# Patient Record
Sex: FEMALE | Race: WHITE | NOT HISPANIC OR LATINO | Employment: PART TIME | URBAN - METROPOLITAN AREA
[De-identification: names, ages, dates, MRNs, and addresses within clinical notes are randomized per-mention and may not be internally consistent; named-entity substitution may affect disease eponyms.]

---

## 2017-01-23 ENCOUNTER — ALLSCRIPTS OFFICE VISIT (OUTPATIENT)
Dept: OTHER | Facility: OTHER | Age: 47
End: 2017-01-23

## 2017-01-23 DIAGNOSIS — K91.2 POSTSURGICAL MALABSORPTION, NOT ELSEWHERE CLASSIFIED: ICD-10-CM

## 2017-01-23 DIAGNOSIS — K21.9 GASTRO-ESOPHAGEAL REFLUX DISEASE WITHOUT ESOPHAGITIS: ICD-10-CM

## 2017-01-23 DIAGNOSIS — E55.9 VITAMIN D DEFICIENCY: ICD-10-CM

## 2017-01-23 DIAGNOSIS — Z13.220 ENCOUNTER FOR SCREENING FOR LIPOID DISORDERS: ICD-10-CM

## 2017-01-23 DIAGNOSIS — G43.909 MIGRAINE WITHOUT STATUS MIGRAINOSUS, NOT INTRACTABLE: ICD-10-CM

## 2017-03-07 ENCOUNTER — GENERIC CONVERSION - ENCOUNTER (OUTPATIENT)
Dept: OTHER | Facility: OTHER | Age: 47
End: 2017-03-07

## 2017-03-30 ENCOUNTER — ALLSCRIPTS OFFICE VISIT (OUTPATIENT)
Dept: OTHER | Facility: OTHER | Age: 47
End: 2017-03-30

## 2017-06-29 ENCOUNTER — ALLSCRIPTS OFFICE VISIT (OUTPATIENT)
Dept: OTHER | Facility: OTHER | Age: 47
End: 2017-06-29

## 2017-06-29 ENCOUNTER — APPOINTMENT (OUTPATIENT)
Dept: LAB | Facility: CLINIC | Age: 47
End: 2017-06-29
Payer: COMMERCIAL

## 2017-06-29 ENCOUNTER — TRANSCRIBE ORDERS (OUTPATIENT)
Dept: LAB | Facility: CLINIC | Age: 47
End: 2017-06-29

## 2017-06-29 DIAGNOSIS — G43.909 MIGRAINE WITHOUT STATUS MIGRAINOSUS, NOT INTRACTABLE: ICD-10-CM

## 2017-06-29 DIAGNOSIS — E55.9 VITAMIN D DEFICIENCY: ICD-10-CM

## 2017-06-29 DIAGNOSIS — K91.2 POSTSURGICAL MALABSORPTION, NOT ELSEWHERE CLASSIFIED: ICD-10-CM

## 2017-06-29 DIAGNOSIS — K21.9 GASTRO-ESOPHAGEAL REFLUX DISEASE WITHOUT ESOPHAGITIS: ICD-10-CM

## 2017-06-29 DIAGNOSIS — Z13.220 ENCOUNTER FOR SCREENING FOR LIPOID DISORDERS: ICD-10-CM

## 2017-06-29 LAB
25(OH)D3 SERPL-MCNC: 29.8 NG/ML (ref 30–100)
ALBUMIN SERPL BCP-MCNC: 3.2 G/DL (ref 3.5–5)
ALP SERPL-CCNC: 88 U/L (ref 46–116)
ALT SERPL W P-5'-P-CCNC: 22 U/L (ref 12–78)
ANION GAP SERPL CALCULATED.3IONS-SCNC: 4 MMOL/L (ref 4–13)
AST SERPL W P-5'-P-CCNC: 21 U/L (ref 5–45)
BASOPHILS # BLD AUTO: 0.02 THOUSANDS/ΜL (ref 0–0.1)
BASOPHILS NFR BLD AUTO: 0 % (ref 0–1)
BILIRUB SERPL-MCNC: 0.24 MG/DL (ref 0.2–1)
BUN SERPL-MCNC: 8 MG/DL (ref 5–25)
CALCIUM SERPL-MCNC: 8.3 MG/DL (ref 8.3–10.1)
CHLORIDE SERPL-SCNC: 108 MMOL/L (ref 100–108)
CHOLEST SERPL-MCNC: 172 MG/DL (ref 50–200)
CO2 SERPL-SCNC: 26 MMOL/L (ref 21–32)
CREAT SERPL-MCNC: 0.6 MG/DL (ref 0.6–1.3)
EOSINOPHIL # BLD AUTO: 0.24 THOUSAND/ΜL (ref 0–0.61)
EOSINOPHIL NFR BLD AUTO: 3 % (ref 0–6)
ERYTHROCYTE [DISTWIDTH] IN BLOOD BY AUTOMATED COUNT: 14.4 % (ref 11.6–15.1)
FOLATE SERPL-MCNC: 18.8 NG/ML (ref 3.1–17.5)
GFR SERPL CREATININE-BSD FRML MDRD: >60 ML/MIN/1.73SQ M
GLUCOSE P FAST SERPL-MCNC: 79 MG/DL (ref 65–99)
HCT VFR BLD AUTO: 37.2 % (ref 34.8–46.1)
HDLC SERPL-MCNC: 61 MG/DL (ref 40–60)
HGB BLD-MCNC: 11.9 G/DL (ref 11.5–15.4)
LDLC SERPL CALC-MCNC: 97 MG/DL (ref 0–100)
LYMPHOCYTES # BLD AUTO: 2.32 THOUSANDS/ΜL (ref 0.6–4.47)
LYMPHOCYTES NFR BLD AUTO: 33 % (ref 14–44)
MCH RBC QN AUTO: 28.7 PG (ref 26.8–34.3)
MCHC RBC AUTO-ENTMCNC: 32 G/DL (ref 31.4–37.4)
MCV RBC AUTO: 90 FL (ref 82–98)
MONOCYTES # BLD AUTO: 0.5 THOUSAND/ΜL (ref 0.17–1.22)
MONOCYTES NFR BLD AUTO: 7 % (ref 4–12)
NEUTROPHILS # BLD AUTO: 3.87 THOUSANDS/ΜL (ref 1.85–7.62)
NEUTS SEG NFR BLD AUTO: 57 % (ref 43–75)
NRBC BLD AUTO-RTO: 0 /100 WBCS
PLATELET # BLD AUTO: 285 THOUSANDS/UL (ref 149–390)
PMV BLD AUTO: 10.1 FL (ref 8.9–12.7)
POTASSIUM SERPL-SCNC: 4 MMOL/L (ref 3.5–5.3)
PROT SERPL-MCNC: 7.2 G/DL (ref 6.4–8.2)
RBC # BLD AUTO: 4.15 MILLION/UL (ref 3.81–5.12)
SODIUM SERPL-SCNC: 138 MMOL/L (ref 136–145)
TRIGL SERPL-MCNC: 70 MG/DL
VIT B12 SERPL-MCNC: 256 PG/ML (ref 100–900)
WBC # BLD AUTO: 6.96 THOUSAND/UL (ref 4.31–10.16)

## 2017-06-29 PROCEDURE — 82607 VITAMIN B-12: CPT

## 2017-06-29 PROCEDURE — 84207 ASSAY OF VITAMIN B-6: CPT

## 2017-06-29 PROCEDURE — 80061 LIPID PANEL: CPT

## 2017-06-29 PROCEDURE — 82746 ASSAY OF FOLIC ACID SERUM: CPT

## 2017-06-29 PROCEDURE — 85025 COMPLETE CBC W/AUTO DIFF WBC: CPT

## 2017-06-29 PROCEDURE — 36415 COLL VENOUS BLD VENIPUNCTURE: CPT

## 2017-06-29 PROCEDURE — 80053 COMPREHEN METABOLIC PANEL: CPT

## 2017-06-29 PROCEDURE — 82306 VITAMIN D 25 HYDROXY: CPT

## 2017-06-29 PROCEDURE — 84425 ASSAY OF VITAMIN B-1: CPT

## 2017-07-02 LAB — VIT B1 BLD-SCNC: 206.6 NMOL/L (ref 66.5–200)

## 2017-07-03 LAB — VIT B6 SERPL-MCNC: 7.1 UG/L (ref 2–32.8)

## 2017-09-12 ENCOUNTER — GENERIC CONVERSION - ENCOUNTER (OUTPATIENT)
Dept: OTHER | Facility: OTHER | Age: 47
End: 2017-09-12

## 2017-10-12 ENCOUNTER — GENERIC CONVERSION - ENCOUNTER (OUTPATIENT)
Dept: OTHER | Facility: OTHER | Age: 47
End: 2017-10-12

## 2018-01-12 VITALS
TEMPERATURE: 97.9 F | HEIGHT: 65 IN | DIASTOLIC BLOOD PRESSURE: 94 MMHG | BODY MASS INDEX: 31.74 KG/M2 | RESPIRATION RATE: 15 BRPM | WEIGHT: 190.5 LBS | HEART RATE: 72 BPM | SYSTOLIC BLOOD PRESSURE: 146 MMHG

## 2018-01-12 NOTE — MISCELLANEOUS
Message   Date: 07 Mar 2017 9:37 AM EST, Recorded By: Fabio Pepe   Calling For: Rickie Walters   Caller: Chase Zapata, Self   Phone: [phone] (Home), [phone] (Work)   Reason: Medical Complaint   cancelled f/u appointment for today, has a migraine, requesting Sumatriptan RX, sent to pharmacy        Active Problems   1  Allergic rhinitis (477 9) (J30 9)  2  Cervical cancer screening (V76 2) (Z12 4)  3  Depression (311) (F32 9)  4  Encounter to establish care with new doctor (V65 8) (Z71 89)  5  GERD without esophagitis (530 81) (K21 9)  6  Insomnia (780 52) (G47 00)  7  Lipid screening (V77 91) (Z13 220)  8  Migraine, unspecified, not intractable, without status migrainosus (346 90) (G43 909)  9  Need for prophylactic vaccination and inoculation against influenza (V04 81) (Z23)  10  Pain syndrome, chronic (338 4) (G89 4)  11  Postgastrectomy malabsorption (579 3) (K91 2,Z90 3)  12  Visit for screening mammogram (V76 12) (Z12 31)  13  Vitamin D deficiency (268 9) (E55 9)  14  Weight gain (783 1) (R63 5)    Current Meds  1  B-12 2500 MCG Oral Tablet; 1 TAB DAILY; Therapy: (Brian Flores) to Recorded  2  Carafate 1 GM/10ML Oral Suspension; 1 gram twice a day as needed; Therapy: 44JQR9778 to (Last Rx:12Jan2017)  Requested for: 12Jan2017; Status: ACTIVE   - Transmit to Pharmacy - Awaiting Verification Ordered  3  Fruity Chewables Multivitamin CHEW;   Therapy: (Recorded:16Nov2015) to Recorded  4  Methadone HCl - 10 MG Oral Tablet; TAKE 2 TABLET 3 TIMES DAILY AS NEEDED FOR   PAIN;   Therapy: 21IAQ1355 to (Evaluate:22Mar2017); Last Rx:23Eia2566 Ordered  5  Necon 1/35 (28) 1-35 MG-MCG Oral Tablet; Therapy: 19BAB9198 to (Last QL:23YII0190)  Requested for: 69OIU7002 Ordered  6  PreviDent 5000 Sensitive 1 1-5 % Dental Paste; Therapy: 80HEH4905 to (Last Rx:01Feb2012)  Requested for: 01Feb2012 Ordered  7  Probiotic CAPS; Therapy: (Recorded:16Nov2015) to Recorded  8   Sertraline HCl - 100 MG Oral Tablet; take 3 tablets daily; Therapy: 91QIF3386 to (Evaluate:25Kfy0386)  Requested for: 82AJC4176; Last   Rx:25Nov2016 Ordered  9  Topiramate 50 MG Oral Tablet; TAKE 1 TABLET TWICE DAILY; Therapy: 21ZSV4750 to )  Requested for: 86MKT1362; Last   Rx:23Jan2017; Status: ACTIVE - Transmit to [facility] Verification Ordered  10  TraZODone HCl - 150 MG Oral Tablet; TAKE 1 TABLET AT BEDTIME; Therapy: 61ZYK0994 to (Evaluate:71Vhe4343)  Requested for: 50YJL3642; Last    Rx:01Mar2017 Ordered  11  Zolpidem Tartrate 5 MG Oral Tablet; TAKE ONE TABLET BY MOUTH AT BEDTIME AS    NEEDED FOR SLEEP; Therapy: 27XPR1948 to (Evaluate:23May2017); Last Rx:23Jan2017 Ordered    Allergies   1  Aspirin TABS  2  Morphine Derivatives  3  Stadol SOLN  4  Sulfa Drugs  5  Amoxicillin TABS  6  Azithromycin TABS  7  Macrodantin CAPS  8  Neurontin CAPS  9  Cymbalta CPEP    Plan  Migraine, unspecified, not intractable, without status migrainosus    · Renew: SUMAtriptan Succinate 100 MG Oral Tablet; TAKE 1 TABLET AT ONSET OF  MIGRAINE  MAY REPEAT ONCE AFTER 2 HOURS   MAX 2 DOSES/24 HOURS    Signatures   Electronically signed by : PAULA Flores ; Mar  7 2017 12:15PM EST                       (Author)

## 2018-01-12 NOTE — MISCELLANEOUS
Provider Comments  Provider Comments:   Patient NO SHOW on 4/7/2016 at 10:30am       Signatures   Electronically signed by : PAULA Harper ; Apr 18 2016  3:11PM EST                       (Author)

## 2018-01-13 VITALS
BODY MASS INDEX: 29.89 KG/M2 | DIASTOLIC BLOOD PRESSURE: 82 MMHG | TEMPERATURE: 98.7 F | SYSTOLIC BLOOD PRESSURE: 124 MMHG | HEIGHT: 65 IN | RESPIRATION RATE: 15 BRPM | HEART RATE: 68 BPM | WEIGHT: 179.38 LBS

## 2018-01-13 VITALS
HEIGHT: 65 IN | RESPIRATION RATE: 15 BRPM | DIASTOLIC BLOOD PRESSURE: 64 MMHG | WEIGHT: 168.38 LBS | HEART RATE: 72 BPM | BODY MASS INDEX: 28.06 KG/M2 | TEMPERATURE: 97.7 F | SYSTOLIC BLOOD PRESSURE: 112 MMHG

## 2018-01-13 NOTE — RESULT NOTES
Message      Recorded as Task   Date: 04/21/2016 10:18 AM, Created By: Mariel Auguste   Task Name: Follow Up   Assigned To: May Gordon   Regarding Patient: Zayda Vidal, Status: Active   Comment:    Brittani French - 21 Apr 2016 10:18 AM     TASK CREATED  Vit D level was borderline low  Sending in a prescription for vit D   Corina Cedillo - 21 Apr 2016 1:24 PM     TASK EDITED  Call patient and told her that her vitamin D was low and that the doctor send over a Rx

## 2018-01-14 NOTE — MISCELLANEOUS
Message  She called stating that this morning her right eye was crusty  It feels a little red  No vision problems  No discharge  I sent her olopatadine drops  She should call us if redness worsens or starts to have discharge  Symptoms at this time do not seem consistent with bacterial conjunctivitis  Plan  Allergic conjunctivitis of right eye    · Start: Olopatadine HCl - 0 1 % Ophthalmic Solution; INSTILL 1 DROP INTO AFFECTED  EYE(S) TWICE DAILY AS DIRECTED  Allergic rhinitis, Depression, GERD without esophagitis, Insomnia, Lipid screening,  Migraine, Pain syndrome, chronic, Status post gastric bypass for obesity    · Follow-up visit in 4 Months Evaluation and Treatment  Follow-up  Status: Complete  Done:  00LPM4974  Depression, Insomnia    · *1 - [facility] Physician Referral  Consult  Status: Active   Requested for: 13Apr2016  Behavioral Health Referral Questions : Patient requires outpatient Psychotherapy   assessment/intake appointment (with licensed therapist)  (MU) Care Summary provided  : Yes  Depression, Insomnia, Migraine, Status post gastric bypass for obesity, Vitamin D  deficiency    · (1) VITAMIN D 25-HYDROXY; Status:Active;  Requested for:13Apr2016;     Signatures   Electronically signed by : PAULA Duron ; Apr 22 2016 11:03AM EST                       (Author)

## 2018-01-22 VITALS
HEART RATE: 60 BPM | TEMPERATURE: 98.3 F | WEIGHT: 162 LBS | BODY MASS INDEX: 26.99 KG/M2 | HEIGHT: 65 IN | DIASTOLIC BLOOD PRESSURE: 82 MMHG | RESPIRATION RATE: 15 BRPM | SYSTOLIC BLOOD PRESSURE: 124 MMHG

## 2018-02-09 DIAGNOSIS — F41.9 ANXIETY: Primary | ICD-10-CM

## 2018-02-09 RX ORDER — ALPRAZOLAM 1 MG/1
TABLET ORAL
Qty: 5 TABLET | Refills: 0 | OUTPATIENT
Start: 2018-02-09 | End: 2018-03-27 | Stop reason: SDUPTHER

## 2018-02-09 RX ORDER — ALPRAZOLAM 1 MG/1
1 TABLET ORAL
COMMUNITY
Start: 2011-08-26 | End: 2018-02-09 | Stop reason: SDUPTHER

## 2018-02-14 ENCOUNTER — OFFICE VISIT (OUTPATIENT)
Dept: INTERNAL MEDICINE CLINIC | Facility: CLINIC | Age: 48
End: 2018-02-14
Payer: COMMERCIAL

## 2018-02-14 VITALS
WEIGHT: 154.6 LBS | RESPIRATION RATE: 16 BRPM | DIASTOLIC BLOOD PRESSURE: 90 MMHG | TEMPERATURE: 96.9 F | HEIGHT: 65 IN | BODY MASS INDEX: 25.76 KG/M2 | HEART RATE: 60 BPM | SYSTOLIC BLOOD PRESSURE: 130 MMHG

## 2018-02-14 DIAGNOSIS — F32.A DEPRESSION, UNSPECIFIED DEPRESSION TYPE: ICD-10-CM

## 2018-02-14 DIAGNOSIS — K91.2 POSTGASTRECTOMY MALABSORPTION: ICD-10-CM

## 2018-02-14 DIAGNOSIS — G89.4 PAIN SYNDROME, CHRONIC: Primary | ICD-10-CM

## 2018-02-14 DIAGNOSIS — F41.9 ANXIETY: ICD-10-CM

## 2018-02-14 DIAGNOSIS — Z90.3 POSTGASTRECTOMY MALABSORPTION: ICD-10-CM

## 2018-02-14 DIAGNOSIS — G43.909 MIGRAINE WITHOUT STATUS MIGRAINOSUS, NOT INTRACTABLE, UNSPECIFIED MIGRAINE TYPE: ICD-10-CM

## 2018-02-14 DIAGNOSIS — K21.9 GERD WITHOUT ESOPHAGITIS: ICD-10-CM

## 2018-02-14 PROCEDURE — 99214 OFFICE O/P EST MOD 30 MIN: CPT | Performed by: INTERNAL MEDICINE

## 2018-02-14 RX ORDER — TOPIRAMATE 50 MG/1
1 TABLET, FILM COATED ORAL 2 TIMES DAILY
COMMUNITY
Start: 2017-01-23 | End: 2018-09-19 | Stop reason: SDUPTHER

## 2018-02-14 RX ORDER — METHADONE HYDROCHLORIDE 10 MG/1
1 TABLET ORAL 3 TIMES DAILY
COMMUNITY
Start: 2012-05-23 | End: 2018-02-14 | Stop reason: SDUPTHER

## 2018-02-14 RX ORDER — TRAZODONE HYDROCHLORIDE 150 MG/1
1 TABLET ORAL
COMMUNITY
Start: 2012-03-08 | End: 2018-05-30 | Stop reason: SDUPTHER

## 2018-02-14 RX ORDER — ZOLPIDEM TARTRATE 5 MG/1
1 TABLET ORAL
COMMUNITY
Start: 2012-07-31 | End: 2018-04-26 | Stop reason: SDUPTHER

## 2018-02-14 RX ORDER — NICOTINE POLACRILEX 4 MG/1
1 GUM, CHEWING ORAL DAILY
COMMUNITY
Start: 2017-10-12 | End: 2019-04-18 | Stop reason: SDUPTHER

## 2018-02-14 RX ORDER — SUCRALFATE ORAL 1 G/10ML
SUSPENSION ORAL AS NEEDED
COMMUNITY
Start: 2011-03-08 | End: 2018-04-18 | Stop reason: SDUPTHER

## 2018-02-14 RX ORDER — SERTRALINE HYDROCHLORIDE 100 MG/1
1 TABLET, FILM COATED ORAL 3 TIMES DAILY
COMMUNITY
Start: 2012-07-31 | End: 2018-03-30 | Stop reason: SDUPTHER

## 2018-02-14 RX ORDER — MULTIVITAMIN
1 TABLET,CHEWABLE ORAL DAILY
COMMUNITY

## 2018-02-14 RX ORDER — METHADONE HYDROCHLORIDE 10 MG/1
10 TABLET ORAL 3 TIMES DAILY
Qty: 90 TABLET | Refills: 0 | Status: SHIPPED | OUTPATIENT
Start: 2018-02-14 | End: 2018-02-21 | Stop reason: SDUPTHER

## 2018-02-14 NOTE — PROGRESS NOTES
[facility] [place]      NAME: Ely Cantrell  AGE: 50 y o  SEX: female  : 1970   MRN: [id number]    DATE: 2018  TIME: 12:50 PM    Assessment and Plan   1  Anxiety    This mostly revolves around  Airplane travel  The patient uses an occasional alprazolam when she flies  2  Pain syndrome, chronic    Adequately controlled on current analgesics  - methadone (DOLOPHINE) 10 mg tablet; Take 1 tablet by mouth 3 (three) times a day As needed for pain,  Dispense: 90 tablet; Refill: 0    3  Migraine without status migrainosus, not intractable, unspecified migraine type   well controlled with topiramate    4  GERD without esophagitis    Generally controlled on omeprazole  The patient also uses sucralfate if need be  5  Postgastrectomy malabsorption    Bariatric vitamins surveillance was completed in 2017  She will be due again in the summer  6  Depression, unspecified depression type    Adequately controlled at the moment     the patient is doing generally well  She will be having several teeth extracted in the near future  There is no medical contraindication to this  She does not intend to use any additional pain medication after the procedure  She will continue her current medications and return for follow-up in 3 months  Return to office in:  3 months    Chief Complaint     Interval follow-up    History of Present Illness       The patient returns to the office for follow-up of migraine, esophageal reflux, anxiety and depression, chronic pain syndrome, and vitamin-D deficiency  Since her last visit she has been feeling generally well  She is going to have several teeth extracted in the near future  Thereafter, she is going to get a partial plate  She has had no chest pain, shortness of breath, palpitations, or dizziness  Her chronic pain is adequately controlled on methadone 10 mg 3 times daily  Her migraines are adequately controlled    She is tolerating her medication well      The following portions of the patient's history were reviewed and updated as appropriate: allergies, current medications, past family history, past medical history, past social history, past surgical history and problem list     Review of Systems   Review of Systems    Active Problem List     Patient Active Problem List   Diagnosis    Allergic rhinitis    Anxiety    Depression    GERD without esophagitis    Insomnia    Migraine, unspecified, not intractable, without status migrainosus    Pain syndrome, chronic    Postgastrectomy malabsorption    Vitamin D deficiency       Objective   /90 (BP Location: Left arm, Patient Position: Sitting, Cuff Size: Standard)   Pulse 60   Temp (!) 96 9 °F (36 1 °C) (Tympanic)   Resp 16   Ht 5' 5" (1 651 m)   Wt 70 1 kg (154 lb 9 6 oz)   BMI 25 73 kg/m²     Physical Exam    Pertinent Laboratory/Diagnostic Studies:    No recent lab work    Current Medications     Current Outpatient Prescriptions:     methadone (DOLOPHINE) 10 mg tablet, Take 1 tablet by mouth 3 (three) times a day As needed for pain,, Disp: 90 tablet, Rfl: 0    norethindrone-ethinyl estradiol (DASETTA 1/35) 1-35 MG-MCG per tablet, TAKE ONE TABLET BY MOUTH ONCE DAILY , Disp: , Rfl:     Omeprazole 20 MG TBEC, Take 1 capsule by mouth daily, Disp: , Rfl:     Pediatric Multiple Vit-C-FA (FRUITY CHEWABLES MULTIVITAMIN) CHEW, Chew, Disp: , Rfl:     sertraline (ZOLOFT) 100 mg tablet, Take 1 tablet by mouth 3 (three) times a day, Disp: , Rfl:     sucralfate (CARAFATE) 1 g/10 mL suspension, Take by mouth as needed, Disp: , Rfl:     topiramate (TOPAMAX) 50 MG tablet, Take 1 tablet by mouth 2 (two) times a day, Disp: , Rfl:     traZODone (DESYREL) 150 mg tablet, Take 1 tablet by mouth daily at bedtime, Disp: , Rfl:     zolpidem (AMBIEN) 5 mg tablet, Take 1 tablet by mouth daily at bedtime, Disp: , Rfl:     ALPRAZolam (XANAX) 1 mg tablet, 1 tablet every 6 hours as needed for anxiety (airplane), Disp: 5 tablet, Rfl: 0    Amber Bonilla MD

## 2018-02-21 DIAGNOSIS — G89.4 PAIN SYNDROME, CHRONIC: ICD-10-CM

## 2018-02-21 RX ORDER — METHADONE HYDROCHLORIDE 10 MG/1
10 TABLET ORAL 3 TIMES DAILY
Qty: 10 TABLET | Refills: 0 | Status: SHIPPED | OUTPATIENT
Start: 2018-02-21 | End: 2018-02-22 | Stop reason: SDUPTHER

## 2018-02-22 DIAGNOSIS — G89.4 PAIN SYNDROME, CHRONIC: ICD-10-CM

## 2018-02-22 RX ORDER — METHADONE HYDROCHLORIDE 10 MG/1
10 TABLET ORAL 3 TIMES DAILY
Qty: 180 TABLET | Refills: 0 | Status: SHIPPED | OUTPATIENT
Start: 2018-02-22 | End: 2018-03-23 | Stop reason: SDUPTHER

## 2018-03-23 DIAGNOSIS — G89.4 PAIN SYNDROME, CHRONIC: ICD-10-CM

## 2018-03-23 RX ORDER — METHADONE HYDROCHLORIDE 10 MG/1
TABLET ORAL
Qty: 180 TABLET | Refills: 0 | Status: SHIPPED | OUTPATIENT
Start: 2018-03-23 | End: 2018-04-18 | Stop reason: SDUPTHER

## 2018-03-27 DIAGNOSIS — F41.9 ANXIETY: ICD-10-CM

## 2018-03-28 RX ORDER — ALPRAZOLAM 1 MG/1
TABLET ORAL
Qty: 5 TABLET | Refills: 0 | Status: SHIPPED | OUTPATIENT
Start: 2018-03-28 | End: 2019-01-03 | Stop reason: SDUPTHER

## 2018-03-30 DIAGNOSIS — F32.A DEPRESSION, UNSPECIFIED DEPRESSION TYPE: Primary | ICD-10-CM

## 2018-04-02 RX ORDER — SERTRALINE HYDROCHLORIDE 100 MG/1
TABLET, FILM COATED ORAL
Qty: 90 TABLET | Refills: 2 | OUTPATIENT
Start: 2018-04-02 | End: 2018-07-12 | Stop reason: SDUPTHER

## 2018-04-18 ENCOUNTER — OFFICE VISIT (OUTPATIENT)
Dept: INTERNAL MEDICINE CLINIC | Facility: CLINIC | Age: 48
End: 2018-04-18
Payer: COMMERCIAL

## 2018-04-18 VITALS
TEMPERATURE: 98.3 F | HEART RATE: 76 BPM | SYSTOLIC BLOOD PRESSURE: 114 MMHG | DIASTOLIC BLOOD PRESSURE: 72 MMHG | HEIGHT: 65 IN | BODY MASS INDEX: 25.66 KG/M2 | RESPIRATION RATE: 15 BRPM | WEIGHT: 154 LBS

## 2018-04-18 DIAGNOSIS — F32.A DEPRESSION, UNSPECIFIED DEPRESSION TYPE: ICD-10-CM

## 2018-04-18 DIAGNOSIS — K91.2 POSTGASTRECTOMY MALABSORPTION: ICD-10-CM

## 2018-04-18 DIAGNOSIS — G89.4 PAIN SYNDROME, CHRONIC: ICD-10-CM

## 2018-04-18 DIAGNOSIS — Z90.3 POSTGASTRECTOMY MALABSORPTION: ICD-10-CM

## 2018-04-18 DIAGNOSIS — K21.9 GERD WITHOUT ESOPHAGITIS: Primary | ICD-10-CM

## 2018-04-18 DIAGNOSIS — E55.9 VITAMIN D DEFICIENCY: ICD-10-CM

## 2018-04-18 DIAGNOSIS — G43.909 MIGRAINE WITHOUT STATUS MIGRAINOSUS, NOT INTRACTABLE, UNSPECIFIED MIGRAINE TYPE: ICD-10-CM

## 2018-04-18 PROCEDURE — 99214 OFFICE O/P EST MOD 30 MIN: CPT | Performed by: INTERNAL MEDICINE

## 2018-04-18 RX ORDER — METRONIDAZOLE 500 MG/1
500 TABLET ORAL
COMMUNITY
Start: 2018-04-12 | End: 2018-04-19

## 2018-04-18 RX ORDER — METHADONE HYDROCHLORIDE 10 MG/1
TABLET ORAL
Qty: 180 TABLET | Refills: 0 | Status: SHIPPED | OUTPATIENT
Start: 2018-04-18 | End: 2018-04-26 | Stop reason: SDUPTHER

## 2018-04-18 RX ORDER — SUCRALFATE ORAL 1 G/10ML
1 SUSPENSION ORAL
Qty: 420 ML | Refills: 2 | Status: SHIPPED | OUTPATIENT
Start: 2018-04-18 | End: 2019-03-06 | Stop reason: SDUPTHER

## 2018-04-19 NOTE — PROGRESS NOTES
[facility] [place]      NAME: Ely Cantrell  AGE: 46 y o  SEX: female  : 1970   MRN: [id number]    DATE: 2018  TIME: 5:22 PM    Assessment and Plan   1  Pain syndrome, chronic    The patient's pain is significantly better on methadone than it had been before that  This allows her to function essentially without restrictions  The patient has been on a variety of non opioid therapies  She has had extensive evaluation in the past including pain management physicians here in [facility] and also a physician in [place] who did a nerve decompression procedure  This was only marginally helpful  The patient has tolerated methadone without ill effect  She was actually on a higher dose in the past and was able to tolerate that as well  She has not had any compliance issues  I discussed the possible use of no locks on with the patient but she did not think this was necessary since she has been on methadone for many years and has not had any problems  She  Is being monitored for ill affects  A urine drug screen was ordered  The patient uses no benzodiazepines except for a rare dose of alprazolam when she is flying on an airplane   - methadone (DOLOPHINE) 10 mg tablet; 1 tablet 3 times daily as needed for pain,  Dispense: 180 tablet; Refill: 0  - Toxicology screen, urine    2  GERD without esophagitis    Adequately controlled on omeprazole and sucralfate   - sucralfate (CARAFATE) 1 g/10 mL suspension; Take 10 mL (1 g total) by mouth 4 (four) times a day (with meals and at bedtime)  Dispense: 420 mL; Refill: 2    3  Depression, unspecified depression type   Stable on sertraline  4  Postgastrectomy malabsorption    The patient is doing well following bariatric surgery  She is due for her routine vitamin surveillance labs which have been ordered    - CBC  - Comprehensive metabolic panel  - Iron Saturation %  - Lipid panel  - Vitamin D 25 hydroxy  - Vitamin B12  - Folate  - Vitamin B1, whole blood; Future  - Vitamin B6; Future    5  Migraine without status migrainosus, not intractable, unspecified migraine type    Improved on Topamax  6  Vitamin D deficiency    On replacement  Overall, the patient is doing pretty well  She will continue with her current regimen  The [facility] prescription drug monitoring program has been reviewed and is reassuring  Return to office in: Three months  Chief Complaint     Interval follow-up  History of Present Illness       The patient returned to the office for re-evaluation of migraine, esophageal reflux, previous gastric bypass with post gastrectomy malabsorption, vitamin-D deficiency, and chronic pain syndrome  Since her last visit she has been doing reasonably well  She does have some pain but has been able to manage this with methadone 30 mg daily  In the past, she was taking as much as 60 mg daily  She has been on Topamax and this has been helpful both for her pain and for migraine  Her her migraines are currently in remission  She does have occasional reflux symptoms but is doing pretty well on  omeprazole and sucralfate  She is having no issues with her medications at present  The following portions of the patient's history were reviewed and updated as appropriate: allergies, current medications, past family history, past medical history, past social history, past surgical history and problem list     Review of Systems   Review of Systems   Constitutional: Negative  HENT: Negative for congestion, ear pain, postnasal drip, rhinorrhea, sore throat and trouble swallowing  Eyes: Negative for pain, discharge, redness and visual disturbance  Respiratory: Negative for cough, shortness of breath and wheezing  Cardiovascular: Negative  Gastrointestinal: Negative  Endocrine: Negative  Genitourinary: Negative for difficulty urinating, dysuria, frequency, hematuria and urgency     Musculoskeletal: Negative for arthralgias, gait problem, joint swelling and myalgias  Skin: Negative for rash  Neurological: Negative for dizziness, speech difficulty, weakness, light-headedness, numbness and headaches  Hematological: Negative  Psychiatric/Behavioral: Negative for confusion, decreased concentration, dysphoric mood and sleep disturbance  The patient is not nervous/anxious  Active Problem List     Patient Active Problem List   Diagnosis    Allergic rhinitis    Anxiety    Depression    GERD without esophagitis    Insomnia    Migraine, unspecified, not intractable, without status migrainosus    Pain syndrome, chronic    Postgastrectomy malabsorption    Vitamin D deficiency       Objective   /72 (BP Location: Left arm, Patient Position: Sitting, Cuff Size: Standard)   Pulse 76   Temp 98 3 °F (36 8 °C) (Tympanic)   Resp 15   Ht 5' 5" (1 651 m)   Wt 69 9 kg (154 lb)   BMI 25 63 kg/m²     Physical Exam   Constitutional: She is oriented to person, place, and time  She appears well-developed and well-nourished  No distress  HENT:   Head: Normocephalic and atraumatic  Neck: Neck supple  No JVD present  No tracheal deviation present  No thyromegaly present  Cardiovascular: Normal rate, regular rhythm, normal heart sounds and intact distal pulses  Exam reveals no gallop and no friction rub  No murmur heard  Pulmonary/Chest: Effort normal and breath sounds normal  She has no wheezes  She has no rales  She exhibits no tenderness  Abdominal: Soft  Bowel sounds are normal  She exhibits no distension and no mass  There is no tenderness  There is no rebound  Musculoskeletal: Normal range of motion  She exhibits no edema or tenderness  Lymphadenopathy:     She has no cervical adenopathy  Neurological: She is alert and oriented to person, place, and time  Skin: Skin is warm and dry  Psychiatric: She has a normal mood and affect   Her behavior is normal  Judgment and thought content normal  Pertinent Laboratory/Diagnostic Studies:No recent lab work        Current Medications     Current Outpatient Prescriptions:     methadone (DOLOPHINE) 10 mg tablet, 1 tablet 3 times daily as needed for pain,, Disp: 180 tablet, Rfl: 0    metroNIDAZOLE (FLAGYL) 500 mg tablet, Take 500 mg by mouth, Disp: , Rfl:     norethindrone-ethinyl estradiol (DASETTA 1/35) 1-35 MG-MCG per tablet, TAKE ONE TABLET BY MOUTH ONCE DAILY , Disp: , Rfl:     Omeprazole 20 MG TBEC, Take 1 capsule by mouth daily, Disp: , Rfl:     Pediatric Multiple Vit-C-FA (FRUITY CHEWABLES MULTIVITAMIN) CHEW, Chew, Disp: , Rfl:     sertraline (ZOLOFT) 100 mg tablet, Take 3 tablets once a day, Disp: 90 tablet, Rfl: 2    sucralfate (CARAFATE) 1 g/10 mL suspension, Take 10 mL (1 g total) by mouth 4 (four) times a day (with meals and at bedtime), Disp: 420 mL, Rfl: 2    topiramate (TOPAMAX) 50 MG tablet, Take 1 tablet by mouth 2 (two) times a day, Disp: , Rfl:     traZODone (DESYREL) 150 mg tablet, Take 1 tablet by mouth daily at bedtime, Disp: , Rfl:     zolpidem (AMBIEN) 5 mg tablet, Take 1 tablet by mouth daily at bedtime, Disp: , Rfl:     ALPRAZolam (XANAX) 1 mg tablet, TAKE ONE TABLET BY MOUTH EVERY 6 HOURS AS NEEDED FOR ANXIETY, Disp: 5 tablet, Rfl: 0      Alin Hilton MD

## 2018-04-25 ENCOUNTER — TRANSCRIBE ORDERS (OUTPATIENT)
Dept: ADMINISTRATIVE | Facility: HOSPITAL | Age: 48
End: 2018-04-25

## 2018-04-25 ENCOUNTER — APPOINTMENT (OUTPATIENT)
Dept: LAB | Facility: MEDICAL CENTER | Age: 48
End: 2018-04-25
Payer: COMMERCIAL

## 2018-04-25 DIAGNOSIS — Z90.3 POSTGASTRECTOMY MALABSORPTION: ICD-10-CM

## 2018-04-25 DIAGNOSIS — K91.2 POSTGASTRECTOMY MALABSORPTION: ICD-10-CM

## 2018-04-25 LAB
25(OH)D3 SERPL-MCNC: 17 NG/ML (ref 30–100)
ALBUMIN SERPL BCP-MCNC: 3.3 G/DL (ref 3.5–5)
ALP SERPL-CCNC: 86 U/L (ref 46–116)
ALT SERPL W P-5'-P-CCNC: 28 U/L (ref 12–78)
ANION GAP SERPL CALCULATED.3IONS-SCNC: 5 MMOL/L (ref 4–13)
AST SERPL W P-5'-P-CCNC: 27 U/L (ref 5–45)
BILIRUB SERPL-MCNC: 0.44 MG/DL (ref 0.2–1)
BUN SERPL-MCNC: 13 MG/DL (ref 5–25)
CALCIUM SERPL-MCNC: 8.1 MG/DL (ref 8.3–10.1)
CHLORIDE SERPL-SCNC: 109 MMOL/L (ref 100–108)
CHOLEST SERPL-MCNC: 137 MG/DL (ref 50–200)
CO2 SERPL-SCNC: 25 MMOL/L (ref 21–32)
CREAT SERPL-MCNC: 0.61 MG/DL (ref 0.6–1.3)
ERYTHROCYTE [DISTWIDTH] IN BLOOD BY AUTOMATED COUNT: 14.6 % (ref 11.6–15.1)
FOLATE SERPL-MCNC: >20 NG/ML (ref 3.1–17.5)
GFR SERPL CREATININE-BSD FRML MDRD: 106 ML/MIN/1.73SQ M
GLUCOSE P FAST SERPL-MCNC: 86 MG/DL (ref 65–99)
HCT VFR BLD AUTO: 39 % (ref 34.8–46.1)
HDLC SERPL-MCNC: 60 MG/DL (ref 40–60)
HGB BLD-MCNC: 11.7 G/DL (ref 11.5–15.4)
IRON SATN MFR SERPL: 17 %
IRON SERPL-MCNC: 79 UG/DL (ref 50–170)
LDLC SERPL CALC-MCNC: 59 MG/DL (ref 0–100)
MCH RBC QN AUTO: 26 PG (ref 26.8–34.3)
MCHC RBC AUTO-ENTMCNC: 30 G/DL (ref 31.4–37.4)
MCV RBC AUTO: 87 FL (ref 82–98)
NONHDLC SERPL-MCNC: 77 MG/DL
PLATELET # BLD AUTO: 287 THOUSANDS/UL (ref 149–390)
PMV BLD AUTO: 9.7 FL (ref 8.9–12.7)
POTASSIUM SERPL-SCNC: 4.5 MMOL/L (ref 3.5–5.3)
PROT SERPL-MCNC: 7.2 G/DL (ref 6.4–8.2)
RBC # BLD AUTO: 4.5 MILLION/UL (ref 3.81–5.12)
SODIUM SERPL-SCNC: 139 MMOL/L (ref 136–145)
TIBC SERPL-MCNC: 459 UG/DL (ref 250–450)
TRIGL SERPL-MCNC: 91 MG/DL
VIT B12 SERPL-MCNC: 218 PG/ML (ref 100–900)
WBC # BLD AUTO: 6.87 THOUSAND/UL (ref 4.31–10.16)

## 2018-04-25 PROCEDURE — 85027 COMPLETE CBC AUTOMATED: CPT | Performed by: INTERNAL MEDICINE

## 2018-04-25 PROCEDURE — 82607 VITAMIN B-12: CPT | Performed by: INTERNAL MEDICINE

## 2018-04-25 PROCEDURE — 36415 COLL VENOUS BLD VENIPUNCTURE: CPT | Performed by: INTERNAL MEDICINE

## 2018-04-25 PROCEDURE — 84207 ASSAY OF VITAMIN B-6: CPT

## 2018-04-25 PROCEDURE — 82746 ASSAY OF FOLIC ACID SERUM: CPT | Performed by: INTERNAL MEDICINE

## 2018-04-25 PROCEDURE — 84425 ASSAY OF VITAMIN B-1: CPT

## 2018-04-25 PROCEDURE — 83550 IRON BINDING TEST: CPT | Performed by: INTERNAL MEDICINE

## 2018-04-25 PROCEDURE — 82306 VITAMIN D 25 HYDROXY: CPT | Performed by: INTERNAL MEDICINE

## 2018-04-25 PROCEDURE — 83540 ASSAY OF IRON: CPT | Performed by: INTERNAL MEDICINE

## 2018-04-25 PROCEDURE — 80061 LIPID PANEL: CPT | Performed by: INTERNAL MEDICINE

## 2018-04-25 PROCEDURE — 80053 COMPREHEN METABOLIC PANEL: CPT | Performed by: INTERNAL MEDICINE

## 2018-04-25 PROCEDURE — 80307 DRUG TEST PRSMV CHEM ANLYZR: CPT | Performed by: INTERNAL MEDICINE

## 2018-04-26 DIAGNOSIS — F51.01 PRIMARY INSOMNIA: Primary | ICD-10-CM

## 2018-04-26 DIAGNOSIS — G89.4 PAIN SYNDROME, CHRONIC: ICD-10-CM

## 2018-04-27 RX ORDER — ZOLPIDEM TARTRATE 5 MG/1
5 TABLET ORAL
Qty: 30 TABLET | Refills: 0 | Status: SHIPPED | OUTPATIENT
Start: 2018-04-27 | End: 2018-05-30 | Stop reason: SDUPTHER

## 2018-04-27 RX ORDER — METHADONE HYDROCHLORIDE 10 MG/1
TABLET ORAL
Qty: 90 TABLET | Refills: 0 | Status: SHIPPED | OUTPATIENT
Start: 2018-04-27 | End: 2018-05-30 | Stop reason: SDUPTHER

## 2018-04-29 LAB — VIT B6 SERPL-MCNC: 6.6 UG/L (ref 2–32.8)

## 2018-04-30 LAB
AMPHETAMINES UR QL SCN: NEGATIVE NG/ML
BARBITURATES UR QL SCN: NEGATIVE NG/ML
BENZODIAZ UR QL SCN: NEGATIVE NG/ML
BZE UR QL: NEGATIVE NG/ML
CANNABINOIDS UR QL SCN: NEGATIVE NG/ML
METHADONE UR QL SCN: POSITIVE
OPIATES UR QL: NEGATIVE NG/ML
PCP UR QL: NEGATIVE NG/ML
PROPOXYPH UR QL: NEGATIVE NG/ML
VIT B1 BLD-SCNC: 172.3 NMOL/L (ref 66.5–200)

## 2018-05-10 ENCOUNTER — TELEPHONE (OUTPATIENT)
Dept: INTERNAL MEDICINE CLINIC | Facility: CLINIC | Age: 48
End: 2018-05-10

## 2018-05-10 DIAGNOSIS — E55.9 VITAMIN D DEFICIENCY: Primary | ICD-10-CM

## 2018-05-10 NOTE — TELEPHONE ENCOUNTER
The patient called asking for her lab results  As per Dr Chapo Haro everything looked pretty good except that her Vitamin D was low  She should start taking Vitamin D 1,000 u daily      The patient understood and will start taking this

## 2018-05-14 ENCOUNTER — OFFICE VISIT (OUTPATIENT)
Dept: INTERNAL MEDICINE CLINIC | Facility: CLINIC | Age: 48
End: 2018-05-14
Payer: COMMERCIAL

## 2018-05-14 VITALS
RESPIRATION RATE: 15 BRPM | WEIGHT: 149.6 LBS | HEIGHT: 65 IN | HEART RATE: 68 BPM | BODY MASS INDEX: 24.93 KG/M2 | TEMPERATURE: 98.3 F | DIASTOLIC BLOOD PRESSURE: 78 MMHG | SYSTOLIC BLOOD PRESSURE: 120 MMHG

## 2018-05-14 DIAGNOSIS — R20.0 NUMBNESS AND TINGLING: ICD-10-CM

## 2018-05-14 DIAGNOSIS — R07.81 RIB PAIN: Primary | ICD-10-CM

## 2018-05-14 DIAGNOSIS — R20.2 NUMBNESS AND TINGLING: ICD-10-CM

## 2018-05-14 PROCEDURE — 99213 OFFICE O/P EST LOW 20 MIN: CPT | Performed by: INTERNAL MEDICINE

## 2018-05-14 NOTE — PROGRESS NOTES
Assessment/Plan:    No problem-specific Assessment & Plan notes found for this encounter  Likely inflammation, versus contusion  Will get an x-ray to rule out any significant abnormality  Topical lidocaine, ice application  Unclear as the cause of her tingling on the thumb  Negative Tinel sign  Continue monitoring at this time  If symptoms do not improve, EMG would be considered  Diagnoses and all orders for this visit:    Rib pain  -     XR ribs bilateral 4+ vw w pa chest; Future    Numbness and tingling          Subjective:   Chief Complaint   Patient presents with    Rib Pain     Right side        Patient ID: Guevara Alvarez is a 46 y o  female  She comes in for an acute appointment  She notes that yesterday she was at her daughter's graduation party and was choking on a meatball that she was unable to chew properly  Two people had to give her the Heimlich maneuver before the meatball piece dislodged and she felt better  Last night she started having pain in the right mid back  Pain is worse when she takes a deep breath or coughs or sneezes  No falls  She tried her regular methadone that she uses for chronic pain without any benefit  She is also concerned about tingling and numbness on her right thumb which has been going on for a week or so  Initially thought that it was because of a bug bite but it has not completely resolved  No pain or difficulty in mobility  The following portions of the patient's history were reviewed and updated as appropriate: current medications, past medical history, past social history and past surgical history      PHQ-9 Depression Screening    PHQ-9:    Frequency of the following problems over the past two weeks:                Current Outpatient Prescriptions:     ALPRAZolam (XANAX) 1 mg tablet, TAKE ONE TABLET BY MOUTH EVERY 6 HOURS AS NEEDED FOR ANXIETY, Disp: 5 tablet, Rfl: 0    methadone (DOLOPHINE) 10 mg tablet, 1 tablet 3 times daily as needed for pain, Earliest Fill Date: 4/27/18, Disp: 90 tablet, Rfl: 0    norethindrone-ethinyl estradiol (DASETTA 1/35) 1-35 MG-MCG per tablet, TAKE ONE TABLET BY MOUTH ONCE DAILY , Disp: , Rfl:     Omeprazole 20 MG TBEC, Take 1 capsule by mouth daily, Disp: , Rfl:     Pediatric Multiple Vit-C-FA (FRUITY CHEWABLES MULTIVITAMIN) CHEW, Chew, Disp: , Rfl:     sertraline (ZOLOFT) 100 mg tablet, Take 3 tablets once a day, Disp: 90 tablet, Rfl: 2    sucralfate (CARAFATE) 1 g/10 mL suspension, Take 10 mL (1 g total) by mouth 4 (four) times a day (with meals and at bedtime), Disp: 420 mL, Rfl: 2    topiramate (TOPAMAX) 50 MG tablet, Take 1 tablet by mouth 2 (two) times a day, Disp: , Rfl:     traZODone (DESYREL) 150 mg tablet, Take 1 tablet by mouth daily at bedtime, Disp: , Rfl:     zolpidem (AMBIEN) 5 mg tablet, Take 1 tablet (5 mg total) by mouth daily at bedtime, Disp: 30 tablet, Rfl: 0    Review of Systems   Constitutional: Negative for fatigue, fever and unexpected weight change  HENT: Negative for ear pain, hearing loss and sore throat  Eyes: Negative for pain and discharge  Respiratory: Negative for cough, chest tightness and shortness of breath  Cardiovascular: Negative for chest pain and palpitations  Gastrointestinal: Negative for abdominal pain, blood in stool, constipation, diarrhea and nausea  Genitourinary: Negative for dysuria, frequency and hematuria  Musculoskeletal: Positive for arthralgias  Negative for joint swelling  Skin: Negative for rash  Allergic/Immunologic: Negative for immunocompromised state  Neurological: Negative for dizziness and headaches  Hematological: Negative for adenopathy  Psychiatric/Behavioral: Negative for confusion and sleep disturbance           Objective:  /78 (BP Location: Left arm, Patient Position: Sitting, Cuff Size: Standard)   Pulse 68   Temp 98 3 °F (36 8 °C)   Resp 15   Ht 5' 5" (1 651 m)   Wt 67 9 kg (149 lb 9 6 oz)   BMI 24 89 kg/m²      Physical Exam   Constitutional: She appears well-developed and well-nourished  HENT:   Head: Normocephalic and atraumatic  Right Ear: Tympanic membrane normal    Left Ear: Tympanic membrane normal    Nose: Nose normal    Mouth/Throat: Oropharynx is clear and moist  No posterior oropharyngeal edema or posterior oropharyngeal erythema  Eyes: Conjunctivae are normal  Pupils are equal, round, and reactive to light  Right eye exhibits no discharge  Neck: Normal range of motion  Neck supple  No thyromegaly present  Cardiovascular: Normal rate, regular rhythm, S1 normal, S2 normal and normal heart sounds  PMI is not displaced  No murmur heard  Pulmonary/Chest: Effort normal and breath sounds normal  No accessory muscle usage  No apnea  No respiratory distress  She has no rhonchi  She has no rales  Abdominal: Soft  Normal appearance and bowel sounds are normal  She exhibits no shifting dullness  There is no hepatosplenomegaly  There is no tenderness  There is no rebound and no CVA tenderness  Musculoskeletal: Normal range of motion  She exhibits no edema or tenderness  Left wrist: She exhibits no tenderness, no swelling and no effusion  Back:    Lymphadenopathy:     She has no cervical adenopathy  Neurological: She is alert  Skin: Skin is warm and intact  No rash noted  Psychiatric: She has a normal mood and affect  Her speech is normal    Nursing note and vitals reviewed          Recent Results (from the past 1008 hour(s))   CBC    Collection Time: 04/25/18  5:51 PM   Result Value Ref Range    WBC 6 87 4 31 - 10 16 Thousand/uL    RBC 4 50 3 81 - 5 12 Million/uL    Hemoglobin 11 7 11 5 - 15 4 g/dL    Hematocrit 39 0 34 8 - 46 1 %    MCV 87 82 - 98 fL    MCH 26 0 (L) 26 8 - 34 3 pg    MCHC 30 0 (L) 31 4 - 37 4 g/dL    RDW 14 6 11 6 - 15 1 %    Platelets 830 730 - 913 Thousands/uL    MPV 9 7 8 9 - 12 7 fL   Comprehensive metabolic panel    Collection Time: 04/25/18  5:51 PM   Result Value Ref Range    Sodium 139 136 - 145 mmol/L    Potassium 4 5 3 5 - 5 3 mmol/L    Chloride 109 (H) 100 - 108 mmol/L    CO2 25 21 - 32 mmol/L    Anion Gap 5 4 - 13 mmol/L    BUN 13 5 - 25 mg/dL    Creatinine 0 61 0 60 - 1 30 mg/dL    Glucose, Fasting 86 65 - 99 mg/dL    Calcium 8 1 (L) 8 3 - 10 1 mg/dL    AST 27 5 - 45 U/L    ALT 28 12 - 78 U/L    Alkaline Phosphatase 86 46 - 116 U/L    Total Protein 7 2 6 4 - 8 2 g/dL    Albumin 3 3 (L) 3 5 - 5 0 g/dL    Total Bilirubin 0 44 0 20 - 1 00 mg/dL    eGFR 106 ml/min/1 73sq m   Iron Saturation %    Collection Time: 04/25/18  5:51 PM   Result Value Ref Range    Iron Saturation 17 %    TIBC 459 (H) 250 - 450 ug/dL    Iron 79 50 - 170 ug/dL   Lipid panel    Collection Time: 04/25/18  5:51 PM   Result Value Ref Range    Cholesterol 137 50 - 200 mg/dL    Triglycerides 91 <=150 mg/dL    HDL, Direct 60 40 - 60 mg/dL    LDL Calculated 59 0 - 100 mg/dL    Non-HDL-Chol (CHOL-HDL) 77 mg/dl   Vitamin D 25 hydroxy    Collection Time: 04/25/18  5:51 PM   Result Value Ref Range    Vit D, 25-Hydroxy 17 0 (L) 30 0 - 100 0 ng/mL   Vitamin B12    Collection Time: 04/25/18  5:51 PM   Result Value Ref Range    Vitamin B-12 218 100 - 900 pg/mL   Folate    Collection Time: 04/25/18  5:51 PM   Result Value Ref Range    Folate >20 0 (H) 3 1 - 17 5 ng/mL   Toxicology screen, urine    Collection Time: 04/25/18  5:51 PM   Result Value Ref Range    Amphetamine Screen, Ur Negative Bdevde=9447 ng/mL    Barbiturate Screen, Ur Negative Eyuuwr=901 ng/mL    Benzodiazepine Screen, Urine Negative Spxjoc=378 ng/mL    Cannabinoid Scrn, Ur Negative Cutoff=50 ng/mL    Methadone Screen, Urine Positive (A) Nvtazn=162    Opiate Scrn, Ur Negative Couqqj=476 ng/mL    Phencyclidine (PCP), Qual, Ur Negative Cutoff=25 ng/mL    Propoxyphene, Screen Negative Ueuqie=506 ng/mL    Cocaine (Metab ) Urine Negative Qyydam=853 ng/mL   Vitamin B1, whole blood    Collection Time: 04/25/18  5:51 PM   Result Value Ref Range    Vitamin B1, Whole Blood 172 3 66 5 - 200 0 nmol/L   Vitamin B6    Collection Time: 04/25/18  5:51 PM   Result Value Ref Range    Vitamin B6 6 6 2 0 - 32 8 ug/L   ]    No results found

## 2018-05-29 ENCOUNTER — TELEPHONE (OUTPATIENT)
Dept: INTERNAL MEDICINE CLINIC | Facility: CLINIC | Age: 48
End: 2018-05-29

## 2018-05-30 DIAGNOSIS — F51.01 PRIMARY INSOMNIA: ICD-10-CM

## 2018-05-30 DIAGNOSIS — G89.4 PAIN SYNDROME, CHRONIC: ICD-10-CM

## 2018-05-31 RX ORDER — METHADONE HYDROCHLORIDE 10 MG/1
TABLET ORAL
Qty: 90 TABLET | Refills: 0 | Status: SHIPPED | OUTPATIENT
Start: 2018-05-31 | End: 2018-07-02 | Stop reason: SDUPTHER

## 2018-05-31 RX ORDER — ZOLPIDEM TARTRATE 5 MG/1
5 TABLET ORAL
Qty: 90 TABLET | Refills: 1 | Status: SHIPPED | OUTPATIENT
Start: 2018-05-31 | End: 2018-11-28 | Stop reason: SDUPTHER

## 2018-05-31 RX ORDER — TRAZODONE HYDROCHLORIDE 150 MG/1
150 TABLET ORAL
Qty: 90 TABLET | Refills: 1 | Status: SHIPPED | OUTPATIENT
Start: 2018-05-31 | End: 2018-09-04 | Stop reason: ALTCHOICE

## 2018-06-06 ENCOUNTER — CLINICAL SUPPORT (OUTPATIENT)
Dept: INTERNAL MEDICINE CLINIC | Facility: CLINIC | Age: 48
End: 2018-06-06
Payer: COMMERCIAL

## 2018-06-06 DIAGNOSIS — Z11.1 SCREENING FOR TUBERCULOSIS: Primary | ICD-10-CM

## 2018-06-06 PROCEDURE — 86580 TB INTRADERMAL TEST: CPT | Performed by: INTERNAL MEDICINE

## 2018-06-08 LAB
INDURATION: 0 MM
TB SKIN TEST: NEGATIVE

## 2018-06-14 RX ORDER — MELATONIN
1000 DAILY
Qty: 30 TABLET | Refills: 0
Start: 2018-06-14 | End: 2021-06-16

## 2018-06-18 ENCOUNTER — APPOINTMENT (OUTPATIENT)
Dept: RADIOLOGY | Facility: CLINIC | Age: 48
End: 2018-06-18
Payer: COMMERCIAL

## 2018-06-18 ENCOUNTER — OFFICE VISIT (OUTPATIENT)
Dept: OBGYN CLINIC | Facility: CLINIC | Age: 48
End: 2018-06-18
Payer: COMMERCIAL

## 2018-06-18 VITALS
HEIGHT: 65 IN | SYSTOLIC BLOOD PRESSURE: 123 MMHG | WEIGHT: 149.2 LBS | DIASTOLIC BLOOD PRESSURE: 76 MMHG | BODY MASS INDEX: 24.86 KG/M2 | HEART RATE: 69 BPM

## 2018-06-18 DIAGNOSIS — R52 PAIN: ICD-10-CM

## 2018-06-18 DIAGNOSIS — G56.30 WARTENBERG SYNDROME: Primary | ICD-10-CM

## 2018-06-18 PROCEDURE — 73140 X-RAY EXAM OF FINGER(S): CPT

## 2018-06-18 PROCEDURE — 99203 OFFICE O/P NEW LOW 30 MIN: CPT | Performed by: ORTHOPAEDIC SURGERY

## 2018-06-18 NOTE — PROGRESS NOTES
ASSESSMENT/PLAN:    Diagnoses and all orders for this visit:    Wartenberg syndrome  -     XR thumb right first digit-min 2v; Future  -     Ambulatory referral to PT/OT hand therapy; Future  -     Ambulatory referral to PT/OT hand therapy; Future        Assessment:   Wartenberg syndrome     Plan:   Therapy with modalities as needed  Custom Nighttime thumb spica splinting made by the OT was discussed with the patient today  Follow Up:  2  month(s)    To Do Next Visit:       General Discussions:       Operative Discussions:         _____________________________________________________  CHIEF COMPLAINT:  Chief Complaint   Patient presents with    Right Thumb - Pain         SUBJECTIVE:  Guevara Alvarez is a 46y o  year old female who presents with Numbness, stiffness/loss of ROM and swelling to the right thumb  Pt states that sometimes her thumb will turn blue-vaibhav in color  This started  2 month(s) ago as Insidious  Pt states that she notices that after she was gardening she noticed this pain and constant dorsal sided thumb numbness     Radiation: Yes to the  thumb  Previous Treatments: Resume activities as tolerated without relief  Associated symptoms: No Complaints    PAST MEDICAL HISTORY:  Past Medical History:   Diagnosis Date    Facial cellulitis     Fracture, cuboid     LAST ASSESSED: 3/26/15    Gastrojejunal ulcer     ANASTOMOTIC    Hypertension     Insomnia     Iron deficiency anemia     LAST ASSESSED: AND RESOLVED: 4/13/16       PAST SURGICAL HISTORY:  Past Surgical History:   Procedure Laterality Date    ABDOMINOPLASTY      GASTRIC BYPASS      FOR MORBID OBESITY       FAMILY HISTORY:  Family History   Problem Relation Age of Onset    Diabetes Family         MELLITUS    Cancer Family     Hypertension Family     Osteoporosis Family     Asthma Family     Stroke Family         SYNDROME    Breast cancer Mother     COPD Father     Asthma Child        SOCIAL HISTORY:  Social History   Substance Use Topics    Smoking status: Never Smoker    Smokeless tobacco: Never Used    Alcohol use No      Comment: CONSUMES ALCOHOL OCCASIONALLY       MEDICATIONS:    Current Outpatient Prescriptions:     ALPRAZolam (XANAX) 1 mg tablet, TAKE ONE TABLET BY MOUTH EVERY 6 HOURS AS NEEDED FOR ANXIETY, Disp: 5 tablet, Rfl: 0    cholecalciferol (VITAMIN D3) 1,000 units tablet, Take 1 tablet (1,000 Units total) by mouth daily, Disp: 30 tablet, Rfl: 0    methadone (DOLOPHINE) 10 mg tablet, 1 tablet 3 times daily as needed for pain,, Disp: 90 tablet, Rfl: 0    norethindrone-ethinyl estradiol (DASETTA 1/35) 1-35 MG-MCG per tablet, TAKE ONE TABLET BY MOUTH ONCE DAILY , Disp: , Rfl:     Omeprazole 20 MG TBEC, Take 1 capsule by mouth daily, Disp: , Rfl:     Pediatric Multiple Vit-C-FA (FRUITY CHEWABLES MULTIVITAMIN) CHEW, Chew, Disp: , Rfl:     sertraline (ZOLOFT) 100 mg tablet, Take 3 tablets once a day, Disp: 90 tablet, Rfl: 2    sucralfate (CARAFATE) 1 g/10 mL suspension, Take 10 mL (1 g total) by mouth 4 (four) times a day (with meals and at bedtime), Disp: 420 mL, Rfl: 2    topiramate (TOPAMAX) 50 MG tablet, Take 1 tablet by mouth 2 (two) times a day, Disp: , Rfl:     traZODone (DESYREL) 150 mg tablet, Take 1 tablet (150 mg total) by mouth daily at bedtime, Disp: 90 tablet, Rfl: 1    zolpidem (AMBIEN) 5 mg tablet, Take 1 tablet (5 mg total) by mouth daily at bedtime, Disp: 90 tablet, Rfl: 1    ALLERGIES:  Allergies   Allergen Reactions    Amoxicillin Hives and Shortness Of Breath    Butorphanol Anaphylaxis and Other (See Comments)     Other reaction(s): BUTORPHANOL TARTRATE (STADOL) (loss of breath)    Aspirin Hives and Other (See Comments)    Duloxetine     Morphine Hallucinations and Other (See Comments)     takes vicodin at home    Gabapentin Palpitations    Nitrofurantoin Hives and Rash    Sulfa Antibiotics Hives, Rash and Other (See Comments)       REVIEW OF SYSTEMS:  Pertinent items are noted in HPI     LABS:  HgA1c: No results found for: HGBA1C  BMP:   Lab Results   Component Value Date    CALCIUM 8 1 (L) 04/25/2018     04/25/2018    K 4 5 04/25/2018    CO2 25 04/25/2018     (H) 04/25/2018    BUN 13 04/25/2018    CREATININE 0 61 04/25/2018         _____________________________________________________  PHYSICAL EXAMINATION:  General: well developed and well nourished, alert, oriented times 3 and appears comfortable  Psychiatric: Normal  HEENT: Trachea Midline, No torticollis  Cardiovascular: No discernable arrhythmia  Pulmonary: No wheezing or stridor  Skin: No masses, erthema, lacerations, fluctation, ulcerations  Neurovascular: Decreased Sensation to  the Median Nerve, Decreased Sensation to  the Radial Nerve, Motor Intact to the Median, Ulnar, Radial Nerve and Pulses Intact    MUSCULOSKELETAL EXAMINATION:  RIGHT SIDE:  Finger:  thumb ROM is full, negative CMC tenderness, negative tinels at the wrist, no triggering, IP, MP and CMC jt to valgus and varus dorsal and volar translation, tinels to superficial radial sensory branch   negative Finklesteins    _____________________________________________________  STUDIES REVIEWED:  Images were reviewd in PACS: minimal arthritic change noted in R thumb CMC jt       PROCEDURES PERFORMED:  Procedures  No Procedures performed today   Scribe Attestation    I,:   Ji Wu am acting as a scribe while in the presence of the attending physician :        I,:   Nupur Goldstein MD personally performed the services described in this documentation    as scribed in my presence :

## 2018-06-25 ENCOUNTER — EVALUATION (OUTPATIENT)
Dept: OCCUPATIONAL THERAPY | Facility: MEDICAL CENTER | Age: 48
End: 2018-06-25
Payer: COMMERCIAL

## 2018-06-25 DIAGNOSIS — G56.30 WARTENBERG SYNDROME: ICD-10-CM

## 2018-06-25 DIAGNOSIS — G58.9 ENTRAPMENT SYNDROME: Primary | ICD-10-CM

## 2018-06-25 PROCEDURE — G8985 CARRY GOAL STATUS: HCPCS | Performed by: OCCUPATIONAL THERAPIST

## 2018-06-25 PROCEDURE — 97166 OT EVAL MOD COMPLEX 45 MIN: CPT | Performed by: OCCUPATIONAL THERAPIST

## 2018-06-25 PROCEDURE — G8984 CARRY CURRENT STATUS: HCPCS | Performed by: OCCUPATIONAL THERAPIST

## 2018-06-25 NOTE — PROGRESS NOTES
OT Evaluation     Today's date: 2018  Patient name: Terri Moore  : 1970  MRN: [id number]  Referring provider: Julius Jones MD  Dx:   Encounter Diagnosis     ICD-10-CM    1  Entrapment syndrome G58 9    2  Wartenberg syndrome G58 9 Ambulatory referral to PT/OT hand therapy                  Assessment  Impairments: abnormal or restricted ROM, activity intolerance, impaired physical strength, lacks appropriate home exercise program and pain with function  Functional limitations: Pt  has pain with lifting and pulling with R hand, she is unable to start the lawnmower  pain with hand writing  Assessment details: Pt  Presenting with pain and numbness of R thumb dorsally due to +Wartenberg's syndrome dx  Pt  To benefit from occupational therapy for ROM, strengthening, neuro re-education and custom splinting for protection/rest     Understanding of Dx/Px/POC: excellent  Goals  STG(6 visits)  1  Decrease pain to 2/10 with hand function  2  Compliant with splint/HEP for ROM/protection  3  Increase /pinch strength by 8lbs    LTG(12 visits)  1  Decrease pain to 0/10 with hand function  2  Improve FOTO score by 20%    Plan  Patient would benefit from: skilled occupational therapy  Planned modality interventions: low level laser therapy, thermotherapy: hydrocollator packs and fluidotherapy  Planned therapy interventions: manual therapy, massage, neuromuscular re-education, therapeutic exercise, home exercise program, graded exercise, functional ROM exercises and fine motor coordination training  Frequency: 2x week  Treatment plan discussed with: patient        Subjective Evaluation    History of Present Illness  Date of onset: 2018  Mechanism of injury: Pt  Started experiencing N/T dorsum R thumb which has progressed within the last few weeks  Pt  Was referred to Dr Justice Farah where she was diagnosed with Wartenberg's syndrome of R thumb  Pt   Has N/T radiating dorsally over thumb with pain and difficulty moving the thumb into opposition  She is referred to OT for evaluation and treatment and custom splinting  Quality of life: excellent    Pain  Current pain rating: 3  At worst pain ratin  Quality: sharp  Relieving factors: medications (OTC tylenol)  Progression: worsening    Social Support    Employment status: working (childcare)  Hand dominance: right      Diagnostic Tests  X-ray: normal  Treatments  Current treatment: occupational therapy  Patient Goals  Patient goals for therapy: decreased pain, increased motion and increased strength          Objective     Neurological Testing     Sensation     Wrist/Hand     Right   Diminished: light touch, pin prick, static two point discrimination and dynamic two point discrimination    Active Range of Motion     Right Wrist   Normal active range of motion    Right Thumb   Flexion     MP: 58    DIP: 50  Extension     CMC: 10    MP: 0    DIP: 0  Radial Abduction    CMC: 42  Adduction    CMC: 10  Opposition: Opposes to base of fifth      Strength/Myotome Testing     Left Wrist/Hand      (2nd hand position)     Trial 1: 55    Thumb Strength  Key/Lateral Pinch     Trail 1: 11  Tip/Two-Point Pinch     Trial 1: 6  Palmar/Three-Point Pinch     Trial 1: 8    Right Wrist/Hand   Normal wrist strength     (2nd hand position)     Trial 1: 45    Thumb Strength   Key/Lateral Pinch     Trial 1: 12  Tip/Two-Point Pinch     Trial 1: 9  Palmar/Three-Point Pinch     Trial 1: 10      Flowsheet Rows      Most Recent Value   PT/OT G-Codes   Current Score  69   Projected Score  70   FOTO information reviewed  Yes   Assessment Type  Evaluation   G code set  Carrying, Moving & Handling Objects   Carrying, Moving and Handling Objects Current Status ()  CJ   Carrying, Moving and Handling Objects Goal Status ()  CJ          Precautions: Universal    Daily Treatment Diary     Manual              IASTM dorsal thumb             Thumb ROM Exercise Diary  6/25            opposition                                                                                                                                                                                                                                                                        Modalities  6/25            jb padilla

## 2018-06-27 ENCOUNTER — OFFICE VISIT (OUTPATIENT)
Dept: OCCUPATIONAL THERAPY | Facility: MEDICAL CENTER | Age: 48
End: 2018-06-27
Payer: COMMERCIAL

## 2018-06-27 DIAGNOSIS — G58.9 ENTRAPMENT SYNDROME: ICD-10-CM

## 2018-06-27 DIAGNOSIS — G56.30 WARTENBERG SYNDROME: Primary | ICD-10-CM

## 2018-06-27 PROCEDURE — 97022 WHIRLPOOL THERAPY: CPT | Performed by: OCCUPATIONAL THERAPIST

## 2018-06-27 PROCEDURE — 97010 HOT OR COLD PACKS THERAPY: CPT | Performed by: OCCUPATIONAL THERAPIST

## 2018-06-27 PROCEDURE — 97110 THERAPEUTIC EXERCISES: CPT | Performed by: OCCUPATIONAL THERAPIST

## 2018-06-27 PROCEDURE — 97140 MANUAL THERAPY 1/> REGIONS: CPT | Performed by: OCCUPATIONAL THERAPIST

## 2018-06-27 NOTE — PROGRESS NOTES
Daily Note     Today's date: 2018  Patient name: Lakeshia Yeung  : 1970  MRN: [id number]  Referring provider: Nasrin Miramontes MD  Dx:   Encounter Diagnosis     ICD-10-CM    1  Wartenberg syndrome G58 9    2  Entrapment syndrome G58 9                   Subjective: It felt so good after you worked on it  Objective: See treatment diary below     Daily Treatment Diary     Manual             IASTM dorsal thumb  5min           Thumb ROM  3min                                                      Exercise Diary             opposition  W/ red clip           EDC  Red bands x2                                                                                                                                                                                                                                                         Modalities             fluido  5 min           MHP L thumb  10 min                            Assessment: Tolerated treatment well  Patient demonstrated fatigue post treatment      Plan: Continue per plan of care  Awaiting authorization for custom TSS

## 2018-06-28 ENCOUNTER — OFFICE VISIT (OUTPATIENT)
Dept: INTERNAL MEDICINE CLINIC | Facility: CLINIC | Age: 48
End: 2018-06-28
Payer: COMMERCIAL

## 2018-06-28 VITALS
SYSTOLIC BLOOD PRESSURE: 122 MMHG | HEIGHT: 65 IN | WEIGHT: 151.8 LBS | TEMPERATURE: 97.3 F | BODY MASS INDEX: 25.29 KG/M2 | DIASTOLIC BLOOD PRESSURE: 78 MMHG | HEART RATE: 62 BPM | RESPIRATION RATE: 14 BRPM

## 2018-06-28 DIAGNOSIS — L23.9 ALLERGIC DERMATITIS: Primary | ICD-10-CM

## 2018-06-28 PROCEDURE — 99213 OFFICE O/P EST LOW 20 MIN: CPT | Performed by: INTERNAL MEDICINE

## 2018-06-28 RX ORDER — CLOBETASOL PROPIONATE 0.5 MG/G
OINTMENT TOPICAL 2 TIMES DAILY
Qty: 30 G | Refills: 0 | Status: SHIPPED | OUTPATIENT
Start: 2018-06-28 | End: 2018-09-04 | Stop reason: ALTCHOICE

## 2018-06-28 NOTE — LETTER
June 28, 2018     Patient: Lakeshia Yeung   YOB: 1970   Date of Visit: 6/28/2018       To Whom it May Concern:    Lakeshia Yeung is under my professional care  She was seen in my office on 6/28/2018  She may return to work on 6/29/18  If you have any questions or concerns, please don't hesitate to call           Sincerely,          Quan Jett MD        CC: No Recipients

## 2018-06-28 NOTE — PROGRESS NOTES
Assessment/Plan:    No problem-specific Assessment & Plan notes found for this encounter  Likely an allergic reaction, topical steroids, completely stop the offending agent  No recent travel or exposure to bedbugs  I asked the patient to call the office if symptoms do not improve     Diagnoses and all orders for this visit:    Allergic dermatitis  -     clobetasol (TEMOVATE) 0 05 % ointment; Apply topically 2 (two) times a day          Subjective:   Chief Complaint   Patient presents with    Rash     breasts , face, stomach, back        Patient ID: Katie Linda is a 46 y o  female  She comes in for an acute appointment  For the last 1 week she has had an EEG rash on her body  She notes that she has started using a new talcum powder  No new medications  Has recently been in touch with the new by friend and possibly things may be allergic to his cosmetic products  Rash   Pertinent negatives include no cough, diarrhea, eye pain, fatigue, fever, shortness of breath or sore throat  The following portions of the patient's history were reviewed and updated as appropriate: current medications, past medical history, past social history and past surgical history      PHQ-9 Depression Screening    PHQ-9:    Frequency of the following problems over the past two weeks:                Current Outpatient Prescriptions:     ALPRAZolam (XANAX) 1 mg tablet, TAKE ONE TABLET BY MOUTH EVERY 6 HOURS AS NEEDED FOR ANXIETY, Disp: 5 tablet, Rfl: 0    cholecalciferol (VITAMIN D3) 1,000 units tablet, Take 1 tablet (1,000 Units total) by mouth daily, Disp: 30 tablet, Rfl: 0    methadone (DOLOPHINE) 10 mg tablet, 1 tablet 3 times daily as needed for pain,, Disp: 90 tablet, Rfl: 0    norethindrone-ethinyl estradiol (DASETTA 1/35) 1-35 MG-MCG per tablet, TAKE ONE TABLET BY MOUTH ONCE DAILY , Disp: , Rfl:     Omeprazole 20 MG TBEC, Take 1 capsule by mouth daily, Disp: , Rfl:     Pediatric Multiple Vit-C-FA (FRUITY CHEWABLES MULTIVITAMIN) CHEW, Chew, Disp: , Rfl:     sertraline (ZOLOFT) 100 mg tablet, Take 3 tablets once a day, Disp: 90 tablet, Rfl: 2    sucralfate (CARAFATE) 1 g/10 mL suspension, Take 10 mL (1 g total) by mouth 4 (four) times a day (with meals and at bedtime), Disp: 420 mL, Rfl: 2    topiramate (TOPAMAX) 50 MG tablet, Take 1 tablet by mouth 2 (two) times a day, Disp: , Rfl:     traZODone (DESYREL) 150 mg tablet, Take 1 tablet (150 mg total) by mouth daily at bedtime, Disp: 90 tablet, Rfl: 1    zolpidem (AMBIEN) 5 mg tablet, Take 1 tablet (5 mg total) by mouth daily at bedtime, Disp: 90 tablet, Rfl: 1    clobetasol (TEMOVATE) 0 05 % ointment, Apply topically 2 (two) times a day, Disp: 30 g, Rfl: 0    Review of Systems   Constitutional: Negative for fatigue, fever and unexpected weight change  HENT: Negative for ear pain, hearing loss and sore throat  Eyes: Negative for pain and discharge  Respiratory: Negative for cough, chest tightness and shortness of breath  Cardiovascular: Negative for chest pain and palpitations  Gastrointestinal: Negative for abdominal pain, blood in stool, constipation, diarrhea and nausea  Genitourinary: Negative for dysuria, frequency and hematuria  Musculoskeletal: Negative for arthralgias and joint swelling  Skin: Positive for rash  Allergic/Immunologic: Negative for immunocompromised state  Neurological: Negative for dizziness and headaches  Hematological: Negative for adenopathy  Psychiatric/Behavioral: Negative for confusion and sleep disturbance  Objective:  /78 (BP Location: Left arm, Patient Position: Sitting, Cuff Size: Standard)   Pulse 62   Temp (!) 97 3 °F (36 3 °C)   Resp 14   Ht 5' 5" (1 651 m)   Wt 68 9 kg (151 lb 12 8 oz)   BMI 25 26 kg/m²      Physical Exam   Constitutional: She appears well-developed and well-nourished  HENT:   Head: Normocephalic and atraumatic     Right Ear: Tympanic membrane normal    Left Ear: Tympanic membrane normal    Nose: Nose normal    Mouth/Throat: Oropharynx is clear and moist  No posterior oropharyngeal edema or posterior oropharyngeal erythema  Eyes: Conjunctivae are normal  Pupils are equal, round, and reactive to light  Right eye exhibits no discharge  Neck: Normal range of motion  Neck supple  No thyromegaly present  Cardiovascular: Normal rate, regular rhythm, S1 normal, S2 normal and normal heart sounds  PMI is not displaced  No murmur heard  Pulmonary/Chest: Effort normal and breath sounds normal  No accessory muscle usage  No apnea  No respiratory distress  She has no rhonchi  She has no rales  Abdominal: Soft  Normal appearance and bowel sounds are normal  She exhibits no shifting dullness  There is no hepatosplenomegaly  There is no tenderness  There is no rebound and no CVA tenderness  Musculoskeletal: Normal range of motion  She exhibits no edema or tenderness  Lymphadenopathy:     She has no cervical adenopathy  Neurological: She is alert  Skin: Skin is warm and intact  Rash noted  Rash is urticarial         Psychiatric: She has a normal mood and affect  Her speech is normal    Nursing note and vitals reviewed  Recent Results (from the past 1008 hour(s))   TB Skin Test    Collection Time: 06/08/18  1:02 PM   Result Value Ref Range    TB Skin Test Negative     Induration 0 mm   ]    No results found

## 2018-07-02 ENCOUNTER — OFFICE VISIT (OUTPATIENT)
Dept: OCCUPATIONAL THERAPY | Facility: MEDICAL CENTER | Age: 48
End: 2018-07-02
Payer: COMMERCIAL

## 2018-07-02 DIAGNOSIS — G56.30 WARTENBERG SYNDROME: Primary | ICD-10-CM

## 2018-07-02 DIAGNOSIS — G89.4 PAIN SYNDROME, CHRONIC: ICD-10-CM

## 2018-07-02 PROCEDURE — L3808 WHFO, RIGID W/O JOINTS: HCPCS | Performed by: OCCUPATIONAL THERAPIST

## 2018-07-02 PROCEDURE — 97010 HOT OR COLD PACKS THERAPY: CPT

## 2018-07-02 PROCEDURE — 97110 THERAPEUTIC EXERCISES: CPT

## 2018-07-02 PROCEDURE — 97140 MANUAL THERAPY 1/> REGIONS: CPT

## 2018-07-02 RX ORDER — METHADONE HYDROCHLORIDE 10 MG/1
TABLET ORAL
Qty: 90 TABLET | Refills: 0 | Status: SHIPPED | OUTPATIENT
Start: 2018-07-02 | End: 2018-08-01 | Stop reason: SDUPTHER

## 2018-07-02 NOTE — PROGRESS NOTES
Splint    Diagnosis:   1  Wartenberg syndrome        Indication: wartenberg syndrome    Location: Right  wrist  Supplies: Orthotics  Thermoplastic material    Splint type: WHO  thumb spica  Wearing Schedule: Night wear only  Describe Position: wrist in neutral, Thumb slight abduction    Precautions: Universal    Patient or Caregiver expresses understanding of wearing Schedule and Precautions? Yes  Patient or Caregiver able to don/doff orthotic independently? Yes    Written orders provided to patient?  Yes  Orders Obtained: Written  Orders Obtained from: Dr Shalonda Guzmán

## 2018-07-02 NOTE — PROGRESS NOTES
Daily Note     Today's date: 2018  Patient name: Lakeshia Yeung  : 1970  MRN: [id number]  Referring provider: Nasrin Miramontes MD  Dx:   Encounter Diagnosis     ICD-10-CM    1  Wartenberg syndrome G58 9                   Subjective: It feels so much better just since the few times I've been here  Objective: See treatment diary below     Daily Treatment Diary     Manual            IASTM dorsal thumb  5min 5 min          Thumb ROM  3min 3 min                                                     Exercise Diary            opposition  W/ red clip Red clip          EDC  Red bands x2 Red bands 2x                                                                                                                                                                                                                                                        Modalities            fluido prn post  5 min           MHP L thumb pre  10 min 10 min                           Assessment: Tolerated treatment well  Patient demonstrated fatigue post treatment      Plan: Continue per plan of care  Monitor effectiveness of custom splint

## 2018-07-09 ENCOUNTER — APPOINTMENT (OUTPATIENT)
Dept: OCCUPATIONAL THERAPY | Facility: MEDICAL CENTER | Age: 48
End: 2018-07-09
Payer: COMMERCIAL

## 2018-07-11 ENCOUNTER — OFFICE VISIT (OUTPATIENT)
Dept: OCCUPATIONAL THERAPY | Facility: MEDICAL CENTER | Age: 48
End: 2018-07-11
Payer: COMMERCIAL

## 2018-07-11 DIAGNOSIS — G56.30 WARTENBERG SYNDROME: Primary | ICD-10-CM

## 2018-07-11 PROCEDURE — 97010 HOT OR COLD PACKS THERAPY: CPT

## 2018-07-11 PROCEDURE — 97110 THERAPEUTIC EXERCISES: CPT

## 2018-07-11 PROCEDURE — 97140 MANUAL THERAPY 1/> REGIONS: CPT

## 2018-07-11 NOTE — PROGRESS NOTES
Daily Note     Today's date: 2018  Patient name: Angella Cavazos  : 1970  MRN: [id number]  Referring provider: Marissa Garcia MD  Dx:   Encounter Diagnosis     ICD-10-CM    1  Wartenberg syndrome G58 9                   Subjective: The splint is making it worse  The whole hand goes numb if I wear it  It was feeling better before I started using it  Objective: See treatment diary below     Daily Treatment Diary     Manual           IASTM dorsal thumb  5min 5 min manual 5 min         Thumb ROM  3min 3 min 3 min                                                    Exercise Diary           pinching  W/ red clip Red clip Red clip         EDC  Red bands x2 Red bands 2x Red bands 2x                                                                                                                                                                                                                                                       Modalities           fluido prn post  5 min           MHP L thumb pre  10 min 10 min 10 min                          Assessment: Tolerated treatment well  Patient exhibited good technique with therapeutic exercises and would benefit from continued OT  Plan: Continue per plan of care  Discontinue splint, due to making symptoms worse

## 2018-07-12 DIAGNOSIS — F32.A DEPRESSION, UNSPECIFIED DEPRESSION TYPE: ICD-10-CM

## 2018-07-12 RX ORDER — SERTRALINE HYDROCHLORIDE 100 MG/1
TABLET, FILM COATED ORAL
Qty: 90 TABLET | Refills: 0 | Status: SHIPPED | OUTPATIENT
Start: 2018-07-12 | End: 2018-08-20 | Stop reason: SDUPTHER

## 2018-07-16 ENCOUNTER — OFFICE VISIT (OUTPATIENT)
Dept: OCCUPATIONAL THERAPY | Facility: MEDICAL CENTER | Age: 48
End: 2018-07-16
Payer: COMMERCIAL

## 2018-07-16 DIAGNOSIS — G56.30 WARTENBERG SYNDROME: Primary | ICD-10-CM

## 2018-07-16 PROCEDURE — 97010 HOT OR COLD PACKS THERAPY: CPT | Performed by: OCCUPATIONAL THERAPIST

## 2018-07-16 PROCEDURE — 97140 MANUAL THERAPY 1/> REGIONS: CPT | Performed by: OCCUPATIONAL THERAPIST

## 2018-07-16 PROCEDURE — 97110 THERAPEUTIC EXERCISES: CPT | Performed by: OCCUPATIONAL THERAPIST

## 2018-07-16 NOTE — PROGRESS NOTES
Daily Note     Today's date: 2018  Patient name: Theo Lubin  : 1970  MRN: [id number]  Referring provider: Jim Nissen, MD  Dx:   Encounter Diagnosis     ICD-10-CM    1  Wartenberg syndrome G58 9                   Subjective: It is improving  Objective: See treatment diary below     Daily Treatment Diary     Manual          IASTM dorsal thumb  5min 5 min manual 5 min 5 min        Thumb ROM  3min 3 min 3 min 3 min                                                   Exercise Diary          pinching  W/ red clip Red clip Red clip Red clip        EDC  Red bands x2 Red bands 2x Red bands 2x Red  bands x2                                                                                                                                                                                                                                                      Modalities          fluido prn post  5 min           MHP L thumb pre  10 min 10 min 10 min 10 min                         Assessment: Tolerated treatment well  Patient exhibited good technique with therapeutic exercises and would benefit from continued OT  Pain has is minimal since discontinuing splint use  Plan: Continue per plan of care

## 2018-07-18 ENCOUNTER — OFFICE VISIT (OUTPATIENT)
Dept: OCCUPATIONAL THERAPY | Facility: MEDICAL CENTER | Age: 48
End: 2018-07-18
Payer: COMMERCIAL

## 2018-07-18 DIAGNOSIS — G56.30 WARTENBERG SYNDROME: Primary | ICD-10-CM

## 2018-07-18 PROCEDURE — 97010 HOT OR COLD PACKS THERAPY: CPT | Performed by: OCCUPATIONAL THERAPIST

## 2018-07-18 PROCEDURE — 97110 THERAPEUTIC EXERCISES: CPT | Performed by: OCCUPATIONAL THERAPIST

## 2018-07-18 PROCEDURE — 97140 MANUAL THERAPY 1/> REGIONS: CPT | Performed by: OCCUPATIONAL THERAPIST

## 2018-07-18 NOTE — PROGRESS NOTES
Daily Note     Today's date: 2018  Patient name: Denise Tavera  : 1970  MRN: [id number]  Referring provider: Wilner Schmitz MD  Dx:   Encounter Diagnosis     ICD-10-CM    1  Wartenberg syndrome G58 9                   Subjective: It is improving  Objective: See treatment diary below     Daily Treatment Diary     Manual         IASTM dorsal thumb  5min 5 min manual 5 min 5 min 5min       Thumb ROM  3min 3 min 3 min 3 min 3min                                                  Exercise Diary         pinching  W/ red clip Red clip Red clip Red clip Red clip       EDC  Red bands x2 Red bands 2x Red bands 2x Red  bands x2 Green bands x2                                                                                                                                                                                                                                                     Modalities         fluido prn post  5 min           MHP L thumb pre  10 min 10 min 10 min 10 min 10 min                        Assessment: Tolerated treatment well  Patient exhibited good technique with therapeutic exercises and would benefit from continued OT  Pain is minimal since discontinuing splint use  Plan: Continue per plan of care

## 2018-07-23 ENCOUNTER — APPOINTMENT (OUTPATIENT)
Dept: OCCUPATIONAL THERAPY | Facility: MEDICAL CENTER | Age: 48
End: 2018-07-23
Payer: COMMERCIAL

## 2018-07-25 ENCOUNTER — OFFICE VISIT (OUTPATIENT)
Dept: OCCUPATIONAL THERAPY | Facility: MEDICAL CENTER | Age: 48
End: 2018-07-25
Payer: COMMERCIAL

## 2018-07-25 DIAGNOSIS — G56.30 WARTENBERG SYNDROME: Primary | ICD-10-CM

## 2018-07-25 PROCEDURE — G8992 OTHER PT/OT  D/C STATUS: HCPCS

## 2018-07-25 PROCEDURE — 97140 MANUAL THERAPY 1/> REGIONS: CPT

## 2018-07-25 PROCEDURE — 97110 THERAPEUTIC EXERCISES: CPT

## 2018-07-25 PROCEDURE — G8991 OTHER PT/OT GOAL STATUS: HCPCS

## 2018-07-25 PROCEDURE — 97010 HOT OR COLD PACKS THERAPY: CPT

## 2018-07-25 PROCEDURE — G8990 OTHER PT/OT CURRENT STATUS: HCPCS

## 2018-07-25 NOTE — PROGRESS NOTES
Daily Note     Today's date: 2018  Patient name: Oanh Luis  : 1970  MRN: [id number]  Referring provider: Coleen Rosario MD  Dx:   Encounter Diagnosis     ICD-10-CM    1  Wartenberg syndrome G58 9                   Subjective: It is improving  Objective: See treatment diary below     Daily Treatment Diary     Manual        IASTM dorsal thumb  5min 5 min manual 5 min 5 min 5min 5 min      Thumb ROM  3min 3 min 3 min 3 min 3min                                                  Exercise Diary        pinching  W/ red clip Red clip Red clip Red clip Red clip Red clip      EDC  Red bands x2 Red bands 2x Red bands 2x Red  bands x2 Green bands x2 Green bands 2x                                                                                                                                                                                                                                                    Modalities        fluido prn post  5 min           MHP L thumb pre  10 min 10 min 10 min 10 min 10 min 10 min                       Assessment: Tolerated treatment well  Patient exhibited good technique with therapeutic exercises  Plan: Pt doing well  Transition to HEP

## 2018-08-01 DIAGNOSIS — G89.4 PAIN SYNDROME, CHRONIC: ICD-10-CM

## 2018-08-01 RX ORDER — METHADONE HYDROCHLORIDE 10 MG/1
TABLET ORAL
Qty: 90 TABLET | Refills: 0 | Status: SHIPPED | OUTPATIENT
Start: 2018-08-01 | End: 2018-08-31 | Stop reason: SDUPTHER

## 2018-08-20 DIAGNOSIS — F32.A DEPRESSION, UNSPECIFIED DEPRESSION TYPE: ICD-10-CM

## 2018-08-21 RX ORDER — SERTRALINE HYDROCHLORIDE 100 MG/1
TABLET, FILM COATED ORAL
Qty: 90 TABLET | Refills: 0 | Status: SHIPPED | OUTPATIENT
Start: 2018-08-21 | End: 2018-09-21 | Stop reason: SDUPTHER

## 2018-08-31 DIAGNOSIS — G89.4 PAIN SYNDROME, CHRONIC: ICD-10-CM

## 2018-08-31 RX ORDER — METHADONE HYDROCHLORIDE 10 MG/1
TABLET ORAL
Qty: 12 TABLET | Refills: 0 | Status: SHIPPED | OUTPATIENT
Start: 2018-08-31 | End: 2018-09-04 | Stop reason: SDUPTHER

## 2018-09-04 ENCOUNTER — OFFICE VISIT (OUTPATIENT)
Dept: INTERNAL MEDICINE CLINIC | Facility: CLINIC | Age: 48
End: 2018-09-04
Payer: COMMERCIAL

## 2018-09-04 VITALS
HEART RATE: 80 BPM | DIASTOLIC BLOOD PRESSURE: 84 MMHG | TEMPERATURE: 97 F | HEIGHT: 65 IN | RESPIRATION RATE: 16 BRPM | SYSTOLIC BLOOD PRESSURE: 130 MMHG | BODY MASS INDEX: 24.16 KG/M2 | WEIGHT: 145 LBS

## 2018-09-04 DIAGNOSIS — Z90.3 POSTGASTRECTOMY MALABSORPTION: ICD-10-CM

## 2018-09-04 DIAGNOSIS — K91.2 POSTGASTRECTOMY MALABSORPTION: ICD-10-CM

## 2018-09-04 DIAGNOSIS — F32.A DEPRESSION, UNSPECIFIED DEPRESSION TYPE: ICD-10-CM

## 2018-09-04 DIAGNOSIS — E55.9 VITAMIN D DEFICIENCY: ICD-10-CM

## 2018-09-04 DIAGNOSIS — G43.909 MIGRAINE WITHOUT STATUS MIGRAINOSUS, NOT INTRACTABLE, UNSPECIFIED MIGRAINE TYPE: ICD-10-CM

## 2018-09-04 DIAGNOSIS — K21.9 GERD WITHOUT ESOPHAGITIS: Primary | ICD-10-CM

## 2018-09-04 DIAGNOSIS — G89.4 PAIN SYNDROME, CHRONIC: ICD-10-CM

## 2018-09-04 DIAGNOSIS — F51.01 PRIMARY INSOMNIA: ICD-10-CM

## 2018-09-04 PROCEDURE — 1036F TOBACCO NON-USER: CPT | Performed by: INTERNAL MEDICINE

## 2018-09-04 PROCEDURE — 99214 OFFICE O/P EST MOD 30 MIN: CPT | Performed by: INTERNAL MEDICINE

## 2018-09-04 PROCEDURE — 3008F BODY MASS INDEX DOCD: CPT | Performed by: INTERNAL MEDICINE

## 2018-09-04 RX ORDER — METHADONE HYDROCHLORIDE 10 MG/1
TABLET ORAL
Qty: 90 TABLET | Refills: 0 | Status: SHIPPED | OUTPATIENT
Start: 2018-09-04 | End: 2018-10-04 | Stop reason: SDUPTHER

## 2018-09-05 NOTE — PROGRESS NOTES
[facility] [place]      NAME: Ely Cantrell  AGE: 46 y o  SEX: female  : 1970   MRN: [id number]    DATE: 2018  TIME: 1:10 PM    Assessment and Plan   1  Pain syndrome, chronic  The patient's pain is under adequate control on her current dose of methadone  Her current dose represents a reduction from her maximal usage of 50 mg daily  She remains on Topamax as a pain modulator  She is not a candidate for nonsteroidal anti-inflammatories because of her previous gastric bypass  - methadone (DOLOPHINE) 10 mg tablet; 1 tablet 3 times daily as needed for pain,  Dispense: 90 tablet; Refill: 0    2  GERD without esophagitis  Stable on omeprazole  The patient uses sucralfate only occasionally  3  Migraine without status migrainosus, not intractable, unspecified migraine type  Well controlled on Topamax    4  Depression, unspecified depression type  Stable on sertraline  5  Primary insomnia  The patient has weaned off trazodone  She is using zolpidem 5 mg nightly    6  Vitamin D deficiency  On replacement    7  Postgastrectomy malabsorption  On appropriate nutritional supplements  Most recent vitamin levels were satisfactory  Overall, the patient is doing reasonably well  Her pain control is satisfactory  Her mood has been stable  Her migraines have been minimally symptomatic  She has also lost weight since she resume Topamax  I recommended that she continue with her current therapy for now  She is going to the dentist later today because of a broken tooth  Return to office in:   3 months    Chief Complaint     Chief Complaint   Patient presents with    Follow-up     4 months       History of Present Illness     The patient returned to the office for re-evaluation of gastroesophageal reflux, migraine, depression, chronic pain syndrome, and previous bariatric surgery with post gastrectomy malabsorption  Since her last visit she has had a broken tooth    She will be seeing the dentist later today  Otherwise, she has been doing pretty well  Her pain is stable  She is tolerating methadone, 10 mg 3 times daily  She remains on Topamax  Since she resume Topamax, her weight has been better controlled  She is having no reflux symptoms  She has no chest pain or shortness of breath  It should be noted that although the patient has alprazolam 1 her medication list, she uses this only when she is flying  The following portions of the patient's history were reviewed and updated as appropriate: allergies, current medications, past family history, past medical history, past social history, past surgical history and problem list     Review of Systems   Review of Systems   Constitutional: Negative  HENT: Positive for dental problem  Negative for congestion, ear pain, postnasal drip, rhinorrhea, sore throat and trouble swallowing  Eyes: Negative for pain, discharge, redness and visual disturbance  Respiratory: Negative for cough, shortness of breath and wheezing  Cardiovascular: Negative  Gastrointestinal: Negative  Endocrine: Negative  Genitourinary: Negative for difficulty urinating, dysuria, frequency, hematuria and urgency  Musculoskeletal: Negative for arthralgias, gait problem, joint swelling and myalgias  Skin: Negative for rash  Neurological: Negative for dizziness, speech difficulty, weakness, light-headedness, numbness and headaches  Hematological: Negative  Psychiatric/Behavioral: Negative for confusion, decreased concentration, dysphoric mood and sleep disturbance  The patient is not nervous/anxious          Active Problem List     Patient Active Problem List   Diagnosis    Allergic rhinitis    Anxiety    Depression    GERD without esophagitis    Insomnia    Migraine, unspecified, not intractable, without status migrainosus    Pain syndrome, chronic    Postgastrectomy malabsorption    Vitamin D deficiency    Wartenberg syndrome  History of bariatric surgery       Objective   /84 (BP Location: Left arm, Patient Position: Sitting, Cuff Size: Standard)   Pulse 80   Temp (!) 97 °F (36 1 °C) (Tympanic)   Resp 16   Ht 5' 5" (1 651 m)   Wt 65 8 kg (145 lb)   BMI 24 13 kg/m²     Physical Exam   Constitutional: She is oriented to person, place, and time  She appears well-developed and well-nourished  No distress  HENT:   Head: Normocephalic and atraumatic  Neck: Neck supple  No JVD present  No tracheal deviation present  No thyromegaly present  Cardiovascular: Normal rate, regular rhythm, normal heart sounds and intact distal pulses  Exam reveals no gallop and no friction rub  No murmur heard  Pulmonary/Chest: Effort normal and breath sounds normal  She has no wheezes  She has no rales  She exhibits no tenderness  Abdominal: Soft  Bowel sounds are normal  She exhibits no distension and no mass  There is no tenderness  There is no rebound  Musculoskeletal: Normal range of motion  She exhibits no edema or tenderness  Lymphadenopathy:     She has no cervical adenopathy  Neurological: She is alert and oriented to person, place, and time  Skin: Skin is warm and dry  Psychiatric: She has a normal mood and affect   Her behavior is normal  Judgment and thought content normal        Pertinent Laboratory/Diagnostic Studies:  Clinical Support on 06/06/2018   Component Date Value Ref Range Status    TB Skin Test 06/08/2018 Negative   Final    Induration 06/08/2018 0  mm Final           Current Medications     Current Outpatient Prescriptions:     cholecalciferol (VITAMIN D3) 1,000 units tablet, Take 1 tablet (1,000 Units total) by mouth daily, Disp: 30 tablet, Rfl: 0    methadone (DOLOPHINE) 10 mg tablet, 1 tablet 3 times daily as needed for pain,, Disp: 90 tablet, Rfl: 0    norethindrone-ethinyl estradiol (DASETTA 1/35) 1-35 MG-MCG per tablet, TAKE ONE TABLET BY MOUTH ONCE DAILY , Disp: , Rfl:     Omeprazole 20 MG TBEC, Take 1 capsule by mouth daily, Disp: , Rfl:     Pediatric Multiple Vit-C-FA (FRUITY CHEWABLES MULTIVITAMIN) CHEW, Chew, Disp: , Rfl:     sertraline (ZOLOFT) 100 mg tablet, TAKE THREE TABLETS BY MOUTH DAILY , Disp: 90 tablet, Rfl: 0    sucralfate (CARAFATE) 1 g/10 mL suspension, Take 10 mL (1 g total) by mouth 4 (four) times a day (with meals and at bedtime), Disp: 420 mL, Rfl: 2    topiramate (TOPAMAX) 50 MG tablet, Take 1 tablet by mouth 2 (two) times a day, Disp: , Rfl:     zolpidem (AMBIEN) 5 mg tablet, Take 1 tablet (5 mg total) by mouth daily at bedtime, Disp: 90 tablet, Rfl: 1    ALPRAZolam (XANAX) 1 mg tablet, TAKE ONE TABLET BY MOUTH EVERY 6 HOURS AS NEEDED FOR ANXIETY (Patient not taking: Reported on 9/4/2018), Disp: 5 tablet, Rfl: 0      Marlyn Hernandez MD

## 2018-09-19 DIAGNOSIS — G43.909 MIGRAINE WITHOUT STATUS MIGRAINOSUS, NOT INTRACTABLE, UNSPECIFIED MIGRAINE TYPE: Primary | ICD-10-CM

## 2018-09-20 RX ORDER — TOPIRAMATE 50 MG/1
TABLET, FILM COATED ORAL
Qty: 60 TABLET | Refills: 1 | Status: SHIPPED | OUTPATIENT
Start: 2018-09-20 | End: 2018-12-11 | Stop reason: SDUPTHER

## 2018-09-21 DIAGNOSIS — F32.A DEPRESSION, UNSPECIFIED DEPRESSION TYPE: ICD-10-CM

## 2018-09-21 RX ORDER — SERTRALINE HYDROCHLORIDE 100 MG/1
300 TABLET, FILM COATED ORAL DAILY
Qty: 90 TABLET | Refills: 0 | Status: SHIPPED | OUTPATIENT
Start: 2018-09-21 | End: 2018-11-07 | Stop reason: SDUPTHER

## 2018-10-04 DIAGNOSIS — G89.4 PAIN SYNDROME, CHRONIC: ICD-10-CM

## 2018-10-04 RX ORDER — METHADONE HYDROCHLORIDE 10 MG/1
TABLET ORAL
Qty: 90 TABLET | Refills: 0 | Status: SHIPPED | OUTPATIENT
Start: 2018-10-04 | End: 2018-10-31 | Stop reason: SDUPTHER

## 2018-10-31 DIAGNOSIS — G89.4 PAIN SYNDROME, CHRONIC: ICD-10-CM

## 2018-10-31 RX ORDER — METHADONE HYDROCHLORIDE 10 MG/1
TABLET ORAL
Qty: 90 TABLET | Refills: 0 | Status: SHIPPED | OUTPATIENT
Start: 2018-10-31 | End: 2018-11-28 | Stop reason: SDUPTHER

## 2018-11-07 DIAGNOSIS — F32.A DEPRESSION, UNSPECIFIED DEPRESSION TYPE: ICD-10-CM

## 2018-11-07 RX ORDER — SERTRALINE HYDROCHLORIDE 100 MG/1
300 TABLET, FILM COATED ORAL DAILY
Qty: 90 TABLET | Refills: 3 | Status: SHIPPED | OUTPATIENT
Start: 2018-11-07 | End: 2019-04-22 | Stop reason: SDUPTHER

## 2018-11-28 DIAGNOSIS — F51.01 PRIMARY INSOMNIA: ICD-10-CM

## 2018-11-28 DIAGNOSIS — G89.4 PAIN SYNDROME, CHRONIC: ICD-10-CM

## 2018-11-29 RX ORDER — METHADONE HYDROCHLORIDE 10 MG/1
TABLET ORAL
Qty: 90 TABLET | Refills: 0 | Status: SHIPPED | OUTPATIENT
Start: 2018-11-29 | End: 2018-12-28 | Stop reason: SDUPTHER

## 2018-11-29 RX ORDER — ZOLPIDEM TARTRATE 5 MG/1
TABLET ORAL
Qty: 30 TABLET | Refills: 0 | Status: SHIPPED | OUTPATIENT
Start: 2018-11-29 | End: 2018-12-23 | Stop reason: SDUPTHER

## 2018-12-11 DIAGNOSIS — G43.909 MIGRAINE WITHOUT STATUS MIGRAINOSUS, NOT INTRACTABLE, UNSPECIFIED MIGRAINE TYPE: ICD-10-CM

## 2018-12-11 RX ORDER — TOPIRAMATE 50 MG/1
50 TABLET, FILM COATED ORAL 2 TIMES DAILY
Qty: 60 TABLET | Refills: 0 | Status: SHIPPED | OUTPATIENT
Start: 2018-12-11 | End: 2019-01-16 | Stop reason: SDUPTHER

## 2018-12-23 DIAGNOSIS — F51.01 PRIMARY INSOMNIA: ICD-10-CM

## 2018-12-23 RX ORDER — ZOLPIDEM TARTRATE 5 MG/1
TABLET ORAL
Qty: 30 TABLET | Refills: 0 | Status: SHIPPED | OUTPATIENT
Start: 2018-12-23 | End: 2019-01-03 | Stop reason: SDUPTHER

## 2018-12-28 DIAGNOSIS — G89.4 PAIN SYNDROME, CHRONIC: ICD-10-CM

## 2018-12-28 RX ORDER — METHADONE HYDROCHLORIDE 10 MG/1
TABLET ORAL
Qty: 9 TABLET | Refills: 0 | Status: SHIPPED | OUTPATIENT
Start: 2018-12-28 | End: 2019-01-03 | Stop reason: SDUPTHER

## 2019-01-03 ENCOUNTER — OFFICE VISIT (OUTPATIENT)
Dept: INTERNAL MEDICINE CLINIC | Facility: CLINIC | Age: 49
End: 2019-01-03
Payer: COMMERCIAL

## 2019-01-03 VITALS
HEIGHT: 65 IN | WEIGHT: 144 LBS | RESPIRATION RATE: 16 BRPM | BODY MASS INDEX: 23.99 KG/M2 | TEMPERATURE: 97.7 F | DIASTOLIC BLOOD PRESSURE: 60 MMHG | HEART RATE: 72 BPM | SYSTOLIC BLOOD PRESSURE: 102 MMHG

## 2019-01-03 DIAGNOSIS — K21.9 GERD WITHOUT ESOPHAGITIS: ICD-10-CM

## 2019-01-03 DIAGNOSIS — F51.01 PRIMARY INSOMNIA: ICD-10-CM

## 2019-01-03 DIAGNOSIS — G43.909 MIGRAINE WITHOUT STATUS MIGRAINOSUS, NOT INTRACTABLE, UNSPECIFIED MIGRAINE TYPE: ICD-10-CM

## 2019-01-03 DIAGNOSIS — F41.9 ANXIETY: ICD-10-CM

## 2019-01-03 DIAGNOSIS — Z90.3 POSTGASTRECTOMY MALABSORPTION: ICD-10-CM

## 2019-01-03 DIAGNOSIS — K91.2 POSTGASTRECTOMY MALABSORPTION: ICD-10-CM

## 2019-01-03 DIAGNOSIS — Z23 ENCOUNTER FOR IMMUNIZATION: Primary | ICD-10-CM

## 2019-01-03 DIAGNOSIS — E55.9 VITAMIN D DEFICIENCY: ICD-10-CM

## 2019-01-03 DIAGNOSIS — G89.4 PAIN SYNDROME, CHRONIC: ICD-10-CM

## 2019-01-03 PROCEDURE — 3008F BODY MASS INDEX DOCD: CPT | Performed by: INTERNAL MEDICINE

## 2019-01-03 PROCEDURE — 90682 RIV4 VACC RECOMBINANT DNA IM: CPT | Performed by: INTERNAL MEDICINE

## 2019-01-03 PROCEDURE — 1036F TOBACCO NON-USER: CPT | Performed by: INTERNAL MEDICINE

## 2019-01-03 PROCEDURE — 90471 IMMUNIZATION ADMIN: CPT | Performed by: INTERNAL MEDICINE

## 2019-01-03 PROCEDURE — 99214 OFFICE O/P EST MOD 30 MIN: CPT | Performed by: INTERNAL MEDICINE

## 2019-01-03 RX ORDER — ZOLPIDEM TARTRATE 5 MG/1
5 TABLET ORAL
Qty: 30 TABLET | Refills: 3 | Status: SHIPPED | OUTPATIENT
Start: 2019-01-03 | End: 2019-04-18 | Stop reason: SDUPTHER

## 2019-01-03 RX ORDER — METHADONE HYDROCHLORIDE 10 MG/1
TABLET ORAL
Qty: 90 TABLET | Refills: 0 | Status: SHIPPED | OUTPATIENT
Start: 2019-01-03 | End: 2019-01-03 | Stop reason: SDUPTHER

## 2019-01-03 RX ORDER — ALPRAZOLAM 1 MG/1
1 TABLET ORAL 3 TIMES DAILY PRN
Qty: 5 TABLET | Refills: 0 | Status: SHIPPED | OUTPATIENT
Start: 2019-01-03 | End: 2019-04-04 | Stop reason: SDUPTHER

## 2019-01-03 RX ORDER — METHADONE HYDROCHLORIDE 10 MG/1
TABLET ORAL
Qty: 81 TABLET | Refills: 0 | Status: SHIPPED | OUTPATIENT
Start: 2019-01-03 | End: 2019-01-29 | Stop reason: SDUPTHER

## 2019-01-03 NOTE — PROGRESS NOTES
[facility] [place]      NAME: Ely Cantrell  AGE: 46 y o  SEX: female  : 1970   MRN: [id number]    DATE: 1/3/2019  TIME: 5:11 PM    Assessment and Plan   1  Encounter for immunization  - influenza vaccine, [id number], quadrivalent, recombinant, PF, 0 5 mL, for patients 18 yr+ ([id number])    2  Migraine without status migrainosus, not intractable, unspecified migraine type  Stable on Topamax  3  GERD without esophagitis  Stable on sucralfate  4  Postgastrectomy malabsorption  Doing well overall  It will be time for re-evaluation of her vitamin status before her next visit  - CBC; Future  - Comprehensive metabolic panel; Future  - Lipid panel; Future  - Iron Saturation %; Future  - Vitamin B12; Future  - Vitamin B1, whole blood; Future  - Vitamin B6; Future    5  Anxiety  The patient uses alprazolam only before flying  She will be going to [place] in the near future  - ALPRAZolam (XANAX) 1 mg tablet; Take 1 tablet (1 mg total) by mouth 3 (three) times a day as needed for anxiety  Dispense: 5 tablet; Refill: 0    6  Vitamin D deficiency  Continue vitamin-D replacement  - Vitamin D 25 hydroxy; Future    7  Pain syndrome, chronic  The patient's symptoms have been generally stable  She will continue her current regimen  Toxicology screen will be obtained in the near future  - Toxicology screen, urine; Future  - methadone (DOLOPHINE) 10 mg tablet; 1 tablet 3 times daily as needed for pain,  Dispense: 90 tablet; Refill: 0  - methadone (DOLOPHINE) 10 mg tablet; 1 tablet 3 times daily as needed for pain,  Dispense: 81 tablet; Refill: 0    8  Primary insomnia  Continue Ambien  - zolpidem (AMBIEN) 5 mg tablet; Take 1 tablet (5 mg total) by mouth daily at bedtime  Dispense: 30 tablet;  Refill: 3          Return to office in:   3 months    Chief Complaint     Chief Complaint   Patient presents with    Follow-up     3 months       History of Present Illness     The patient returned to the office for re-evaluation of esophageal reflux, migraine, depression, chronic pain syndrome, and postsurgical malabsorption after gastric bypass  Since her last visit she has been doing reasonably well  Her pain control is reasonably good  She is tolerating her medications  She is having no chest pain, shortness of breath, palpitations, or dizziness  She is having no nausea, vomiting, etc     The following portions of the patient's history were reviewed and updated as appropriate: allergies, current medications, past family history, past medical history, past social history, past surgical history and problem list     Review of Systems   Review of Systems   Constitutional: Negative  HENT: Negative for congestion, ear pain, postnasal drip, rhinorrhea, sore throat and trouble swallowing  Eyes: Negative for pain, discharge, redness and visual disturbance  Respiratory: Negative for cough, shortness of breath and wheezing  Cardiovascular: Negative  Gastrointestinal: Negative  Endocrine: Negative  Genitourinary: Negative for difficulty urinating, dysuria, frequency, hematuria and urgency  Musculoskeletal: Negative for arthralgias, gait problem, joint swelling and myalgias  Skin: Negative for rash  Neurological: Negative for dizziness, speech difficulty, weakness, light-headedness, numbness and headaches  Hematological: Negative  Psychiatric/Behavioral: Negative for confusion, decreased concentration, dysphoric mood and sleep disturbance  The patient is not nervous/anxious          Active Problem List     Patient Active Problem List   Diagnosis    Allergic rhinitis    Anxiety    Depression    GERD without esophagitis    Insomnia    Migraine, unspecified, not intractable, without status migrainosus    Pain syndrome, chronic    Postgastrectomy malabsorption    Vitamin D deficiency    Wartenberg syndrome    History of bariatric surgery       Objective   /60 (BP Location: Left arm, Patient Position: Sitting, Cuff Size: Standard)   Pulse 72   Temp 97 7 °F (36 5 °C) (Tympanic)   Resp 16   Ht 5' 5" (1 651 m)   Wt 65 3 kg (144 lb)   BMI 23 96 kg/m²     Physical Exam   Constitutional: She is oriented to person, place, and time  She appears well-developed and well-nourished  No distress  HENT:   Head: Normocephalic and atraumatic  Neck: Neck supple  No JVD present  No tracheal deviation present  No thyromegaly present  Cardiovascular: Normal rate, regular rhythm, normal heart sounds and intact distal pulses  Exam reveals no gallop and no friction rub  No murmur heard  Pulmonary/Chest: Effort normal and breath sounds normal  She has no wheezes  She has no rales  She exhibits no tenderness  Abdominal: Soft  Bowel sounds are normal  She exhibits no distension and no mass  There is no tenderness  There is no rebound  Musculoskeletal: Normal range of motion  She exhibits no edema or tenderness  Lymphadenopathy:     She has no cervical adenopathy  Neurological: She is alert and oriented to person, place, and time  Skin: Skin is warm and dry  Psychiatric: She has a normal mood and affect  Her behavior is normal  Judgment and thought content normal        Pertinent Laboratory/Diagnostic Studies:  No visits with results within 3 Month(s) from this visit     Latest known visit with results is:   Clinical Support on 06/06/2018   Component Date Value Ref Range Status    TB Skin Test 06/08/2018 Negative   Final    Induration 06/08/2018 0  mm Final           Current Medications     Current Outpatient Prescriptions:     ALPRAZolam (XANAX) 1 mg tablet, Take 1 tablet (1 mg total) by mouth 3 (three) times a day as needed for anxiety, Disp: 5 tablet, Rfl: 0    cholecalciferol (VITAMIN D3) 1,000 units tablet, Take 1 tablet (1,000 Units total) by mouth daily, Disp: 30 tablet, Rfl: 0    methadone (DOLOPHINE) 10 mg tablet, 1 tablet 3 times daily as needed for pain,, Disp: 90 tablet, Rfl: 0    Omeprazole 20 MG TBEC, Take 1 capsule by mouth daily, Disp: , Rfl:     Pediatric Multiple Vit-C-FA (FRUITY CHEWABLES MULTIVITAMIN) CHEW, Chew, Disp: , Rfl:     sertraline (ZOLOFT) 100 mg tablet, Take 3 tablets (300 mg total) by mouth daily, Disp: 90 tablet, Rfl: 3    sucralfate (CARAFATE) 1 g/10 mL suspension, Take 10 mL (1 g total) by mouth 4 (four) times a day (with meals and at bedtime), Disp: 420 mL, Rfl: 2    topiramate (TOPAMAX) 50 MG tablet, Take 1 tablet (50 mg total) by mouth 2 (two) times a day, Disp: 60 tablet, Rfl: 0    zolpidem (AMBIEN) 5 mg tablet, Take 1 tablet (5 mg total) by mouth daily at bedtime, Disp: 30 tablet, Rfl: 3      Amber Bonilla MD

## 2019-01-16 DIAGNOSIS — G43.909 MIGRAINE WITHOUT STATUS MIGRAINOSUS, NOT INTRACTABLE, UNSPECIFIED MIGRAINE TYPE: ICD-10-CM

## 2019-01-17 RX ORDER — TOPIRAMATE 50 MG/1
50 TABLET, FILM COATED ORAL 2 TIMES DAILY
Qty: 60 TABLET | Refills: 0 | Status: SHIPPED | OUTPATIENT
Start: 2019-01-17 | End: 2019-02-21 | Stop reason: SDUPTHER

## 2019-01-29 DIAGNOSIS — G89.4 PAIN SYNDROME, CHRONIC: ICD-10-CM

## 2019-01-29 RX ORDER — METHADONE HYDROCHLORIDE 10 MG/1
TABLET ORAL
Qty: 81 TABLET | Refills: 0 | Status: SHIPPED | OUTPATIENT
Start: 2019-01-29 | End: 2019-02-25 | Stop reason: SDUPTHER

## 2019-02-20 DIAGNOSIS — G43.909 MIGRAINE WITHOUT STATUS MIGRAINOSUS, NOT INTRACTABLE, UNSPECIFIED MIGRAINE TYPE: ICD-10-CM

## 2019-02-21 DIAGNOSIS — G43.909 MIGRAINE WITHOUT STATUS MIGRAINOSUS, NOT INTRACTABLE, UNSPECIFIED MIGRAINE TYPE: ICD-10-CM

## 2019-02-21 RX ORDER — TOPIRAMATE 50 MG/1
TABLET, FILM COATED ORAL
Qty: 60 TABLET | Refills: 0 | OUTPATIENT
Start: 2019-02-21

## 2019-02-21 RX ORDER — TOPIRAMATE 50 MG/1
50 TABLET, FILM COATED ORAL 2 TIMES DAILY
Qty: 60 TABLET | Refills: 0 | Status: CANCELLED | OUTPATIENT
Start: 2019-02-21

## 2019-02-21 RX ORDER — TOPIRAMATE 50 MG/1
50 TABLET, FILM COATED ORAL 2 TIMES DAILY
Qty: 60 TABLET | Refills: 0 | Status: SHIPPED | OUTPATIENT
Start: 2019-02-21 | End: 2019-04-04 | Stop reason: SDUPTHER

## 2019-02-25 DIAGNOSIS — G89.4 PAIN SYNDROME, CHRONIC: ICD-10-CM

## 2019-02-27 RX ORDER — METHADONE HYDROCHLORIDE 10 MG/1
TABLET ORAL
Qty: 81 TABLET | Refills: 0 | Status: SHIPPED | OUTPATIENT
Start: 2019-02-27 | End: 2019-03-25 | Stop reason: SDUPTHER

## 2019-03-06 DIAGNOSIS — K21.9 GERD WITHOUT ESOPHAGITIS: ICD-10-CM

## 2019-03-06 RX ORDER — SUCRALFATE ORAL 1 G/10ML
1 SUSPENSION ORAL
Qty: 420 ML | Refills: 2 | Status: SHIPPED | OUTPATIENT
Start: 2019-03-06 | End: 2019-04-18

## 2019-03-25 DIAGNOSIS — G89.4 PAIN SYNDROME, CHRONIC: ICD-10-CM

## 2019-03-26 RX ORDER — METHADONE HYDROCHLORIDE 10 MG/1
TABLET ORAL
Qty: 81 TABLET | Refills: 0 | Status: SHIPPED | OUTPATIENT
Start: 2019-03-26 | End: 2019-03-29 | Stop reason: SDUPTHER

## 2019-03-29 DIAGNOSIS — G89.4 PAIN SYNDROME, CHRONIC: ICD-10-CM

## 2019-03-29 RX ORDER — METHADONE HYDROCHLORIDE 10 MG/1
TABLET ORAL
Qty: 81 TABLET | Refills: 0 | Status: SHIPPED | OUTPATIENT
Start: 2019-03-29 | End: 2019-04-22 | Stop reason: SDUPTHER

## 2019-03-29 NOTE — TELEPHONE ENCOUNTER
Pharmacy gave pt 5 day supply until authorization was approved  Pharmacist call and requested a new script

## 2019-04-04 DIAGNOSIS — F41.9 ANXIETY: ICD-10-CM

## 2019-04-04 DIAGNOSIS — G43.909 MIGRAINE WITHOUT STATUS MIGRAINOSUS, NOT INTRACTABLE, UNSPECIFIED MIGRAINE TYPE: ICD-10-CM

## 2019-04-05 DIAGNOSIS — G43.909 MIGRAINE WITHOUT STATUS MIGRAINOSUS, NOT INTRACTABLE, UNSPECIFIED MIGRAINE TYPE: ICD-10-CM

## 2019-04-05 DIAGNOSIS — F41.9 ANXIETY: ICD-10-CM

## 2019-04-05 RX ORDER — ALPRAZOLAM 1 MG/1
1 TABLET ORAL 3 TIMES DAILY PRN
Qty: 5 TABLET | Refills: 0 | Status: SHIPPED | OUTPATIENT
Start: 2019-04-05 | End: 2019-11-01 | Stop reason: SDUPTHER

## 2019-04-05 RX ORDER — TOPIRAMATE 50 MG/1
TABLET, FILM COATED ORAL
Qty: 60 TABLET | Refills: 0 | OUTPATIENT
Start: 2019-04-05

## 2019-04-05 RX ORDER — ALPRAZOLAM 1 MG/1
TABLET ORAL
Qty: 5 TABLET | Refills: 0 | OUTPATIENT
Start: 2019-04-05

## 2019-04-05 RX ORDER — TOPIRAMATE 50 MG/1
50 TABLET, FILM COATED ORAL 2 TIMES DAILY
Qty: 60 TABLET | Refills: 0 | Status: SHIPPED | OUTPATIENT
Start: 2019-04-05 | End: 2019-04-18

## 2019-04-15 ENCOUNTER — APPOINTMENT (OUTPATIENT)
Dept: LAB | Facility: CLINIC | Age: 49
End: 2019-04-15
Payer: COMMERCIAL

## 2019-04-15 DIAGNOSIS — Z90.3 POSTGASTRECTOMY MALABSORPTION: ICD-10-CM

## 2019-04-15 DIAGNOSIS — G89.4 PAIN SYNDROME, CHRONIC: ICD-10-CM

## 2019-04-15 DIAGNOSIS — E55.9 VITAMIN D DEFICIENCY: ICD-10-CM

## 2019-04-15 DIAGNOSIS — K91.2 POSTGASTRECTOMY MALABSORPTION: ICD-10-CM

## 2019-04-15 LAB
25(OH)D3 SERPL-MCNC: 26.9 NG/ML (ref 30–100)
ALBUMIN SERPL BCP-MCNC: 3 G/DL (ref 3.5–5)
ALP SERPL-CCNC: 106 U/L (ref 46–116)
ALT SERPL W P-5'-P-CCNC: 26 U/L (ref 12–78)
ANION GAP SERPL CALCULATED.3IONS-SCNC: 4 MMOL/L (ref 4–13)
AST SERPL W P-5'-P-CCNC: 35 U/L (ref 5–45)
BILIRUB SERPL-MCNC: 0.24 MG/DL (ref 0.2–1)
BUN SERPL-MCNC: 13 MG/DL (ref 5–25)
CALCIUM SERPL-MCNC: 7.8 MG/DL (ref 8.3–10.1)
CHLORIDE SERPL-SCNC: 114 MMOL/L (ref 100–108)
CHOLEST SERPL-MCNC: 144 MG/DL (ref 50–200)
CO2 SERPL-SCNC: 24 MMOL/L (ref 21–32)
CREAT SERPL-MCNC: 0.58 MG/DL (ref 0.6–1.3)
ERYTHROCYTE [DISTWIDTH] IN BLOOD BY AUTOMATED COUNT: 16.8 % (ref 11.6–15.1)
GFR SERPL CREATININE-BSD FRML MDRD: 107 ML/MIN/1.73SQ M
GLUCOSE P FAST SERPL-MCNC: 72 MG/DL (ref 65–99)
HCT VFR BLD AUTO: 35.5 % (ref 34.8–46.1)
HDLC SERPL-MCNC: 51 MG/DL (ref 40–60)
HGB BLD-MCNC: 10.1 G/DL (ref 11.5–15.4)
IRON SATN MFR SERPL: 5 %
IRON SERPL-MCNC: 23 UG/DL (ref 50–170)
LDLC SERPL CALC-MCNC: 81 MG/DL (ref 0–100)
MCH RBC QN AUTO: 23.9 PG (ref 26.8–34.3)
MCHC RBC AUTO-ENTMCNC: 28.5 G/DL (ref 31.4–37.4)
MCV RBC AUTO: 84 FL (ref 82–98)
NONHDLC SERPL-MCNC: 93 MG/DL
PLATELET # BLD AUTO: 287 THOUSANDS/UL (ref 149–390)
PMV BLD AUTO: 10.3 FL (ref 8.9–12.7)
POTASSIUM SERPL-SCNC: 4.1 MMOL/L (ref 3.5–5.3)
PROT SERPL-MCNC: 6.7 G/DL (ref 6.4–8.2)
RBC # BLD AUTO: 4.23 MILLION/UL (ref 3.81–5.12)
SODIUM SERPL-SCNC: 142 MMOL/L (ref 136–145)
TIBC SERPL-MCNC: 487 UG/DL (ref 250–450)
TRIGL SERPL-MCNC: 62 MG/DL
VIT B12 SERPL-MCNC: 223 PG/ML (ref 100–900)
WBC # BLD AUTO: 7.54 THOUSAND/UL (ref 4.31–10.16)

## 2019-04-15 PROCEDURE — 80061 LIPID PANEL: CPT

## 2019-04-15 PROCEDURE — 84425 ASSAY OF VITAMIN B-1: CPT

## 2019-04-15 PROCEDURE — 83550 IRON BINDING TEST: CPT

## 2019-04-15 PROCEDURE — 80053 COMPREHEN METABOLIC PANEL: CPT

## 2019-04-15 PROCEDURE — 80307 DRUG TEST PRSMV CHEM ANLYZR: CPT

## 2019-04-15 PROCEDURE — 36415 COLL VENOUS BLD VENIPUNCTURE: CPT

## 2019-04-15 PROCEDURE — 84207 ASSAY OF VITAMIN B-6: CPT

## 2019-04-15 PROCEDURE — 82607 VITAMIN B-12: CPT

## 2019-04-15 PROCEDURE — 85027 COMPLETE CBC AUTOMATED: CPT

## 2019-04-15 PROCEDURE — 83540 ASSAY OF IRON: CPT

## 2019-04-15 PROCEDURE — 82306 VITAMIN D 25 HYDROXY: CPT

## 2019-04-18 ENCOUNTER — OFFICE VISIT (OUTPATIENT)
Dept: INTERNAL MEDICINE CLINIC | Facility: CLINIC | Age: 49
End: 2019-04-18
Payer: COMMERCIAL

## 2019-04-18 VITALS
SYSTOLIC BLOOD PRESSURE: 110 MMHG | HEIGHT: 65 IN | RESPIRATION RATE: 16 BRPM | HEART RATE: 76 BPM | DIASTOLIC BLOOD PRESSURE: 60 MMHG | BODY MASS INDEX: 22.33 KG/M2 | WEIGHT: 134 LBS | TEMPERATURE: 97 F

## 2019-04-18 DIAGNOSIS — G89.4 PAIN SYNDROME, CHRONIC: Primary | ICD-10-CM

## 2019-04-18 DIAGNOSIS — G43.909 MIGRAINE WITHOUT STATUS MIGRAINOSUS, NOT INTRACTABLE, UNSPECIFIED MIGRAINE TYPE: ICD-10-CM

## 2019-04-18 DIAGNOSIS — F51.01 PRIMARY INSOMNIA: ICD-10-CM

## 2019-04-18 DIAGNOSIS — E55.9 VITAMIN D DEFICIENCY: ICD-10-CM

## 2019-04-18 DIAGNOSIS — D50.8 OTHER IRON DEFICIENCY ANEMIA: ICD-10-CM

## 2019-04-18 DIAGNOSIS — K21.9 GERD WITHOUT ESOPHAGITIS: ICD-10-CM

## 2019-04-18 DIAGNOSIS — F32.A DEPRESSION, UNSPECIFIED DEPRESSION TYPE: ICD-10-CM

## 2019-04-18 PROBLEM — D50.9 IRON DEFICIENCY ANEMIA: Status: ACTIVE | Noted: 2019-04-18

## 2019-04-18 LAB
AMPHETAMINES UR QL SCN: NEGATIVE NG/ML
BARBITURATES UR QL SCN: NEGATIVE NG/ML
BENZODIAZ UR QL: NEGATIVE NG/ML
BZE UR QL: NEGATIVE NG/ML
CANNABINOIDS UR QL SCN: NEGATIVE NG/ML
METHADONE UR QL SCN: POSITIVE
OPIATES UR QL: NEGATIVE NG/ML
PCP UR QL: NEGATIVE NG/ML
PROPOXYPH UR QL SCN: NEGATIVE NG/ML

## 2019-04-18 PROCEDURE — 3008F BODY MASS INDEX DOCD: CPT | Performed by: INTERNAL MEDICINE

## 2019-04-18 PROCEDURE — 99214 OFFICE O/P EST MOD 30 MIN: CPT | Performed by: INTERNAL MEDICINE

## 2019-04-18 RX ORDER — ZOLPIDEM TARTRATE 5 MG/1
5 TABLET ORAL
Qty: 30 TABLET | Refills: 3 | Status: SHIPPED | OUTPATIENT
Start: 2019-04-18 | End: 2019-11-02 | Stop reason: SDUPTHER

## 2019-04-18 RX ORDER — NICOTINE POLACRILEX 4 MG/1
20 GUM, CHEWING ORAL DAILY
Qty: 90 TABLET | Refills: 3 | Status: SHIPPED | OUTPATIENT
Start: 2019-04-18 | End: 2020-05-14 | Stop reason: SDUPTHER

## 2019-04-18 RX ORDER — SUCRALFATE ORAL 1 G/10ML
1 SUSPENSION ORAL 4 TIMES DAILY PRN
Qty: 420 ML | Refills: 2 | Status: SHIPPED | OUTPATIENT
Start: 2019-04-18 | End: 2020-09-14 | Stop reason: SDUPTHER

## 2019-04-18 RX ORDER — TOPIRAMATE 50 MG/1
50 TABLET, FILM COATED ORAL DAILY
Qty: 60 TABLET | Refills: 0
Start: 2019-04-18 | End: 2019-05-17 | Stop reason: SDUPTHER

## 2019-04-19 PROBLEM — R87.810 ASCUS WITH POSITIVE HIGH RISK HPV CERVICAL: Status: ACTIVE | Noted: 2019-03-28

## 2019-04-19 PROBLEM — R87.610 ASCUS WITH POSITIVE HIGH RISK HPV CERVICAL: Status: ACTIVE | Noted: 2019-03-28

## 2019-04-21 LAB — VIT B1 BLD-SCNC: 164.3 NMOL/L (ref 66.5–200)

## 2019-04-22 DIAGNOSIS — F32.A DEPRESSION, UNSPECIFIED DEPRESSION TYPE: ICD-10-CM

## 2019-04-22 DIAGNOSIS — G89.4 PAIN SYNDROME, CHRONIC: ICD-10-CM

## 2019-04-22 LAB — VIT B6 SERPL-MCNC: 5.6 UG/L (ref 2–32.8)

## 2019-04-23 RX ORDER — METHADONE HYDROCHLORIDE 10 MG/1
TABLET ORAL
Qty: 81 TABLET | Refills: 0 | Status: SHIPPED | OUTPATIENT
Start: 2019-04-23 | End: 2019-05-20 | Stop reason: SDUPTHER

## 2019-04-23 RX ORDER — SERTRALINE HYDROCHLORIDE 100 MG/1
300 TABLET, FILM COATED ORAL DAILY
Qty: 90 TABLET | Refills: 3 | Status: SHIPPED | OUTPATIENT
Start: 2019-04-23 | End: 2019-09-03 | Stop reason: SDUPTHER

## 2019-04-26 RX ORDER — SODIUM CHLORIDE 9 MG/ML
20 INJECTION, SOLUTION INTRAVENOUS CONTINUOUS
Status: DISCONTINUED | OUTPATIENT
Start: 2019-04-29 | End: 2019-05-02 | Stop reason: HOSPADM

## 2019-04-29 ENCOUNTER — HOSPITAL ENCOUNTER (OUTPATIENT)
Dept: INFUSION CENTER | Facility: CLINIC | Age: 49
Discharge: HOME | End: 2019-04-29
Payer: COMMERCIAL

## 2019-04-29 VITALS
RESPIRATION RATE: 18 BRPM | SYSTOLIC BLOOD PRESSURE: 110 MMHG | DIASTOLIC BLOOD PRESSURE: 65 MMHG | TEMPERATURE: 98.2 F | HEART RATE: 70 BPM

## 2019-04-29 PROCEDURE — 96365 THER/PROPH/DIAG IV INF INIT: CPT

## 2019-04-29 RX ADMIN — SODIUM CHLORIDE 20 ML/HR: 0.9 INJECTION, SOLUTION INTRAVENOUS at 13:51

## 2019-04-29 RX ADMIN — IRON SUCROSE 200 MG: 20 INJECTION, SOLUTION INTRAVENOUS at 14:01

## 2019-04-29 NOTE — PLAN OF CARE
Problem: Potential for Falls  Goal: Patient will remain free of falls  Description  INTERVENTIONS:  - Assess patient frequently for physical needs  -  Identify cognitive and physical deficits and behaviors that affect risk of falls    -  [place] fall precautions as indicated by assessment   - Educate patient/family on patient safety including physical limitations  - Instruct patient to call for assistance with activity based on assessment  - Modify environment to reduce risk of injury  - Consider OT/PT consult to assist with strengthening/mobility  Outcome: Progressing

## 2019-05-06 RX ORDER — SODIUM CHLORIDE 9 MG/ML
20 INJECTION, SOLUTION INTRAVENOUS CONTINUOUS
Status: DISCONTINUED | OUTPATIENT
Start: 2019-05-07 | End: 2019-05-10 | Stop reason: HOSPADM

## 2019-05-07 ENCOUNTER — HOSPITAL ENCOUNTER (OUTPATIENT)
Dept: INFUSION CENTER | Facility: CLINIC | Age: 49
Discharge: HOME | End: 2019-05-07
Payer: COMMERCIAL

## 2019-05-07 VITALS
HEART RATE: 71 BPM | DIASTOLIC BLOOD PRESSURE: 71 MMHG | TEMPERATURE: 97 F | RESPIRATION RATE: 18 BRPM | SYSTOLIC BLOOD PRESSURE: 115 MMHG

## 2019-05-07 PROCEDURE — 96366 THER/PROPH/DIAG IV INF ADDON: CPT

## 2019-05-07 PROCEDURE — 96365 THER/PROPH/DIAG IV INF INIT: CPT

## 2019-05-07 RX ADMIN — IRON SUCROSE 200 MG: 20 INJECTION, SOLUTION INTRAVENOUS at 14:10

## 2019-05-07 RX ADMIN — SODIUM CHLORIDE 20 ML/HR: 0.9 INJECTION, SOLUTION INTRAVENOUS at 14:10

## 2019-05-10 RX ORDER — SODIUM CHLORIDE 9 MG/ML
20 INJECTION, SOLUTION INTRAVENOUS CONTINUOUS
Status: DISCONTINUED | OUTPATIENT
Start: 2019-05-13 | End: 2019-05-16 | Stop reason: HOSPADM

## 2019-05-13 ENCOUNTER — HOSPITAL ENCOUNTER (OUTPATIENT)
Dept: INFUSION CENTER | Facility: CLINIC | Age: 49
Discharge: HOME | End: 2019-05-13

## 2019-05-15 ENCOUNTER — HOSPITAL ENCOUNTER (OUTPATIENT)
Dept: INFUSION CENTER | Facility: CLINIC | Age: 49
Discharge: HOME | End: 2019-05-15
Payer: COMMERCIAL

## 2019-05-15 VITALS
HEART RATE: 64 BPM | DIASTOLIC BLOOD PRESSURE: 62 MMHG | TEMPERATURE: 98.5 F | SYSTOLIC BLOOD PRESSURE: 97 MMHG | RESPIRATION RATE: 16 BRPM

## 2019-05-15 PROCEDURE — 96365 THER/PROPH/DIAG IV INF INIT: CPT

## 2019-05-15 PROCEDURE — 96366 THER/PROPH/DIAG IV INF ADDON: CPT

## 2019-05-15 RX ORDER — SODIUM CHLORIDE 9 MG/ML
20 INJECTION, SOLUTION INTRAVENOUS CONTINUOUS
Status: DISCONTINUED | OUTPATIENT
Start: 2019-05-15 | End: 2019-05-18 | Stop reason: HOSPADM

## 2019-05-15 RX ADMIN — IRON SUCROSE 200 MG: 20 INJECTION, SOLUTION INTRAVENOUS at 14:16

## 2019-05-15 RX ADMIN — SODIUM CHLORIDE 20 ML/HR: 0.9 INJECTION, SOLUTION INTRAVENOUS at 14:10

## 2019-05-15 NOTE — PROGRESS NOTES
Patient tolerated Venofer  Denies any discomforts at the end  Patient had some burning while venofer infusing  NSS infused concurrently at [facility] which helped the burning  Aware of next appointment   Declined AVS

## 2019-05-17 DIAGNOSIS — G43.909 MIGRAINE WITHOUT STATUS MIGRAINOSUS, NOT INTRACTABLE, UNSPECIFIED MIGRAINE TYPE: ICD-10-CM

## 2019-05-17 RX ORDER — TOPIRAMATE 50 MG/1
50 TABLET, FILM COATED ORAL DAILY
Qty: 60 TABLET | Refills: 0 | Status: SHIPPED | OUTPATIENT
Start: 2019-05-17 | End: 2019-07-02 | Stop reason: SDUPTHER

## 2019-05-20 ENCOUNTER — HOSPITAL ENCOUNTER (OUTPATIENT)
Dept: INFUSION CENTER | Facility: CLINIC | Age: 49
Discharge: HOME | End: 2019-05-20
Payer: COMMERCIAL

## 2019-05-20 DIAGNOSIS — G89.4 PAIN SYNDROME, CHRONIC: ICD-10-CM

## 2019-05-20 PROCEDURE — 96365 THER/PROPH/DIAG IV INF INIT: CPT

## 2019-05-20 PROCEDURE — 96366 THER/PROPH/DIAG IV INF ADDON: CPT

## 2019-05-20 RX ORDER — METHADONE HYDROCHLORIDE 10 MG/1
TABLET ORAL
Qty: 90 TABLET | Refills: 0 | Status: SHIPPED | OUTPATIENT
Start: 2019-05-20 | End: 2019-06-17 | Stop reason: SDUPTHER

## 2019-05-20 RX ORDER — SODIUM CHLORIDE 9 MG/ML
20 INJECTION, SOLUTION INTRAVENOUS CONTINUOUS
Status: DISCONTINUED | OUTPATIENT
Start: 2019-05-20 | End: 2019-05-23 | Stop reason: HOSPADM

## 2019-05-20 RX ADMIN — IRON SUCROSE 200 MG: 20 INJECTION, SOLUTION INTRAVENOUS at 14:30

## 2019-05-20 RX ADMIN — SODIUM CHLORIDE 20 ML/HR: 0.9 INJECTION, SOLUTION INTRAVENOUS at 14:25

## 2019-05-20 NOTE — PROGRESS NOTES
Pt  Tolerated Venofer without adverse event  There are no future appointments scheduled at this time  Labs are scheduled with physician follow up    Declined AVS

## 2019-05-23 RX ORDER — SODIUM CHLORIDE 9 MG/ML
20 INJECTION, SOLUTION INTRAVENOUS CONTINUOUS
Status: DISCONTINUED | OUTPATIENT
Start: 2019-05-28 | End: 2019-05-31 | Stop reason: HOSPADM

## 2019-05-28 ENCOUNTER — HOSPITAL ENCOUNTER (OUTPATIENT)
Dept: INFUSION CENTER | Facility: CLINIC | Age: 49
Discharge: HOME | End: 2019-05-28

## 2019-05-29 RX ORDER — SODIUM CHLORIDE 9 MG/ML
20 INJECTION, SOLUTION INTRAVENOUS CONTINUOUS
Status: DISCONTINUED | OUTPATIENT
Start: 2019-05-30 | End: 2019-06-02 | Stop reason: HOSPADM

## 2019-05-30 ENCOUNTER — HOSPITAL ENCOUNTER (OUTPATIENT)
Dept: INFUSION CENTER | Facility: CLINIC | Age: 49
Discharge: HOME | End: 2019-05-30
Payer: COMMERCIAL

## 2019-05-30 VITALS
HEART RATE: 71 BPM | DIASTOLIC BLOOD PRESSURE: 69 MMHG | RESPIRATION RATE: 18 BRPM | SYSTOLIC BLOOD PRESSURE: 105 MMHG | TEMPERATURE: 98.7 F

## 2019-05-30 PROCEDURE — 96365 THER/PROPH/DIAG IV INF INIT: CPT

## 2019-05-30 RX ADMIN — SODIUM CHLORIDE 20 ML/HR: 0.9 INJECTION, SOLUTION INTRAVENOUS at 13:49

## 2019-05-30 RX ADMIN — IRON SUCROSE 200 MG: 20 INJECTION, SOLUTION INTRAVENOUS at 13:56

## 2019-05-30 NOTE — PROGRESS NOTES
Pt tolerated venofer today without incident  Pt declined AVS and states this is her last venofer treatment

## 2019-05-30 NOTE — PLAN OF CARE
Problem: Potential for Falls  Goal: Patient will remain free of falls  Description  INTERVENTIONS:  - Assess patient frequently for physical needs  -  Identify cognitive and physical deficits and behaviors that affect risk of falls    -  [place] fall precautions as indicated by assessment   - Educate patient/family on patient safety including physical limitations  - Instruct patient to call for assistance with activity based on assessment  - Modify environment to reduce risk of injury  - Consider OT/PT consult to assist with strengthening/mobility  Outcome: Progressing

## 2019-06-17 DIAGNOSIS — G89.4 PAIN SYNDROME, CHRONIC: ICD-10-CM

## 2019-06-17 RX ORDER — METHADONE HYDROCHLORIDE 10 MG/1
TABLET ORAL
Qty: 90 TABLET | Refills: 0 | Status: SHIPPED | OUTPATIENT
Start: 2019-06-17 | End: 2019-07-15 | Stop reason: SDUPTHER

## 2019-06-21 ENCOUNTER — APPOINTMENT (OUTPATIENT)
Dept: LAB | Facility: MEDICAL CENTER | Age: 49
End: 2019-06-21
Payer: COMMERCIAL

## 2019-06-21 LAB
ERYTHROCYTE [DISTWIDTH] IN BLOOD BY AUTOMATED COUNT: 19 % (ref 11.6–15.1)
HCT VFR BLD AUTO: 42.7 % (ref 34.8–46.1)
HGB BLD-MCNC: 12.9 G/DL (ref 11.5–15.4)
MCH RBC QN AUTO: 27.8 PG (ref 26.8–34.3)
MCHC RBC AUTO-ENTMCNC: 30.2 G/DL (ref 31.4–37.4)
MCV RBC AUTO: 92 FL (ref 82–98)
PLATELET # BLD AUTO: 254 THOUSANDS/UL (ref 149–390)
PMV BLD AUTO: 10.2 FL (ref 8.9–12.7)
RBC # BLD AUTO: 4.64 MILLION/UL (ref 3.81–5.12)
WBC # BLD AUTO: 9.77 THOUSAND/UL (ref 4.31–10.16)

## 2019-06-21 PROCEDURE — 85027 COMPLETE CBC AUTOMATED: CPT | Performed by: INTERNAL MEDICINE

## 2019-06-21 PROCEDURE — 36415 COLL VENOUS BLD VENIPUNCTURE: CPT | Performed by: INTERNAL MEDICINE

## 2019-07-02 DIAGNOSIS — G43.909 MIGRAINE WITHOUT STATUS MIGRAINOSUS, NOT INTRACTABLE, UNSPECIFIED MIGRAINE TYPE: ICD-10-CM

## 2019-07-02 RX ORDER — TOPIRAMATE 50 MG/1
50 TABLET, FILM COATED ORAL 2 TIMES DAILY
Qty: 60 TABLET | Refills: 0 | Status: SHIPPED | OUTPATIENT
Start: 2019-07-02 | End: 2019-08-05 | Stop reason: SDUPTHER

## 2019-07-02 NOTE — TELEPHONE ENCOUNTER
Dr Leodan Nicolas has gone back to taking two pills daily of her Topamax  She was taking one, but lately has been having a lot more headaches and 2 a day have been working  I did change the prescription for 2 a day, for 30 days  She has an appt with you on 7/18 and did see you in April

## 2019-07-15 DIAGNOSIS — G89.4 PAIN SYNDROME, CHRONIC: ICD-10-CM

## 2019-07-16 RX ORDER — METHADONE HYDROCHLORIDE 10 MG/1
TABLET ORAL
Qty: 90 TABLET | Refills: 0 | Status: SHIPPED | OUTPATIENT
Start: 2019-07-16 | End: 2019-07-23 | Stop reason: SDUPTHER

## 2019-07-18 ENCOUNTER — OFFICE VISIT (OUTPATIENT)
Dept: INTERNAL MEDICINE CLINIC | Facility: CLINIC | Age: 49
End: 2019-07-18
Payer: COMMERCIAL

## 2019-07-18 VITALS
HEIGHT: 65 IN | WEIGHT: 143.8 LBS | BODY MASS INDEX: 23.96 KG/M2 | DIASTOLIC BLOOD PRESSURE: 62 MMHG | SYSTOLIC BLOOD PRESSURE: 110 MMHG | OXYGEN SATURATION: 99 % | HEART RATE: 66 BPM

## 2019-07-18 DIAGNOSIS — G89.4 PAIN SYNDROME, CHRONIC: ICD-10-CM

## 2019-07-18 DIAGNOSIS — Z98.84 HISTORY OF BARIATRIC SURGERY: ICD-10-CM

## 2019-07-18 DIAGNOSIS — D50.8 OTHER IRON DEFICIENCY ANEMIA: Primary | ICD-10-CM

## 2019-07-18 DIAGNOSIS — E55.9 VITAMIN D DEFICIENCY: ICD-10-CM

## 2019-07-18 DIAGNOSIS — F32.A DEPRESSION, UNSPECIFIED DEPRESSION TYPE: ICD-10-CM

## 2019-07-18 DIAGNOSIS — G43.909 MIGRAINE WITHOUT STATUS MIGRAINOSUS, NOT INTRACTABLE, UNSPECIFIED MIGRAINE TYPE: ICD-10-CM

## 2019-07-18 PROCEDURE — 99214 OFFICE O/P EST MOD 30 MIN: CPT | Performed by: INTERNAL MEDICINE

## 2019-07-18 PROCEDURE — 3008F BODY MASS INDEX DOCD: CPT | Performed by: INTERNAL MEDICINE

## 2019-07-22 NOTE — PROGRESS NOTES
[facility] [place]      NAME: Ely Cantrell  AGE: 44 y o  SEX: female  : 1970   MRN: [id number]    DATE: 2019  TIME: 8:18 AM    Assessment and Plan   1  Other iron deficiency anemia  The patient's hemoglobin has improved  We will continue to monitor this  She has required periodic iron infusions   - CBC; Future    2  Pain syndrome, chronic  Stable on current dose of methadone  3  History of bariatric surgery  On appropriate nutritional supplements  Her weight has been stable  4  Migraine without status migrainosus, not intractable, unspecified migraine type  Well controlled on Topamax  5  Depression, unspecified depression type  Stable on sertraline  6  Vitamin D deficiency  On replacement  Overall, the patient is doing pretty well  I asked her to continue her current medications for now  Return to office in:  3 months    Chief Complaint     Chief Complaint   Patient presents with    Follow-up       History of Present Illness     The patient returned to the office for re-evaluation of migraine, iron deficiency anemia, vitamin-D deficiency, and previous bariatric surgery  Since her last visit she has been feeling reasonably well  She has a lot going on her life including looking for a new job and a new place to live  Despite this, her mood has been reasonably stable  She has had no chest pain, shortness of breath, palpitations, or dizziness  Her chronic pain has been stable  She has been tolerating her medications  The following portions of the patient's history were reviewed and updated as appropriate: allergies, current medications, past family history, past medical history, past social history, past surgical history and problem list     Review of Systems   Review of Systems   Constitutional: Negative  HENT: Negative for congestion, ear pain, postnasal drip, rhinorrhea, sore throat and trouble swallowing      Eyes: Negative for pain, discharge, redness and visual disturbance  Respiratory: Negative for cough, shortness of breath and wheezing  Cardiovascular: Negative  Gastrointestinal: Negative  Endocrine: Negative  Genitourinary: Negative for difficulty urinating, dysuria, frequency, hematuria and urgency  Musculoskeletal: Negative for arthralgias, gait problem, joint swelling and myalgias  Skin: Negative for rash  Neurological: Negative for dizziness, speech difficulty, weakness, light-headedness, numbness and headaches  Hematological: Negative  Psychiatric/Behavioral: Negative for confusion, decreased concentration, dysphoric mood and sleep disturbance  The patient is not nervous/anxious  Active Problem List     Patient Active Problem List   Diagnosis    Allergic rhinitis    Anxiety    Depression    GERD without esophagitis    Insomnia    Migraine, unspecified, not intractable, without status migrainosus    Pain syndrome, chronic    Postgastrectomy malabsorption    Vitamin D deficiency    Wartenberg syndrome    History of bariatric surgery    Iron deficiency anemia    ASCUS with positive high risk HPV cervical       Objective   /62 (BP Location: Left arm, Patient Position: Sitting, Cuff Size: Standard)   Pulse 66   Ht 5' 5" (1 651 m)   Wt 65 2 kg (143 lb 12 8 oz)   SpO2 99%   BMI 23 93 kg/m²     Physical Exam   Constitutional: She is oriented to person, place, and time  She appears well-developed and well-nourished  No distress  HENT:   Head: Normocephalic and atraumatic  Neck: Neck supple  No JVD present  No tracheal deviation present  No thyromegaly present  Cardiovascular: Normal rate, regular rhythm, normal heart sounds and intact distal pulses  Exam reveals no gallop and no friction rub  No murmur heard  Pulmonary/Chest: Effort normal and breath sounds normal  She has no wheezes  She has no rales  She exhibits no tenderness  Abdominal: Soft   Bowel sounds are normal  She exhibits no distension and no mass  There is no tenderness  There is no rebound  Musculoskeletal: Normal range of motion  She exhibits no edema or tenderness  Lymphadenopathy:     She has no cervical adenopathy  Neurological: She is alert and oriented to person, place, and time  Skin: Skin is warm and dry  Psychiatric: She has a normal mood and affect  Her behavior is normal  Judgment and thought content normal        Pertinent Laboratory/Diagnostic Studies:  No visits with results within 3 Month(s) from this visit     Latest known visit with results is:   Office Visit on 04/18/2019   Component Date Value Ref Range Status    WBC 06/21/2019 9 77  4 31 - 10 16 Thousand/uL Final    RBC 06/21/2019 4 64  3 81 - 5 12 Million/uL Final    Hemoglobin 06/21/2019 12 9  11 5 - 15 4 g/dL Final    Hematocrit 06/21/2019 42 7  34 8 - 46 1 % Final    MCV 06/21/2019 92  82 - 98 fL Final    MCH 06/21/2019 27 8  26 8 - 34 3 pg Final    MCHC 06/21/2019 30 2* 31 4 - 37 4 g/dL Final    RDW 06/21/2019 19 0* 11 6 - 15 1 % Final    Platelets 57/93/2799 254  149 - 390 Thousands/uL Final    MPV 06/21/2019 10 2  8 9 - 12 7 fL Final           Current Medications     Current Outpatient Medications:     ALPRAZolam (XANAX) 1 mg tablet, Take 1 tablet (1 mg total) by mouth 3 (three) times a day as needed for anxiety (Patient not taking: Reported on 4/29/2019), Disp: 5 tablet, Rfl: 0    cholecalciferol (VITAMIN D3) 1,000 units tablet, Take 1 tablet (1,000 Units total) by mouth daily, Disp: 30 tablet, Rfl: 0    methadone (DOLOPHINE) 10 mg tablet, 1 tablet 3 times daily as needed for pain,, Disp: 90 tablet, Rfl: 0    norethindrone-ethinyl estradiol (NORTREL 1-35 TAB) 1-35 MG-MCG per tablet, Take 1 tablet by mouth, Disp: , Rfl:     Omeprazole 20 MG TBEC, Take 1 tablet (20 mg total) by mouth daily, Disp: 90 tablet, Rfl: 3    Pediatric Multiple Vit-C-FA (FRUITY CHEWABLES MULTIVITAMIN) CHEW, Chew, Disp: , Rfl:     sertraline (ZOLOFT) 100 mg tablet, Take 3 tablets (300 mg total) by mouth daily, Disp: 90 tablet, Rfl: 3    sucralfate (CARAFATE) 1 g/10 mL suspension, Take 10 mL (1 g total) by mouth 4 (four) times a day as needed (dyspepsia) (Patient not taking: Reported on 4/29/2019), Disp: 420 mL, Rfl: 2    topiramate (TOPAMAX) 50 MG tablet, Take 1 tablet (50 mg total) by mouth 2 (two) times a day Take 1 tablet by mouth 2 times daily, Disp: 60 tablet, Rfl: 0    zolpidem (AMBIEN) 5 mg tablet, Take 1 tablet (5 mg total) by mouth daily at bedtime, Disp: 30 tablet, Rfl: 3      Aleta Zamora MD

## 2019-07-23 DIAGNOSIS — G89.4 PAIN SYNDROME, CHRONIC: ICD-10-CM

## 2019-07-23 RX ORDER — METHADONE HYDROCHLORIDE 10 MG/1
TABLET ORAL
Qty: 68 TABLET | Refills: 0 | Status: SHIPPED | OUTPATIENT
Start: 2019-07-23 | End: 2019-07-23 | Stop reason: SDUPTHER

## 2019-07-23 NOTE — PROGRESS NOTES
Dr Rubén Ybarra  [address] [place] [address] called and asked if we could send this medication to another [address] [place] [address]  The one pharmacy is out of stock  I changed the pharmacy and sent the methadone to you again  Thanks

## 2019-07-23 NOTE — TELEPHONE ENCOUNTER
PT picked up 22 tablets and paid cash, needs new prescription for the remainder of 90 tables   Prior authorization was approved on 7/22/19

## 2019-07-24 RX ORDER — METHADONE HYDROCHLORIDE 10 MG/1
TABLET ORAL
Qty: 68 TABLET | Refills: 0 | Status: SHIPPED | OUTPATIENT
Start: 2019-07-24 | End: 2019-08-14 | Stop reason: SDUPTHER

## 2019-08-02 DIAGNOSIS — G43.909 MIGRAINE WITHOUT STATUS MIGRAINOSUS, NOT INTRACTABLE, UNSPECIFIED MIGRAINE TYPE: ICD-10-CM

## 2019-08-05 ENCOUNTER — TELEPHONE (OUTPATIENT)
Dept: INTERNAL MEDICINE CLINIC | Facility: CLINIC | Age: 49
End: 2019-08-05

## 2019-08-05 DIAGNOSIS — G43.909 MIGRAINE WITHOUT STATUS MIGRAINOSUS, NOT INTRACTABLE, UNSPECIFIED MIGRAINE TYPE: ICD-10-CM

## 2019-08-05 NOTE — TELEPHONE ENCOUNTER
Ely called in stating that she had been bitten by her friend's dog and has a puncture wound on her calf  She said she cleaned it and dressed it  We told her to keep an eye on it, keep it clean, and let us know how she's doing in a couple of days  We offered to come in and see Dr Myra Pablo, but she thought she would see how it is in a couple of days and then call if needed

## 2019-08-06 RX ORDER — TOPIRAMATE 50 MG/1
50 TABLET, FILM COATED ORAL 2 TIMES DAILY
Qty: 60 TABLET | Refills: 0 | Status: SHIPPED | OUTPATIENT
Start: 2019-08-06 | End: 2019-08-30 | Stop reason: SDUPTHER

## 2019-08-06 RX ORDER — TOPIRAMATE 50 MG/1
TABLET, FILM COATED ORAL
Qty: 60 TABLET | Refills: 0 | OUTPATIENT
Start: 2019-08-06 | End: 2020-01-10

## 2019-08-14 DIAGNOSIS — G89.4 PAIN SYNDROME, CHRONIC: ICD-10-CM

## 2019-08-14 RX ORDER — METHADONE HYDROCHLORIDE 10 MG/1
TABLET ORAL
Qty: 90 TABLET | Refills: 0 | Status: SHIPPED | OUTPATIENT
Start: 2019-08-14 | End: 2019-09-09 | Stop reason: SDUPTHER

## 2019-08-30 DIAGNOSIS — G43.909 MIGRAINE WITHOUT STATUS MIGRAINOSUS, NOT INTRACTABLE, UNSPECIFIED MIGRAINE TYPE: ICD-10-CM

## 2019-09-03 DIAGNOSIS — F32.A DEPRESSION, UNSPECIFIED DEPRESSION TYPE: ICD-10-CM

## 2019-09-03 RX ORDER — SERTRALINE HYDROCHLORIDE 100 MG/1
TABLET, FILM COATED ORAL
Qty: 90 TABLET | Refills: 2 | Status: SHIPPED | OUTPATIENT
Start: 2019-09-03 | End: 2020-01-22

## 2019-09-03 RX ORDER — TOPIRAMATE 50 MG/1
TABLET, FILM COATED ORAL
Qty: 60 TABLET | Refills: 0 | Status: SHIPPED | OUTPATIENT
Start: 2019-09-03 | End: 2019-10-26 | Stop reason: SDUPTHER

## 2019-09-09 DIAGNOSIS — G89.4 PAIN SYNDROME, CHRONIC: ICD-10-CM

## 2019-09-10 RX ORDER — METHADONE HYDROCHLORIDE 10 MG/1
TABLET ORAL
Qty: 90 TABLET | Refills: 0 | Status: SHIPPED | OUTPATIENT
Start: 2019-09-10 | End: 2019-10-08 | Stop reason: SDUPTHER

## 2019-09-26 DIAGNOSIS — G43.909 MIGRAINE WITHOUT STATUS MIGRAINOSUS, NOT INTRACTABLE, UNSPECIFIED MIGRAINE TYPE: ICD-10-CM

## 2019-09-26 RX ORDER — TOPIRAMATE 50 MG/1
TABLET, FILM COATED ORAL
Qty: 60 TABLET | Refills: 0 | Status: SHIPPED | OUTPATIENT
Start: 2019-09-26 | End: 2019-11-22 | Stop reason: SDUPTHER

## 2019-10-04 DIAGNOSIS — G89.4 PAIN SYNDROME, CHRONIC: ICD-10-CM

## 2019-10-08 DIAGNOSIS — G89.4 PAIN SYNDROME, CHRONIC: ICD-10-CM

## 2019-10-08 RX ORDER — METHADONE HYDROCHLORIDE 10 MG/1
TABLET ORAL
Qty: 90 TABLET | Refills: 0 | Status: SHIPPED | OUTPATIENT
Start: 2019-10-08 | End: 2019-11-04 | Stop reason: SDUPTHER

## 2019-10-08 RX ORDER — METHADONE HYDROCHLORIDE 10 MG/1
TABLET ORAL
Qty: 90 TABLET | Refills: 0 | Status: CANCELLED | OUTPATIENT
Start: 2019-10-08

## 2019-10-22 ENCOUNTER — OFFICE VISIT (OUTPATIENT)
Dept: INTERNAL MEDICINE CLINIC | Facility: CLINIC | Age: 49
End: 2019-10-22
Payer: COMMERCIAL

## 2019-10-22 VITALS
HEART RATE: 72 BPM | OXYGEN SATURATION: 98 % | TEMPERATURE: 97.8 F | WEIGHT: 143 LBS | SYSTOLIC BLOOD PRESSURE: 140 MMHG | DIASTOLIC BLOOD PRESSURE: 80 MMHG | HEIGHT: 65 IN | BODY MASS INDEX: 23.82 KG/M2

## 2019-10-22 DIAGNOSIS — Z23 NEED FOR IMMUNIZATION AGAINST INFLUENZA: ICD-10-CM

## 2019-10-22 DIAGNOSIS — G89.4 PAIN SYNDROME, CHRONIC: ICD-10-CM

## 2019-10-22 DIAGNOSIS — Z98.84 HISTORY OF BARIATRIC SURGERY: ICD-10-CM

## 2019-10-22 DIAGNOSIS — Z23 NEED FOR TETANUS, DIPHTHERIA, AND ACELLULAR PERTUSSIS (TDAP) VACCINE IN PATIENT OF ADOLESCENT AGE OR OLDER: ICD-10-CM

## 2019-10-22 DIAGNOSIS — E55.9 VITAMIN D DEFICIENCY: ICD-10-CM

## 2019-10-22 DIAGNOSIS — G43.909 MIGRAINE WITHOUT STATUS MIGRAINOSUS, NOT INTRACTABLE, UNSPECIFIED MIGRAINE TYPE: Primary | ICD-10-CM

## 2019-10-22 DIAGNOSIS — D50.8 OTHER IRON DEFICIENCY ANEMIA: ICD-10-CM

## 2019-10-22 PROCEDURE — 90715 TDAP VACCINE 7 YRS/> IM: CPT | Performed by: INTERNAL MEDICINE

## 2019-10-22 PROCEDURE — 3008F BODY MASS INDEX DOCD: CPT | Performed by: INTERNAL MEDICINE

## 2019-10-22 PROCEDURE — 90472 IMMUNIZATION ADMIN EACH ADD: CPT | Performed by: INTERNAL MEDICINE

## 2019-10-22 PROCEDURE — 99214 OFFICE O/P EST MOD 30 MIN: CPT | Performed by: INTERNAL MEDICINE

## 2019-10-22 PROCEDURE — 90682 RIV4 VACC RECOMBINANT DNA IM: CPT | Performed by: INTERNAL MEDICINE

## 2019-10-22 PROCEDURE — 90471 IMMUNIZATION ADMIN: CPT | Performed by: INTERNAL MEDICINE

## 2019-10-22 NOTE — PROGRESS NOTES
[facility] [place]      NAME: Ely Cantrell  AGE: 44 y o  SEX: female  : 1970   MRN: [id number]    DATE: 10/22/2019  TIME: 4:09 PM    Assessment and Plan   1  Migraine without status migrainosus, not intractable, unspecified migraine type  Currently stable on Topamax  2  History of bariatric surgery  Doing well  Up to date with vitamin-C reveal aunts, etc    3  Other iron deficiency anemia  Oral iron replacement  The patient receives IV iron periodically  4  Pain syndrome, chronic  Well controlled on current medications  5  Vitamin D deficiency  On replacement  Overall, the patient is doing well  She will maintain her current medications  She had a mammography recently  She saw her gynecologist last week  She was given an influenza vaccine  She was also given a Tdap  She is baby-sitting her infant grandchild 1 day a week  Return to office in:  3 months    Chief Complaint     Chief Complaint   Patient presents with    Follow-up       History of Present Illness     The patient returned to the office for re-evaluation of migraine, esophageal reflux, vitamin-D deficiency, previous bariatric surgery with subsequent iron deficiency anemia, and vitamin-D deficiency  Since her last visit she has been doing well in general   She did have a dizzy spell while at work  She improved after consuming some water  She was later found to have urinary tract infection which may have been playing a role  Otherwise, she has done well  She has had no chest pain, palpitations, shortness of breath, etc   Her pain control is adequate  She has no issues with her medications  The following portions of the patient's history were reviewed and updated as appropriate: allergies, current medications, past family history, past medical history, past social history, past surgical history and problem list     Review of Systems   Review of Systems   Constitutional: Negative      HENT: Negative for congestion, ear pain, postnasal drip, rhinorrhea, sore throat and trouble swallowing  Eyes: Negative for pain, discharge, redness and visual disturbance  Respiratory: Negative for cough, shortness of breath and wheezing  Cardiovascular: Negative  Gastrointestinal: Negative  Endocrine: Negative  Genitourinary: Negative for difficulty urinating, dysuria, frequency, hematuria and urgency  Musculoskeletal: Negative for arthralgias, gait problem, joint swelling and myalgias  Skin: Negative for rash  Neurological: Negative for dizziness, speech difficulty, weakness, light-headedness, numbness and headaches  Hematological: Negative  Psychiatric/Behavioral: Negative for confusion, decreased concentration, dysphoric mood and sleep disturbance  The patient is not nervous/anxious  Active Problem List     Patient Active Problem List   Diagnosis    Allergic rhinitis    Anxiety    Depression    GERD without esophagitis    Insomnia    Migraine, unspecified, not intractable, without status migrainosus    Pain syndrome, chronic    Postgastrectomy malabsorption    Vitamin D deficiency    Wartenberg syndrome    History of bariatric surgery    Iron deficiency anemia    ASCUS with positive high risk HPV cervical       Objective   /80 (BP Location: Left arm, Patient Position: Sitting, Cuff Size: Standard)   Pulse 72   Temp 97 8 °F (36 6 °C) (Tympanic)   Ht 5' 5" (1 651 m)   Wt 64 9 kg (143 lb)   SpO2 98%   BMI 23 80 kg/m²     Physical Exam   Constitutional: She is oriented to person, place, and time  She appears well-developed and well-nourished  No distress  HENT:   Head: Normocephalic and atraumatic  Neck: Neck supple  No JVD present  No tracheal deviation present  No thyromegaly present  Cardiovascular: Normal rate, regular rhythm, normal heart sounds and intact distal pulses  Exam reveals no gallop and no friction rub  No murmur heard    Pulmonary/Chest: Effort normal and breath sounds normal  She has no wheezes  She has no rales  She exhibits no tenderness  Abdominal: Soft  Bowel sounds are normal  She exhibits no distension and no mass  There is no tenderness  There is no rebound  Musculoskeletal: Normal range of motion  She exhibits no edema or tenderness  Lymphadenopathy:     She has no cervical adenopathy  Neurological: She is alert and oriented to person, place, and time  Skin: Skin is warm and dry  Psychiatric: She has a normal mood and affect  Her behavior is normal  Judgment and thought content normal        Pertinent Laboratory/Diagnostic Studies:  No visits with results within 3 Month(s) from this visit     Latest known visit with results is:   Office Visit on 04/18/2019   Component Date Value Ref Range Status    WBC 06/21/2019 9 77  4 31 - 10 16 Thousand/uL Final    RBC 06/21/2019 4 64  3 81 - 5 12 Million/uL Final    Hemoglobin 06/21/2019 12 9  11 5 - 15 4 g/dL Final    Hematocrit 06/21/2019 42 7  34 8 - 46 1 % Final    MCV 06/21/2019 92  82 - 98 fL Final    MCH 06/21/2019 27 8  26 8 - 34 3 pg Final    MCHC 06/21/2019 30 2* 31 4 - 37 4 g/dL Final    RDW 06/21/2019 19 0* 11 6 - 15 1 % Final    Platelets 40/41/3224 254  149 - 390 Thousands/uL Final    MPV 06/21/2019 10 2  8 9 - 12 7 fL Final           Current Medications     Current Outpatient Medications:     ALPRAZolam (XANAX) 1 mg tablet, Take 1 tablet (1 mg total) by mouth 3 (three) times a day as needed for anxiety (Patient not taking: Reported on 4/29/2019), Disp: 5 tablet, Rfl: 0    cholecalciferol (VITAMIN D3) 1,000 units tablet, Take 1 tablet (1,000 Units total) by mouth daily, Disp: 30 tablet, Rfl: 0    methadone (DOLOPHINE) 10 mg tablet, 1 tablet 3 times daily as needed for pain,, Disp: 90 tablet, Rfl: 0    norethindrone-ethinyl estradiol (NORTREL 1-35 TAB) 1-35 MG-MCG per tablet, Take 1 tablet by mouth, Disp: , Rfl:     Omeprazole 20 MG TBEC, Take 1 tablet (20 mg total) by mouth daily, Disp: 90 tablet, Rfl: 3    Pediatric Multiple Vit-C-FA (FRUITY CHEWABLES MULTIVITAMIN) CHEW, Chew, Disp: , Rfl:     sertraline (ZOLOFT) 100 mg tablet, TAKE THREE TABLETS BY MOUTH DAILY , Disp: 90 tablet, Rfl: 2    sucralfate (CARAFATE) 1 g/10 mL suspension, Take 10 mL (1 g total) by mouth 4 (four) times a day as needed (dyspepsia) (Patient not taking: Reported on 4/29/2019), Disp: 420 mL, Rfl: 2    topiramate (TOPAMAX) 50 MG tablet, TAKE ONE TABLET BY MOUTH TWICE DAILY , Disp: 60 tablet, Rfl: 0    topiramate (TOPAMAX) 50 MG tablet, TAKE ONE TABLET BY MOUTH TWICE DAILY , Disp: 60 tablet, Rfl: 0    topiramate (TOPAMAX) 50 MG tablet, TAKE ONE TABLET BY MOUTH TWICE DAILY , Disp: 60 tablet, Rfl: 0    zolpidem (AMBIEN) 5 mg tablet, Take 1 tablet (5 mg total) by mouth daily at bedtime, Disp: 30 tablet, Rfl: 3      Brianna Ayon MD

## 2019-10-22 NOTE — LETTER
To whom it May concern,   A because of previous surgery, Ely Cantrell is prone to dehydration  It would be most helpful if she could keep a 16 oz bottle of water or other beverage with her during work  Thank you for your attention to this matter      Sincerely,    Diego Briones MD

## 2019-10-23 ENCOUNTER — TELEPHONE (OUTPATIENT)
Dept: INTERNAL MEDICINE CLINIC | Facility: CLINIC | Age: 49
End: 2019-10-23

## 2019-10-23 NOTE — TELEPHONE ENCOUNTER
Call was handed to me at 16:55 today  It came in [place] 10 AM   Ely had flu vaccine and TDap yesterday  She had a low grade fever last night  She also had a sore throat and swelling in her face  She took Benadryl  I tried to call her to see how she was feeling but there was no answer  I left a message and asked her to return my call  Ely called back at 16:55  She said she still had a sore throat and headache  She was instructed to use Tylenol and call tomorrow with an update

## 2019-10-26 DIAGNOSIS — F41.9 ANXIETY: ICD-10-CM

## 2019-10-26 DIAGNOSIS — G43.909 MIGRAINE WITHOUT STATUS MIGRAINOSUS, NOT INTRACTABLE, UNSPECIFIED MIGRAINE TYPE: ICD-10-CM

## 2019-10-29 RX ORDER — TOPIRAMATE 50 MG/1
TABLET, FILM COATED ORAL
Qty: 60 TABLET | Refills: 0 | Status: SHIPPED | OUTPATIENT
Start: 2019-10-29 | End: 2020-01-10

## 2019-11-01 RX ORDER — ALPRAZOLAM 1 MG/1
1 TABLET ORAL
Qty: 5 TABLET | Refills: 0 | Status: SHIPPED | OUTPATIENT
Start: 2019-11-01 | End: 2019-11-04 | Stop reason: SDUPTHER

## 2019-11-02 DIAGNOSIS — F41.9 ANXIETY: ICD-10-CM

## 2019-11-02 DIAGNOSIS — F51.01 PRIMARY INSOMNIA: ICD-10-CM

## 2019-11-02 DIAGNOSIS — G89.4 PAIN SYNDROME, CHRONIC: ICD-10-CM

## 2019-11-05 RX ORDER — METHADONE HYDROCHLORIDE 10 MG/1
TABLET ORAL
Qty: 90 TABLET | Refills: 0 | Status: SHIPPED | OUTPATIENT
Start: 2019-11-05 | End: 2019-12-03 | Stop reason: SDUPTHER

## 2019-11-05 RX ORDER — ALPRAZOLAM 1 MG/1
1 TABLET ORAL
Qty: 30 TABLET | Refills: 0 | Status: SHIPPED | OUTPATIENT
Start: 2019-11-05 | End: 2020-03-31 | Stop reason: SDUPTHER

## 2019-11-05 RX ORDER — ZOLPIDEM TARTRATE 5 MG/1
TABLET ORAL
Qty: 30 TABLET | Refills: 1 | Status: SHIPPED | OUTPATIENT
Start: 2019-11-05 | End: 2019-12-26 | Stop reason: SDUPTHER

## 2019-11-10 ENCOUNTER — OFFICE VISIT (OUTPATIENT)
Dept: URGENT CARE | Age: 49
End: 2019-11-10
Payer: COMMERCIAL

## 2019-11-10 VITALS
HEIGHT: 65 IN | RESPIRATION RATE: 18 BRPM | BODY MASS INDEX: 23.66 KG/M2 | TEMPERATURE: 98.1 F | SYSTOLIC BLOOD PRESSURE: 144 MMHG | DIASTOLIC BLOOD PRESSURE: 66 MMHG | HEART RATE: 58 BPM | WEIGHT: 142 LBS

## 2019-11-10 DIAGNOSIS — R35.0 FREQUENCY OF MICTURITION: ICD-10-CM

## 2019-11-10 DIAGNOSIS — R31.9 URINARY TRACT INFECTION WITH HEMATURIA, SITE UNSPECIFIED: Primary | ICD-10-CM

## 2019-11-10 DIAGNOSIS — N39.0 URINARY TRACT INFECTION WITH HEMATURIA, SITE UNSPECIFIED: Primary | ICD-10-CM

## 2019-11-10 LAB
SL AMB  POCT GLUCOSE, UA: NORMAL
SL AMB LEUKOCYTE ESTERASE,UA: NORMAL
SL AMB POCT BILIRUBIN,UA: NORMAL
SL AMB POCT BLOOD,UA: NORMAL
SL AMB POCT CLARITY,UA: CLEAR
SL AMB POCT COLOR,UA: YELLOW
SL AMB POCT KETONES,UA: NORMAL
SL AMB POCT NITRITE,UA: NORMAL
SL AMB POCT PH,UA: 6
SL AMB POCT SPECIFIC GRAVITY,UA: 1.01
SL AMB POCT URINE PROTEIN: NORMAL
SL AMB POCT UROBILINOGEN: 0.2

## 2019-11-10 PROCEDURE — 87086 URINE CULTURE/COLONY COUNT: CPT | Performed by: PHYSICIAN ASSISTANT

## 2019-11-10 PROCEDURE — 99213 OFFICE O/P EST LOW 20 MIN: CPT | Performed by: PHYSICIAN ASSISTANT

## 2019-11-10 PROCEDURE — 81002 URINALYSIS NONAUTO W/O SCOPE: CPT | Performed by: PHYSICIAN ASSISTANT

## 2019-11-10 RX ORDER — CLINDAMYCIN HYDROCHLORIDE 300 MG/1
300 CAPSULE ORAL 3 TIMES DAILY
Qty: 21 CAPSULE | Refills: 0 | Status: SHIPPED | OUTPATIENT
Start: 2019-11-10 | End: 2019-11-10 | Stop reason: CLARIF

## 2019-11-10 RX ORDER — CLINDAMYCIN HYDROCHLORIDE 300 MG/1
300 CAPSULE ORAL 3 TIMES DAILY
Qty: 21 CAPSULE | Refills: 0 | Status: SHIPPED | OUTPATIENT
Start: 2019-11-10 | End: 2019-11-17

## 2019-11-10 NOTE — PROGRESS NOTES
=    [facility]        NAME: Earl Edwards is a 44 y o  female  : 1970    MRN: [id number]  DATE: November 10, 2019  TIME: 1:48 PM    Assessment and Plan   Urinary tract infection with hematuria, site unspecified [N39 0, R31 9]  1  Urinary tract infection with hematuria, site unspecified  clindamycin (CLEOCIN) 300 MG capsule    DISCONTINUED: clindamycin (CLEOCIN) 300 MG capsule   2  Frequency of micturition  POCT urine dip    Urine culture         Patient Instructions       Follow up with PCP in 3-5 days  Proceed to  ER if symptoms worsen  Chief Complaint     Chief Complaint   Patient presents with    Possible UTI     s/s started on Friday- frequency and burning, this am she saw blood in her uring- last UTI was about 1 month ago         History of Present Illness       Patient here for evaluation of urinary frequency and pressure and hematuria  Patient's symptoms started on Friday  She did have some low back pain as well  She denies any no fevers, nausea, vomiting  She does have a history of frequent UTIs which occur after intercourse  She has worked up by her OBGYN and has been treated multiple times for the UTIs  She does have a follow-up coming up this week  Review of Systems   Review of Systems   Constitutional: Negative  Gastrointestinal: Negative  Genitourinary: Positive for frequency, hematuria and urgency  Negative for dysuria and flank pain  Musculoskeletal: Positive for back pain           Current Medications       Current Outpatient Medications:     ALPRAZolam (XANAX) 1 mg tablet, Take 1 tablet (1 mg total) by mouth daily at bedtime as needed for anxiety, Disp: 30 tablet, Rfl: 0    cholecalciferol (VITAMIN D3) 1,000 units tablet, Take 1 tablet (1,000 Units total) by mouth daily, Disp: 30 tablet, Rfl: 0    clindamycin (CLEOCIN) 300 MG capsule, Take 1 capsule (300 mg total) by mouth 3 (three) times a day for 7 days, Disp: 21 capsule, Rfl: 0    methadone (DOLOPHINE) 10 mg tablet, 1 tablet 3 times daily as needed for pain,, Disp: 90 tablet, Rfl: 0    norethindrone-ethinyl estradiol (NORTREL 1-35 TAB) 1-35 MG-MCG per tablet, Take 1 tablet by mouth, Disp: , Rfl:     Omeprazole 20 MG TBEC, Take 1 tablet (20 mg total) by mouth daily, Disp: 90 tablet, Rfl: 3    Pediatric Multiple Vit-C-FA (FRUITY CHEWABLES MULTIVITAMIN) CHEW, Chew, Disp: , Rfl:     sertraline (ZOLOFT) 100 mg tablet, TAKE THREE TABLETS BY MOUTH DAILY , Disp: 90 tablet, Rfl: 2    sucralfate (CARAFATE) 1 g/10 mL suspension, Take 10 mL (1 g total) by mouth 4 (four) times a day as needed (dyspepsia) (Patient not taking: Reported on 4/29/2019), Disp: 420 mL, Rfl: 2    topiramate (TOPAMAX) 50 MG tablet, TAKE ONE TABLET BY MOUTH TWICE DAILY , Disp: 60 tablet, Rfl: 0    topiramate (TOPAMAX) 50 MG tablet, TAKE ONE TABLET BY MOUTH TWICE DAILY , Disp: 60 tablet, Rfl: 0    topiramate (TOPAMAX) 50 MG tablet, TAKE ONE TABLET BY MOUTH TWICE DAILY , Disp: 60 tablet, Rfl: 0    zolpidem (AMBIEN) 5 mg tablet, TAKE ONE TABLET BY MOUTH ONCE DAILY AT BEDTIME , Disp: 30 tablet, Rfl: 1    Current Allergies     Allergies as of 11/10/2019 - Reviewed 11/10/2019   Allergen Reaction Noted    Amoxicillin Hives and Shortness Of Breath 11/16/2015    Aspirin Hives and Other (See Comments) 08/23/2009    Butorphanol Anaphylaxis and Other (See Comments) 08/23/2009    Morphine Other (See Comments) and Hallucinations 08/23/2009    Duloxetine  07/30/2013    Gabapentin Palpitations 02/15/2016    Nitrofurantoin Hives and Rash 06/14/2012    Sulfa antibiotics Hives, Rash, and Other (See Comments) 08/23/2009            The following portions of the patient's history were reviewed and updated as appropriate: allergies, current medications, past family history, past medical history, past social history, past surgical history and problem list      Past Medical History:   Diagnosis Date    Facial cellulitis     Fracture, cuboid LAST ASSESSED: 3/26/15    Gastrojejunal ulcer     ANASTOMOTIC    History of small bowel obstruction     Hypertension     pt denies    Insomnia     Iron deficiency anemia     LAST ASSESSED: AND RESOLVED: 4/13/16       Past Surgical History:   Procedure Laterality Date    ABDOMINOPLASTY      x2    CHOLECYSTECTOMY      GASTRIC BYPASS      FOR MORBID OBESITY    INCISIONAL HERNIA REPAIR      SMALL INTESTINE SURGERY         Family History   Problem Relation Age of Onset    Diabetes Family         MELLITUS    Cancer Family     Hypertension Family     Osteoporosis Family     Asthma Family     Stroke Family         SYNDROME    Breast cancer Mother     COPD Father     Asthma Child          Medications have been verified  Objective   /66 (BP Location: Right arm, Patient Position: Sitting, Cuff Size: Standard)   Pulse 58   Temp 98 1 °F (36 7 °C) (Temporal)   Resp 18   Ht 5' 5" (1 651 m)   Wt 64 4 kg (142 lb)   LMP 11/01/2019   BMI 23 63 kg/m²        Physical Exam     Physical Exam   Constitutional: She is oriented to person, place, and time  She appears well-developed and well-nourished  No distress  HENT:   Head: Normocephalic and atraumatic  Abdominal: There is no CVA tenderness  Neurological: She is alert and oriented to person, place, and time  Skin: Skin is warm and dry  No rash noted  She is not diaphoretic  Psychiatric: She has a normal mood and affect  Her behavior is normal  Judgment and thought content normal    Nursing note and vitals reviewed

## 2019-11-11 ENCOUNTER — TELEPHONE (OUTPATIENT)
Dept: URGENT CARE | Age: 49
End: 2019-11-11

## 2019-11-11 LAB — BACTERIA UR CULT: NORMAL

## 2019-11-22 DIAGNOSIS — G43.909 MIGRAINE WITHOUT STATUS MIGRAINOSUS, NOT INTRACTABLE, UNSPECIFIED MIGRAINE TYPE: ICD-10-CM

## 2019-11-22 RX ORDER — TOPIRAMATE 50 MG/1
TABLET, FILM COATED ORAL
Qty: 60 TABLET | Refills: 0 | Status: SHIPPED | OUTPATIENT
Start: 2019-11-22 | End: 2020-01-10

## 2019-11-26 ENCOUNTER — APPOINTMENT (OUTPATIENT)
Dept: LAB | Facility: CLINIC | Age: 49
End: 2019-11-26
Payer: COMMERCIAL

## 2019-11-26 ENCOUNTER — TRANSCRIBE ORDERS (OUTPATIENT)
Dept: LAB | Facility: CLINIC | Age: 49
End: 2019-11-26

## 2019-11-26 DIAGNOSIS — Z11.3 SCREENING EXAMINATION FOR VENEREAL DISEASE: Primary | ICD-10-CM

## 2019-11-26 LAB
HBV CORE AB SER QL: NORMAL
HBV SURFACE AB SER-ACNC: <3.1 MIU/ML
HBV SURFACE AG SER QL: NORMAL
HCV AB SER QL: NORMAL

## 2019-11-26 PROCEDURE — 86704 HEP B CORE ANTIBODY TOTAL: CPT

## 2019-11-26 PROCEDURE — 87340 HEPATITIS B SURFACE AG IA: CPT

## 2019-11-26 PROCEDURE — 86592 SYPHILIS TEST NON-TREP QUAL: CPT

## 2019-11-26 PROCEDURE — 86706 HEP B SURFACE ANTIBODY: CPT

## 2019-11-26 PROCEDURE — 86803 HEPATITIS C AB TEST: CPT

## 2019-11-26 PROCEDURE — 87389 HIV-1 AG W/HIV-1&-2 AB AG IA: CPT

## 2019-11-26 PROCEDURE — 36415 COLL VENOUS BLD VENIPUNCTURE: CPT

## 2019-11-27 LAB — RPR SER QL: NORMAL

## 2019-11-28 LAB — HIV 1+2 AB+HIV1 P24 AG SERPL QL IA: NORMAL

## 2019-11-29 DIAGNOSIS — G89.4 PAIN SYNDROME, CHRONIC: ICD-10-CM

## 2019-11-29 RX ORDER — METHADONE HYDROCHLORIDE 10 MG/1
TABLET ORAL
Qty: 90 TABLET | Refills: 0 | Status: CANCELLED | OUTPATIENT
Start: 2019-11-29

## 2019-11-29 NOTE — TELEPHONE ENCOUNTER
Calling to get Methadone refill  She is changing pharmacies  I updated her preferred pharmacy at this time

## 2019-12-03 DIAGNOSIS — G89.4 PAIN SYNDROME, CHRONIC: ICD-10-CM

## 2019-12-04 RX ORDER — METHADONE HYDROCHLORIDE 10 MG/1
TABLET ORAL
Qty: 90 TABLET | Refills: 0 | Status: SHIPPED | OUTPATIENT
Start: 2019-12-04 | End: 2020-01-02 | Stop reason: SDUPTHER

## 2019-12-26 DIAGNOSIS — F51.01 PRIMARY INSOMNIA: ICD-10-CM

## 2019-12-26 RX ORDER — ZOLPIDEM TARTRATE 5 MG/1
5 TABLET ORAL
Qty: 30 TABLET | Refills: 1 | Status: SHIPPED | OUTPATIENT
Start: 2019-12-26 | End: 2020-01-22 | Stop reason: DRUGHIGH

## 2020-01-02 DIAGNOSIS — G89.4 PAIN SYNDROME, CHRONIC: ICD-10-CM

## 2020-01-02 RX ORDER — METHADONE HYDROCHLORIDE 10 MG/1
TABLET ORAL
Qty: 90 TABLET | Refills: 0 | Status: SHIPPED | OUTPATIENT
Start: 2020-01-02 | End: 2020-01-02 | Stop reason: SDUPTHER

## 2020-01-02 RX ORDER — METHADONE HYDROCHLORIDE 10 MG/1
TABLET ORAL
Qty: 90 TABLET | Refills: 0 | Status: SHIPPED | OUTPATIENT
Start: 2020-01-02 | End: 2020-01-31 | Stop reason: SDUPTHER

## 2020-01-10 DIAGNOSIS — G43.909 MIGRAINE WITHOUT STATUS MIGRAINOSUS, NOT INTRACTABLE, UNSPECIFIED MIGRAINE TYPE: ICD-10-CM

## 2020-01-11 RX ORDER — TOPIRAMATE 50 MG/1
50 TABLET, FILM COATED ORAL 2 TIMES DAILY
Qty: 60 TABLET | Refills: 0 | Status: SHIPPED | OUTPATIENT
Start: 2020-01-11 | End: 2020-02-11

## 2020-01-22 ENCOUNTER — OFFICE VISIT (OUTPATIENT)
Dept: INTERNAL MEDICINE CLINIC | Facility: CLINIC | Age: 50
End: 2020-01-22
Payer: COMMERCIAL

## 2020-01-22 VITALS
BODY MASS INDEX: 24.83 KG/M2 | HEART RATE: 85 BPM | SYSTOLIC BLOOD PRESSURE: 140 MMHG | HEIGHT: 65 IN | WEIGHT: 149 LBS | OXYGEN SATURATION: 98 % | DIASTOLIC BLOOD PRESSURE: 80 MMHG | TEMPERATURE: 98 F

## 2020-01-22 DIAGNOSIS — G89.4 PAIN SYNDROME, CHRONIC: ICD-10-CM

## 2020-01-22 DIAGNOSIS — E55.9 VITAMIN D DEFICIENCY: ICD-10-CM

## 2020-01-22 DIAGNOSIS — D50.8 OTHER IRON DEFICIENCY ANEMIA: ICD-10-CM

## 2020-01-22 DIAGNOSIS — Z90.3 POSTGASTRECTOMY MALABSORPTION: Primary | ICD-10-CM

## 2020-01-22 DIAGNOSIS — K91.2 POSTGASTRECTOMY MALABSORPTION: Primary | ICD-10-CM

## 2020-01-22 DIAGNOSIS — F51.01 PRIMARY INSOMNIA: ICD-10-CM

## 2020-01-22 DIAGNOSIS — F32.A DEPRESSION, UNSPECIFIED DEPRESSION TYPE: ICD-10-CM

## 2020-01-22 DIAGNOSIS — G43.909 MIGRAINE WITHOUT STATUS MIGRAINOSUS, NOT INTRACTABLE, UNSPECIFIED MIGRAINE TYPE: ICD-10-CM

## 2020-01-22 PROCEDURE — 3008F BODY MASS INDEX DOCD: CPT | Performed by: INTERNAL MEDICINE

## 2020-01-22 PROCEDURE — 99214 OFFICE O/P EST MOD 30 MIN: CPT | Performed by: INTERNAL MEDICINE

## 2020-01-22 RX ORDER — ZOLPIDEM TARTRATE 10 MG/1
10 TABLET ORAL
Qty: 30 TABLET | Refills: 0 | Status: SHIPPED | OUTPATIENT
Start: 2020-01-22 | End: 2020-02-17 | Stop reason: SDUPTHER

## 2020-01-22 RX ORDER — SERTRALINE HYDROCHLORIDE 100 MG/1
200 TABLET, FILM COATED ORAL DAILY
Qty: 60 TABLET | Refills: 5 | Status: SHIPPED | OUTPATIENT
Start: 2020-01-22 | End: 2020-08-17

## 2020-01-23 NOTE — PROGRESS NOTES
[facility] [place]      NAME: Ely Cantrell  AGE: 44 y o  SEX: female  : 1970   MRN: [id number]    DATE: 2020  TIME: 1:35 PM    Assessment and Plan   1  Depression  This has stabilized nicely since the patient resume sertraline  She is currently using 200 milligrams daily  - sertraline (ZOLOFT) 100 mg tablet; Take 2 tablets (200 mg total) by mouth daily  Dispense: 60 tablet; Refill: 5    2  Postgastrectomy malabsorption  The patient has sustained an excellent weight loss  She does occasionally have episodes of lightheadedness which is probably related to reactive hypoglycemia  She has adjusted her diet to minimize these   - CBC  - Comprehensive metabolic panel    3  Migraine without status migrainosus, not intractable, unspecified migraine type  Well controlled on Topamax    4  Primary insomnia  The patient has gotten good results from zolpidem 10 milligrams daily  She tried to taper this to 5 but has not done as well with it   - zolpidem (AMBIEN) 10 mg tablet; Take 1 tablet (10 mg total) by mouth daily at bedtime as needed for sleep  Dispense: 30 tablet; Refill: 0    5  Other iron deficiency anemia  Will check CBC and iron status  - Iron Saturation %    6  Pain syndrome, chronic  On a stable dose of methadone with good improvement  7  Vitamin D deficiency  On replacement  Check vitamin-D level  - Vitamin D 25 hydroxy          Return to office in:  3 months    Chief Complaint     Chief Complaint   Patient presents with    Follow-up       History of Present Illness     The patient returned to the office for re-evaluation of migraine, gastric bypass with postsurgical malabsorption, iron deficiency, vitamin-D deficiency, and depression  Since her last visit she had 2 episodes of lightheadedness  She was at her job a jaw it  Her blood pressure was taken and was normal   She improved with juice  She had not eaten in a while  I suspect she was hypoglycemic    She has had no chest pain, palpitations, shortness of breath, abdominal pain, etc   Her migraines have been well controlled on Topamax  She is not having any specific issues with her medications at the moment  She tried to stop sertraline but became more depressed  She is currently back on 200 milligrams daily which is working well  She was on zolpidem 10 milligrams at bedtime to try to reduce this to 5  Unfortunately this has not been very helpful  The following portions of the patient's history were reviewed and updated as appropriate: allergies, current medications, past family history, past medical history, past social history, past surgical history and problem list     Review of Systems   Review of Systems   Constitutional: Negative  HENT: Negative for congestion, ear pain, postnasal drip, rhinorrhea, sore throat and trouble swallowing  Eyes: Negative for pain, discharge, redness and visual disturbance  Respiratory: Negative for cough, shortness of breath and wheezing  Cardiovascular: Negative  Gastrointestinal: Negative  Endocrine: Negative  Genitourinary: Negative for difficulty urinating, dysuria, frequency, hematuria and urgency  Musculoskeletal: Negative for arthralgias, gait problem, joint swelling and myalgias  Skin: Negative for rash  Neurological: Positive for dizziness  Negative for speech difficulty, weakness, light-headedness, numbness and headaches  Hematological: Negative  Psychiatric/Behavioral: Negative for confusion, decreased concentration, dysphoric mood and sleep disturbance  The patient is not nervous/anxious          Active Problem List     Patient Active Problem List   Diagnosis    Allergic rhinitis    Anxiety    Depression    GERD without esophagitis    Insomnia    Migraine, unspecified, not intractable, without status migrainosus    Pain syndrome, chronic    Postgastrectomy malabsorption    Vitamin D deficiency    Wartenberg syndrome    History of bariatric surgery    Iron deficiency anemia    ASCUS with positive high risk HPV cervical    Urinary tract infection with hematuria       Objective   /80 (BP Location: Right arm, Patient Position: Sitting, Cuff Size: Standard)   Pulse 85   Temp 98 °F (36 7 °C) (Tympanic)   Ht 5' 5" (1 651 m)   Wt 67 6 kg (149 lb)   SpO2 98%   BMI 24 79 kg/m²     Physical Exam   Constitutional: She is oriented to person, place, and time  She appears well-developed and well-nourished  No distress  HENT:   Head: Normocephalic and atraumatic  Neck: Neck supple  No JVD present  No tracheal deviation present  No thyromegaly present  Cardiovascular: Normal rate, regular rhythm, normal heart sounds and intact distal pulses  Exam reveals no gallop and no friction rub  No murmur heard  Pulmonary/Chest: Effort normal and breath sounds normal  She has no wheezes  She has no rales  She exhibits no tenderness  Abdominal: Soft  Bowel sounds are normal  She exhibits no distension and no mass  There is no tenderness  There is no rebound  Musculoskeletal: Normal range of motion  She exhibits no edema or tenderness  Lymphadenopathy:     She has no cervical adenopathy  Neurological: She is alert and oriented to person, place, and time  Skin: Skin is warm and dry  Psychiatric: She has a normal mood and affect   Her behavior is normal  Judgment and thought content normal        Pertinent Laboratory/Diagnostic Studies:  Transcribe Orders on 11/26/2019   Component Date Value Ref Range Status    Hep B Core Total Ab 11/26/2019 Non-reactive  Non-reactive Final    Hepatitis B Surface Ag 11/26/2019 Non-reactive  Non-reactive, NonReactive - Confirmed Final    Hep B S Ab 11/26/2019 <3 10  mIU/mL Final    HIV-1/HIV-2 Ab 11/26/2019 Non-Reactive  Non-Reactive Final    Hepatitis C Ab 11/26/2019 Non-reactive  Non-reactive Final    RPR 11/26/2019 Non-Reactive  Non-Reactive Final   Office Visit on 11/10/2019   Component Date Value Ref Range Status    LEUKOCYTE ESTERASE,UA 11/10/2019 neg   Final    NITRITE,UA 11/10/2019 neg   Final    SL AMB POCT UROBILINOGEN 11/10/2019 0 2   Final    POCT URINE PROTEIN 11/10/2019 neg   Final     PH,UA 11/10/2019 6 0   Final    BLOOD,UA 11/10/2019 neg   Final    SPECIFIC GRAVITY,UA 11/10/2019 1 010   Final    KETONES,UA 11/10/2019 neg   Final    BILIRUBIN,UA 11/10/2019 neg   Final    GLUCOSE, UA 11/10/2019 neg   Final     COLOR,UA 11/10/2019 yellow   Final    CLARITY,UA 11/10/2019 clear   Final    Urine Culture 11/10/2019 No Growth <1000 cfu/mL   Final           Current Medications     Current Outpatient Medications:     ALPRAZolam (XANAX) 1 mg tablet, Take 1 tablet (1 mg total) by mouth daily at bedtime as needed for anxiety, Disp: 30 tablet, Rfl: 0    cholecalciferol (VITAMIN D3) 1,000 units tablet, Take 1 tablet (1,000 Units total) by mouth daily, Disp: 30 tablet, Rfl: 0    methadone (DOLOPHINE) 10 mg tablet, 1 tablet 3 times daily as needed for pain,, Disp: 90 tablet, Rfl: 0    norethindrone-ethinyl estradiol (NORTREL 1-35 TAB) 1-35 MG-MCG per tablet, Take 1 tablet by mouth, Disp: , Rfl:     Omeprazole 20 MG TBEC, Take 1 tablet (20 mg total) by mouth daily, Disp: 90 tablet, Rfl: 3    Pediatric Multiple Vit-C-FA (FRUITY CHEWABLES MULTIVITAMIN) CHEW, Chew, Disp: , Rfl:     sertraline (ZOLOFT) 100 mg tablet, Take 2 tablets (200 mg total) by mouth daily, Disp: 60 tablet, Rfl: 5    sucralfate (CARAFATE) 1 g/10 mL suspension, Take 10 mL (1 g total) by mouth 4 (four) times a day as needed (dyspepsia) (Patient not taking: Reported on 4/29/2019), Disp: 420 mL, Rfl: 2    topiramate (TOPAMAX) 50 MG tablet, Take 1 tablet (50 mg total) by mouth 2 (two) times a day, Disp: 60 tablet, Rfl: 0    zolpidem (AMBIEN) 10 mg tablet, Take 1 tablet (10 mg total) by mouth daily at bedtime as needed for sleep, Disp: 30 tablet, Rfl: 0      Brady Stover MD

## 2020-01-31 DIAGNOSIS — G89.4 PAIN SYNDROME, CHRONIC: ICD-10-CM

## 2020-02-04 RX ORDER — METHADONE HYDROCHLORIDE 10 MG/1
TABLET ORAL
Qty: 90 TABLET | Refills: 0 | Status: SHIPPED | OUTPATIENT
Start: 2020-02-04 | End: 2020-03-02 | Stop reason: SDUPTHER

## 2020-02-11 DIAGNOSIS — G43.909 MIGRAINE WITHOUT STATUS MIGRAINOSUS, NOT INTRACTABLE, UNSPECIFIED MIGRAINE TYPE: ICD-10-CM

## 2020-02-11 RX ORDER — TOPIRAMATE 50 MG/1
TABLET, FILM COATED ORAL
Qty: 60 TABLET | Refills: 0 | Status: SHIPPED | OUTPATIENT
Start: 2020-02-11 | End: 2020-03-11 | Stop reason: SDUPTHER

## 2020-02-17 DIAGNOSIS — F51.01 PRIMARY INSOMNIA: ICD-10-CM

## 2020-02-18 ENCOUNTER — TRANSCRIBE ORDERS (OUTPATIENT)
Dept: LAB | Facility: OTHER | Age: 50
End: 2020-02-18

## 2020-02-18 ENCOUNTER — APPOINTMENT (OUTPATIENT)
Dept: LAB | Facility: OTHER | Age: 50
End: 2020-02-18
Payer: COMMERCIAL

## 2020-02-18 LAB
25(OH)D3 SERPL-MCNC: 32.5 NG/ML (ref 30–100)
ALBUMIN SERPL BCP-MCNC: 3.5 G/DL (ref 3.5–5)
ALP SERPL-CCNC: 122 U/L (ref 46–116)
ALT SERPL W P-5'-P-CCNC: 31 U/L (ref 12–78)
ANION GAP SERPL CALCULATED.3IONS-SCNC: 4 MMOL/L (ref 4–13)
AST SERPL W P-5'-P-CCNC: 33 U/L (ref 5–45)
BILIRUB SERPL-MCNC: 0.28 MG/DL (ref 0.2–1)
BUN SERPL-MCNC: 14 MG/DL (ref 5–25)
CALCIUM SERPL-MCNC: 8.7 MG/DL (ref 8.3–10.1)
CHLORIDE SERPL-SCNC: 111 MMOL/L (ref 100–108)
CO2 SERPL-SCNC: 25 MMOL/L (ref 21–32)
CREAT SERPL-MCNC: 0.67 MG/DL (ref 0.6–1.3)
ERYTHROCYTE [DISTWIDTH] IN BLOOD BY AUTOMATED COUNT: 12.8 % (ref 11.6–15.1)
GFR SERPL CREATININE-BSD FRML MDRD: 101 ML/MIN/1.73SQ M
GLUCOSE P FAST SERPL-MCNC: 72 MG/DL (ref 65–99)
HCT VFR BLD AUTO: 43.4 % (ref 34.8–46.1)
HGB BLD-MCNC: 13.5 G/DL (ref 11.5–15.4)
IRON SATN MFR SERPL: 9 %
IRON SERPL-MCNC: 37 UG/DL (ref 50–170)
MCH RBC QN AUTO: 29.8 PG (ref 26.8–34.3)
MCHC RBC AUTO-ENTMCNC: 31.1 G/DL (ref 31.4–37.4)
MCV RBC AUTO: 96 FL (ref 82–98)
PLATELET # BLD AUTO: 281 THOUSANDS/UL (ref 149–390)
PMV BLD AUTO: 9.9 FL (ref 8.9–12.7)
POTASSIUM SERPL-SCNC: 4.1 MMOL/L (ref 3.5–5.3)
PROT SERPL-MCNC: 7.1 G/DL (ref 6.4–8.2)
RBC # BLD AUTO: 4.53 MILLION/UL (ref 3.81–5.12)
SODIUM SERPL-SCNC: 140 MMOL/L (ref 136–145)
TIBC SERPL-MCNC: 424 UG/DL (ref 250–450)
WBC # BLD AUTO: 7.9 THOUSAND/UL (ref 4.31–10.16)

## 2020-02-18 PROCEDURE — 80053 COMPREHEN METABOLIC PANEL: CPT | Performed by: INTERNAL MEDICINE

## 2020-02-18 PROCEDURE — 82306 VITAMIN D 25 HYDROXY: CPT | Performed by: INTERNAL MEDICINE

## 2020-02-18 PROCEDURE — 83550 IRON BINDING TEST: CPT | Performed by: INTERNAL MEDICINE

## 2020-02-18 PROCEDURE — 36415 COLL VENOUS BLD VENIPUNCTURE: CPT | Performed by: INTERNAL MEDICINE

## 2020-02-18 PROCEDURE — 83540 ASSAY OF IRON: CPT | Performed by: INTERNAL MEDICINE

## 2020-02-18 PROCEDURE — 85027 COMPLETE CBC AUTOMATED: CPT | Performed by: INTERNAL MEDICINE

## 2020-02-18 RX ORDER — ZOLPIDEM TARTRATE 10 MG/1
10 TABLET ORAL
Qty: 30 TABLET | Refills: 2 | Status: SHIPPED | OUTPATIENT
Start: 2020-02-18 | End: 2020-04-23 | Stop reason: ALTCHOICE

## 2020-02-22 ENCOUNTER — OFFICE VISIT (OUTPATIENT)
Dept: URGENT CARE | Facility: MEDICAL CENTER | Age: 50
End: 2020-02-22
Payer: COMMERCIAL

## 2020-02-22 VITALS
SYSTOLIC BLOOD PRESSURE: 124 MMHG | TEMPERATURE: 99.5 F | HEART RATE: 75 BPM | DIASTOLIC BLOOD PRESSURE: 67 MMHG | OXYGEN SATURATION: 98 % | RESPIRATION RATE: 18 BRPM

## 2020-02-22 DIAGNOSIS — R35.0 FREQUENT URINATION: ICD-10-CM

## 2020-02-22 DIAGNOSIS — R31.9 URINARY TRACT INFECTION WITH HEMATURIA, SITE UNSPECIFIED: Primary | ICD-10-CM

## 2020-02-22 DIAGNOSIS — N39.0 URINARY TRACT INFECTION WITH HEMATURIA, SITE UNSPECIFIED: Primary | ICD-10-CM

## 2020-02-22 LAB
SL AMB  POCT GLUCOSE, UA: ABNORMAL
SL AMB LEUKOCYTE ESTERASE,UA: ABNORMAL
SL AMB POCT BILIRUBIN,UA: ABNORMAL
SL AMB POCT BLOOD,UA: ABNORMAL
SL AMB POCT CLARITY,UA: ABNORMAL
SL AMB POCT COLOR,UA: YELLOW
SL AMB POCT KETONES,UA: ABNORMAL
SL AMB POCT NITRITE,UA: ABNORMAL
SL AMB POCT PH,UA: 8
SL AMB POCT SPECIFIC GRAVITY,UA: 1.01
SL AMB POCT URINE PROTEIN: ABNORMAL
SL AMB POCT UROBILINOGEN: 0.2

## 2020-02-22 PROCEDURE — 81002 URINALYSIS NONAUTO W/O SCOPE: CPT | Performed by: PHYSICIAN ASSISTANT

## 2020-02-22 PROCEDURE — 99213 OFFICE O/P EST LOW 20 MIN: CPT | Performed by: PHYSICIAN ASSISTANT

## 2020-02-22 PROCEDURE — 87086 URINE CULTURE/COLONY COUNT: CPT | Performed by: PHYSICIAN ASSISTANT

## 2020-02-22 RX ORDER — AZITHROMYCIN 250 MG/1
TABLET, FILM COATED ORAL
Qty: 6 TABLET | Refills: 0 | Status: SHIPPED | OUTPATIENT
Start: 2020-02-22 | End: 2020-02-26

## 2020-02-22 NOTE — PATIENT INSTRUCTIONS

## 2020-02-22 NOTE — PROGRESS NOTES
[facility] [address] [facility]        NAME: Lawerence Closs is a 44 y o  female  : 1970    MRN: [id number]  DATE: 2020  TIME: 11:49 AM    Assessment and Plan   Urinary tract infection with hematuria, site unspecified [N39 0, R31 9]  1  Urinary tract infection with hematuria, site unspecified  azithromycin (ZITHROMAX) 250 mg tablet   2  Frequent urination  POCT urine dip    Urine culture         Patient Instructions   We discussed with her multiple allergies and inability to take medications due to interactions that we will start her on Zithromax which has helped in the past until we get the final cultures back  Follow up with PCP in 3-5 days  Increase fluids  Chief Complaint     Chief Complaint   Patient presents with    Possible UTI         History of Present Illness       Patient tends to have urinary tract infections after sex  Her last 1 was several months ago  She is to follow-up with a urologist     Urinary Tract Infection    This is a new problem  The current episode started in the past 7 days  The problem occurs every urination  The problem has been gradually worsening  The quality of the pain is described as burning  The pain is moderate  There has been no fever  She is sexually active  There is no history of pyelonephritis  Associated symptoms include frequency, hematuria, hesitancy and urgency  Pertinent negatives include no chills, discharge, flank pain, nausea, possible pregnancy, sweats or vomiting  She has tried nothing for the symptoms  Her past medical history is significant for recurrent UTIs  There is no history of catheterization, kidney stones, a single kidney, urinary stasis or a urological procedure  Review of Systems   Review of Systems   Constitutional: Negative for chills  Gastrointestinal: Negative for nausea and vomiting  Genitourinary: Positive for frequency, hematuria, hesitancy and urgency  Negative for flank pain     All other systems reviewed and are negative          Current Medications       Current Outpatient Medications:     ALPRAZolam (XANAX) 1 mg tablet, Take 1 tablet (1 mg total) by mouth daily at bedtime as needed for anxiety, Disp: 30 tablet, Rfl: 0    cholecalciferol (VITAMIN D3) 1,000 units tablet, Take 1 tablet (1,000 Units total) by mouth daily, Disp: 30 tablet, Rfl: 0    methadone (DOLOPHINE) 10 mg tablet, 1 tablet 3 times daily as needed for pain,, Disp: 90 tablet, Rfl: 0    norethindrone-ethinyl estradiol (NORTREL 1-35 TAB) 1-35 MG-MCG per tablet, Take 1 tablet by mouth, Disp: , Rfl:     Omeprazole 20 MG TBEC, Take 1 tablet (20 mg total) by mouth daily, Disp: 90 tablet, Rfl: 3    Pediatric Multiple Vit-C-FA (FRUITY CHEWABLES MULTIVITAMIN) CHEW, Chew, Disp: , Rfl:     sertraline (ZOLOFT) 100 mg tablet, Take 2 tablets (200 mg total) by mouth daily, Disp: 60 tablet, Rfl: 5    sucralfate (CARAFATE) 1 g/10 mL suspension, Take 10 mL (1 g total) by mouth 4 (four) times a day as needed (dyspepsia), Disp: 420 mL, Rfl: 2    topiramate (TOPAMAX) 50 MG tablet, take 1 tablet by mouth twice a day, Disp: 60 tablet, Rfl: 0    zolpidem (AMBIEN) 10 mg tablet, Take 1 tablet (10 mg total) by mouth daily at bedtime as needed for sleep, Disp: 30 tablet, Rfl: 2    azithromycin (ZITHROMAX) 250 mg tablet, Take 2 tablets today then 1 tablet daily x 4 days, Disp: 6 tablet, Rfl: 0    Current Allergies     Allergies as of 02/22/2020 - Reviewed 02/22/2020   Allergen Reaction Noted    Amoxicillin Hives and Shortness Of Breath 11/16/2015    Aspirin Hives and Other (See Comments) 08/23/2009    Butorphanol Anaphylaxis and Other (See Comments) 08/23/2009    Morphine Other (See Comments) and Hallucinations 08/23/2009    Duloxetine  07/30/2013    Gabapentin Palpitations 02/15/2016    Nitrofurantoin Hives and Rash 06/14/2012    Sulfa antibiotics Hives, Rash, and Other (See Comments) 08/23/2009            The following portions of the patient's history were reviewed and updated as appropriate: allergies, current medications, past family history, past medical history, past social history, past surgical history and problem list      Past Medical History:   Diagnosis Date    Facial cellulitis     Fracture, cuboid     LAST ASSESSED: 3/26/15    Gastrojejunal ulcer     ANASTOMOTIC    History of small bowel obstruction     Hypertension     pt denies    Insomnia     Iron deficiency anemia     LAST ASSESSED: AND RESOLVED: 4/13/16       Past Surgical History:   Procedure Laterality Date    ABDOMINOPLASTY      x2    CHOLECYSTECTOMY      GASTRIC BYPASS      FOR MORBID OBESITY    INCISIONAL HERNIA REPAIR      SMALL INTESTINE SURGERY         Family History   Problem Relation Age of Onset    Diabetes Family         MELLITUS    Cancer Family     Hypertension Family     Osteoporosis Family     Asthma Family     Stroke Family         SYNDROME    Breast cancer Mother     COPD Father     Asthma Child          Medications have been verified  Objective   /67   Pulse 75   Temp 99 5 °F (37 5 °C)   Resp 18   SpO2 98%        Physical Exam     Physical Exam   Constitutional: She appears well-developed and well-nourished  Cardiovascular: Normal rate, regular rhythm and normal heart sounds  Pulmonary/Chest: Effort normal and breath sounds normal    Abdominal: Soft   Bowel sounds are normal      No CVA tenderness mild suprapubic tenderness

## 2020-02-22 NOTE — LETTER
February 22, 2020     Patient: Kamryn Mckeon   YOB: 1970   Date of Visit: 2/22/2020       To Whom it May Concern:    Kamryn Mckeon was seen in my clinic on 2/22/2020  She may return to work on 02/23/2020  If you have any questions or concerns, please don't hesitate to call           Sincerely,          Taylor Jerome PA-C        CC: No Recipients

## 2020-02-23 LAB — BACTERIA UR CULT: NORMAL

## 2020-03-02 DIAGNOSIS — G89.4 PAIN SYNDROME, CHRONIC: ICD-10-CM

## 2020-03-03 DIAGNOSIS — G89.4 PAIN SYNDROME, CHRONIC: ICD-10-CM

## 2020-03-03 RX ORDER — METHADONE HYDROCHLORIDE 10 MG/1
TABLET ORAL
Qty: 90 TABLET | Refills: 0 | Status: SHIPPED | OUTPATIENT
Start: 2020-03-03 | End: 2020-03-03 | Stop reason: SDUPTHER

## 2020-03-03 RX ORDER — METHADONE HYDROCHLORIDE 10 MG/1
TABLET ORAL
Qty: 90 TABLET | Refills: 0 | Status: SHIPPED | OUTPATIENT
Start: 2020-03-03 | End: 2020-04-02 | Stop reason: SDUPTHER

## 2020-03-11 DIAGNOSIS — G43.909 MIGRAINE WITHOUT STATUS MIGRAINOSUS, NOT INTRACTABLE, UNSPECIFIED MIGRAINE TYPE: ICD-10-CM

## 2020-03-11 RX ORDER — TOPIRAMATE 50 MG/1
50 TABLET, FILM COATED ORAL 2 TIMES DAILY
Qty: 60 TABLET | Refills: 1 | Status: SHIPPED | OUTPATIENT
Start: 2020-03-11 | End: 2020-05-07

## 2020-03-17 ENCOUNTER — TELEPHONE (OUTPATIENT)
Dept: INTERNAL MEDICINE CLINIC | Facility: CLINIC | Age: 50
End: 2020-03-17

## 2020-03-17 NOTE — TELEPHONE ENCOUNTER
Patient called with concerns on whether or not she should get tested for COVID-9  She stated that GYN provider cancelled appointment due to cough and to follow up with pcp  Patient denied any fever and shortness of breath  Having cough for 2 weeks  Patient also stated that her daughter is a teacher and there were 3 students in the class who had been exposed to [place]  I spoke with Dr Denisse Vanessa about Ely's concern and he states that she does not need to get tested for COVID  I relayed Dr Denisse Vanessa concerns to Divina Jackson and she understood and had no further questions or concerns

## 2020-03-31 DIAGNOSIS — F41.9 ANXIETY: ICD-10-CM

## 2020-03-31 RX ORDER — ALPRAZOLAM 1 MG/1
1 TABLET ORAL
Qty: 5 TABLET | Refills: 0 | Status: SHIPPED | OUTPATIENT
Start: 2020-03-31 | End: 2020-07-31 | Stop reason: SDUPTHER

## 2020-04-02 DIAGNOSIS — G89.4 PAIN SYNDROME, CHRONIC: ICD-10-CM

## 2020-04-02 RX ORDER — METHADONE HYDROCHLORIDE 10 MG/1
TABLET ORAL
Qty: 90 TABLET | Refills: 0 | Status: SHIPPED | OUTPATIENT
Start: 2020-04-02 | End: 2020-04-30 | Stop reason: SDUPTHER

## 2020-04-22 ENCOUNTER — TELEMEDICINE (OUTPATIENT)
Dept: INTERNAL MEDICINE CLINIC | Facility: CLINIC | Age: 50
End: 2020-04-22
Payer: COMMERCIAL

## 2020-04-22 DIAGNOSIS — K91.2 POSTGASTRECTOMY MALABSORPTION: ICD-10-CM

## 2020-04-22 DIAGNOSIS — G89.4 PAIN SYNDROME, CHRONIC: ICD-10-CM

## 2020-04-22 DIAGNOSIS — F32.A DEPRESSION, UNSPECIFIED DEPRESSION TYPE: ICD-10-CM

## 2020-04-22 DIAGNOSIS — G43.909 MIGRAINE WITHOUT STATUS MIGRAINOSUS, NOT INTRACTABLE, UNSPECIFIED MIGRAINE TYPE: Primary | ICD-10-CM

## 2020-04-22 DIAGNOSIS — Z90.3 POSTGASTRECTOMY MALABSORPTION: ICD-10-CM

## 2020-04-22 DIAGNOSIS — F51.01 PRIMARY INSOMNIA: ICD-10-CM

## 2020-04-22 PROCEDURE — 99214 OFFICE O/P EST MOD 30 MIN: CPT | Performed by: INTERNAL MEDICINE

## 2020-04-23 RX ORDER — NORTRIPTYLINE HYDROCHLORIDE 25 MG/1
25 CAPSULE ORAL
Qty: 30 CAPSULE | Refills: 2 | Status: SHIPPED | OUTPATIENT
Start: 2020-04-23 | End: 2020-08-26

## 2020-04-27 DIAGNOSIS — K21.9 GERD WITHOUT ESOPHAGITIS: Primary | ICD-10-CM

## 2020-04-27 RX ORDER — OMEPRAZOLE 20 MG/1
CAPSULE, DELAYED RELEASE ORAL
Qty: 90 CAPSULE | Refills: 3 | Status: SHIPPED | OUTPATIENT
Start: 2020-04-27 | End: 2021-04-15 | Stop reason: SDUPTHER

## 2020-04-30 DIAGNOSIS — G89.4 PAIN SYNDROME, CHRONIC: ICD-10-CM

## 2020-04-30 RX ORDER — METHADONE HYDROCHLORIDE 10 MG/1
TABLET ORAL
Qty: 90 TABLET | Refills: 0 | Status: SHIPPED | OUTPATIENT
Start: 2020-04-30 | End: 2020-06-02 | Stop reason: SDUPTHER

## 2020-05-01 ENCOUNTER — TRANSCRIBE ORDERS (OUTPATIENT)
Dept: INTERNAL MEDICINE CLINIC | Facility: CLINIC | Age: 50
End: 2020-05-01

## 2020-05-01 DIAGNOSIS — I83.813 VARICOSE VEINS OF BILATERAL LOWER EXTREMITIES WITH PAIN: Primary | ICD-10-CM

## 2020-05-01 PROBLEM — I83.893 SYMPTOMATIC VARICOSE VEINS OF BOTH LOWER EXTREMITIES: Status: ACTIVE | Noted: 2020-05-01

## 2020-05-04 ENCOUNTER — TELEPHONE (OUTPATIENT)
Dept: OTHER | Facility: OTHER | Age: 50
End: 2020-05-04

## 2020-05-07 DIAGNOSIS — G43.909 MIGRAINE WITHOUT STATUS MIGRAINOSUS, NOT INTRACTABLE, UNSPECIFIED MIGRAINE TYPE: ICD-10-CM

## 2020-05-07 RX ORDER — TOPIRAMATE 50 MG/1
TABLET, FILM COATED ORAL
Qty: 60 TABLET | Refills: 5 | Status: SHIPPED | OUTPATIENT
Start: 2020-05-07 | End: 2020-11-09

## 2020-05-14 ENCOUNTER — CONSULT (OUTPATIENT)
Dept: VASCULAR SURGERY | Facility: CLINIC | Age: 50
End: 2020-05-14
Payer: COMMERCIAL

## 2020-05-14 VITALS
HEART RATE: 83 BPM | TEMPERATURE: 99.7 F | WEIGHT: 160 LBS | BODY MASS INDEX: 26.66 KG/M2 | HEIGHT: 65 IN | RESPIRATION RATE: 16 BRPM

## 2020-05-14 DIAGNOSIS — I83.893 SYMPTOMATIC VARICOSE VEINS OF BOTH LOWER EXTREMITIES: Primary | ICD-10-CM

## 2020-05-14 DIAGNOSIS — G89.4 PAIN SYNDROME, CHRONIC: ICD-10-CM

## 2020-05-14 DIAGNOSIS — I83.813 VARICOSE VEINS OF BILATERAL LOWER EXTREMITIES WITH PAIN: ICD-10-CM

## 2020-05-14 DIAGNOSIS — F32.A DEPRESSION, UNSPECIFIED DEPRESSION TYPE: ICD-10-CM

## 2020-05-14 DIAGNOSIS — D50.8 OTHER IRON DEFICIENCY ANEMIA: ICD-10-CM

## 2020-05-14 PROCEDURE — 99244 OFF/OP CNSLTJ NEW/EST MOD 40: CPT | Performed by: PHYSICIAN ASSISTANT

## 2020-05-14 RX ORDER — NORETHINDRONE AND ETHINYL ESTRADIOL 1 MG-35MCG
1 KIT ORAL DAILY
COMMUNITY
Start: 2020-05-07 | End: 2020-12-02 | Stop reason: ALTCHOICE

## 2020-05-19 ENCOUNTER — TELEPHONE (OUTPATIENT)
Dept: INTERNAL MEDICINE CLINIC | Facility: CLINIC | Age: 50
End: 2020-05-19

## 2020-05-19 DIAGNOSIS — F51.01 PRIMARY INSOMNIA: Primary | ICD-10-CM

## 2020-05-19 RX ORDER — ESZOPICLONE 1 MG/1
1 TABLET, FILM COATED ORAL
Qty: 30 TABLET | Refills: 0 | Status: SHIPPED | OUTPATIENT
Start: 2020-05-19 | End: 2020-08-26

## 2020-05-28 DIAGNOSIS — F51.01 PRIMARY INSOMNIA: Primary | ICD-10-CM

## 2020-05-28 RX ORDER — ZOLPIDEM TARTRATE 10 MG/1
10 TABLET ORAL
Qty: 30 TABLET | Refills: 0 | Status: SHIPPED | OUTPATIENT
Start: 2020-05-28 | End: 2020-06-24 | Stop reason: SDUPTHER

## 2020-06-02 DIAGNOSIS — G89.4 PAIN SYNDROME, CHRONIC: ICD-10-CM

## 2020-06-03 RX ORDER — METHADONE HYDROCHLORIDE 10 MG/1
TABLET ORAL
Qty: 90 TABLET | Refills: 0 | Status: SHIPPED | OUTPATIENT
Start: 2020-06-03 | End: 2020-07-01 | Stop reason: SDUPTHER

## 2020-06-24 DIAGNOSIS — F51.01 PRIMARY INSOMNIA: ICD-10-CM

## 2020-06-26 RX ORDER — ZOLPIDEM TARTRATE 10 MG/1
10 TABLET ORAL
Qty: 90 TABLET | Refills: 0 | Status: SHIPPED | OUTPATIENT
Start: 2020-06-26 | End: 2020-09-21 | Stop reason: SDUPTHER

## 2020-06-30 ENCOUNTER — OFFICE VISIT (OUTPATIENT)
Dept: URGENT CARE | Age: 50
End: 2020-06-30
Payer: COMMERCIAL

## 2020-06-30 VITALS
DIASTOLIC BLOOD PRESSURE: 60 MMHG | RESPIRATION RATE: 20 BRPM | HEART RATE: 70 BPM | TEMPERATURE: 98.6 F | SYSTOLIC BLOOD PRESSURE: 124 MMHG | OXYGEN SATURATION: 100 % | WEIGHT: 160 LBS | BODY MASS INDEX: 26.66 KG/M2 | HEIGHT: 65 IN

## 2020-06-30 DIAGNOSIS — S92.912A UNSPECIFIED FRACTURE OF LEFT TOE(S), INITIAL ENCOUNTER FOR CLOSED FRACTURE: Primary | ICD-10-CM

## 2020-06-30 PROCEDURE — 99213 OFFICE O/P EST LOW 20 MIN: CPT | Performed by: PHYSICIAN ASSISTANT

## 2020-06-30 NOTE — PROGRESS NOTES
[facility]      NAME: Trace Flanagan is a 44 y o  female  : 1970    MRN: [id number]  DATE: 2020  TIME: 9:06 AM    Assessment and Plan   Unspecified fracture of left toe(s), initial encounter for closed fracture [S92 912A]  1  Unspecified fracture of left toe(s), initial encounter for closed fracture  XR foot 3+ vw left    Ambulatory referral to Orthopedic Surgery    Post Op Shoe     Left foot x-ray three views  Subtle irregularity of the cortices of the distal proximal phalanx  Unclear if new or old  Official report pending    Patient Instructions     Follow up with Ortho in 3-5 days  Proceed to  ER if symptoms worsen  Patent placed in post op shoe with toes buddy taped  Maintain as directed  Ice as needed for pain  Motrin / tylenol as needed for pain  Ortho referral placed  Chief Complaint     Chief Complaint   Patient presents with    Toe Pain     pt injured her toe about 4 years ago and never had it xrayed but feels she broke it then  pt is now having increased pain due to a recurrent injury  History of Present Illness       Patient is a 47-year-old female presenting the office for left 3rd toe pain  Patient states that her symptoms ongoing for the past 24 hours in duration  Patient states that she struck her toe on a stationary object and began to experience pain soon after  Patient states she had a toe fracture of the same toe approximately 4 years ago which was not treated properly was left with a angular deformity of the toe  Patient denies any numbness or tingling in the toe  Patient denies any discoloration of the toe  Patient does admit to having pain at the base her toe in association with swelling  Patient states she wrapped her toe with minimal relief of symptoms  Patient states she has not been taking any type pain medication  Patient offers no other complaints this time  Review of Systems   Review of Systems   Constitutional: Negative  HENT: Negative  Eyes: Negative  Respiratory: Negative  Cardiovascular: Negative  Gastrointestinal: Negative  Endocrine: Negative  Genitourinary: Negative  Musculoskeletal: Positive for arthralgias, gait problem and joint swelling  Negative for back pain, myalgias, neck pain and neck stiffness  Skin: Negative  Allergic/Immunologic: Negative  Hematological: Negative  Psychiatric/Behavioral: Negative            Current Medications       Current Outpatient Medications:     ALPRAZolam (XANAX) 1 mg tablet, Take 1 tablet (1 mg total) by mouth daily at bedtime as needed for anxiety, Disp: 5 tablet, Rfl: 0    ALYACEN 1/35 1-35 MG-MCG per tablet, Take 1 tablet by mouth daily, Disp: , Rfl:     cholecalciferol (VITAMIN D3) 1,000 units tablet, Take 1 tablet (1,000 Units total) by mouth daily, Disp: 30 tablet, Rfl: 0    eszopiclone (LUNESTA) 1 mg tablet, Take 1 tablet (1 mg total) by mouth daily at bedtime as needed for sleep Take immediately before bedtime, Disp: 30 tablet, Rfl: 0    methadone (DOLOPHINE) 10 mg tablet, 1 tablet 3 times daily as needed for chronic pain,, Disp: 90 tablet, Rfl: 0    nortriptyline (PAMELOR) 25 mg capsule, Take 1 capsule (25 mg total) by mouth daily at bedtime, Disp: 30 capsule, Rfl: 2    omeprazole (PriLOSEC) 20 mg delayed release capsule, take 1 capsule by mouth daily, Disp: 90 capsule, Rfl: 3    Pediatric Multiple Vit-C-FA (FRUITY CHEWABLES MULTIVITAMIN) CHEW, Chew, Disp: , Rfl:     sertraline (ZOLOFT) 100 mg tablet, Take 2 tablets (200 mg total) by mouth daily, Disp: 60 tablet, Rfl: 5    sucralfate (CARAFATE) 1 g/10 mL suspension, Take 10 mL (1 g total) by mouth 4 (four) times a day as needed (dyspepsia), Disp: 420 mL, Rfl: 2    topiramate (TOPAMAX) 50 MG tablet, take 1 tablet by mouth twice a day, Disp: 60 tablet, Rfl: 5    zolpidem (AMBIEN) 10 mg tablet, Take 1 tablet (10 mg total) by mouth daily at bedtime as needed for sleep, Disp: 90 tablet, Rfl: 0    Current Allergies     Allergies as of 06/30/2020 - Reviewed 06/30/2020   Allergen Reaction Noted    Amoxicillin Hives and Shortness Of Breath 11/16/2015    Aspirin Hives and Other (See Comments) 08/23/2009    Butorphanol Anaphylaxis and Other (See Comments) 08/23/2009    Morphine Other (See Comments) and Hallucinations 08/23/2009    Duloxetine  07/30/2013    Azithromycin Rash 05/14/2020    Gabapentin Palpitations 02/15/2016    Nitrofurantoin Hives and Rash 06/14/2012    Sulfa antibiotics Hives, Rash, and Other (See Comments) 08/23/2009            The following portions of the patient's history were reviewed and updated as appropriate: allergies, current medications, past family history, past medical history, past social history, past surgical history and problem list      Past Medical History:   Diagnosis Date    Facial cellulitis     Fracture, cuboid     LAST ASSESSED: 3/26/15    Gastrojejunal ulcer     ANASTOMOTIC    History of small bowel obstruction     Hypertension     pt denies    Insomnia     Iron deficiency anemia     LAST ASSESSED: AND RESOLVED: 4/13/16       Past Surgical History:   Procedure Laterality Date    ABDOMINOPLASTY      x2    CHOLECYSTECTOMY      GASTRIC BYPASS      FOR MORBID OBESITY    INCISIONAL HERNIA REPAIR      SMALL INTESTINE SURGERY         Family History   Problem Relation Age of Onset    Diabetes Family         MELLITUS    Cancer Family     Hypertension Family     Osteoporosis Family     Asthma Family     Stroke Family         SYNDROME    Breast cancer Mother     COPD Father     Asthma Child          Medications have been verified  Objective   /60   Pulse 70   Temp 98 6 °F (37 °C)   Resp 20   Ht 5' 5" (1 651 m)   Wt 72 6 kg (160 lb)   SpO2 100%   BMI 26 63 kg/m²        Physical Exam     Physical Exam   Constitutional: She is oriented to person, place, and time  She appears well-developed and well-nourished     HENT:   Head: Normocephalic  Eyes: Pupils are equal, round, and reactive to light  Conjunctivae and EOM are normal    Neck: Normal range of motion  Cardiovascular: Normal rate, regular rhythm, normal heart sounds and intact distal pulses  Pulmonary/Chest: Effort normal and breath sounds normal    Musculoskeletal:        Feet:    Neurological: She is alert and oriented to person, place, and time  Skin: Skin is warm  Capillary refill takes less than 2 seconds  Psychiatric: She has a normal mood and affect  Her behavior is normal  Judgment and thought content normal    Nursing note and vitals reviewed

## 2020-06-30 NOTE — LETTER
June 30, 2020     Patient: Óscar Parra   YOB: 1970   Date of Visit: 6/30/2020       To Whom it May Concern:    Óscar Parra was seen in my clinic on 6/30/2020  Please allow patient to be sedentary duty as needed  Until evaluation by Orthopedics  If you have any questions or concerns, please don't hesitate to call           Sincerely,          Kari Breen PA-C        CC: No Recipients

## 2020-06-30 NOTE — PATIENT INSTRUCTIONS
Follow up with Ortho in 3-5 days  Proceed to  ER if symptoms worsen  Patent placed in post op shoe with toes buddy taped  Maintain as directed  Ice as needed for pain  Motrin / tylenol as needed for pain  Ortho referral placed  Toe Fracture   WHAT YOU NEED TO KNOW:   A toe fracture is a break in 1 or more of the bones in your toe  It is most commonly caused by a direct blow to the toe  The bones in your toe may just be broken, or they may be out of place or   DISCHARGE INSTRUCTIONS:   Return to the emergency department if:   · Blood soaks through your bandage  · You have severe pain in your toe  · Your toe is cold or numb  Contact your healthcare provider if:   · You have a fever  · Your pain does not go away, even after treatment  · Your toe continues to hurt even after it has healed  · You have questions or concerns about your condition or care  Medicines: You may need any of the following:  · NSAIDs , such as ibuprofen, help decrease swelling, pain, and fever  This medicine is available with or without a doctor's order  NSAIDs can cause stomach bleeding or kidney problems in certain people  If you take blood thinner medicine, always ask your healthcare provider if NSAIDs are safe for you  Always read the medicine label and follow directions  · Prescription pain medicine  may be given  Ask your healthcare provider how to take this medicine safely  Some prescription pain medicines contain acetaminophen  Do not take other medicines that contain acetaminophen without talking to your healthcare provider  Too much acetaminophen may cause liver damage  Prescription pain medicine may cause constipation  Ask your healthcare provider how to prevent or treat constipation  · Antibiotics  treat a bacterial infection  You may need antibiotics if you have an open wound  · Take your medicine as directed    Contact your healthcare provider if you think your medicine is not helping or if you have side effects  Tell him of her if you are allergic to any medicine  Keep a list of the medicines, vitamins, and herbs you take  Include the amounts, and when and why you take them  Bring the list or the pill bottles to follow-up visits  Carry your medicine list with you in case of an emergency  Self-care:   · Use ralf tape, an elastic bandage, or a splint as directed  These help keep your toe in its correct position as it heals  Ralf tape is when your fractured toe and the toe next to it are taped together  · Use support devices  including a cane, crutches, walking boot, or hard soled shoe as directed  These help protect your broken toe and limit movement so it can heal     · Rest  your toe so that it can heal  Return to normal activities as directed  · Apply ice  on your toe for 15 to 20 minutes every hour or as directed  Use an ice pack, or put crushed ice in a plastic bag  Cover it with a towel  Ice helps prevent tissue damage and decreases swelling and pain  · Elevate  your toe above the level of your heart as often as you can  This will help decrease swelling and pain  Prop your toe on pillows or blankets to keep it elevated comfortably  Follow up with your healthcare provider as directed: You may need to see a podiatrist in 1 to 2 weeks  Write down your questions so you remember to ask them during your visits  © 2017 [employer] Information is for End User's use only and may not be sold, redistributed or otherwise used for commercial purposes  All illustrations and images included in CareNotes® are the copyrighted property of [employer] M [employer] Inc  or [employer]  The above information is an  only  It is not intended as medical advice for individual conditions or treatments  Talk to your doctor, nurse or pharmacist before following any medical regimen to see if it is safe and effective for you

## 2020-07-01 DIAGNOSIS — G89.4 PAIN SYNDROME, CHRONIC: ICD-10-CM

## 2020-07-01 RX ORDER — METHADONE HYDROCHLORIDE 10 MG/1
TABLET ORAL
Qty: 90 TABLET | Refills: 0 | Status: SHIPPED | OUTPATIENT
Start: 2020-07-01 | End: 2020-07-31 | Stop reason: SDUPTHER

## 2020-07-31 ENCOUNTER — OFFICE VISIT (OUTPATIENT)
Dept: URGENT CARE | Age: 50
End: 2020-07-31
Payer: COMMERCIAL

## 2020-07-31 VITALS
WEIGHT: 168 LBS | SYSTOLIC BLOOD PRESSURE: 136 MMHG | BODY MASS INDEX: 27.99 KG/M2 | HEIGHT: 65 IN | RESPIRATION RATE: 16 BRPM | DIASTOLIC BLOOD PRESSURE: 64 MMHG | OXYGEN SATURATION: 97 % | HEART RATE: 66 BPM | TEMPERATURE: 97.5 F

## 2020-07-31 DIAGNOSIS — M26.629 TMJ SYNDROME: Primary | ICD-10-CM

## 2020-07-31 DIAGNOSIS — F41.9 ANXIETY: ICD-10-CM

## 2020-07-31 DIAGNOSIS — G89.4 PAIN SYNDROME, CHRONIC: ICD-10-CM

## 2020-07-31 PROCEDURE — 99213 OFFICE O/P EST LOW 20 MIN: CPT | Performed by: PHYSICIAN ASSISTANT

## 2020-07-31 RX ORDER — ALPRAZOLAM 1 MG/1
1 TABLET ORAL
Qty: 5 TABLET | Refills: 0 | Status: SHIPPED | OUTPATIENT
Start: 2020-07-31 | End: 2020-08-26 | Stop reason: SDUPTHER

## 2020-07-31 RX ORDER — METHADONE HYDROCHLORIDE 10 MG/1
TABLET ORAL
Qty: 30 TABLET | Refills: 0 | Status: SHIPPED | OUTPATIENT
Start: 2020-07-31 | End: 2020-08-13 | Stop reason: SDUPTHER

## 2020-07-31 RX ORDER — METHOCARBAMOL 500 MG/1
500 TABLET, FILM COATED ORAL 2 TIMES DAILY
Qty: 20 TABLET | Refills: 0 | Status: SHIPPED | OUTPATIENT
Start: 2020-07-31 | End: 2020-08-26

## 2020-07-31 NOTE — PROGRESS NOTES
[facility]        NAME: Lucina Crump is a 44 y o  female  : 1970    MRN: [id number]  DATE: 2020  TIME: 7:18 PM    /64   Pulse 66   Temp 97 5 °F (36 4 °C)   Resp 16   Ht 5' 5" (1 651 m)   Wt 76 2 kg (168 lb)   LMP 2020 (Exact Date)   SpO2 97%   BMI 27 96 kg/m²     Assessment and Plan   TMJ syndrome [M26 629]  1  TMJ syndrome  methocarbamol (ROBAXIN) 500 mg tablet         Patient Instructions       Follow up with PCP in 3-5 days  Proceed to  ER if symptoms worsen  Chief Complaint     Chief Complaint   Patient presents with    Earache     pt reports pain and popping in left ear  Pt states she had teeth extracted on Monday and frequently has complications after dental work  History of Present Illness       Pt with left ear pain for several days  Pt with just having 7 dental extractions several days ago     Earache    There is pain in the left ear  This is a new problem  The current episode started in the past 7 days  The problem occurs constantly  There has been no fever  The pain is at a severity of 5/10  The pain is moderate  Pertinent negatives include no abdominal pain, coughing, diarrhea, ear discharge, headaches, hearing loss, neck pain, rash, rhinorrhea, sore throat or vomiting  She has tried nothing for the symptoms  The treatment provided no relief  Review of Systems   Review of Systems   Constitutional: Negative  HENT: Positive for ear pain  Negative for ear discharge, hearing loss, rhinorrhea and sore throat  Eyes: Negative  Respiratory: Negative  Negative for cough  Cardiovascular: Negative  Gastrointestinal: Negative  Negative for abdominal pain, diarrhea and vomiting  Endocrine: Negative  Genitourinary: Negative  Musculoskeletal: Negative  Negative for neck pain  Skin: Negative  Negative for rash  Allergic/Immunologic: Negative  Neurological: Negative  Negative for headaches  Hematological: Negative  Psychiatric/Behavioral: Negative  All other systems reviewed and are negative          Current Medications       Current Outpatient Medications:     ALPRAZolam (XANAX) 1 mg tablet, Take 1 tablet (1 mg total) by mouth daily at bedtime as needed for anxiety, Disp: 5 tablet, Rfl: 0    ALYACEN 1/35 1-35 MG-MCG per tablet, Take 1 tablet by mouth daily, Disp: , Rfl:     cholecalciferol (VITAMIN D3) 1,000 units tablet, Take 1 tablet (1,000 Units total) by mouth daily, Disp: 30 tablet, Rfl: 0    methadone (DOLOPHINE) 10 mg tablet, 1 tablet 3 times daily as needed for chronic pain,, Disp: 30 tablet, Rfl: 0    omeprazole (PriLOSEC) 20 mg delayed release capsule, take 1 capsule by mouth daily, Disp: 90 capsule, Rfl: 3    Pediatric Multiple Vit-C-FA (FRUITY CHEWABLES MULTIVITAMIN) CHEW, Chew, Disp: , Rfl:     sertraline (ZOLOFT) 100 mg tablet, Take 2 tablets (200 mg total) by mouth daily, Disp: 60 tablet, Rfl: 5    sucralfate (CARAFATE) 1 g/10 mL suspension, Take 10 mL (1 g total) by mouth 4 (four) times a day as needed (dyspepsia), Disp: 420 mL, Rfl: 2    topiramate (TOPAMAX) 50 MG tablet, take 1 tablet by mouth twice a day, Disp: 60 tablet, Rfl: 5    zolpidem (AMBIEN) 10 mg tablet, Take 1 tablet (10 mg total) by mouth daily at bedtime as needed for sleep, Disp: 90 tablet, Rfl: 0    eszopiclone (LUNESTA) 1 mg tablet, Take 1 tablet (1 mg total) by mouth daily at bedtime as needed for sleep Take immediately before bedtime (Patient not taking: Reported on 7/31/2020), Disp: 30 tablet, Rfl: 0    methocarbamol (ROBAXIN) 500 mg tablet, Take 1 tablet (500 mg total) by mouth 2 (two) times a day, Disp: 20 tablet, Rfl: 0    nortriptyline (PAMELOR) 25 mg capsule, Take 1 capsule (25 mg total) by mouth daily at bedtime (Patient not taking: Reported on 7/31/2020), Disp: 30 capsule, Rfl: 2    Current Allergies     Allergies as of 07/31/2020 - Reviewed 07/31/2020   Allergen Reaction Noted    Amoxicillin Hives and Shortness Of Breath 11/16/2015    Aspirin Hives and Other (See Comments) 08/23/2009    Butorphanol Anaphylaxis and Other (See Comments) 08/23/2009    Morphine Other (See Comments) and Hallucinations 08/23/2009    Duloxetine  07/30/2013    Azithromycin Rash 05/14/2020    Gabapentin Palpitations 02/15/2016    Nitrofurantoin Hives and Rash 06/14/2012    Sulfa antibiotics Hives, Rash, and Other (See Comments) 08/23/2009            The following portions of the patient's history were reviewed and updated as appropriate: allergies, current medications, past family history, past medical history, past social history, past surgical history and problem list      Past Medical History:   Diagnosis Date    Facial cellulitis     Fracture, cuboid     LAST ASSESSED: 3/26/15    Gastrojejunal ulcer     ANASTOMOTIC    History of small bowel obstruction     Hypertension     pt denies    Insomnia     Iron deficiency anemia     LAST ASSESSED: AND RESOLVED: 4/13/16       Past Surgical History:   Procedure Laterality Date    ABDOMINOPLASTY      x2    CHOLECYSTECTOMY      GASTRIC BYPASS      FOR MORBID OBESITY    INCISIONAL HERNIA REPAIR      SMALL INTESTINE SURGERY         Family History   Problem Relation Age of Onset    Diabetes Family         MELLITUS    Cancer Family     Hypertension Family     Osteoporosis Family     Asthma Family     Stroke Family         SYNDROME    Breast cancer Mother     COPD Father     Asthma Child          Medications have been verified  Objective   /64   Pulse 66   Temp 97 5 °F (36 4 °C)   Resp 16   Ht 5' 5" (1 651 m)   Wt 76 2 kg (168 lb)   LMP 07/24/2020 (Exact Date)   SpO2 97%   BMI 27 96 kg/m²        Physical Exam     Physical Exam   Constitutional: She is oriented to person, place, and time  She appears well-developed and well-nourished  Pt with gastric bypass  No nsaids  Pt on methadone     HENT:   Head: Normocephalic and atraumatic     Right Ear: External ear normal    Left Ear: External ear normal    Nose: Nose normal    Mouth/Throat: Oropharynx is clear and moist    Left tm wnl  Left ear canal wnl    All dental extaction site anterior    Left tmj pain to palp    Eyes: Pupils are equal, round, and reactive to light  Conjunctivae and EOM are normal    Neck: Normal range of motion  Neck supple  Cardiovascular: Normal rate, regular rhythm and normal heart sounds  Pulmonary/Chest: Effort normal and breath sounds normal    Abdominal: Soft  Bowel sounds are normal    Musculoskeletal: Normal range of motion  Neurological: She is alert and oriented to person, place, and time  Skin: Skin is warm  Capillary refill takes less than 2 seconds  Psychiatric: She has a normal mood and affect  Nursing note and vitals reviewed

## 2020-07-31 NOTE — TELEPHONE ENCOUNTER
Ely called on Friday, July 31,2020 for these prescriptions  As per Dr Cande Moreland she may have a 10 day supply  Dr Steven Chow will have to prescribe when he gets back from vacation  STAT order to Ortho for closed nondisplaced fracture of middle phalanx of lesser toe of left foot.  DOI 4/18/2020.  Images in PACS.  Please advise on in person or video visit.

## 2020-07-31 NOTE — PATIENT INSTRUCTIONS
Temporomandibular Disorder   WHAT YOU NEED TO KNOW:   Temporomandibular disorder is a condition that causes pain in your jaw  The disorder affects the joint between your temporal bone and your mandible (jawbone)  The muscles and nerves around the joint are also affected  DISCHARGE INSTRUCTIONS:   Medicines:   · Pain medicine: You may be given a prescription medicine to decrease pain  Do not wait until the pain is severe before you take this medicine  · NSAIDs:  These medicines decrease swelling and pain  You can buy NSAIDs without a doctor's order  Ask your healthcare provider which medicine is right for you, and how much to take  Take as directed  NSAIDs can cause stomach bleeding or kidney problems if not taken correctly  · Muscle relaxers  help decrease pain and muscle spasms  · Take your medicine as directed  Contact your healthcare provider if you think your medicine is not helping or if you have side effects  Tell him of her if you are allergic to any medicine  Keep a list of the medicines, vitamins, and herbs you take  Include the amounts, and when and why you take them  Bring the list or the pill bottles to follow-up visits  Carry your medicine list with you in case of an emergency  Follow up with your healthcare provider as directed:  Write down your questions so you remember to ask them during your visits  Manage your symptoms:   · Eat soft foods: Your healthcare provider may suggest that you eat only soft foods for several days  A dietitian may work with you to find foods that are easier to bite, chew, or swallow  Examples are soup, applesauce, cottage cheese, pudding, yogurt, and soft fruits  · Use jaw supporting devices:  Splints may be used to support your jaw or keep your jaw from moving  You may need to wear a mouth guard to keep you from clenching or grinding your teeth while you are sleeping  · Use ice and heat:  Ice helps decrease swelling and pain   Ice may also help prevent tissue damage  Use an ice pack, or put crushed ice in a plastic bag  Cover it with a towel and place it on your jaw for 15 to 20 minutes every hour or as directed  After the first 24 to 48 hours, use heat to decrease pain, swelling, and muscle spasms  Apply heat on the area for 20 to 30 minutes every 2 hours for as many days as directed  Use a heating pad, moist warm compress, or a hot water bottle  · Go to physical therapy:  A physical therapist teaches you exercises to help improve movement and strength, and to decrease pain in your jaw  A speech therapist may help you with swallowing and speech exercises  Contact your healthcare provider if:   · You have a fever  · Your splint or mouth guard is loose  · You have questions or concerns about your condition or care  Return to the emergency department if:   · You have nausea, are vomiting, or cannot keep liquids down  · You have pain that does not go away even after you take your pain medicine  · You have problems breathing, talking, drinking, eating, or swallowing  · Your splint or mouth guard gets damaged or broken  © 2017 [facility] Information is for End User's use only and may not be sold, redistributed or otherwise used for commercial purposes  All illustrations and images included in CareNotes® are the copyrighted property of [employer] M , Inc  or [employer]  The above information is an  only  It is not intended as medical advice for individual conditions or treatments  Talk to your doctor, nurse or pharmacist before following any medical regimen to see if it is safe and effective for you

## 2020-08-02 DIAGNOSIS — F41.9 ANXIETY: ICD-10-CM

## 2020-08-03 RX ORDER — ALPRAZOLAM 1 MG/1
TABLET ORAL
Qty: 5 TABLET | OUTPATIENT
Start: 2020-08-03

## 2020-08-10 DIAGNOSIS — F41.9 ANXIETY: ICD-10-CM

## 2020-08-11 RX ORDER — ALPRAZOLAM 1 MG/1
1 TABLET ORAL
Qty: 5 TABLET | Refills: 0 | OUTPATIENT
Start: 2020-08-11

## 2020-08-13 DIAGNOSIS — G89.4 PAIN SYNDROME, CHRONIC: ICD-10-CM

## 2020-08-13 RX ORDER — METHADONE HYDROCHLORIDE 10 MG/1
TABLET ORAL
Qty: 90 TABLET | Refills: 0 | Status: SHIPPED | OUTPATIENT
Start: 2020-08-13 | End: 2020-09-14 | Stop reason: SDUPTHER

## 2020-08-16 DIAGNOSIS — F32.A DEPRESSION, UNSPECIFIED DEPRESSION TYPE: ICD-10-CM

## 2020-08-17 RX ORDER — SERTRALINE HYDROCHLORIDE 100 MG/1
TABLET, FILM COATED ORAL
Qty: 60 TABLET | Refills: 5 | Status: SHIPPED | OUTPATIENT
Start: 2020-08-17 | End: 2021-02-04 | Stop reason: SDUPTHER

## 2020-08-19 ENCOUNTER — HOSPITAL ENCOUNTER (OUTPATIENT)
Dept: NON INVASIVE DIAGNOSTICS | Facility: CLINIC | Age: 50
Discharge: HOME | End: 2020-08-19
Payer: COMMERCIAL

## 2020-08-19 ENCOUNTER — TELEPHONE (OUTPATIENT)
Dept: VASCULAR SURGERY | Facility: CLINIC | Age: 50
End: 2020-08-19

## 2020-08-19 DIAGNOSIS — I83.893 SYMPTOMATIC VARICOSE VEINS OF BOTH LOWER EXTREMITIES: ICD-10-CM

## 2020-08-19 PROCEDURE — 93970 EXTREMITY STUDY: CPT

## 2020-08-19 PROCEDURE — 93970 EXTREMITY STUDY: CPT | Performed by: SURGERY

## 2020-08-19 NOTE — TELEPHONE ENCOUNTER
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

## 2020-08-24 ENCOUNTER — TELEPHONE (OUTPATIENT)
Dept: ADMINISTRATIVE | Facility: HOSPITAL | Age: 50
End: 2020-08-24

## 2020-08-24 NOTE — TELEPHONE ENCOUNTER

## 2020-08-25 ENCOUNTER — TRANSCRIBE ORDERS (OUTPATIENT)
Dept: LAB | Facility: CLINIC | Age: 50
End: 2020-08-25

## 2020-08-25 ENCOUNTER — OFFICE VISIT (OUTPATIENT)
Dept: VASCULAR SURGERY | Facility: CLINIC | Age: 50
End: 2020-08-25
Payer: COMMERCIAL

## 2020-08-25 ENCOUNTER — APPOINTMENT (OUTPATIENT)
Dept: LAB | Facility: CLINIC | Age: 50
End: 2020-08-25
Payer: COMMERCIAL

## 2020-08-25 VITALS
RESPIRATION RATE: 18 BRPM | HEIGHT: 65 IN | BODY MASS INDEX: 27.66 KG/M2 | SYSTOLIC BLOOD PRESSURE: 112 MMHG | DIASTOLIC BLOOD PRESSURE: 68 MMHG | WEIGHT: 166 LBS | HEART RATE: 74 BPM | TEMPERATURE: 97.3 F

## 2020-08-25 DIAGNOSIS — Z11.3 SCREENING EXAMINATION FOR VENEREAL DISEASE: ICD-10-CM

## 2020-08-25 DIAGNOSIS — I83.893 SYMPTOMATIC VARICOSE VEINS OF BOTH LOWER EXTREMITIES: Primary | ICD-10-CM

## 2020-08-25 DIAGNOSIS — Z11.3 SCREENING EXAMINATION FOR VENEREAL DISEASE: Primary | ICD-10-CM

## 2020-08-25 LAB
HCV AB SER QL: NORMAL
HIV 1+2 AB+HIV1 P24 AG SERPL QL IA: NORMAL
HIV1 P24 AG SER QL: NORMAL

## 2020-08-25 PROCEDURE — 86592 SYPHILIS TEST NON-TREP QUAL: CPT

## 2020-08-25 PROCEDURE — 36415 COLL VENOUS BLD VENIPUNCTURE: CPT

## 2020-08-25 PROCEDURE — 86803 HEPATITIS C AB TEST: CPT

## 2020-08-25 PROCEDURE — 1036F TOBACCO NON-USER: CPT | Performed by: SURGERY

## 2020-08-25 PROCEDURE — 87806 HIV AG W/HIV1&2 ANTB W/OPTIC: CPT

## 2020-08-25 PROCEDURE — 3008F BODY MASS INDEX DOCD: CPT | Performed by: SURGERY

## 2020-08-25 PROCEDURE — 99213 OFFICE O/P EST LOW 20 MIN: CPT | Performed by: SURGERY

## 2020-08-25 NOTE — PATIENT INSTRUCTIONS
1) Venous disease  -your test showed that you do not have any leaky valves in the veins (no venous insufficiency)  -if did show that you have an old blood clot in the right leg; there is no treatment necessary for this currently because it happened some time in the past  -wearing compression, elevating your legs, exercise, healthy weight can help with the swelling you have at work and help prevent symptoms related to the blood clots down the road  -I do not think that the pain you are describing in your thighs is related to the veins/vasculature    Leg Edema   WHAT YOU NEED TO KNOW:   Leg edema is swelling caused by fluid buildup  Your legs may swell if you sit or stand for long periods of time, are pregnant, or are injured  Swelling may also occur if you have heart failure or circulation problems  This means that your heart does not pump blood through your body as it should  DISCHARGE INSTRUCTIONS:   Self-care:   · Elevate your legs:  Raise your legs above the level of your heart as often as you can  This will help decrease swelling and pain  Prop your legs on pillows or blankets to keep them elevated comfortably  · Wear pressure stockings: These tight stockings put pressure on your legs to promote blood flow and prevent blood clots  Wear the stockings during the day  Do not wear them while you sleep  · Apply heat:  Heat helps decrease pain and swelling  Apply heat on the area for 20 to 30 minutes every 2 hours for as many days as directed  · Stay active:  Do not stand or sit for long periods of time  Ask your healthcare provider about the best exercise plan for you  · Eat healthy foods:  Healthy foods include fruits, vegetables, whole-grain breads, low-fat dairy products, beans, lean meats, and fish  Ask if you need to be on a special diet  Limit salt  Salt will make your body hold even more fluid    Follow up with your healthcare provider as directed:  Write down your questions so you remember to ask them during your visits  Contact your healthcare provider if:   · You have a fever or feel more tired than usual     · The veins in your legs look larger than usual  They may look full or bulging  · Your legs itch or feel heavy  · You have red or white areas or sores on your legs  The skin may also appear dimpled or have indentations  · You are gaining weight  · You have trouble moving your ankles  · The swelling does not go away, or other parts of your body swell  · You have questions or concerns about your condition or care  Return to the emergency department if:   · You cannot walk  · You feel faint or confused  · Your skin turns blue or gray  · Your leg feels warm, tender, and painful  It may be swollen and red  · You have chest pain or trouble breathing that is worse when you lie down  · You suddenly feel lightheaded and have trouble breathing  · You have new and sudden chest pain  You may have more pain when you take deep breaths or cough  You may also cough up blood  © 2017 [facility] Information is for End User's use only and may not be sold, redistributed or otherwise used for commercial purposes  All illustrations and images included in CareNotes® are the copyrighted property of [employer]  or [employer]  The above information is an  only  It is not intended as medical advice for individual conditions or treatments  Talk to your doctor, nurse or pharmacist before following any medical regimen to see if it is safe and effective for you

## 2020-08-25 NOTE — PROGRESS NOTES
Assessment/Plan:    Pt is a 45 yo F w/ GERD, malabsorption, migraine, anxiety, MDD, s/p gastric bypass, s/p abdominoplasty x 2 c/b chornic pain syndrome and methadone use, presents to discuss leg symptoms/veins    Symptomatic varicose veins of both lower extremities  -patient has large varicose vein of R anterior thigh and moderate L anterior thigh but these are asymptomatic; has mild BLE edema at the end of the day  -no prior venous procedures and no known hx of DVT  -reviewed reflux study which shows competent R deep and GSV/SSV systems; she has chronic R pop/per/PT DVT; L GSV/SSV atretic; L deep system competent  -given no reflux and atypical symptoms of R>L posterior thigh pain, I do not think that the etiology of her problem is vascular; there is no need for venous intervention; given her chronic DVT and mild edema, do recommend continuation of medical measures including compression, leg elevation, exercise, healthy weight to prevent worsening of symptoms, particularly on work days; did discuss that if she started having significant pain in her right leg varicose veins that we could consider doing stab phlebectomies for this, but would not recommend at this time as she has no discomfort there  -f/u PRN    Subjective:      Patient ID: Sunitha Macias is a 44 y o  female  Patient had a LEVDR on 8/19/20  Pt  c/o painful VV in BLE  Rt leg >Lt leg  Pt states that she has throbbing pain in the legs  Pt states that she has a rash on the RLE since her LEVDR  Pt wears compression stockings for 8 hours a day  Pt elevates her legs when possible  HPI:    Patient complains of legs symptoms  Last seen by Venus Barrow  Patient complains of large varicose vein over anterior thighs, R>L  She complains of leg pain, mainly down the posterior thighs, R>L  Has some mild swelling at the end of her shift  Works at [employer] part time (3x/wk, 8hr shift)  Since last visit, wears compression on work days    Does not think this makes any difference in her leg symptoms  Does elevate her legs, about 3hrs/day when she doesn't go to work (about 10hrs/wk total)  Does not do any exercise  Had gastric bypass surgery '07  Lost 200lbs and kept this off  Had abdominoplasty in '08 c/b MRSA infection and revision in '10  No prior venous procedures  Was unaware of any DVTs  The following portions of the patient's history were reviewed and updated as appropriate: allergies, current medications, past family history, past medical history, past social history, past surgical history and problem list     Review of Systems   Constitutional: Negative  HENT: Negative  Eyes: Negative  Respiratory: Negative  Cardiovascular: Positive for leg swelling (after work)  Painful veins     Gastrointestinal: Negative  Endocrine: Negative  Genitourinary: Negative  Musculoskeletal: Negative for arthralgias and gait problem  Myalgias: B posterior thighs  Leg pain   Skin: Positive for rash (Rt anterior shin)  Negative for color change and wound  Allergic/Immunologic: Negative  Neurological: Negative  Hematological: Negative  Psychiatric/Behavioral: Negative  Objective:      /68 (BP Location: Left arm, Patient Position: Sitting)   Pulse 74   Temp (!) 97 3 °F (36 3 °C) (Tympanic)   Resp 18   Ht 5' 5" (1 651 m)   Wt 75 3 kg (166 lb)   BMI 27 62 kg/m²          Physical Exam  Vitals signs and nursing note reviewed  Constitutional:       Appearance: She is well-developed  HENT:      Head: Normocephalic and atraumatic  Eyes:      Conjunctiva/sclera: Conjunctivae normal    Neck:      Musculoskeletal: Normal range of motion and neck supple  Cardiovascular:      Rate and Rhythm: Normal rate and regular rhythm  Pulses:           Radial pulses are 2+ on the right side and 2+ on the left side  Dorsalis pedis pulses are 1+ on the right side and 1+ on the left side          Posterior tibial pulses are 2+ on the right side and 2+ on the left side  Heart sounds: Normal heart sounds  No murmur  Comments: No carotid bruits B  Pulmonary:      Effort: Pulmonary effort is normal  No respiratory distress  Breath sounds: Normal breath sounds  No wheezing or rales  Abdominal:      General: There is no distension  Palpations: Abdomen is soft  Tenderness: There is no abdominal tenderness  There is no rebound  Musculoskeletal: Normal range of motion  Right lower leg: No edema  Left lower leg: No edema  Skin:     General: Skin is warm and dry  Comments: No chronic skin changes to the BLE; large varicosity running over the R anterior thigh; smaller varicosity running over the L anterior thigh; minimal spiders and retics over BLE  No leg/foot wounds   Neurological:      Mental Status: She is alert and oriented to person, place, and time     Psychiatric:         Behavior: Behavior normal

## 2020-08-26 ENCOUNTER — OFFICE VISIT (OUTPATIENT)
Dept: INTERNAL MEDICINE CLINIC | Facility: CLINIC | Age: 50
End: 2020-08-26
Payer: COMMERCIAL

## 2020-08-26 VITALS
TEMPERATURE: 97.6 F | WEIGHT: 164.6 LBS | SYSTOLIC BLOOD PRESSURE: 109 MMHG | HEIGHT: 65 IN | BODY MASS INDEX: 27.42 KG/M2 | DIASTOLIC BLOOD PRESSURE: 71 MMHG | RESPIRATION RATE: 14 BRPM | HEART RATE: 66 BPM

## 2020-08-26 DIAGNOSIS — F32.A DEPRESSION, UNSPECIFIED DEPRESSION TYPE: ICD-10-CM

## 2020-08-26 DIAGNOSIS — F41.9 ANXIETY: ICD-10-CM

## 2020-08-26 DIAGNOSIS — M75.52 SUBDELTOID BURSITIS OF LEFT SHOULDER JOINT: ICD-10-CM

## 2020-08-26 DIAGNOSIS — G89.4 PAIN SYNDROME, CHRONIC: ICD-10-CM

## 2020-08-26 DIAGNOSIS — Z90.3 POSTGASTRECTOMY MALABSORPTION: Primary | ICD-10-CM

## 2020-08-26 DIAGNOSIS — D50.8 OTHER IRON DEFICIENCY ANEMIA: ICD-10-CM

## 2020-08-26 DIAGNOSIS — K91.2 POSTGASTRECTOMY MALABSORPTION: Primary | ICD-10-CM

## 2020-08-26 LAB — RPR SER QL: NORMAL

## 2020-08-26 PROCEDURE — 20610 DRAIN/INJ JOINT/BURSA W/O US: CPT | Performed by: INTERNAL MEDICINE

## 2020-08-26 PROCEDURE — 3008F BODY MASS INDEX DOCD: CPT | Performed by: INTERNAL MEDICINE

## 2020-08-26 PROCEDURE — 1036F TOBACCO NON-USER: CPT | Performed by: INTERNAL MEDICINE

## 2020-08-26 PROCEDURE — 99214 OFFICE O/P EST MOD 30 MIN: CPT | Performed by: INTERNAL MEDICINE

## 2020-08-26 RX ORDER — ALPRAZOLAM 1 MG/1
1 TABLET ORAL
Qty: 10 TABLET | Refills: 1 | Status: SHIPPED | OUTPATIENT
Start: 2020-08-26 | End: 2020-09-14

## 2020-08-26 RX ADMIN — METHYLPREDNISOLONE ACETATE 1 ML: 40 INJECTION, SUSPENSION INTRA-ARTICULAR; INTRALESIONAL; INTRAMUSCULAR; SOFT TISSUE at 14:41

## 2020-08-27 PROBLEM — M75.52 SUBDELTOID BURSITIS OF LEFT SHOULDER JOINT: Status: ACTIVE | Noted: 2020-08-27

## 2020-08-27 RX ORDER — METHYLPREDNISOLONE ACETATE 40 MG/ML
1 INJECTION, SUSPENSION INTRA-ARTICULAR; INTRALESIONAL; INTRAMUSCULAR; SOFT TISSUE
Status: COMPLETED | OUTPATIENT
Start: 2020-08-26 | End: 2020-08-26

## 2020-08-27 NOTE — PROGRESS NOTES
[facility] Internal Medicine Only      NAME: Ely Cantrell  AGE: 44 y o  SEX: female  : 1970   MRN: [id number]    DATE: 2020  TIME: 5:05 PM    Assessment and Plan   1  Anxiety  The patient's anxiety has been somewhat increased related to the pandemic  She is using a quarter of a mg of alprazolam periodically  - ALPRAZolam (XANAX) 1 mg tablet; Take 1 tablet (1 mg total) by mouth daily at bedtime as needed for anxiety  Dispense: 10 tablet; Refill: 1    2  Postgastrectomy malabsorption  Continue current vitamin supplementation  [address] lab work  - Comprehensive metabolic panel    3  Depression, unspecified depression type  Content stable on sertraline  4  Pain syndrome, chronic  Adequately controlled on current dose of methadone  5  Other iron deficiency anemia  The patient has been prone to iron deficiency since her gastric bypass  Her iron studies will be recheck  - CBC  - Iron Saturation %    6  BMI  BMI Counseling: Body mass index is 27 39 kg/m²  The BMI is above normal  Nutrition recommendations include 3-5 servings of fruits/vegetables daily  Exercise recommendations include moderate aerobic physical activity for 150 minutes/week  7  Left subdeltoid bursitis  I recommended steroid injection and physical therapy  The patient is doing pretty well overall  She will continue her current medications  She was agreeable to steroid injection      Large joint arthrocentesis: L subacromial bursa  Date/Time: 2020 2:41 PM  Consent given by: patient  Site marked: site marked  Timeout: Immediately prior to procedure a time out was called to verify the correct patient, procedure, equipment, support staff and site/side marked as required   Supporting Documentation  Indications: pain   Procedure Details  Location: shoulder - L subacromial bursa  Preparation: Patient was prepped and draped in the usual sterile fashion  Needle size: 22 G  Ultrasound guidance: no  Approach: lateral  Medications administered: 1 mL methylPREDNISolone acetate 40 mg/mL    Patient tolerance: patient tolerated the procedure well with no immediate complications  Dressing:  Sterile dressing applied            Return to office in:  3 months    Chief Complaint     Chief Complaint   Patient presents with    Follow-up     3 month       History of Present Illness     The patient returned to the office for re-evaluation of esophageal reflux, migraine, depression, chronic pain syndrome, and post gastric bypass malabsorption  Since her last visit she has been feeling pretty well  She has been somewhat more anxious than usual   She is occasionally using 0 25 mg of Xanax which she finds very helpful  She has encountered no ill effects from this  Her pain is stable on her usual dose of methadone  She has had no chest pain, shortness of breath, palpitations, or dizziness  The following portions of the patient's history were reviewed and updated as appropriate: allergies, current medications, past family history, past medical history, past social history, past surgical history and problem list     Review of Systems   Review of Systems   Constitutional: Negative  HENT: Negative for congestion, ear pain, postnasal drip, rhinorrhea, sore throat and trouble swallowing  Eyes: Negative for pain, discharge, redness and visual disturbance  Respiratory: Negative for cough, shortness of breath and wheezing  Cardiovascular: Negative  Gastrointestinal: Negative  Endocrine: Negative  Genitourinary: Negative for difficulty urinating, dysuria, frequency, hematuria and urgency  Musculoskeletal: Negative for arthralgias, gait problem, joint swelling and myalgias  Skin: Negative for rash  Neurological: Negative for dizziness, speech difficulty, weakness, light-headedness, numbness and headaches  Hematological: Negative      Psychiatric/Behavioral: Negative for confusion, decreased concentration, dysphoric mood and sleep disturbance  The patient is not nervous/anxious  Active Problem List     Patient Active Problem List   Diagnosis    Allergic rhinitis    Anxiety    Depression    GERD without esophagitis    Insomnia    Migraine, unspecified, not intractable, without status migrainosus    Pain syndrome, chronic    Postgastrectomy malabsorption    Vitamin D deficiency    Wartenberg syndrome    History of bariatric surgery    Iron deficiency anemia    ASCUS with positive high risk HPV cervical    Urinary tract infection with hematuria    Symptomatic varicose veins of both lower extremities       Objective   /71 (BP Location: Left arm, Patient Position: Sitting, Cuff Size: Large)   Pulse 66   Temp 97 6 °F (36 4 °C)   Resp 14   Ht 5' 5" (1 651 m)   Wt 74 7 kg (164 lb 9 6 oz)   BMI 27 39 kg/m²     Physical Exam  Constitutional:       General: She is not in acute distress  Appearance: She is well-developed  HENT:      Head: Normocephalic and atraumatic  Neck:      Musculoskeletal: Neck supple  Thyroid: No thyromegaly  Vascular: No JVD  Trachea: No tracheal deviation  Cardiovascular:      Rate and Rhythm: Normal rate and regular rhythm  Heart sounds: Normal heart sounds  No murmur  No friction rub  No gallop  Pulmonary:      Effort: Pulmonary effort is normal       Breath sounds: Normal breath sounds  No wheezing or rales  Chest:      Chest wall: No tenderness  Abdominal:      General: Bowel sounds are normal  There is no distension  Palpations: Abdomen is soft  There is no mass  Tenderness: There is no abdominal tenderness  There is no rebound  Musculoskeletal: Normal range of motion  General: No tenderness  Lymphadenopathy:      Cervical: No cervical adenopathy  Skin:     General: Skin is warm and dry  Neurological:      Mental Status: She is alert and oriented to person, place, and time     Psychiatric:         Mood and Affect: Mood normal          Behavior: Behavior normal          Thought Content:  Thought content normal          Judgment: Judgment normal          Pertinent Laboratory/Diagnostic Studies:  Appointment on 08/25/2020   Component Date Value Ref Range Status    Hepatitis C Ab 08/25/2020 Non-reactive  Non-reactive Final    Rapid HIV 1 AND 2 08/25/2020 Non-Reactive  Non-Reactive Final    HIV-1 P24 Ag Screen 08/25/2020 Non-Reactive  Non-Reactive Final    RPR 08/25/2020 Non-Reactive  Non-Reactive Final           Current Medications     Current Outpatient Medications:     ALPRAZolam (XANAX) 1 mg tablet, Take 1 tablet (1 mg total) by mouth daily at bedtime as needed for anxiety, Disp: 10 tablet, Rfl: 1    ALYACEN 1/35 1-35 MG-MCG per tablet, Take 1 tablet by mouth daily, Disp: , Rfl:     cholecalciferol (VITAMIN D3) 1,000 units tablet, Take 1 tablet (1,000 Units total) by mouth daily, Disp: 30 tablet, Rfl: 0    methadone (DOLOPHINE) 10 mg tablet, 1 tablet 3 times daily as needed for chronic pain,, Disp: 90 tablet, Rfl: 0    omeprazole (PriLOSEC) 20 mg delayed release capsule, take 1 capsule by mouth daily, Disp: 90 capsule, Rfl: 3    Pediatric Multiple Vit-C-FA (FRUITY CHEWABLES MULTIVITAMIN) CHEW, Chew, Disp: , Rfl:     sertraline (ZOLOFT) 100 mg tablet, take 2 tablets by mouth daily, Disp: 60 tablet, Rfl: 5    sucralfate (CARAFATE) 1 g/10 mL suspension, Take 10 mL (1 g total) by mouth 4 (four) times a day as needed (dyspepsia), Disp: 420 mL, Rfl: 2    topiramate (TOPAMAX) 50 MG tablet, take 1 tablet by mouth twice a day, Disp: 60 tablet, Rfl: 5    zolpidem (AMBIEN) 10 mg tablet, Take 1 tablet (10 mg total) by mouth daily at bedtime as needed for sleep, Disp: 90 tablet, Rfl: 0      Re Espinal MD

## 2020-09-08 ENCOUNTER — HOSPITAL ENCOUNTER (INPATIENT)
Facility: HOSPITAL | Age: 50
LOS: 1 days | Discharge: HOME | DRG: 197 | End: 2020-09-09
Attending: EMERGENCY MEDICINE | Admitting: INTERNAL MEDICINE
Payer: COMMERCIAL

## 2020-09-08 ENCOUNTER — OFFICE VISIT (OUTPATIENT)
Dept: INTERNAL MEDICINE CLINIC | Facility: CLINIC | Age: 50
End: 2020-09-08
Payer: COMMERCIAL

## 2020-09-08 ENCOUNTER — APPOINTMENT (EMERGENCY)
Dept: NON INVASIVE DIAGNOSTICS | Facility: HOSPITAL | Age: 50
DRG: 197 | End: 2020-09-08
Payer: COMMERCIAL

## 2020-09-08 VITALS
OXYGEN SATURATION: 97 % | HEIGHT: 65 IN | TEMPERATURE: 97.9 F | BODY MASS INDEX: 28.32 KG/M2 | HEART RATE: 75 BPM | SYSTOLIC BLOOD PRESSURE: 120 MMHG | WEIGHT: 170 LBS | DIASTOLIC BLOOD PRESSURE: 76 MMHG

## 2020-09-08 DIAGNOSIS — Z98.84 HISTORY OF BARIATRIC SURGERY: ICD-10-CM

## 2020-09-08 DIAGNOSIS — F32.A DEPRESSION, UNSPECIFIED DEPRESSION TYPE: ICD-10-CM

## 2020-09-08 DIAGNOSIS — I82.401 ACUTE DEEP VEIN THROMBOSIS (DVT) OF RIGHT LOWER EXTREMITY, UNSPECIFIED VEIN (HCC): Primary | ICD-10-CM

## 2020-09-08 DIAGNOSIS — I82.5Y1 CHRONIC DEEP VEIN THROMBOSIS (DVT) OF PROXIMAL VEIN OF RIGHT LOWER EXTREMITY (HCC): Primary | ICD-10-CM

## 2020-09-08 DIAGNOSIS — I83.893 SYMPTOMATIC VARICOSE VEINS OF BOTH LOWER EXTREMITIES: ICD-10-CM

## 2020-09-08 DIAGNOSIS — I82.409 ACUTE DVT (DEEP VENOUS THROMBOSIS) (HCC): ICD-10-CM

## 2020-09-08 DIAGNOSIS — G89.4 PAIN SYNDROME, CHRONIC: ICD-10-CM

## 2020-09-08 DIAGNOSIS — L03.115 CELLULITIS OF RIGHT LOWER EXTREMITY: ICD-10-CM

## 2020-09-08 PROBLEM — H61.22 IMPACTED CERUMEN OF LEFT EAR: Status: ACTIVE | Noted: 2020-09-08

## 2020-09-08 PROBLEM — I82.511 CHRONIC DEEP VEIN THROMBOSIS (DVT) OF FEMORAL VEIN OF RIGHT LOWER EXTREMITY (HCC): Status: ACTIVE | Noted: 2020-09-08

## 2020-09-08 PROBLEM — D72.9 NEUTROPHILIC LEUKOCYTOSIS: Status: ACTIVE | Noted: 2020-09-08

## 2020-09-08 PROBLEM — D72.828 NEUTROPHILIC LEUKOCYTOSIS: Status: ACTIVE | Noted: 2020-09-08

## 2020-09-08 LAB
ANION GAP SERPL CALCULATED.3IONS-SCNC: 8 MMOL/L (ref 4–13)
APTT PPP: 40 SECONDS (ref 23–37)
BASOPHILS # BLD AUTO: 0.03 THOUSANDS/ΜL (ref 0–0.1)
BASOPHILS NFR BLD AUTO: 0 % (ref 0–1)
BUN SERPL-MCNC: 11 MG/DL (ref 5–25)
CALCIUM SERPL-MCNC: 7.7 MG/DL (ref 8.3–10.1)
CHLORIDE SERPL-SCNC: 103 MMOL/L (ref 100–108)
CO2 SERPL-SCNC: 25 MMOL/L (ref 21–32)
CREAT SERPL-MCNC: 0.77 MG/DL (ref 0.6–1.3)
EOSINOPHIL # BLD AUTO: 0.06 THOUSAND/ΜL (ref 0–0.61)
EOSINOPHIL NFR BLD AUTO: 0 % (ref 0–6)
ERYTHROCYTE [DISTWIDTH] IN BLOOD BY AUTOMATED COUNT: 13.4 % (ref 11.6–15.1)
GFR SERPL CREATININE-BSD FRML MDRD: 89 ML/MIN/1.73SQ M
GLUCOSE SERPL-MCNC: 80 MG/DL (ref 65–140)
HCT VFR BLD AUTO: 37.9 % (ref 34.8–46.1)
HGB BLD-MCNC: 12 G/DL (ref 11.5–15.4)
IMM GRANULOCYTES # BLD AUTO: 0.13 THOUSAND/UL (ref 0–0.2)
IMM GRANULOCYTES NFR BLD AUTO: 1 % (ref 0–2)
INR PPP: 1.2 (ref 0.84–1.19)
LYMPHOCYTES # BLD AUTO: 1.33 THOUSANDS/ΜL (ref 0.6–4.47)
LYMPHOCYTES NFR BLD AUTO: 8 % (ref 14–44)
MCH RBC QN AUTO: 29.5 PG (ref 26.8–34.3)
MCHC RBC AUTO-ENTMCNC: 31.7 G/DL (ref 31.4–37.4)
MCV RBC AUTO: 93 FL (ref 82–98)
MONOCYTES # BLD AUTO: 0.93 THOUSAND/ΜL (ref 0.17–1.22)
MONOCYTES NFR BLD AUTO: 6 % (ref 4–12)
NEUTROPHILS # BLD AUTO: 13.63 THOUSANDS/ΜL (ref 1.85–7.62)
NEUTS SEG NFR BLD AUTO: 85 % (ref 43–75)
NRBC BLD AUTO-RTO: 0 /100 WBCS
PLATELET # BLD AUTO: 228 THOUSANDS/UL (ref 149–390)
PMV BLD AUTO: 9.2 FL (ref 8.9–12.7)
POTASSIUM SERPL-SCNC: 3.5 MMOL/L (ref 3.5–5.3)
PROTHROMBIN TIME: 14.9 SECONDS (ref 11.6–14.5)
RBC # BLD AUTO: 4.07 MILLION/UL (ref 3.81–5.12)
SODIUM SERPL-SCNC: 136 MMOL/L (ref 136–145)
WBC # BLD AUTO: 16.11 THOUSAND/UL (ref 4.31–10.16)

## 2020-09-08 PROCEDURE — 85025 COMPLETE CBC W/AUTO DIFF WBC: CPT | Performed by: EMERGENCY MEDICINE

## 2020-09-08 PROCEDURE — 93971 EXTREMITY STUDY: CPT | Performed by: SURGERY

## 2020-09-08 PROCEDURE — 99284 EMERGENCY DEPT VISIT MOD MDM: CPT | Performed by: EMERGENCY MEDICINE

## 2020-09-08 PROCEDURE — 99223 1ST HOSP IP/OBS HIGH 75: CPT | Performed by: INTERNAL MEDICINE

## 2020-09-08 PROCEDURE — 80048 BASIC METABOLIC PNL TOTAL CA: CPT | Performed by: EMERGENCY MEDICINE

## 2020-09-08 PROCEDURE — 93971 EXTREMITY STUDY: CPT

## 2020-09-08 PROCEDURE — 85610 PROTHROMBIN TIME: CPT | Performed by: EMERGENCY MEDICINE

## 2020-09-08 PROCEDURE — 99284 EMERGENCY DEPT VISIT MOD MDM: CPT

## 2020-09-08 PROCEDURE — NC001 PR NO CHARGE: Performed by: SURGERY

## 2020-09-08 PROCEDURE — 36415 COLL VENOUS BLD VENIPUNCTURE: CPT | Performed by: EMERGENCY MEDICINE

## 2020-09-08 PROCEDURE — 85730 THROMBOPLASTIN TIME PARTIAL: CPT | Performed by: EMERGENCY MEDICINE

## 2020-09-08 PROCEDURE — 99214 OFFICE O/P EST MOD 30 MIN: CPT | Performed by: INTERNAL MEDICINE

## 2020-09-08 RX ORDER — PANTOPRAZOLE SODIUM 40 MG/1
40 TABLET, DELAYED RELEASE ORAL
Status: DISCONTINUED | OUTPATIENT
Start: 2020-09-09 | End: 2020-09-09 | Stop reason: HOSPADM

## 2020-09-08 RX ORDER — ZOLPIDEM TARTRATE 5 MG/1
10 TABLET ORAL
Status: DISCONTINUED | OUTPATIENT
Start: 2020-09-08 | End: 2020-09-09 | Stop reason: HOSPADM

## 2020-09-08 RX ORDER — SERTRALINE HYDROCHLORIDE 100 MG/1
200 TABLET, FILM COATED ORAL DAILY
Status: DISCONTINUED | OUTPATIENT
Start: 2020-09-09 | End: 2020-09-08

## 2020-09-08 RX ORDER — TOPIRAMATE 25 MG/1
50 TABLET ORAL 2 TIMES DAILY
Status: DISCONTINUED | OUTPATIENT
Start: 2020-09-08 | End: 2020-09-09 | Stop reason: HOSPADM

## 2020-09-08 RX ORDER — ALPRAZOLAM 0.5 MG/1
1 TABLET ORAL
Status: DISCONTINUED | OUTPATIENT
Start: 2020-09-08 | End: 2020-09-09 | Stop reason: HOSPADM

## 2020-09-08 RX ORDER — SERTRALINE HYDROCHLORIDE 100 MG/1
100 TABLET, FILM COATED ORAL 2 TIMES DAILY
Status: DISCONTINUED | OUTPATIENT
Start: 2020-09-08 | End: 2020-09-09 | Stop reason: HOSPADM

## 2020-09-08 RX ORDER — CLINDAMYCIN HYDROCHLORIDE 150 MG/1
300 CAPSULE ORAL EVERY 6 HOURS SCHEDULED
Status: DISCONTINUED | OUTPATIENT
Start: 2020-09-08 | End: 2020-09-09 | Stop reason: HOSPADM

## 2020-09-08 RX ORDER — METHADONE HYDROCHLORIDE 10 MG/1
30 TABLET ORAL DAILY
Status: DISCONTINUED | OUTPATIENT
Start: 2020-09-09 | End: 2020-09-09 | Stop reason: HOSPADM

## 2020-09-08 RX ADMIN — ENOXAPARIN SODIUM 80 MG: 80 INJECTION SUBCUTANEOUS at 16:24

## 2020-09-08 RX ADMIN — ALPRAZOLAM 1 MG: 0.5 TABLET ORAL at 23:29

## 2020-09-08 RX ADMIN — CLINDAMYCIN HYDROCHLORIDE 300 MG: 150 CAPSULE ORAL at 23:29

## 2020-09-08 RX ADMIN — ZOLPIDEM TARTRATE 10 MG: 5 TABLET, COATED ORAL at 23:29

## 2020-09-08 RX ADMIN — SERTRALINE HYDROCHLORIDE 100 MG: 100 TABLET ORAL at 21:03

## 2020-09-08 RX ADMIN — TOPIRAMATE 50 MG: 25 TABLET, FILM COATED ORAL at 18:44

## 2020-09-08 RX ADMIN — CLINDAMYCIN HYDROCHLORIDE 300 MG: 150 CAPSULE ORAL at 19:37

## 2020-09-08 NOTE — H&P
H&P- Ely Cantrell 1970, 44 y o  female MRN: [id number]    [facility] [address] 210-02 Encounter: [id number]    Primary Care Provider: Nixon Cali MD   Date and time admitted to hospital: 9/8/2020  2:37 PM        * Chronic deep vein thrombosis (DVT) of femoral vein of right lower extremity ([address] )  Assessment & Plan  · Patient has symptomatic large right lower extremity DVT, per vascular tech it looked chronic, but it is up to the common femoral vein with associated discomfort and erythema of the skin  · Unprovoked  · Will start Lovenox 1 milligram/kilogram q 12  · Consult vascular surgery, inpatient  · Consider outpatient referral to hematology for hypercoagulable workup    Neutrophilic leukocytosis  Assessment & Plan  · Due to large DVT versus right lower extremity cellulitis supported by scattered areas of warmth/petechia/and swelling  · She reports multiple allergies to antibiotics and other medications but tolerates clindamycin  · Start clindamycin 300 mg p o  Q 6 for possible right lower extremity cellular  · Repeat CBC in a m  Impacted cerumen of left ear  Assessment & Plan  · Will refer to ENT as an outpatient     Pain syndrome, chronic  Assessment & Plan  · Reports being on methadone 30 mg daily for many years for chronic pain  · PDMP website reviewed and confirmed her methadone usage  · Continue methadone 30 mg daily    Migraine, unspecified, not intractable, without status migrainosus  Assessment & Plan  · Stable without exacerbation  · Continue Topamax 50 mg b i d  GERD without esophagitis  Assessment & Plan  · Switch omeprazole to Protonix 40 mg daily      VTE Prophylaxis: Enoxaparin (Lovenox)    Code Status:  Full code  POLST: There is no POLST form on file for this patient (pre-hospital)    Anticipated Length of Stay:  Patient will be admitted on an Inpatient basis with an anticipated length of stay of  at least 2 midnights     Justification for Hospital Stay:  DVT    Total Time for Visit, including Counseling / Coordination of Care: 45 minutes  Greater than 50% of this total time spent on direct patient counseling and coordination of care  Chief Complaint:   Right leg swelling    History of Present Illness:    Mariela Devine is a 44 y o  female who presents with 2 days of right leg swelling, pain and discomfort  She has history of morbid obesity status post remote gastric bypass  She also recalls history of DVT many years ago  She admits being in pain all the time so she was used to bilateral leg discomfort and heaviness  She takes methadone 30 mg once daily for chronic pain  Two days ago she started noticing that her right leg was heavier and more painful  She noted that it was becoming more swollen compared to the left  She denied any recent hospitalization or immobility  In fact she still active at work and admits being on her feet all day  She denied shortness of breath/cough/hemoptysis/or chest pain  She also noted scattered areas of redness on her right leg  She denied any recent exposure/or bites  She was seen by vascular last month for symptomatic bilateral varicose veins  At that time a duplex was done which showed chronic right popliteal, peroneal, posterior tibial DVT  At that time it was decided that she did not require anticoagulation  Treatment with compression, leg elevation, exercise and weight loss were recommended  I reviewed her recent vascular study on 08/19/2020  There was no note of DVT in the proximal veins  Review of Systems:    Review of Systems   Constitutional: Negative for chills and fever  HENT:        Decreased hearing left ear   Eyes: Negative  Respiratory: Negative  Cardiovascular: Positive for leg swelling  Gastrointestinal: Negative  Endocrine: Negative  Genitourinary: Negative  Musculoskeletal:        Chronic back pain   Skin: Positive for color change  Neurological: Negative  Psychiatric/Behavioral: Negative  All other systems reviewed and are negative  Past Medical and Surgical History:     Past Medical History:   Diagnosis Date    Facial cellulitis     Fracture, cuboid     LAST ASSESSED: 3/26/15    Gastrojejunal ulcer     ANASTOMOTIC    History of small bowel obstruction     Hypertension     pt denies    Insomnia     Iron deficiency anemia     LAST ASSESSED: AND RESOLVED: 4/13/16       Past Surgical History:   Procedure Laterality Date    ABDOMINOPLASTY      x2    CHOLECYSTECTOMY      GASTRIC BYPASS      FOR MORBID OBESITY    INCISIONAL HERNIA REPAIR      SMALL INTESTINE SURGERY         Meds/Allergies:    Prior to Admission medications    Medication Sig Start Date End Date Taking?  Authorizing Provider   ALPRAZolam Garcia Glance) 1 mg tablet Take 1 tablet (1 mg total) by mouth daily at bedtime as needed for anxiety  Patient taking differently: Take 0 5 mg by mouth daily at bedtime as needed for anxiety  8/26/20  Yes Frank Mott MD   ALYACEN 1/35 1-35 MG-MCG per tablet Take 1 tablet by mouth daily 5/7/20  Yes Historical Provider, MD   cholecalciferol (VITAMIN D3) 1,000 units tablet Take 1 tablet (1,000 Units total) by mouth daily 6/14/18  Yes Frank Mott MD   methadone (DOLOPHINE) 10 mg tablet 1 tablet 3 times daily as needed for chronic pain, 8/13/20  Yes Frank Mott MD   omeprazole (PriLOSEC) 20 mg delayed release capsule take 1 capsule by mouth daily 4/27/20  Yes Frank Mott MD   Pediatric Multiple Vit-C-FA (FRUITY CHEWABLES MULTIVITAMIN) CHEW Chew 1 tablet daily    Yes Historical Provider, MD   sertraline (ZOLOFT) 100 mg tablet take 2 tablets by mouth daily  Patient taking differently: Take 100 mg by mouth 2 (two) times a day  8/17/20  Yes Frank Mott MD   sucralfate (CARAFATE) 1 g/10 mL suspension Take 10 mL (1 g total) by mouth 4 (four) times a day as needed (dyspepsia) 4/18/19  Yes Farnk Mott MD   topiramate (TOPAMAX) 50 MG tablet take 1 tablet by mouth twice a day 5/7/20  Yes Jake Carrizales MD Romeo   zolpidem (AMBIEN) 10 mg tablet Take 1 tablet (10 mg total) by mouth daily at bedtime as needed for sleep 6/26/20  Yes Luis Angel Medellin MD     I have reviewed home medications with patient personally  Allergies:    Allergies   Allergen Reactions    Amoxicillin Hives and Shortness Of Breath    Aspirin Hives and Other (See Comments)     Short of breath    Butorphanol Anaphylaxis and Other (See Comments)     Other reaction(s): BUTORPHANOL TARTRATE (STADOL) (loss of breath)    Morphine Other (See Comments) and Hallucinations     takes vicodin at home    Duloxetine     Azithromycin Rash    Gabapentin Palpitations    Nitrofurantoin Hives and Rash    Sulfa Antibiotics Hives, Rash and Other (See Comments)       Social History:     Marital Status: /Civil Union   Occupation:  Works at [employer]  Patient Pre-hospital Living Situation:  Independent  Patient Pre-hospital Level of Mobility:  Independent  Patient Pre-hospital Diet Restrictions:  None  Substance Use History:   Social History     Substance and Sexual Activity   Alcohol Use No    Comment: CONSUMES ALCOHOL OCCASIONALLY     Social History     Tobacco Use   Smoking Status Never Smoker   Smokeless Tobacco Never Used     Social History     Substance and Sexual Activity   Drug Use Never    Comment: usues methadone for pain relief       Family History:    Family History   Problem Relation Age of Onset    Diabetes Family         MELLITUS    Cancer Family     Hypertension Family     Osteoporosis Family     Asthma Family     Stroke Family         SYNDROME    Breast cancer Mother     COPD Father     Asthma Child        Physical Exam:     Vitals:   Blood Pressure: 132/75 (09/08/20 1702)  Pulse: 71 (09/08/20 1702)  Temperature: 97 8 °F (36 6 °C) (09/08/20 1702)  Temp Source: Oral (09/08/20 1702)  Respirations: 18 (09/08/20 1702)  Weight - Scale: 77 6 kg (171 lb 1 2 oz) (09/08/20 1348)  SpO2: 96 % (09/08/20 1702)    Physical Exam  Constitutional: Appearance: She is obese  She is not ill-appearing or diaphoretic  HENT:      Head: Normocephalic and atraumatic  Ears:      Comments: Impacted cerumen left external auditory canal  Could not visualize tympanic membrane  No tragal tenderness  No pinna tenderness     Nose: No congestion or rhinorrhea  Eyes:      General: No scleral icterus  Neck:      Musculoskeletal: Neck supple  Cardiovascular:      Rate and Rhythm: Normal rate and regular rhythm  Heart sounds: No murmur  No gallop  Pulmonary:      Effort: Pulmonary effort is normal  No respiratory distress  Breath sounds: No wheezing or rales  Abdominal:      General: Abdomen is flat  Palpations: Abdomen is soft  Musculoskeletal:      Right lower leg: Edema present  Comments: Right lower extremity is bigger than left   Skin:     General: Skin is warm  Findings: Erythema and rash present  Comments: Right leg is warm and erythematous  Scattered areas of petechia are noted on the right leg, lower thigh   Neurological:      Mental Status: She is alert  Mental status is at baseline  Psychiatric:         Mood and Affect: Mood normal          Behavior: Behavior normal          Additional Data:     Lab Results: I have personally reviewed pertinent reports  Results from last 7 days   Lab Units 09/08/20  1507   WBC Thousand/uL 16 11*   HEMOGLOBIN g/dL 12 0   HEMATOCRIT % 37 9   PLATELETS Thousands/uL 228   NEUTROS PCT % 85*   LYMPHS PCT % 8*   MONOS PCT % 6   EOS PCT % 0     Results from last 7 days   Lab Units 09/08/20  1507   SODIUM mmol/L 136   POTASSIUM mmol/L 3 5   CHLORIDE mmol/L 103   CO2 mmol/L 25   BUN mg/dL 11   CREATININE mg/dL 0 77   ANION GAP mmol/L 8   CALCIUM mg/dL 7 7*   GLUCOSE RANDOM mg/dL 80     Results from last 7 days   Lab Units 09/08/20  1507   INR  1 20*                   Imaging: I have personally reviewed pertinent reports        VAS lower limb venous duplex study, unilateral/limited (Results Pending)       EKG, Pathology, and Other Studies Reviewed on Admission:   · EKG:  None    Allscripts / Epic Records Reviewed: Yes     ** Please Note: This note has been constructed using a voice recognition system   **

## 2020-09-08 NOTE — ASSESSMENT & PLAN NOTE
· Reports being on methadone 30 mg daily for many years for chronic pain  · PDMP website reviewed and confirmed her methadone usage    · Continue methadone 30 mg daily

## 2020-09-08 NOTE — ED PROVIDER NOTES
History  Chief Complaint   Patient presents with    Leg Pain     Right leg pain, swelling, redness and rash x 2 days  Sent in by Dr Alcides Avila to r/o dvt  Denies cp/sob  Hx of dvt, not currently anticoagulated  58-year-old female with a history of hypertension, chronic pain, varicose veins presents to the emergency department with a 2 day history of swelling, pain and redness to the right lower extremity  She also noticed a rash to the right lower extremity  She saw her family doctor today and was referred here for rule out DVT  She denies fevers or chills, chest pain or shortness of breath  No recent trauma or immobilization  She states she been seen vascular surgery for varicose veins and had a venous duplex of the right lower extremity approximately 3 weeks ago which showed chronic DVT to the right calf  She was told at that time that she does not need anticoagulation and that probably had been there for years  History provided by:  Patient   used: No    Leg Pain   Location:  Leg  Time since incident:  2 days  Injury: no    Leg location:  R upper leg and R lower leg  Pain details:     Quality:  Aching    Radiates to:  Does not radiate    Severity:  Unable to specify    Onset quality:  Gradual    Duration:  2 days    Timing:  Constant    Progression:  Unchanged  Chronicity:  New  Prior injury to area:  No  Associated symptoms: swelling    Associated symptoms: no back pain, no decreased ROM, no fatigue, no fever, no itching, no muscle weakness, no neck pain, no numbness, no stiffness and no tingling    Swelling:     Location:  Leg    Onset quality:  Gradual    Duration:  2 days    Timing:  Constant    Progression:  Unchanged    Chronicity:  New  Risk factors: obesity    Risk factors: no recent illness        Prior to Admission Medications   Prescriptions Last Dose Informant Patient Reported? Taking?    ALPRAZolam (XANAX) 1 mg tablet   No No   Sig: Take 1 tablet (1 mg total) by mouth daily at bedtime as needed for anxiety   ALYACEN  1-35 MG-MCG per tablet  Self Yes No   Sig: Take 1 tablet by mouth daily   Pediatric Multiple Vit-C-FA (FRUITY CHEWABLES MULTIVITAMIN) CHEW  Self Yes No   Sig: Chew   cholecalciferol (VITAMIN D3) 1,000 units tablet  Self No No   Sig: Take 1 tablet (1,000 Units total) by mouth daily   methadone (DOLOPHINE) 10 mg tablet  Self No No   Si tablet 3 times daily as needed for chronic pain,   omeprazole (PriLOSEC) 20 mg delayed release capsule  Self No No   Sig: take 1 capsule by mouth daily   sertraline (ZOLOFT) 100 mg tablet  Self No No   Sig: take 2 tablets by mouth daily   sucralfate (CARAFATE) 1 g/10 mL suspension  Self No No   Sig: Take 10 mL (1 g total) by mouth 4 (four) times a day as needed (dyspepsia)   topiramate (TOPAMAX) 50 MG tablet  Self No No   Sig: take 1 tablet by mouth twice a day   zolpidem (AMBIEN) 10 mg tablet  Self No No   Sig: Take 1 tablet (10 mg total) by mouth daily at bedtime as needed for sleep      Facility-Administered Medications: None       Past Medical History:   Diagnosis Date    Facial cellulitis     Fracture, cuboid     LAST ASSESSED: 3/26/15    Gastrojejunal ulcer     ANASTOMOTIC    History of small bowel obstruction     Hypertension     pt denies    Insomnia     Iron deficiency anemia     LAST ASSESSED: AND RESOLVED: 16       Past Surgical History:   Procedure Laterality Date    ABDOMINOPLASTY      x2    CHOLECYSTECTOMY      GASTRIC BYPASS      FOR MORBID OBESITY    INCISIONAL HERNIA REPAIR      SMALL INTESTINE SURGERY         Family History   Problem Relation Age of Onset    Diabetes Family         MELLITUS    Cancer Family     Hypertension Family     Osteoporosis Family     Asthma Family     Stroke Family         SYNDROME    Breast cancer Mother     COPD Father     Asthma Child      I have reviewed and agree with the history as documented      E-Cigarette/Vaping    E-Cigarette Use Never User E-Cigarette/Vaping Substances     Social History     Tobacco Use    Smoking status: Never Smoker    Smokeless tobacco: Never Used   Substance Use Topics    Alcohol use: No     Comment: CONSUMES ALCOHOL OCCASIONALLY    Drug use: Never     Comment: usues methadone for pain relief       Review of Systems   Constitutional: Negative  Negative for chills, diaphoresis, fatigue and fever  HENT: Negative  Negative for congestion, rhinorrhea and sore throat  Eyes: Negative  Negative for discharge, redness and itching  Respiratory: Negative  Negative for apnea, cough, chest tightness, shortness of breath and wheezing  Cardiovascular: Positive for leg swelling  Negative for chest pain and palpitations  Gastrointestinal: Negative  Negative for abdominal pain  Endocrine: Negative  Genitourinary: Negative  Negative for flank pain, frequency and urgency  Musculoskeletal: Negative  Negative for back pain, neck pain and stiffness  Skin: Negative  Negative for itching  Allergic/Immunologic: Negative  Neurological: Negative  Negative for dizziness, syncope, weakness, light-headedness, numbness and headaches  Hematological: Negative  All other systems reviewed and are negative  Physical Exam  Physical Exam  Vitals signs and nursing note reviewed  Constitutional:       General: She is not in acute distress  Appearance: She is well-developed  She is obese  She is not ill-appearing, toxic-appearing or diaphoretic  HENT:      Head: Normocephalic and atraumatic  Right Ear: External ear normal       Left Ear: External ear normal       Nose: Nose normal       Mouth/Throat:      Pharynx: No oropharyngeal exudate  Eyes:      General: No scleral icterus  Right eye: No discharge  Left eye: No discharge  Conjunctiva/sclera: Conjunctivae normal       Pupils: Pupils are equal, round, and reactive to light  Neck:      Thyroid: No thyromegaly        Vascular: No JVD       Trachea: No tracheal deviation  Cardiovascular:      Rate and Rhythm: Normal rate and regular rhythm  Heart sounds: Normal heart sounds  No murmur  No friction rub  No gallop  Pulmonary:      Effort: Pulmonary effort is normal  No respiratory distress  Breath sounds: Normal breath sounds  No stridor  No wheezing or rales  Chest:      Chest wall: No tenderness  Abdominal:      General: Bowel sounds are normal  There is no distension  Palpations: Abdomen is soft  There is no mass  Tenderness: There is no abdominal tenderness  Hernia: No hernia is present  Musculoskeletal: Normal range of motion  General: No tenderness or deformity  Comments: Swelling of entire right lower extremity  There is some mild erythema to the right lower extremity below the knee  There is patches of petechial rash to the right thigh and right calf region  There is right calf tenderness  Skin:     General: Skin is warm and dry  Coloration: Skin is not jaundiced or pale  Findings: No bruising, erythema, lesion or rash  Neurological:      General: No focal deficit present  Mental Status: She is alert and oriented to person, place, and time  Motor: No abnormal muscle tone  Deep Tendon Reflexes: Reflexes are normal and symmetric  Psychiatric:         Mood and Affect: Mood normal          Behavior: Behavior is cooperative           Vital Signs  ED Triage Vitals [09/08/20 1348]   Temperature Pulse Respirations Blood Pressure SpO2   97 8 °F (36 6 °C) 73 16 116/56 96 %      Temp Source Heart Rate Source Patient Position - Orthostatic VS BP Location FiO2 (%)   Temporal -- -- -- --      Pain Score       5           Vitals:    09/08/20 1348   BP: 116/56   Pulse: 73         Visual Acuity      ED Medications  Medications - No data to display    Diagnostic Studies  Results Reviewed     Procedure Component Value Units Date/Time    CBC and differential [[id number]] Lab Status:  No result Specimen:  Blood     Protime-INR [[id number]]     Lab Status:  No result Specimen:  Blood     APTT [[id number]]     Lab Status:  No result Specimen:  Blood     Basic metabolic panel [[id number]]     Lab Status:  No result Specimen:  Blood                  VAS lower limb venous duplex study, unilateral/limited    (Results Pending)              Procedures  Procedures         ED Course                           SBIRT 22yo+      Most Recent Value   SBIRT (22 yo +)   In order to provide better care to our patients, we are screening all of our patients for alcohol and drug use  Would it be okay to ask you these screening questions? No Filed at: 09/08/2020 1454                  MDM  Number of Diagnoses or Management Options  Diagnosis management comments: 53-year-old female presents with a 2 day history of right lower extremity pain and swelling and also rash to the right lower extremity  No fevers or chills, chest pain or shortness of breath  She saw her family doctor today and was sent here for rule out DVT  She does have a history of chronic DVT of the right lower extremity for which she was never treated  She states it probably happened 12 years ago when she had her bariatric surgery  On exam she is alert in no distress  She does have swelling of the right lower extremity and some erythema below the knee along with a petechial rash in areas along the right leg  She does have right calf tenderness  Will check CBC, BMP and coags secondary to petechiae  Will also obtain venous duplex of the right lower extremity to rule out DVT         Amount and/or Complexity of Data Reviewed  Clinical lab tests: ordered and reviewed  Tests in the radiology section of CPT®: ordered and reviewed        Disposition  Final diagnoses:   None     ED Disposition     None      Follow-up Information    None         Patient's Medications   Discharge Prescriptions    No medications on file     No discharge procedures on file      PDMP Review       Value Time User    PDMP Reviewed  Yes 8/26/2020  3:08 PM Nimco Childress MD          ED Provider  Electronically Signed by           Parish Oglesby DO  09/08/20 4217

## 2020-09-08 NOTE — ASSESSMENT & PLAN NOTE
· Patient has symptomatic large right lower extremity DVT, per vascular tech it looked chronic, but it is up to the common femoral vein with associated discomfort and erythema of the skin    · Unprovoked  · Will start Lovenox 1 milligram/kilogram q 12  · Consult vascular surgery, inpatient  · Consider outpatient referral to hematology for hypercoagulable workup

## 2020-09-08 NOTE — CONSULTS
Consultation - Vascular Surgery   Ely Cantrell 44 y o  female MRN: [id number]  Unit/Bed#: [place] [address] -02 Encounter: [id number]    Assessment/Plan     Assessment:  43 y/o F w/ hx of DVT, HTN, gastric bypass, incisional hernia repair, cholecystectomy, abdominoplasty x2, who p/w RLE pain, edema, erythema, petechiae x 2 days, repeat venous duplex showing likely subacute R femoral DVT    Plan:  --Continue therapeutic Lovenox  --Continue Clindamycin for cellulitis  --Hypercoagulable workup  --Compression, elevation of RLE  --Rest of care per primary team    History of Present Illness     HPI:  Ger Hopkins is a 44 y o  female w/ hx of DVT, symptomatic varicose veins of b/l LE, HTN, gastric bypass, incisional hernia repair, cholecystectomy, abdominoplasty x2, who p/w RLE pain, erythema, edema x 2 days  Pt reports that 2 days ago, she began noticing her RLE becoming more edematous and painful  Denies any recent travel or immobility  Denies any hx of inherited hypercoagulability  Venous U/S of RLE performed today shows chronic vs  subacute DVT R femoral vein, popliteal vein, gastrocnemius vein, paired PT and peroneal veins  Pt had prior LEVDS on 8/19/20 showing chronic non-occlusive thrombus in R popliteal, PT, peroneal veins  Labwork today shows WBC of 16K  Pt was admitted to [place] [address], and started on PO Clindamycin for possible RLE cellulitis  Pt reports she does have a new cat in her home, and has had allergic skin reactions to her cats in the past            Inpatient consult to Vascular Surgery     Performed by  Jadyn Glasgow MD     Authorized by Ame Swann MD              Review of Systems   Constitutional: Negative for chills, diaphoresis, fatigue and fever  HENT: Negative for congestion, postnasal drip, rhinorrhea, sinus pressure, sinus pain, sore throat and trouble swallowing  Eyes: Negative for visual disturbance     Respiratory: Negative for cough, chest tightness, shortness of breath, wheezing and stridor  Cardiovascular: Positive for leg swelling  Negative for chest pain and palpitations  RLE swelling, erythema   Gastrointestinal: Negative for abdominal distention, abdominal pain, constipation, diarrhea, nausea and vomiting  Endocrine: Negative for polyphagia  Genitourinary: Negative for flank pain and urgency  Musculoskeletal: Negative for arthralgias, back pain, gait problem, joint swelling, myalgias, neck pain and neck stiffness  Skin: Negative for color change, pallor, rash and wound  Allergic/Immunologic: Negative for environmental allergies, food allergies and immunocompromised state  Neurological: Negative for dizziness, facial asymmetry, light-headedness and headaches  Psychiatric/Behavioral: Negative for agitation, behavioral problems and confusion         Historical Information   Past Medical History:   Diagnosis Date    Facial cellulitis     Fracture, cuboid     LAST ASSESSED: 3/26/15    Gastrojejunal ulcer     ANASTOMOTIC    History of small bowel obstruction     Hypertension     pt denies    Insomnia     Iron deficiency anemia     LAST ASSESSED: AND RESOLVED: 4/13/16     Past Surgical History:   Procedure Laterality Date    ABDOMINOPLASTY      x2    CHOLECYSTECTOMY      GASTRIC BYPASS      FOR MORBID OBESITY    INCISIONAL HERNIA REPAIR      SMALL INTESTINE SURGERY       Social History   Social History     Substance and Sexual Activity   Alcohol Use No    Comment: CONSUMES ALCOHOL OCCASIONALLY     Social History     Substance and Sexual Activity   Drug Use Never    Comment: usues methadone for pain relief     E-Cigarette/Vaping    E-Cigarette Use Never User      E-Cigarette/Vaping Substances     Social History     Tobacco Use   Smoking Status Never Smoker   Smokeless Tobacco Never Used     Family History:   Family History   Problem Relation Age of Onset    Diabetes Family         MELLITUS    Cancer Family     Hypertension Family     Osteoporosis Family  Asthma Family     Stroke Family         SYNDROME    Breast cancer Mother     COPD Father     Asthma Child        Meds/Allergies   current meds:   Current Facility-Administered Medications   Medication Dose Route Frequency    ALPRAZolam (XANAX) tablet 1 mg  1 mg Oral HS PRN    clindamycin (CLEOCIN) capsule 300 mg  300 mg Oral Q6H [facility]    [START ON 2020] enoxaparin (LOVENOX) subcutaneous injection 80 mg  1 mg/kg Subcutaneous Q12H [facility]    [START ON 2020] methadone (DOLOPHINE) tablet 30 mg  30 mg Oral Daily    [START ON 2020] pantoprazole (PROTONIX) EC tablet 40 mg  40 mg Oral Early Morning    [START ON 2020] sertraline (ZOLOFT) tablet 200 mg  200 mg Oral Daily    topiramate (TOPAMAX) tablet 50 mg  50 mg Oral BID    zolpidem (AMBIEN) tablet 10 mg  10 mg Oral HS PRN    and PTA meds:   Prior to Admission Medications   Prescriptions Last Dose Informant Patient Reported? Taking?    ALPRAZolam (XANAX) 1 mg tablet   No Yes   Sig: Take 1 tablet (1 mg total) by mouth daily at bedtime as needed for anxiety   Patient taking differently: Take 0 5 mg by mouth daily at bedtime as needed for anxiety    ALYACEN  1-35 MG-MCG per tablet  Self Yes Yes   Sig: Take 1 tablet by mouth daily   Pediatric Multiple Vit-C-FA (FRUITY CHEWABLES MULTIVITAMIN) CHEW  Self Yes Yes   Sig: Chew 1 tablet daily    cholecalciferol (VITAMIN D3) 1,000 units tablet  Self No Yes   Sig: Take 1 tablet (1,000 Units total) by mouth daily   methadone (DOLOPHINE) 10 mg tablet  Self No Yes   Si tablet 3 times daily as needed for chronic pain,   omeprazole (PriLOSEC) 20 mg delayed release capsule  Self No Yes   Sig: take 1 capsule by mouth daily   sertraline (ZOLOFT) 100 mg tablet  Self No Yes   Sig: take 2 tablets by mouth daily   Patient taking differently: Take 100 mg by mouth 2 (two) times a day    sucralfate (CARAFATE) 1 g/10 mL suspension  Self No Yes   Sig: Take 10 mL (1 g total) by mouth 4 (four) times a day as needed (dyspepsia)   topiramate (TOPAMAX) 50 MG tablet  Self No Yes   Sig: take 1 tablet by mouth twice a day   zolpidem (AMBIEN) 10 mg tablet  Self No Yes   Sig: Take 1 tablet (10 mg total) by mouth daily at bedtime as needed for sleep      Facility-Administered Medications: None     Allergies   Allergen Reactions    Amoxicillin Hives and Shortness Of Breath    Aspirin Hives and Other (See Comments)     Short of breath    Butorphanol Anaphylaxis and Other (See Comments)     Other reaction(s): BUTORPHANOL TARTRATE (STADOL) (loss of breath)    Morphine Other (See Comments) and Hallucinations     takes vicodin at home    Duloxetine     Azithromycin Rash    Gabapentin Palpitations    Nitrofurantoin Hives and Rash    Sulfa Antibiotics Hives, Rash and Other (See Comments)       Objective   First Vitals:   Blood Pressure: 116/56 (09/08/20 1348)  Pulse: 73 (09/08/20 1348)  Temperature: 97 8 °F (36 6 °C) (09/08/20 1348)  Temp Source: Temporal (09/08/20 1348)  Respirations: 16 (09/08/20 1348)  Weight - Scale: 77 6 kg (171 lb 1 2 oz) (09/08/20 1348)  SpO2: 96 % (09/08/20 1348)    Current Vitals:   Blood Pressure: 132/75 (09/08/20 1702)  Pulse: 71 (09/08/20 1702)  Temperature: 97 8 °F (36 6 °C) (09/08/20 1702)  Temp Source: Oral (09/08/20 1702)  Respirations: 18 (09/08/20 1702)  Weight - Scale: 77 6 kg (171 lb 1 2 oz) (09/08/20 1348)  SpO2: 96 % (09/08/20 1702)    No intake or output data in the 24 hours ending 09/08/20 1942    Invasive Devices     Peripheral Intravenous Line            Peripheral IV 09/08/20 Right Antecubital less than 1 day                Physical Exam  Vitals signs and nursing note reviewed  Constitutional:       General: She is not in acute distress  Appearance: She is well-developed  She is not diaphoretic  HENT:      Head: Normocephalic and atraumatic  Neck:      Musculoskeletal: Normal range of motion and neck supple  Cardiovascular:      Rate and Rhythm: Normal rate and regular rhythm  Comments: Pulse exam:  Bilateral palp DP/PT  Pulmonary:      Effort: Pulmonary effort is normal    Abdominal:      General: There is no distension  Palpations: Abdomen is soft  Tenderness: There is no abdominal tenderness  Musculoskeletal: Normal range of motion  Skin:     General: Skin is warm  Comments: Patchy areas of erythema and petechiae on R medial calf and R posterior calf   Neurological:      Mental Status: She is alert and oriented to person, place, and time     Psychiatric:         Behavior: Behavior normal                  Lab Results:   CBC:   Lab Results   Component Value Date    WBC 16 11 (H) 09/08/2020    HGB 12 0 09/08/2020    HCT 37 9 09/08/2020    MCV 93 09/08/2020     09/08/2020    MCH 29 5 09/08/2020    MCHC 31 7 09/08/2020    RDW 13 4 09/08/2020    MPV 9 2 09/08/2020    NRBC 0 09/08/2020   , CMP:   Lab Results   Component Value Date    SODIUM 136 09/08/2020    K 3 5 09/08/2020     09/08/2020    CO2 25 09/08/2020    BUN 11 09/08/2020    CREATININE 0 77 09/08/2020    CALCIUM 7 7 (L) 09/08/2020    EGFR 89 09/08/2020   , Coagulation:   Lab Results   Component Value Date    INR 1 20 (H) 09/08/2020     Imaging:     VAS lower limb venous duplex study, unilateral/limited   Final Result by Annette Arroyo MD (09/08 1828)

## 2020-09-08 NOTE — PROGRESS NOTES
[facility] [place]      NAME: Ely Cantrell  AGE: 44 y o  SEX: female  : 1970   MRN: [id number]    DATE: 2020  TIME: 4:25 PM    Assessment and Plan   1  Acute deep vein thrombosis (DVT) of right lower extremity, unspecified vein (HCC)  Based on physical examination I believe that there is a high likelihood of significant DVT  The patient will go to the emergency room for further evaluation   - Transfer to other facility    2  Symptomatic varicose veins of both lower extremities  Recent venous duplex examination revealed nonocclusive chronic DVT of the right popliteal vein  No intervention was thought needed  3  History of bariatric surgery  The patient's weight has been stable  She is on appropriate nutritional supplements  She has a tendency toward iron deficiency  4  Pain syndrome, chronic  Stable on methadone    5  Depression, unspecified depression type  Stable on sertraline    The case was discussed with the emergency room staff  The patient will go to the emergency room for further evaluation  Return to office in:  As appropriate based on ongoing evaluation    Chief Complaint     Chief Complaint   Patient presents with    Leg Swelling     edema, redness, and rash for three days (right leg and ankle area)       History of Present Illness     The patient came to the office on an urgent basis for evaluation of right leg pain and swelling  This started 3 days ago  She had no trauma  She has not been able to walk well because of pain  She noted the leg to be swollen and red  It is warm  There is a petechial rash on the lower calf  The patient has had no pulmonary symptoms including cough, shortness of breath, hemoptysis, or chest pain  She was recently seen by vascular surgery because of varicose veins  Venous duplex was done which showed chronic nonocclusive clots of the right popliteal vein  No intervention was thought needed      The following portions of the patient's history were reviewed and updated as appropriate: allergies, current medications, past family history, past medical history, past social history, past surgical history and problem list     Review of Systems   Review of Systems   Constitutional: Negative  HENT: Negative for congestion, ear pain, postnasal drip, rhinorrhea, sore throat and trouble swallowing  Eyes: Negative for pain, discharge, redness and visual disturbance  Respiratory: Negative for cough, shortness of breath and wheezing  Cardiovascular: Negative  Gastrointestinal: Negative  Endocrine: Negative  Genitourinary: Negative for difficulty urinating, dysuria, frequency, hematuria and urgency  Musculoskeletal: Negative for arthralgias, gait problem, joint swelling and myalgias  Skin: Negative for rash  Neurological: Negative for dizziness, speech difficulty, weakness, light-headedness, numbness and headaches  Hematological: Negative  Psychiatric/Behavioral: Negative for confusion, decreased concentration, dysphoric mood and sleep disturbance  The patient is not nervous/anxious          Active Problem List     Patient Active Problem List   Diagnosis    Allergic rhinitis    Anxiety    Depression    GERD without esophagitis    Insomnia    Migraine, unspecified, not intractable, without status migrainosus    Pain syndrome, chronic    Postgastrectomy malabsorption    Vitamin D deficiency    Wartenberg syndrome    History of bariatric surgery    Iron deficiency anemia    ASCUS with positive high risk HPV cervical    Urinary tract infection with hematuria    Symptomatic varicose veins of both lower extremities    Subdeltoid bursitis of left shoulder joint    Acute deep vein thrombosis (DVT) of right lower extremity (HCC)       Objective   /76 (BP Location: Left arm, Patient Position: Sitting, Cuff Size: Standard)   Pulse 75   Temp 97 9 °F (36 6 °C) (Skin)   Ht 5' 5" (1 651 m)   Wt 77 1 kg (170 lb)   LMP 08/12/2020   SpO2 97%   BMI 28 29 kg/m²     Physical Exam  Constitutional:       Appearance: Normal appearance  She is well-developed  HENT:      Head: Normocephalic and atraumatic  Neck:      Musculoskeletal: Neck supple  Thyroid: No thyromegaly  Vascular: No JVD  Trachea: No tracheal deviation  Cardiovascular:      Rate and Rhythm: Normal rate and regular rhythm  Heart sounds: Normal heart sounds  No murmur  No friction rub  No gallop  Pulmonary:      Effort: Pulmonary effort is normal       Breath sounds: Normal breath sounds  No wheezing or rales  Chest:      Chest wall: No tenderness  Abdominal:      General: Bowel sounds are normal  There is no distension  Palpations: Abdomen is soft  There is no mass  Tenderness: There is no abdominal tenderness  There is no rebound  Musculoskeletal: Normal range of motion  Comments: The right leg was markedly swollen up to mid thigh  There was mild redness of the calf  There was warmth  There was significant calf tenderness  There was a prominence of the superficial venous pattern  Lymphadenopathy:      Cervical: No cervical adenopathy  Skin:     General: Skin is warm and dry  Neurological:      Mental Status: She is alert and oriented to person, place, and time  Psychiatric:         Mood and Affect: Mood normal          Behavior: Behavior normal          Thought Content:  Thought content normal          Judgment: Judgment normal          Pertinent Laboratory/Diagnostic Studies:  Admission on 09/08/2020   Component Date Value Ref Range Status    WBC 09/08/2020 16 11* 4 31 - 10 16 Thousand/uL Final    RBC 09/08/2020 4 07  3 81 - 5 12 Million/uL Final    Hemoglobin 09/08/2020 12 0  11 5 - 15 4 g/dL Final    Hematocrit 09/08/2020 37 9  34 8 - 46 1 % Final    MCV 09/08/2020 93  82 - 98 fL Final    MCH 09/08/2020 29 5  26 8 - 34 3 pg Final    MCHC 09/08/2020 31 7  31 4 - 37 4 g/dL Final    RDW 09/08/2020 13 4  11 6 - 15 1 % Final    MPV 09/08/2020 9 2  8 9 - 12 7 fL Final    Platelets 47/22/8992 228  149 - 390 Thousands/uL Final    nRBC 09/08/2020 0  /100 WBCs Final    Neutrophils Relative 09/08/2020 85* 43 - 75 % Final    Immat GRANS % 09/08/2020 1  0 - 2 % Final    Lymphocytes Relative 09/08/2020 8* 14 - 44 % Final    Monocytes Relative 09/08/2020 6  4 - 12 % Final    Eosinophils Relative 09/08/2020 0  0 - 6 % Final    Basophils Relative 09/08/2020 0  0 - 1 % Final    Neutrophils Absolute 09/08/2020 13 63* 1 85 - 7 62 Thousands/µL Final    Immature Grans Absolute 09/08/2020 0 13  0 00 - 0 20 Thousand/uL Final    Lymphocytes Absolute 09/08/2020 1 33  0 60 - 4 47 Thousands/µL Final    Monocytes Absolute 09/08/2020 0 93  0 17 - 1 22 Thousand/µL Final    Eosinophils Absolute 09/08/2020 0 06  0 00 - 0 61 Thousand/µL Final    Basophils Absolute 09/08/2020 0 03  0 00 - 0 10 Thousands/µL Final    Protime 09/08/2020 14 9* 11 6 - 14 5 seconds Final    INR 09/08/2020 1 20* 0 84 - 1 19 Final    PTT 09/08/2020 40* 23 - 37 seconds Final    Sodium 09/08/2020 136  136 - 145 mmol/L Final    Potassium 09/08/2020 3 5  3 5 - 5 3 mmol/L Final    Chloride 09/08/2020 103  100 - 108 mmol/L Final    CO2 09/08/2020 25  21 - 32 mmol/L Final    ANION GAP 09/08/2020 8  4 - 13 mmol/L Final    BUN 09/08/2020 11  5 - 25 mg/dL Final    Creatinine 09/08/2020 0 77  0 60 - 1 30 mg/dL Final    Glucose 09/08/2020 80  65 - 140 mg/dL Final    Calcium 09/08/2020 7 7* 8 3 - 10 1 mg/dL Final    eGFR 09/08/2020 89  ml/min/1 73sq m Final   Appointment on 08/25/2020   Component Date Value Ref Range Status    Hepatitis C Ab 08/25/2020 Non-reactive  Non-reactive Final    Rapid HIV 1 AND 2 08/25/2020 Non-Reactive  Non-Reactive Final    HIV-1 P24 Ag Screen 08/25/2020 Non-Reactive  Non-Reactive Final    RPR 08/25/2020 Non-Reactive  Non-Reactive Final           Current Medications   No current facility-administered medications for this visit       Current Outpatient Medications:     ALPRAZolam (XANAX) 1 mg tablet, Take 1 tablet (1 mg total) by mouth daily at bedtime as needed for anxiety (Patient taking differently: Take 0 5 mg by mouth daily at bedtime as needed for anxiety ), Disp: 10 tablet, Rfl: 1    ALYACEN 1/35 1-35 MG-MCG per tablet, Take 1 tablet by mouth daily, Disp: , Rfl:     cholecalciferol (VITAMIN D3) 1,000 units tablet, Take 1 tablet (1,000 Units total) by mouth daily, Disp: 30 tablet, Rfl: 0    methadone (DOLOPHINE) 10 mg tablet, 1 tablet 3 times daily as needed for chronic pain,, Disp: 90 tablet, Rfl: 0    omeprazole (PriLOSEC) 20 mg delayed release capsule, take 1 capsule by mouth daily, Disp: 90 capsule, Rfl: 3    Pediatric Multiple Vit-C-FA (FRUITY CHEWABLES MULTIVITAMIN) CHEW, Chew 1 tablet daily , Disp: , Rfl:     sertraline (ZOLOFT) 100 mg tablet, take 2 tablets by mouth daily (Patient taking differently: Take 100 mg by mouth 2 (two) times a day ), Disp: 60 tablet, Rfl: 5    sucralfate (CARAFATE) 1 g/10 mL suspension, Take 10 mL (1 g total) by mouth 4 (four) times a day as needed (dyspepsia), Disp: 420 mL, Rfl: 2    topiramate (TOPAMAX) 50 MG tablet, take 1 tablet by mouth twice a day, Disp: 60 tablet, Rfl: 5    zolpidem (AMBIEN) 10 mg tablet, Take 1 tablet (10 mg total) by mouth daily at bedtime as needed for sleep, Disp: 90 tablet, Rfl: 0      Naveed Tafoya MD

## 2020-09-08 NOTE — PLAN OF CARE
Problem: Potential for Falls  Goal: Patient will remain free of falls  Description: INTERVENTIONS:  - Assess patient frequently for physical needs  -  Identify cognitive and physical deficits and behaviors that affect risk of falls    -  [place] fall precautions as indicated by assessment   - Educate patient/family on patient safety including physical limitations  - Instruct patient to call for assistance with activity based on assessment  - Modify environment to reduce risk of injury  - Consider OT/PT consult to assist with strengthening/mobility  Outcome: Progressing     Problem: PAIN - ADULT  Goal: Verbalizes/displays adequate comfort level or baseline comfort level  Description: Interventions:  - Encourage patient to monitor pain and request assistance  - Assess pain using appropriate pain scale  - Administer analgesics based on type and severity of pain and evaluate response  - Implement non-pharmacological measures as appropriate and evaluate response  - Consider cultural and social influences on pain and pain management  - Notify physician/advanced practitioner if interventions unsuccessful or patient reports new pain  Outcome: Progressing     Problem: INFECTION - ADULT  Goal: Absence or prevention of progression during hospitalization  Description: INTERVENTIONS:  - Assess and monitor for signs and symptoms of infection  - Monitor lab/diagnostic results  - Monitor all insertion sites, i e  indwelling lines, tubes, and drains  - Monitor endotracheal if appropriate and nasal secretions for changes in amount and color  - [place] appropriate cooling/warming therapies per order  - Administer medications as ordered  - Instruct and encourage patient and family to use good hand hygiene technique  - Identify and instruct in appropriate isolation precautions for identified infection/condition  Outcome: Progressing     Problem: SAFETY ADULT  Goal: Patient will remain free of falls  Description: INTERVENTIONS:  - Assess patient frequently for physical needs  -  Identify cognitive and physical deficits and behaviors that affect risk of falls    -  [place] fall precautions as indicated by assessment   - Educate patient/family on patient safety including physical limitations  - Instruct patient to call for assistance with activity based on assessment  - Modify environment to reduce risk of injury  - Consider OT/PT consult to assist with strengthening/mobility  Outcome: Progressing  Goal: Maintain or return to baseline ADL function  Description: INTERVENTIONS:  -  Assess patient's ability to carry out ADLs; assess patient's baseline for ADL function and identify physical deficits which impact ability to perform ADLs (bathing, care of mouth/teeth, toileting, grooming, dressing, etc )  - Assess/evaluate cause of self-care deficits   - Assess range of motion  - Assess patient's mobility; develop plan if impaired  - Assess patient's need for assistive devices and provide as appropriate  - Encourage maximum independence but intervene and supervise when necessary  - Involve family in performance of ADLs  - Assess for home care needs following discharge   - Consider OT consult to assist with ADL evaluation and planning for discharge  - Provide patient education as appropriate  Outcome: Progressing  Goal: Maintain or return mobility status to optimal level  Description: INTERVENTIONS:  - Assess patient's baseline mobility status (ambulation, transfers, stairs, etc )    - Identify cognitive and physical deficits and behaviors that affect mobility  - Identify mobility aids required to assist with transfers and/or ambulation (gait belt, sit-to-stand, lift, walker, cane, etc )  - [place] fall precautions as indicated by assessment  - Record patient progress and toleration of activity level on Mobility SBAR; progress patient to next Phase/Stage  - Instruct patient to call for assistance with activity based on assessment  - Consider rehabilitation consult to assist with strengthening/weightbearing, etc   Outcome: Progressing     Problem: DISCHARGE PLANNING  Goal: Discharge to home or other facility with appropriate resources  Description: INTERVENTIONS:  - Identify barriers to discharge w/patient and caregiver  - Arrange for needed discharge resources and transportation as appropriate  - Identify discharge learning needs (meds, wound care, etc )  - Arrange for interpretive services to assist at discharge as needed  - Refer to Case Management Department for coordinating discharge planning if the patient needs post-hospital services based on physician/advanced practitioner order or complex needs related to functional status, cognitive ability, or social support system  Outcome: Progressing     Problem: Knowledge Deficit  Goal: Patient/family/caregiver demonstrates understanding of disease process, treatment plan, medications, and discharge instructions  Description: Complete learning assessment and assess knowledge base    Interventions:  - Provide teaching at level of understanding  - Provide teaching via preferred learning methods  Outcome: Progressing     Problem: CARDIOVASCULAR - ADULT  Goal: Maintains optimal cardiac output and hemodynamic stability  Description: INTERVENTIONS:  - Monitor I/O, vital signs and rhythm  - Monitor for S/S and trends of decreased cardiac output  - Administer and titrate ordered vasoactive medications to optimize hemodynamic stability  - Assess quality of pulses, skin color and temperature  - Assess for signs of decreased coronary artery perfusion  - Instruct patient to report change in severity of symptoms  Outcome: Progressing  Goal: Absence of cardiac dysrhythmias or at baseline rhythm  Description: INTERVENTIONS:  - Continuous cardiac monitoring, vital signs, obtain 12 lead EKG if ordered  - Administer antiarrhythmic and heart rate control medications as ordered  - Monitor electrolytes and administer replacement therapy as ordered  Outcome: Progressing     Problem: HEMATOLOGIC - ADULT  Goal: Maintains hematologic stability  Description: INTERVENTIONS  - Assess for signs and symptoms of bleeding or hemorrhage  - Monitor labs  - Administer supportive blood products/factors as ordered and appropriate  Outcome: Progressing     Problem: MUSCULOSKELETAL - ADULT  Goal: Maintain or return mobility to safest level of function  Description: INTERVENTIONS:  - Assess patient's ability to carry out ADLs; assess patient's baseline for ADL function and identify physical deficits which impact ability to perform ADLs (bathing, care of mouth/teeth, toileting, grooming, dressing, etc )  - Assess/evaluate cause of self-care deficits   - Assess range of motion  - Assess patient's mobility  - Assess patient's need for assistive devices and provide as appropriate  - Encourage maximum independence but intervene and supervise when necessary  - Involve family in performance of ADLs  - Assess for home care needs following discharge   - Consider OT consult to assist with ADL evaluation and planning for discharge  - Provide patient education as appropriate  Outcome: Progressing  Goal: Maintain proper alignment of affected body part  Description: INTERVENTIONS:  - Support, maintain and protect limb and body alignment  - Provide patient/ family with appropriate education  Outcome: Progressing

## 2020-09-08 NOTE — LETTER
[id number]   Dept: [phone]    September 9, 2020     Patient: Ingrid Rogel   YOB: 1970   Date of Visit: 9/8/2020       To Whom it May Concern:    Ingrid Rogel is under my professional care  She was seen in the hospital from 9/8/2020   to 09/09/20  She should remain out of work through Friday, 09/11/2020, and may return afterwards without restrictions  If you have any questions or concerns, please don't hesitate to call           Sincerely,          Fausto Card PA-C

## 2020-09-08 NOTE — ASSESSMENT & PLAN NOTE
· Due to large DVT versus right lower extremity cellulitis supported by scattered areas of warmth/petechia/and swelling  · She reports multiple allergies to antibiotics and other medications but tolerates clindamycin  · Start clindamycin 300 mg p o  Q 6 for possible right lower extremity cellular  · Repeat CBC in a m

## 2020-09-09 ENCOUNTER — TELEPHONE (OUTPATIENT)
Dept: SURGICAL ONCOLOGY | Facility: CLINIC | Age: 50
End: 2020-09-09

## 2020-09-09 VITALS
HEART RATE: 62 BPM | DIASTOLIC BLOOD PRESSURE: 69 MMHG | TEMPERATURE: 97.9 F | BODY MASS INDEX: 28.47 KG/M2 | SYSTOLIC BLOOD PRESSURE: 115 MMHG | WEIGHT: 171.08 LBS | RESPIRATION RATE: 17 BRPM | OXYGEN SATURATION: 97 %

## 2020-09-09 LAB
ANION GAP SERPL CALCULATED.3IONS-SCNC: 5 MMOL/L (ref 4–13)
BASOPHILS # BLD AUTO: 0.03 THOUSANDS/ΜL (ref 0–0.1)
BASOPHILS NFR BLD AUTO: 0 % (ref 0–1)
BUN SERPL-MCNC: 9 MG/DL (ref 5–25)
CALCIUM SERPL-MCNC: 7.7 MG/DL (ref 8.3–10.1)
CHLORIDE SERPL-SCNC: 108 MMOL/L (ref 100–108)
CO2 SERPL-SCNC: 28 MMOL/L (ref 21–32)
CREAT SERPL-MCNC: 0.72 MG/DL (ref 0.6–1.3)
EOSINOPHIL # BLD AUTO: 0.41 THOUSAND/ΜL (ref 0–0.61)
EOSINOPHIL NFR BLD AUTO: 4 % (ref 0–6)
ERYTHROCYTE [DISTWIDTH] IN BLOOD BY AUTOMATED COUNT: 13.5 % (ref 11.6–15.1)
GFR SERPL CREATININE-BSD FRML MDRD: 97 ML/MIN/1.73SQ M
GLUCOSE SERPL-MCNC: 84 MG/DL (ref 65–140)
HCT VFR BLD AUTO: 37.6 % (ref 34.8–46.1)
HGB BLD-MCNC: 11.5 G/DL (ref 11.5–15.4)
IMM GRANULOCYTES # BLD AUTO: 0.06 THOUSAND/UL (ref 0–0.2)
IMM GRANULOCYTES NFR BLD AUTO: 1 % (ref 0–2)
LYMPHOCYTES # BLD AUTO: 2.58 THOUSANDS/ΜL (ref 0.6–4.47)
LYMPHOCYTES NFR BLD AUTO: 22 % (ref 14–44)
MCH RBC QN AUTO: 28.8 PG (ref 26.8–34.3)
MCHC RBC AUTO-ENTMCNC: 30.6 G/DL (ref 31.4–37.4)
MCV RBC AUTO: 94 FL (ref 82–98)
MONOCYTES # BLD AUTO: 0.91 THOUSAND/ΜL (ref 0.17–1.22)
MONOCYTES NFR BLD AUTO: 8 % (ref 4–12)
NEUTROPHILS # BLD AUTO: 7.71 THOUSANDS/ΜL (ref 1.85–7.62)
NEUTS SEG NFR BLD AUTO: 65 % (ref 43–75)
NRBC BLD AUTO-RTO: 0 /100 WBCS
PLATELET # BLD AUTO: 229 THOUSANDS/UL (ref 149–390)
PMV BLD AUTO: 9.8 FL (ref 8.9–12.7)
POTASSIUM SERPL-SCNC: 3.5 MMOL/L (ref 3.5–5.3)
RBC # BLD AUTO: 3.99 MILLION/UL (ref 3.81–5.12)
SODIUM SERPL-SCNC: 141 MMOL/L (ref 136–145)
WBC # BLD AUTO: 11.7 THOUSAND/UL (ref 4.31–10.16)

## 2020-09-09 PROCEDURE — 99239 HOSP IP/OBS DSCHRG MGMT >30: CPT | Performed by: PHYSICIAN ASSISTANT

## 2020-09-09 PROCEDURE — 85025 COMPLETE CBC W/AUTO DIFF WBC: CPT | Performed by: INTERNAL MEDICINE

## 2020-09-09 PROCEDURE — 99255 IP/OBS CONSLTJ NEW/EST HI 80: CPT | Performed by: SURGERY

## 2020-09-09 PROCEDURE — 80048 BASIC METABOLIC PNL TOTAL CA: CPT | Performed by: INTERNAL MEDICINE

## 2020-09-09 RX ORDER — SUCRALFATE ORAL 1 G/10ML
1000 SUSPENSION ORAL EVERY 6 HOURS SCHEDULED
Status: DISCONTINUED | OUTPATIENT
Start: 2020-09-09 | End: 2020-09-09 | Stop reason: HOSPADM

## 2020-09-09 RX ORDER — CLINDAMYCIN HYDROCHLORIDE 300 MG/1
300 CAPSULE ORAL EVERY 6 HOURS SCHEDULED
Qty: 32 CAPSULE | Refills: 0 | Status: SHIPPED | OUTPATIENT
Start: 2020-09-09 | End: 2020-09-17

## 2020-09-09 RX ADMIN — SERTRALINE HYDROCHLORIDE 100 MG: 100 TABLET ORAL at 09:58

## 2020-09-09 RX ADMIN — ENOXAPARIN SODIUM 80 MG: 80 INJECTION SUBCUTANEOUS at 05:07

## 2020-09-09 RX ADMIN — CLINDAMYCIN HYDROCHLORIDE 300 MG: 150 CAPSULE ORAL at 05:08

## 2020-09-09 RX ADMIN — PANTOPRAZOLE SODIUM 40 MG: 40 TABLET, DELAYED RELEASE ORAL at 05:08

## 2020-09-09 RX ADMIN — METHADONE HYDROCHLORIDE 30 MG: 10 TABLET ORAL at 09:58

## 2020-09-09 RX ADMIN — TOPIRAMATE 50 MG: 25 TABLET, FILM COATED ORAL at 09:57

## 2020-09-09 RX ADMIN — CLINDAMYCIN HYDROCHLORIDE 300 MG: 150 CAPSULE ORAL at 12:11

## 2020-09-09 RX ADMIN — SUCRALFATE 1000 MG: 1 SUSPENSION ORAL at 12:11

## 2020-09-09 NOTE — PROGRESS NOTES
Progress Note - Vascular Surgery   Ely Cantrell 44 y o  female MRN: [id number]  Unit/Bed#: [place] [address] -02 Encounter: [id number]    Assessment:  45 y/o F w/ hx of DVT, HTN, gastric bypass, incisional hernia repair, cholecystectomy, abdominoplasty x2, who p/w RLE pain, edema, erythema, petechiae x 2 days, repeat venous duplex showing likely subacute R femoral DVT    Plan:  --Continue therapeutic Lovenox  --Continue Clindamycin for cellulitis  --Hypercoagulable workup  --Compression, elevation of RLE  --Rest of care per primary team    Subjective/Objective      Subjective:     No acute events overnight  Pt denies any complaints this AM, feels well  Objective:     Blood pressure 118/72, pulse 65, temperature (!) 97 4 °F (36 3 °C), resp  rate 18, weight 77 6 kg (171 lb 1 2 oz), last menstrual period 08/12/2020, SpO2 99 %  ,Body mass index is 28 47 kg/m²  No intake or output data in the 24 hours ending 09/09/20 0415    Invasive Devices     Peripheral Intravenous Line            Peripheral IV 09/08/20 Right Antecubital less than 1 day                Physical Exam:    GEN: NAD  HEENT: MMM  CV: RRR  Lung: normal effort  Ab: Soft, NT/ND  Extrem: R medial calf and R posterior calf with patchy erythema and petechiae   Neuro:  A+Ox3, motor and sensation grossly intact  Pulse exam: b/l palp DP/PT    Lab, Imaging and other studies:  CBC:   Lab Results   Component Value Date    WBC 16 11 (H) 09/08/2020    HGB 12 0 09/08/2020    HCT 37 9 09/08/2020    MCV 93 09/08/2020     09/08/2020    MCH 29 5 09/08/2020    MCHC 31 7 09/08/2020    RDW 13 4 09/08/2020    MPV 9 2 09/08/2020    NRBC 0 09/08/2020   , CMP:   Lab Results   Component Value Date    SODIUM 136 09/08/2020    K 3 5 09/08/2020     09/08/2020    CO2 25 09/08/2020    BUN 11 09/08/2020    CREATININE 0 77 09/08/2020    CALCIUM 7 7 (L) 09/08/2020    EGFR 89 09/08/2020   , Coagulation:   Lab Results   Component Value Date    INR 1 20 (H) 09/08/2020     VTE Pharmacologic Prophylaxis: Enoxaparin (Lovenox)  VTE Mechanical Prophylaxis: sequential compression device

## 2020-09-09 NOTE — PLAN OF CARE
Problem: Potential for Falls  Goal: Patient will remain free of falls  Description: INTERVENTIONS:  - Assess patient frequently for physical needs  -  Identify cognitive and physical deficits and behaviors that affect risk of falls    -  [place] fall precautions as indicated by assessment   - Educate patient/family on patient safety including physical limitations  - Instruct patient to call for assistance with activity based on assessment  - Modify environment to reduce risk of injury  - Consider OT/PT consult to assist with strengthening/mobility  Outcome: Progressing     Problem: PAIN - ADULT  Goal: Verbalizes/displays adequate comfort level or baseline comfort level  Description: Interventions:  - Encourage patient to monitor pain and request assistance  - Assess pain using appropriate pain scale  - Administer analgesics based on type and severity of pain and evaluate response  - Implement non-pharmacological measures as appropriate and evaluate response  - Consider cultural and social influences on pain and pain management  - Notify physician/advanced practitioner if interventions unsuccessful or patient reports new pain  Outcome: Progressing     Problem: INFECTION - ADULT  Goal: Absence or prevention of progression during hospitalization  Description: INTERVENTIONS:  - Assess and monitor for signs and symptoms of infection  - Monitor lab/diagnostic results  - Monitor all insertion sites, i e  indwelling lines, tubes, and drains  - Monitor endotracheal if appropriate and nasal secretions for changes in amount and color  - [place] appropriate cooling/warming therapies per order  - Administer medications as ordered  - Instruct and encourage patient and family to use good hand hygiene technique  - Identify and instruct in appropriate isolation precautions for identified infection/condition  Outcome: Progressing     Problem: SAFETY ADULT  Goal: Patient will remain free of falls  Description: INTERVENTIONS:  - Assess patient frequently for physical needs  -  Identify cognitive and physical deficits and behaviors that affect risk of falls    -  [place] fall precautions as indicated by assessment   - Educate patient/family on patient safety including physical limitations  - Instruct patient to call for assistance with activity based on assessment  - Modify environment to reduce risk of injury  - Consider OT/PT consult to assist with strengthening/mobility  Outcome: Progressing  Goal: Maintain or return to baseline ADL function  Description: INTERVENTIONS:  -  Assess patient's ability to carry out ADLs; assess patient's baseline for ADL function and identify physical deficits which impact ability to perform ADLs (bathing, care of mouth/teeth, toileting, grooming, dressing, etc )  - Assess/evaluate cause of self-care deficits   - Assess range of motion  - Assess patient's mobility; develop plan if impaired  - Assess patient's need for assistive devices and provide as appropriate  - Encourage maximum independence but intervene and supervise when necessary  - Involve family in performance of ADLs  - Assess for home care needs following discharge   - Consider OT consult to assist with ADL evaluation and planning for discharge  - Provide patient education as appropriate  Outcome: Progressing  Goal: Maintain or return mobility status to optimal level  Description: INTERVENTIONS:  - Assess patient's baseline mobility status (ambulation, transfers, stairs, etc )    - Identify cognitive and physical deficits and behaviors that affect mobility  - Identify mobility aids required to assist with transfers and/or ambulation (gait belt, sit-to-stand, lift, walker, cane, etc )  - [place] fall precautions as indicated by assessment  - Record patient progress and toleration of activity level on Mobility SBAR; progress patient to next Phase/Stage  - Instruct patient to call for assistance with activity based on assessment  - Consider rehabilitation consult to assist with strengthening/weightbearing, etc   Outcome: Progressing     Problem: DISCHARGE PLANNING  Goal: Discharge to home or other facility with appropriate resources  Description: INTERVENTIONS:  - Identify barriers to discharge w/patient and caregiver  - Arrange for needed discharge resources and transportation as appropriate  - Identify discharge learning needs (meds, wound care, etc )  - Arrange for interpretive services to assist at discharge as needed  - Refer to Case Management Department for coordinating discharge planning if the patient needs post-hospital services based on physician/advanced practitioner order or complex needs related to functional status, cognitive ability, or social support system  Outcome: Progressing     Problem: Knowledge Deficit  Goal: Patient/family/caregiver demonstrates understanding of disease process, treatment plan, medications, and discharge instructions  Description: Complete learning assessment and assess knowledge base    Interventions:  - Provide teaching at level of understanding  - Provide teaching via preferred learning methods  Outcome: Progressing     Problem: CARDIOVASCULAR - ADULT  Goal: Maintains optimal cardiac output and hemodynamic stability  Description: INTERVENTIONS:  - Monitor I/O, vital signs and rhythm  - Monitor for S/S and trends of decreased cardiac output  - Administer and titrate ordered vasoactive medications to optimize hemodynamic stability  - Assess quality of pulses, skin color and temperature  - Assess for signs of decreased coronary artery perfusion  - Instruct patient to report change in severity of symptoms  Outcome: Progressing  Goal: Absence of cardiac dysrhythmias or at baseline rhythm  Description: INTERVENTIONS:  - Continuous cardiac monitoring, vital signs, obtain 12 lead EKG if ordered  - Administer antiarrhythmic and heart rate control medications as ordered  - Monitor electrolytes and administer replacement therapy as ordered  Outcome: Progressing     Problem: HEMATOLOGIC - ADULT  Goal: Maintains hematologic stability  Description: INTERVENTIONS  - Assess for signs and symptoms of bleeding or hemorrhage  - Monitor labs  - Administer supportive blood products/factors as ordered and appropriate  Outcome: Progressing     Problem: MUSCULOSKELETAL - ADULT  Goal: Maintain or return mobility to safest level of function  Description: INTERVENTIONS:  - Assess patient's ability to carry out ADLs; assess patient's baseline for ADL function and identify physical deficits which impact ability to perform ADLs (bathing, care of mouth/teeth, toileting, grooming, dressing, etc )  - Assess/evaluate cause of self-care deficits   - Assess range of motion  - Assess patient's mobility  - Assess patient's need for assistive devices and provide as appropriate  - Encourage maximum independence but intervene and supervise when necessary  - Involve family in performance of ADLs  - Assess for home care needs following discharge   - Consider OT consult to assist with ADL evaluation and planning for discharge  - Provide patient education as appropriate  Outcome: Progressing  Goal: Maintain proper alignment of affected body part  Description: INTERVENTIONS:  - Support, maintain and protect limb and body alignment  - Provide patient/ family with appropriate education  Outcome: Progressing

## 2020-09-09 NOTE — ASSESSMENT & PLAN NOTE
· Due to large DVT versus right lower extremity cellulitis supported by scattered areas of warmth/petechia/and swelling  · She reports multiple allergies to antibiotics and other medications but tolerates clindamycin  · Start clindamycin 300 mg p o  Q 6 for possible right lower extremity cellulitis  Will complete 10 day course  · Repeat CBC in a m

## 2020-09-09 NOTE — DISCHARGE SUMMARY
Discharge- Janett Neat 1970, 44 y o  female MRN: [id number]  Unit/Bed#: [facility] [address] 210-02 Encounter: [id number]  Primary Care Provider: Parish Plaza MD   Date and time admitted to hospital: 9/8/2020  2:37 PM    * Chronic deep vein thrombosis (DVT) of femoral vein of right lower extremity ([address] )  Assessment & Plan  · Patient has symptomatic large right lower extremity DVT, per vascular tech it looked chronic, but it is up to the common femoral vein with associated discomfort and erythema of the skin  · Unprovoked  · Was started on Lovenox 1 milligram/kilogram q 12  Transition to loading dose of Eliquis  Checked through [employer] and patient is agreeable  · Appreciate vascular surgery consult  No need for inpatient vascular intervention  · Will send for hematology referral for hypercoagulable workup  · Will give thigh-high compression stocking    Impacted cerumen of left ear  Assessment & Plan  · Gave information for outpatient ENT  Neutrophilic leukocytosis  Assessment & Plan  · Due to large DVT versus right lower extremity cellulitis supported by scattered areas of warmth/petechia/and swelling  · She reports multiple allergies to antibiotics and other medications but tolerates clindamycin  · Start clindamycin 300 mg p o  Q 6 for possible right lower extremity cellulitis  Will complete 10 day course  · Repeat CBC in a m  Pain syndrome, chronic  Assessment & Plan  · Reports being on methadone 30 mg daily for many years for chronic pain  · PDMP website reviewed and confirmed her methadone usage  · Continue methadone 30 mg daily    Migraine, unspecified, not intractable, without status migrainosus  Assessment & Plan  · Stable without exacerbation  · Continue Topamax 50 mg b i d      GERD without esophagitis  Assessment & Plan  · Switch omeprazole to Protonix 40 mg daily      Discharging Physician / Practitioner: Luisito Bills PA-C  PCP: Parish Plaza MD  Admission Date: Admission Orders (From admission, onward)     Ordered        09/08/20 1614  Inpatient Admission  Once                   Discharge Date: 09/09/20    Resolved Problems  Date Reviewed: 9/9/2020    None          Consultations During Hospital Stay:  · Vascular surgery    Procedures Performed:   · None    Significant Findings / Test Results:   · Venous duplex from 09/08/2020 showed evidence of a subacute DVT of the right femoral, popliteal and tibial veins    Incidental Findings:   · None     Test Results Pending at Discharge (will require follow up): · None     Outpatient Tests Requested:  · Hematology follow-up for hypercoagulability workup    Complications:  None    Reason for Admission:  Suspected subacute/acute DVT    Hospital Course:     Igor Alfaro is a 44 y o  female patient who originally presented to the hospital on 9/8/2020 due to suspicion for progression of/new DVT on outpatient Doppler study  On 09/08, the patient was seen by her PCP, Dr Danish craven, who had clinical concern for extensive DVT, and referred patient to the ED  The ED did fine large right lower extremity DVT which extended through the common femoral vein and patient was initiated on weight based Lovenox with a vascular consult  Vascular compared patient's imaging  The DVT appears to be progression of her prior, chronic DVT versus a new DVT  Vascular agrees with ongoing anticoagulation however there is no need for intravascular procedure  There is also petechial rash/redness on patient's calf and she was initiated on clindamycin  Will continue this for the duration of the course  The patient's insurance covers the price of Eliquis and we will transition patient to NOAC Eliquis  Will give her thigh-high compression stocking, at the recommendation of vascular surgery  Will send a referral for outpatient hematology so that patient may perform hypercoagulability workup when she is off of blood thinners      Today, patient denies shortness of breath  She denies difficulty with ambulation as the swelling and pain have improved  Agreeable to plan of care, as above  The patient, initially admitted to the hospital as inpatient, was discharged earlier than expected given the following: Faster than expected coordination of care  Please see above list of diagnoses and related plan for additional information  Condition at Discharge: good     Discharge Day Visit / Exam:     Subjective:  Believes the swelling and pain are improved  No shortness of breath  No chest pain  No fevers or chills  Vitals: Blood Pressure: 115/69 (09/09/20 0652)  Pulse: 62 (09/09/20 0652)  Temperature: 97 9 °F (36 6 °C) (09/09/20 0652)  Temp Source: Oral (09/09/20 3292)  Respirations: 17 (09/09/20 0247)  Weight - Scale: 77 6 kg (171 lb 1 2 oz) (09/08/20 1348)  SpO2: 97 % (09/09/20 0114)  Exam:   Physical Exam  Vitals signs and nursing note reviewed  Exam conducted with a chaperone present  Constitutional:       General: She is not in acute distress  Appearance: Normal appearance  She is not ill-appearing, toxic-appearing or diaphoretic  Comments: The patient is pleasant and fully conversational   No acute distress  Eyes:      General:         Right eye: No discharge  Left eye: No discharge  Cardiovascular:      Rate and Rhythm: Normal rate and regular rhythm  Heart sounds: No murmur  Pulmonary:      Effort: Pulmonary effort is normal  No respiratory distress  Breath sounds: Normal breath sounds  No stridor  Abdominal:      General: There is no distension  Palpations: There is no mass  Tenderness: There is no abdominal tenderness  Hernia: No hernia is present  Skin:     General: Skin is warm and dry  Coloration: Skin is not pale  Findings: Rash (Petechial appearing rash of right lower extremity; some swelling and erythema) present  No bruising     Neurological:      General: No focal deficit present  Mental Status: She is alert and oriented to person, place, and time  Mental status is at baseline  Psychiatric:         Mood and Affect: Mood normal          Behavior: Behavior normal        Discussion with Family:  Discussed with patient's daughter present at bedside  Discharge instructions/Information to patient and family:   See after visit summary for information provided to patient and family  Provisions for Follow-Up Care:  See after visit summary for information related to follow-up care and any pertinent home health orders  Disposition:     Home    For Discharges to [facility] SNF:   · Not Applicable to this Patient - Not Applicable to this Patient    Planned Readmission: none     Discharge Statement:  I spent 45 minutes discharging the patient  This time was spent on the day of discharge  I had direct contact with the patient on the day of discharge  Greater than 50% of the total time was spent examining patient, answering all patient questions, arranging and discussing plan of care with patient as well as directly providing post-discharge instructions  Additional time then spent on discharge activities  Discharge Medications:  See after visit summary for reconciled discharge medications provided to patient and family        ** Please Note: This note has been constructed using a voice recognition system **

## 2020-09-09 NOTE — TELEPHONE ENCOUNTER
New Patient Encounter    New Patient Intake Form   Patient Details:  Jacklyn Letters  1970  [id number]    Background Information:  [address] starts by opening a telephone encounter and gathering the following information   Who is calling to schedule? If not self, relationship to patient? self   Referring Provider Hospital ref   What is the diagnosis? DVT leukocytosis    Is this diagnosis confirmed? Yes   When was the diagnosis? 9/9   Is there a confirmed diagnosis from a biopsy/tissue reviewed by pathology? na   Were outside slides requested? NA   Is patient aware of diagnosis? Yes   Is there a personal history and what kind? na   Is there a family history and what kind? na   Reason for visit? New Diagnosis   Have you had any imaging or labs done? If so: when, [facility]? [facility]   Are records in EPIC? yes   If patient has a prior history of breast cancer were old records obtained? NA   Was the patient told to bring a disk? no   Does the patient smoke or Vape? no   If yes, how many packs or cartridges per day? na   Scheduling Information:   Preferred [place]     Are there any dates/time the patient cannot be seen? na   Miscellaneous: na   After completing the above information, please route to Financial Counselor and the appropriate Nurse Navigator for review

## 2020-09-09 NOTE — UTILIZATION REVIEW
Initial Clinical Review    Admission: Date/Time/Statement:   Admission Orders (From admission, onward)     Ordered        09/08/20 1614  Inpatient Admission  Once                   Orders Placed This Encounter   Procedures    Inpatient Admission     Standing Status:   Standing     Number of Occurrences:   1     Order Specific Question:   Admitting Physician     Answer:   Arlen Sherman [[id number]]     Order Specific Question:   Level of Care     Answer:   Med Surg [[id number]]     Order Specific Question:   Estimated length of stay     Answer:   More than 2 Midnights     Order Specific Question:   Certification     Answer:   I certify that inpatient services are medically necessary for this patient for a duration of greater than two midnights  See H&P and MD Progress Notes for additional information about the patient's course of treatment  ED Arrival Information     Expected Arrival Acuity Means of Arrival Escorted By Service Admission Type    9/8/2020 9/8/2020 13:39 Urgent Walk-In Self Hospitalist Urgent    Arrival Complaint    Acute deep vein thrombosis (DVT) of right lower extremity        Chief Complaint   Patient presents with    Leg Pain     Right leg pain, swelling, redness and rash x 2 days  Sent in by Dr Libby Winters to r/o dvt  Denies cp/sob  Hx of dvt, not currently anticoagulated  Assessment/Plan: 43 yo female to ED from home w/ 2 days of R leg swelling , pain and discomfort   Takes methadone daily for chronic pain   Noted scattered areas of redness on R leg    seen by vascular last month for symptomatic bilateral varicose veins  At that time a duplex was done which showed chronic right popliteal, peroneal, posterior tibial DVT  At that time it was decided that she did not require anticoagulation  Treatment with compression, leg elevation, exercise and weight loss were recommended  Admitted IP status for chronic DVT RLE plan to start lovenox , consult vascular  Start clindamycin and repeat CBC     Cont methadone for pain   9/8 Vascular surgery consult   p/w RLE pain, edema, erythema, petechiae x 2 days, repeat venous duplex showing likely subacute R femoral DVT  Plan to cont lovenox , cont clindamycin , compression , elevation of RLE , rest       9/9 Vascular note   No acute events overnight  Plan to cont lovenox , clindamycin , hypercoagulable workup , compression , elevate RLE and rest     ED Triage Vitals   Temperature Pulse Respirations Blood Pressure SpO2   09/08/20 1348 09/08/20 1348 09/08/20 1348 09/08/20 1348 09/08/20 1348   97 8 °F (36 6 °C) 73 16 116/56 96 %      Temp Source Heart Rate Source Patient Position - Orthostatic VS BP Location FiO2 (%)   09/08/20 1348 09/08/20 1652 09/08/20 1652 09/08/20 1652 --   Temporal Monitor Lying Left arm       Pain Score       09/08/20 1348       5          Wt Readings from Last 1 Encounters:   09/08/20 77 6 kg (171 lb 1 2 oz)     Additional Vital Signs:   09/09/20 06:52:46   97 9 °F (36 6 °C)   62   17   115/69   84   97 %   None (Room air)   Lying    09/08/20 22:15:47   97 4 °F (36 3 °C)Abnormal     65   18   118/72   87   99 %          09/08/20 1938                     None (Room air)       09/08/20 1715                     None (Room air)       09/08/20 17:02:13   97 8 °F (36 6 °C)   71   18   132/75   94   96 %   None (Room air)   Sitting    09/08/20 1652      64   16   119/60   84   97 %   None (Room air)   Lying    09/08/20 1450                     None (Room          Pertinent Labs/Diagnostic Test Results:   9/8 venous duplex RIGHT LOWER LIMB  There is evidence of chronic vs  subacute, deep vein thrombosis in the proximal  through distal femoral vein; the popliteal vein; the gastrocnemius veins; and in  the paired posterior tibial and peroneal veins  No evidence of superficial thrombophlebitis noted  Doppler evaluation shows a normal response to augmentation maneuvers    Popliteal, posterior tibial and anterior tibial arterial Doppler waveforms are  triphasic  LEFT LOWER LIMB LIMITED  Evaluation shows no evidence of thrombus in the common femoral vein  Doppler evaluation shows a normal response to augmentation maneuvers    Results from last 7 days   Lab Units 09/09/20  0510 09/08/20  1507   WBC Thousand/uL 11 70* 16 11*   HEMOGLOBIN g/dL 11 5 12 0   HEMATOCRIT % 37 6 37 9   PLATELETS Thousands/uL 229 228   NEUTROS ABS Thousands/µL 7 71* 13 63*     Results from last 7 days   Lab Units 09/09/20  0510 09/08/20  1507   SODIUM mmol/L 141 136   POTASSIUM mmol/L 3 5 3 5   CHLORIDE mmol/L 108 103   CO2 mmol/L 28 25   ANION GAP mmol/L 5 8   BUN mg/dL 9 11   CREATININE mg/dL 0 72 0 77   EGFR ml/min/1 73sq m 97 89   CALCIUM mg/dL 7 7* 7 7*       Results from last 7 days   Lab Units 09/09/20  0510 09/08/20  1507   GLUCOSE RANDOM mg/dL 84 80     Results from last 7 days   Lab Units 09/08/20  1507   PROTIME seconds 14 9*   INR  1 20*   PTT seconds 40*       ED Treatment:   Medication Administration from 09/08/2020 1328 to 09/08/2020 1701       Date/Time Order Dose Route Action Action by Comments     09/08/2020 1624 enoxaparin (LOVENOX) subcutaneous injection 80 mg 80 mg Subcutaneous Given Alice Blair RN         Past Medical History:   Diagnosis Date    Facial cellulitis     Fracture, cuboid     LAST ASSESSED: 3/26/15    Gastrojejunal ulcer     ANASTOMOTIC    History of small bowel obstruction     Hypertension     pt denies    Insomnia     Iron deficiency anemia     LAST ASSESSED: AND RESOLVED: 4/13/16     Present on Admission:   GERD without esophagitis   Migraine, unspecified, not intractable, without status migrainosus   Pain syndrome, chronic      Admitting Diagnosis: Leg pain [M79 606]  Chronic deep vein thrombosis (DVT) of proximal vein of right lower extremity (HCC) [I82 5Y1]  Age/Sex: 44 y o  female  Admission Orders:  Scheduled Medications:  clindamycin, 300 mg, Oral, Q6H ANUSHA  enoxaparin, 1 mg/kg, Subcutaneous, Q12H [facility]  methadone, 30 mg, Oral, Daily  pantoprazole, 40 mg, Oral, Early Morning  sertraline, 100 mg, Oral, BID  sucralfate, 1,000 mg, Oral, Q6H ANUSHA  topiramate, 50 mg, Oral, BID      Continuous IV Infusions:     PRN Meds:  ALPRAZolam, 1 mg, Oral, HS PRN  zolpidem, 10 mg, Oral, HS PRN    act as keren   Reg diet       IP CONSULT TO VASCULAR SURGERY    Network Utilization Review Department  [email or web address]  ATTENTION: Please call with any questions or concerns to [phone] and carefully listen to the prompts so that you are directed to the right person  All voicemails are confidential   Mihai Gee all requests for admission clinical reviews, approved or denied determinations and any other requests to dedicated fax number below belonging to the campus where the patient is receiving treatment   List of dedicated fax numbers for the Facilities:  [address] DENIALS (Administrative/Medical Necessity) [phone]   [address] (Maternity/NICU/Pediatrics) [phone]   Hyun Tang [phone]     [address] [phone]   Jose Munoz [phone]   Jailyn Peralta [phone]   [address] [place] [address] [address] [place] [address] [phone]   [facility]  [phone]   [address] [facility] [address]  [address] [facility] [address] [facility] [phone]

## 2020-09-09 NOTE — NURSING NOTE
Discharge paperwork and new meds reviewed with patient  Answered all questions and concerns  Patient aware of follow up appointments to be made  States she will  meds at [facility] and [facility]am [facility]ett  Will be providing own transportation home  Taken off unit via wheelchair with PCA

## 2020-09-09 NOTE — PLAN OF CARE
Problem: Potential for Falls  Goal: Patient will remain free of falls  Description: INTERVENTIONS:  - Assess patient frequently for physical needs  -  Identify cognitive and physical deficits and behaviors that affect risk of falls    -  [place] fall precautions as indicated by assessment   - Educate patient/family on patient safety including physical limitations  - Instruct patient to call for assistance with activity based on assessment  - Modify environment to reduce risk of injury  - Consider OT/PT consult to assist with strengthening/mobility  Outcome: Progressing     Problem: PAIN - ADULT  Goal: Verbalizes/displays adequate comfort level or baseline comfort level  Description: Interventions:  - Encourage patient to monitor pain and request assistance  - Assess pain using appropriate pain scale  - Administer analgesics based on type and severity of pain and evaluate response  - Implement non-pharmacological measures as appropriate and evaluate response  - Consider cultural and social influences on pain and pain management  - Notify physician/advanced practitioner if interventions unsuccessful or patient reports new pain  Outcome: Progressing     Problem: INFECTION - ADULT  Goal: Absence or prevention of progression during hospitalization  Description: INTERVENTIONS:  - Assess and monitor for signs and symptoms of infection  - Monitor lab/diagnostic results  - Monitor all insertion sites, i e  indwelling lines, tubes, and drains  - Monitor endotracheal if appropriate and nasal secretions for changes in amount and color  - [place] appropriate cooling/warming therapies per order  - Administer medications as ordered  - Instruct and encourage patient and family to use good hand hygiene technique  - Identify and instruct in appropriate isolation precautions for identified infection/condition  Outcome: Progressing     Problem: SAFETY ADULT  Goal: Patient will remain free of falls  Description: INTERVENTIONS:  - Assess patient frequently for physical needs  -  Identify cognitive and physical deficits and behaviors that affect risk of falls    -  [place] fall precautions as indicated by assessment   - Educate patient/family on patient safety including physical limitations  - Instruct patient to call for assistance with activity based on assessment  - Modify environment to reduce risk of injury  - Consider OT/PT consult to assist with strengthening/mobility  Outcome: Progressing  Goal: Maintain or return to baseline ADL function  Description: INTERVENTIONS:  -  Assess patient's ability to carry out ADLs; assess patient's baseline for ADL function and identify physical deficits which impact ability to perform ADLs (bathing, care of mouth/teeth, toileting, grooming, dressing, etc )  - Assess/evaluate cause of self-care deficits   - Assess range of motion  - Assess patient's mobility; develop plan if impaired  - Assess patient's need for assistive devices and provide as appropriate  - Encourage maximum independence but intervene and supervise when necessary  - Involve family in performance of ADLs  - Assess for home care needs following discharge   - Consider OT consult to assist with ADL evaluation and planning for discharge  - Provide patient education as appropriate  Outcome: Progressing  Goal: Maintain or return mobility status to optimal level  Description: INTERVENTIONS:  - Assess patient's baseline mobility status (ambulation, transfers, stairs, etc )    - Identify cognitive and physical deficits and behaviors that affect mobility  - Identify mobility aids required to assist with transfers and/or ambulation (gait belt, sit-to-stand, lift, walker, cane, etc )  - [place] fall precautions as indicated by assessment  - Record patient progress and toleration of activity level on Mobility SBAR; progress patient to next Phase/Stage  - Instruct patient to call for assistance with activity based on assessment  - Consider rehabilitation consult to assist with strengthening/weightbearing, etc   Outcome: Progressing     Problem: DISCHARGE PLANNING  Goal: Discharge to home or other facility with appropriate resources  Description: INTERVENTIONS:  - Identify barriers to discharge w/patient and caregiver  - Arrange for needed discharge resources and transportation as appropriate  - Identify discharge learning needs (meds, wound care, etc )  - Arrange for interpretive services to assist at discharge as needed  - Refer to Case Management Department for coordinating discharge planning if the patient needs post-hospital services based on physician/advanced practitioner order or complex needs related to functional status, cognitive ability, or social support system  Outcome: Progressing     Problem: Knowledge Deficit  Goal: Patient/family/caregiver demonstrates understanding of disease process, treatment plan, medications, and discharge instructions  Description: Complete learning assessment and assess knowledge base    Interventions:  - Provide teaching at level of understanding  - Provide teaching via preferred learning methods  Outcome: Progressing     Problem: CARDIOVASCULAR - ADULT  Goal: Maintains optimal cardiac output and hemodynamic stability  Description: INTERVENTIONS:  - Monitor I/O, vital signs and rhythm  - Monitor for S/S and trends of decreased cardiac output  - Administer and titrate ordered vasoactive medications to optimize hemodynamic stability  - Assess quality of pulses, skin color and temperature  - Assess for signs of decreased coronary artery perfusion  - Instruct patient to report change in severity of symptoms  Outcome: Progressing  Goal: Absence of cardiac dysrhythmias or at baseline rhythm  Description: INTERVENTIONS:  - Continuous cardiac monitoring, vital signs, obtain 12 lead EKG if ordered  - Administer antiarrhythmic and heart rate control medications as ordered  - Monitor electrolytes and administer replacement therapy as ordered  Outcome: Progressing     Problem: HEMATOLOGIC - ADULT  Goal: Maintains hematologic stability  Description: INTERVENTIONS  - Assess for signs and symptoms of bleeding or hemorrhage  - Monitor labs  - Administer supportive blood products/factors as ordered and appropriate  Outcome: Progressing     Problem: MUSCULOSKELETAL - ADULT  Goal: Maintain or return mobility to safest level of function  Description: INTERVENTIONS:  - Assess patient's ability to carry out ADLs; assess patient's baseline for ADL function and identify physical deficits which impact ability to perform ADLs (bathing, care of mouth/teeth, toileting, grooming, dressing, etc )  - Assess/evaluate cause of self-care deficits   - Assess range of motion  - Assess patient's mobility  - Assess patient's need for assistive devices and provide as appropriate  - Encourage maximum independence but intervene and supervise when necessary  - Involve family in performance of ADLs  - Assess for home care needs following discharge   - Consider OT consult to assist with ADL evaluation and planning for discharge  - Provide patient education as appropriate  Outcome: Progressing  Goal: Maintain proper alignment of affected body part  Description: INTERVENTIONS:  - Support, maintain and protect limb and body alignment  - Provide patient/ family with appropriate education  Outcome: Progressing

## 2020-09-09 NOTE — ASSESSMENT & PLAN NOTE
· Patient has symptomatic large right lower extremity DVT, per vascular tech it looked chronic, but it is up to the common femoral vein with associated discomfort and erythema of the skin  · Unprovoked  · Was started on Lovenox 1 milligram/kilogram q 12  Transition to loading dose of Eliquis  Checked through [employer] and patient is agreeable  · Appreciate vascular surgery consult  No need for inpatient vascular intervention  · Will send for hematology referral for hypercoagulable workup    · Will give thigh-high compression stocking

## 2020-09-09 NOTE — PLAN OF CARE
Problem: Potential for Falls  Goal: Patient will remain free of falls  Description: INTERVENTIONS:  - Assess patient frequently for physical needs  -  Identify cognitive and physical deficits and behaviors that affect risk of falls    -  [place] fall precautions as indicated by assessment   - Educate patient/family on patient safety including physical limitations  - Instruct patient to call for assistance with activity based on assessment  - Modify environment to reduce risk of injury  - Consider OT/PT consult to assist with strengthening/mobility  9/9/2020 1526 by Pretty Jacobson RN  Outcome: Adequate for Discharge  9/9/2020 1244 by Pretty Jacobson RN  Outcome: Progressing     Problem: PAIN - ADULT  Goal: Verbalizes/displays adequate comfort level or baseline comfort level  Description: Interventions:  - Encourage patient to monitor pain and request assistance  - Assess pain using appropriate pain scale  - Administer analgesics based on type and severity of pain and evaluate response  - Implement non-pharmacological measures as appropriate and evaluate response  - Consider cultural and social influences on pain and pain management  - Notify physician/advanced practitioner if interventions unsuccessful or patient reports new pain  9/9/2020 1526 by Pretty Jacobson RN  Outcome: Adequate for Discharge  9/9/2020 1244 by Pretty Jacobson RN  Outcome: Progressing     Problem: INFECTION - ADULT  Goal: Absence or prevention of progression during hospitalization  Description: INTERVENTIONS:  - Assess and monitor for signs and symptoms of infection  - Monitor lab/diagnostic results  - Monitor all insertion sites, i e  indwelling lines, tubes, and drains  - Monitor endotracheal if appropriate and nasal secretions for changes in amount and color  - [place] appropriate cooling/warming therapies per order  - Administer medications as ordered  - Instruct and encourage patient and family to use good hand hygiene technique  - Identify and instruct in appropriate isolation precautions for identified infection/condition  9/9/2020 1526 by Angi Clifford RN  Outcome: Adequate for Discharge  9/9/2020 1244 by Agni Clifford RN  Outcome: Progressing     Problem: SAFETY ADULT  Goal: Patient will remain free of falls  Description: INTERVENTIONS:  - Assess patient frequently for physical needs  -  Identify cognitive and physical deficits and behaviors that affect risk of falls    -  [place] fall precautions as indicated by assessment   - Educate patient/family on patient safety including physical limitations  - Instruct patient to call for assistance with activity based on assessment  - Modify environment to reduce risk of injury  - Consider OT/PT consult to assist with strengthening/mobility  9/9/2020 1526 by Angi Clifford RN  Outcome: Adequate for Discharge  9/9/2020 1244 by Angi Clifford RN  Outcome: Progressing  Goal: Maintain or return to baseline ADL function  Description: INTERVENTIONS:  -  Assess patient's ability to carry out ADLs; assess patient's baseline for ADL function and identify physical deficits which impact ability to perform ADLs (bathing, care of mouth/teeth, toileting, grooming, dressing, etc )  - Assess/evaluate cause of self-care deficits   - Assess range of motion  - Assess patient's mobility; develop plan if impaired  - Assess patient's need for assistive devices and provide as appropriate  - Encourage maximum independence but intervene and supervise when necessary  - Involve family in performance of ADLs  - Assess for home care needs following discharge   - Consider OT consult to assist with ADL evaluation and planning for discharge  - Provide patient education as appropriate  9/9/2020 1526 by Angi Clifford RN  Outcome: Adequate for Discharge  9/9/2020 1244 by Angi Clifford RN  Outcome: Progressing  Goal: Maintain or return mobility status to optimal level  Description: INTERVENTIONS:  - Assess patient's baseline mobility status (ambulation, transfers, stairs, etc )    - Identify cognitive and physical deficits and behaviors that affect mobility  - Identify mobility aids required to assist with transfers and/or ambulation (gait belt, sit-to-stand, lift, walker, cane, etc )  - [place] fall precautions as indicated by assessment  - Record patient progress and toleration of activity level on Mobility SBAR; progress patient to next Phase/Stage  - Instruct patient to call for assistance with activity based on assessment  - Consider rehabilitation consult to assist with strengthening/weightbearing, etc   9/9/2020 1526 by Blue Ramos RN  Outcome: Adequate for Discharge  9/9/2020 1244 by Blue Ramos RN  Outcome: Progressing     Problem: DISCHARGE PLANNING  Goal: Discharge to home or other facility with appropriate resources  Description: INTERVENTIONS:  - Identify barriers to discharge w/patient and caregiver  - Arrange for needed discharge resources and transportation as appropriate  - Identify discharge learning needs (meds, wound care, etc )  - Arrange for interpretive services to assist at discharge as needed  - Refer to Case Management Department for coordinating discharge planning if the patient needs post-hospital services based on physician/advanced practitioner order or complex needs related to functional status, cognitive ability, or social support system  9/9/2020 1526 by Blue Ramos RN  Outcome: Adequate for Discharge  9/9/2020 1244 by Blue Ramos RN  Outcome: Progressing     Problem: Knowledge Deficit  Goal: Patient/family/caregiver demonstrates understanding of disease process, treatment plan, medications, and discharge instructions  Description: Complete learning assessment and assess knowledge base    Interventions:  - Provide teaching at level of understanding  - Provide teaching via preferred learning methods  9/9/2020 1526 by Blue Ramos RN  Outcome: Adequate for Discharge  9/9/2020 1244 by Shikha Maxwell AMBER Huggins  Outcome: Progressing     Problem: CARDIOVASCULAR - ADULT  Goal: Maintains optimal cardiac output and hemodynamic stability  Description: INTERVENTIONS:  - Monitor I/O, vital signs and rhythm  - Monitor for S/S and trends of decreased cardiac output  - Administer and titrate ordered vasoactive medications to optimize hemodynamic stability  - Assess quality of pulses, skin color and temperature  - Assess for signs of decreased coronary artery perfusion  - Instruct patient to report change in severity of symptoms  9/9/2020 1526 by Akhil Black RN  Outcome: Adequate for Discharge  9/9/2020 1244 by Akhil Black RN  Outcome: Progressing  Goal: Absence of cardiac dysrhythmias or at baseline rhythm  Description: INTERVENTIONS:  - Continuous cardiac monitoring, vital signs, obtain 12 lead EKG if ordered  - Administer antiarrhythmic and heart rate control medications as ordered  - Monitor electrolytes and administer replacement therapy as ordered  9/9/2020 1526 by Akhil Black RN  Outcome: Adequate for Discharge  9/9/2020 1244 by Akhil Black RN  Outcome: Progressing     Problem: HEMATOLOGIC - ADULT  Goal: Maintains hematologic stability  Description: INTERVENTIONS  - Assess for signs and symptoms of bleeding or hemorrhage  - Monitor labs  - Administer supportive blood products/factors as ordered and appropriate  9/9/2020 1526 by Akhil Black RN  Outcome: Adequate for Discharge  9/9/2020 1244 by Akhil Black RN  Outcome: Progressing     Problem: MUSCULOSKELETAL - ADULT  Goal: Maintain or return mobility to safest level of function  Description: INTERVENTIONS:  - Assess patient's ability to carry out ADLs; assess patient's baseline for ADL function and identify physical deficits which impact ability to perform ADLs (bathing, care of mouth/teeth, toileting, grooming, dressing, etc )  - Assess/evaluate cause of self-care deficits   - Assess range of motion  - Assess patient's mobility  - Assess patient's need for assistive devices and provide as appropriate  - Encourage maximum independence but intervene and supervise when necessary  - Involve family in performance of ADLs  - Assess for home care needs following discharge   - Consider OT consult to assist with ADL evaluation and planning for discharge  - Provide patient education as appropriate  9/9/2020 1526 by Cory Vaz RN  Outcome: Adequate for Discharge  9/9/2020 1244 by Cory Vaz RN  Outcome: Progressing  Goal: Maintain proper alignment of affected body part  Description: INTERVENTIONS:  - Support, maintain and protect limb and body alignment  - Provide patient/ family with appropriate education  9/9/2020 1526 by Cory Vaz RN  Outcome: Adequate for Discharge  9/9/2020 1244 by Cory Vaz RN  Outcome: Progressing

## 2020-09-11 ENCOUNTER — NURSE TRIAGE (OUTPATIENT)
Dept: INTERNAL MEDICINE CLINIC | Facility: CLINIC | Age: 50
End: 2020-09-11

## 2020-09-11 DIAGNOSIS — G89.4 PAIN SYNDROME, CHRONIC: ICD-10-CM

## 2020-09-11 DIAGNOSIS — F41.9 ANXIETY: ICD-10-CM

## 2020-09-11 RX ORDER — METHADONE HYDROCHLORIDE 10 MG/1
TABLET ORAL
Qty: 90 TABLET | Refills: 0 | Status: CANCELLED | OUTPATIENT
Start: 2020-09-11

## 2020-09-12 ENCOUNTER — APPOINTMENT (EMERGENCY)
Dept: RADIOLOGY | Facility: HOSPITAL | Age: 50
End: 2020-09-12
Payer: COMMERCIAL

## 2020-09-12 ENCOUNTER — HOSPITAL ENCOUNTER (EMERGENCY)
Facility: HOSPITAL | Age: 50
Discharge: HOME | End: 2020-09-12
Attending: EMERGENCY MEDICINE | Admitting: EMERGENCY MEDICINE
Payer: COMMERCIAL

## 2020-09-12 VITALS
SYSTOLIC BLOOD PRESSURE: 109 MMHG | TEMPERATURE: 97.8 F | WEIGHT: 170.42 LBS | RESPIRATION RATE: 18 BRPM | DIASTOLIC BLOOD PRESSURE: 62 MMHG | BODY MASS INDEX: 28.36 KG/M2 | OXYGEN SATURATION: 98 % | HEART RATE: 65 BPM

## 2020-09-12 DIAGNOSIS — R60.0 LOWER EXTREMITY EDEMA: ICD-10-CM

## 2020-09-12 DIAGNOSIS — I26.99 PULMONARY EMBOLISM (HCC): Primary | ICD-10-CM

## 2020-09-12 DIAGNOSIS — I82.409 DVT (DEEP VENOUS THROMBOSIS) (HCC): ICD-10-CM

## 2020-09-12 LAB
ANION GAP SERPL CALCULATED.3IONS-SCNC: 4 MMOL/L (ref 4–13)
ATRIAL RATE: 59 BPM
ATRIAL RATE: 59 BPM
BASOPHILS # BLD AUTO: 0.05 THOUSANDS/ΜL (ref 0–0.1)
BASOPHILS NFR BLD AUTO: 1 % (ref 0–1)
BUN SERPL-MCNC: 15 MG/DL (ref 5–25)
CALCIUM SERPL-MCNC: 7.9 MG/DL (ref 8.3–10.1)
CHLORIDE SERPL-SCNC: 113 MMOL/L (ref 100–108)
CO2 SERPL-SCNC: 24 MMOL/L (ref 21–32)
CREAT SERPL-MCNC: 0.62 MG/DL (ref 0.6–1.3)
EOSINOPHIL # BLD AUTO: 0.58 THOUSAND/ΜL (ref 0–0.61)
EOSINOPHIL NFR BLD AUTO: 6 % (ref 0–6)
ERYTHROCYTE [DISTWIDTH] IN BLOOD BY AUTOMATED COUNT: 13.8 % (ref 11.6–15.1)
GFR SERPL CREATININE-BSD FRML MDRD: 104 ML/MIN/1.73SQ M
GLUCOSE SERPL-MCNC: 96 MG/DL (ref 65–140)
HCT VFR BLD AUTO: 35.6 % (ref 34.8–46.1)
HGB BLD-MCNC: 11.3 G/DL (ref 11.5–15.4)
IMM GRANULOCYTES # BLD AUTO: 0.08 THOUSAND/UL (ref 0–0.2)
IMM GRANULOCYTES NFR BLD AUTO: 1 % (ref 0–2)
LYMPHOCYTES # BLD AUTO: 1.93 THOUSANDS/ΜL (ref 0.6–4.47)
LYMPHOCYTES NFR BLD AUTO: 21 % (ref 14–44)
MCH RBC QN AUTO: 29.4 PG (ref 26.8–34.3)
MCHC RBC AUTO-ENTMCNC: 31.7 G/DL (ref 31.4–37.4)
MCV RBC AUTO: 93 FL (ref 82–98)
MONOCYTES # BLD AUTO: 0.74 THOUSAND/ΜL (ref 0.17–1.22)
MONOCYTES NFR BLD AUTO: 8 % (ref 4–12)
NEUTROPHILS # BLD AUTO: 6.03 THOUSANDS/ΜL (ref 1.85–7.62)
NEUTS SEG NFR BLD AUTO: 63 % (ref 43–75)
NRBC BLD AUTO-RTO: 0 /100 WBCS
P AXIS: 79 DEGREES
P AXIS: 82 DEGREES
PLATELET # BLD AUTO: 324 THOUSANDS/UL (ref 149–390)
PMV BLD AUTO: 9.6 FL (ref 8.9–12.7)
POTASSIUM SERPL-SCNC: 4.5 MMOL/L (ref 3.5–5.3)
PR INTERVAL: 138 MS
PR INTERVAL: 144 MS
QRS AXIS: 57 DEGREES
QRS AXIS: 57 DEGREES
QRSD INTERVAL: 74 MS
QRSD INTERVAL: 78 MS
QT INTERVAL: 416 MS
QT INTERVAL: 420 MS
QTC INTERVAL: 411 MS
QTC INTERVAL: 415 MS
RBC # BLD AUTO: 3.85 MILLION/UL (ref 3.81–5.12)
SARS-COV-2 RNA RESP QL NAA+PROBE: NEGATIVE
SODIUM SERPL-SCNC: 141 MMOL/L (ref 136–145)
T WAVE AXIS: 44 DEGREES
T WAVE AXIS: 48 DEGREES
TROPONIN I SERPL-MCNC: <0.02 NG/ML
VENTRICULAR RATE: 59 BPM
VENTRICULAR RATE: 59 BPM
WBC # BLD AUTO: 9.41 THOUSAND/UL (ref 4.31–10.16)

## 2020-09-12 PROCEDURE — U0003 INFECTIOUS AGENT DETECTION BY NUCLEIC ACID (DNA OR RNA); SEVERE ACUTE RESPIRATORY SYNDROME CORONAVIRUS 2 (SARS-COV-2) (CORONAVIRUS DISEASE [COVID-19]), AMPLIFIED PROBE TECHNIQUE, MAKING USE OF HIGH THROUGHPUT TECHNOLOGIES AS DESCRIBED BY CMS-2020-01-R: HCPCS | Performed by: EMERGENCY MEDICINE

## 2020-09-12 PROCEDURE — NC001 PR NO CHARGE: Performed by: SURGERY

## 2020-09-12 PROCEDURE — 74174 CTA ABD&PLVS W/CONTRAST: CPT

## 2020-09-12 PROCEDURE — 99285 EMERGENCY DEPT VISIT HI MDM: CPT | Performed by: EMERGENCY MEDICINE

## 2020-09-12 PROCEDURE — 99284 EMERGENCY DEPT VISIT MOD MDM: CPT

## 2020-09-12 PROCEDURE — 93005 ELECTROCARDIOGRAM TRACING: CPT

## 2020-09-12 PROCEDURE — 93010 ELECTROCARDIOGRAM REPORT: CPT | Performed by: INTERNAL MEDICINE

## 2020-09-12 PROCEDURE — 36415 COLL VENOUS BLD VENIPUNCTURE: CPT | Performed by: EMERGENCY MEDICINE

## 2020-09-12 PROCEDURE — 84484 ASSAY OF TROPONIN QUANT: CPT | Performed by: EMERGENCY MEDICINE

## 2020-09-12 PROCEDURE — 85025 COMPLETE CBC W/AUTO DIFF WBC: CPT | Performed by: EMERGENCY MEDICINE

## 2020-09-12 PROCEDURE — G1004 CDSM NDSC: HCPCS

## 2020-09-12 PROCEDURE — 71275 CT ANGIOGRAPHY CHEST: CPT

## 2020-09-12 PROCEDURE — 80048 BASIC METABOLIC PNL TOTAL CA: CPT | Performed by: EMERGENCY MEDICINE

## 2020-09-12 RX ADMIN — IOHEXOL 120 ML: 350 INJECTION, SOLUTION INTRAVENOUS at 12:36

## 2020-09-12 NOTE — ED ATTENDING ATTESTATION
9/12/2020  IJake MD, saw and evaluated the patient  I have discussed the patient with the resident/non-physician practitioner and agree with the resident's/non-physician practitioner's findings, Plan of Care, and MDM as documented in the resident's/non-physician practitioner's note, except where noted  All available labs and Radiology studies were reviewed  I was present for key portions of any procedure(s) performed by the resident/non-physician practitioner and I was immediately available to provide assistance  At this point I agree with the current assessment done in the Emergency Department  I have conducted an independent evaluation of this patient a history and physical is as follows:    43 y/o F diagnosed with extensive RLE DVT last week presenting with worsening swelling of the RLE, also with epigastric/ chest pain over the past couple of days  No fever, chills, cough, dyspnea  Has been taking Eliquis as prescribed  Awake and alert, NAD  Head AT/NC  Neck supple  Heart RRR, no M/R/G  Lungs CTA/B with no respiratory distress  Abd soft/NT/ND  There is swelling/ pitting of the right foot and calf with minimal erythema, does not appear cellulitic  2+ DP pulse, distal motor and sensory intact  Compartments soft  Imaging does not show extension of the DVT past the common femoral vein  There is small right segmental PE   CT read as possibly consistent with COVID, patient does not have these symptoms overall but we will send test   EKG and troponin with no evidence of strain  [facility] consulted and they evaluated the patient in the ED  They recommend compression stockings, leg elevation, continue Eliquis, and office follow-up within a week  Will discharge with follow-up instructions and return precautions  ED Course  ED Course as of Sep 12 1516   Sat Sep 12, 2020   1405 Imaging reviewed  Consulting [facility]              Critical Care Time  Procedures

## 2020-09-12 NOTE — TELEPHONE ENCOUNTER
Regarding: Swollen Leg   ----- Message from Agnieszka Rodriguez sent at 9/12/2020  8:50 AM EDT -----  "I have pain, swelling and redness on my right calf and foot "

## 2020-09-12 NOTE — CONSULTS
Consultation - Vascular Surgery   Ely Cantrell 44 y o  female MRN: [id number]  Unit/Bed#: ED 24 Encounter: [id number]      Assessment/Plan      Assessment:  Patient with right lower extremity pain and swelling and known recent history of right lower extremity DVT  CT scan shows DVT of the right femoral vein and the proximal popliteal vein which is consistent with her periods finding back on the 8th by venous Doppler ultrasound  There is no extension to the iliac veins  She does have a small segmental PE in the right lower lobe  However, patient has been complaining of chest pain since even the last admission but no investigation at the time was done  Plan:  Patient can be discharged from the ED  There is likely no benefit in admitting her and starting her on heparin drip  This is not a failed anticoagulation as patient was on the just recently started on Eliquis 3 days ago  There is no extension of the DVT  The PE most likely is old from her previous admission  Patient should follow up with the vascular Clinic  in a week  Cont  Clindamycin as previously prescribed  Patient should continue to elevate her legs whenever possible  Patient will continue on compression stockings  Case was discussed with vascular fellow who agrees with the plan above  History of Present Illness   Physician Requesting Consult: David العلي MD  Reason for Consult / Principal Problem: No improvement of RLE swelling due to DVT    HPI: Cal Chung is a 44y o  year old female who presents with worsening right lower extremity swelling and pain  She was recently admitted to  in [address] [place] [address] for which she was diagnosed with right lower extremity DVT  Duplex ultrasound at that time showed proximal to distal femoral vein, popliteal vein, gastrocnemius vein as were spread PT and peroneal vein DVT  She was started on therapeutic Lovenox and was transitioned to Eliquis  She was discharged on the 9th    Patient was also on clindamycin at the time because she has had petechiae and erythema on the same leg  However, patient today states that the redness and petechia has improved  Patient also states that she has chest pain down the sternum  The pain gets worse with swallowing or eating  Patient also states that it gets worse with deep breaths  Patient's noted that this started along the same time when she was was admitted priorly  She however denies any fever or chills  She does have low little bit of shortness of breath  Patient states that her only medical problem is she had a gastric bypass 12 years ago  She has abdominal plasty was was complicated by infection with MRSA  She subsequently has chronic pain and has been on pain medication for the past 10 years  Patient also has had gastric ulcers in the past   Last EGD was 2 years ago which at that time did not show anything  Patient is still taking her Carafate as needed and omeprazole daily  Patient denies any recent traveling  No sick contacts  No smoking, social drinker, no illicit drug use  Review of Systems   [place] review of system performed and is negative except for the above      Historical Information   Past Medical History:   Diagnosis Date    Facial cellulitis     Fracture, cuboid     LAST ASSESSED: 3/26/15    Gastrojejunal ulcer     ANASTOMOTIC    History of small bowel obstruction     Hypertension     pt denies    Insomnia     Iron deficiency anemia     LAST ASSESSED: AND RESOLVED: 4/13/16     Past Surgical History:   Procedure Laterality Date    ABDOMINOPLASTY      x2    CHOLECYSTECTOMY      GASTRIC BYPASS      FOR MORBID OBESITY    INCISIONAL HERNIA REPAIR      SMALL INTESTINE SURGERY       Social History   Social History     Substance and Sexual Activity   Alcohol Use No    Comment: CONSUMES ALCOHOL OCCASIONALLY     Social History     Substance and Sexual Activity   Drug Use Never    Comment: usues methadone for pain relief     E-Cigarette/Vaping    E-Cigarette Use Never User      E-Cigarette/Vaping Substances     Social History     Tobacco Use   Smoking Status Never Smoker   Smokeless Tobacco Never Used     Family History: non-contributory}    Meds/Allergies   all current active meds have been reviewed and current meds:   No current facility-administered medications for this encounter  Allergies   Allergen Reactions    Amoxicillin Hives and Shortness Of Breath    Aspirin Hives and Other (See Comments)     Short of breath    Butorphanol Anaphylaxis and Other (See Comments)     Other reaction(s): BUTORPHANOL TARTRATE (STADOL) (loss of breath)    Morphine Other (See Comments) and Hallucinations     takes vicodin at home    Duloxetine     Azithromycin Rash    Gabapentin Palpitations    Nitrofurantoin Hives and Rash    Sulfa Antibiotics Hives, Rash and Other (See Comments)       Objective   Vitals: Blood pressure 109/62, pulse 65, temperature 97 8 °F (36 6 °C), temperature source Oral, resp  rate 18, weight 77 3 kg (170 lb 6 7 oz), SpO2 98 %  ,Body mass index is 28 36 kg/m²  No intake or output data in the 24 hours ending 09/12/20 1517  Invasive Devices     Peripheral Intravenous Line            Peripheral IV 09/12/20 Left Antecubital less than 1 day                Physical Exam   Neuro: normal exam  General:  Not in acute distress, alert, awake, oriented  Cardiology:  Regular rate and rhythm, not tachycardic  Pulmonary:  Satting 99% on room air  Not tachypneic  Normal respiratory effort  Abdomen:  Scars seen  Abdomen is soft, nondistended, very very mildly tender to palpation in the epigastric region  No rebound tenderness or any other peritoneal signs  Lower extremity:  Right lower extremity significantly more swollen as compared to the left  There is pitting edema in the ankle on the right  There is petechiae and erythema on the front of the shin as well as the back of the calf    This is nontender to palpation  Calf is soft to palpation  DP and PT pulses on the right side are palpable  [place] sign was negative  Psyc: normal behavior  MSK: WNL    Lab Results:   I have personally reviewed pertinent reports  , CBC with diff:   Lab Results   Component Value Date    WBC 9 41 09/12/2020    HGB 11 3 (L) 09/12/2020    HCT 35 6 09/12/2020    MCV 93 09/12/2020     09/12/2020    MCH 29 4 09/12/2020    MCHC 31 7 09/12/2020    RDW 13 8 09/12/2020    MPV 9 6 09/12/2020    NRBC 0 09/12/2020   , BMP/CMP:   Lab Results   Component Value Date    SODIUM 141 09/12/2020    K 4 5 09/12/2020     (H) 09/12/2020    CO2 24 09/12/2020    BUN 15 09/12/2020    CREATININE 0 62 09/12/2020    CALCIUM 7 9 (L) 09/12/2020    EGFR 104 09/12/2020   , Coags: No results found for: PT, PTT, INR  Imaging Studies: I have personally reviewed pertinent reports  and I have personally reviewed pertinent films in PACS  EKG, Pathology, and Other Studies: I have personally reviewed pertinent reports  and I have personally reviewed pertinent films in PACS with a Radiologist   VTE Prophylaxis: Eliquis     Code Status: Prior  Advance Directive and Living Will:      Power of :    POLST:      Counseling / Coordination of Care  Counseling/Coordination of Care: Total floor / unit time spent today 30 minutes  Greater than 50% of total time was spent with the patient and / or family counseling and / or coordination of care

## 2020-09-12 NOTE — ED PROVIDER NOTES
History  Chief Complaint   Patient presents with    Leg Swelling     Patient reports recently hospitlization for DVT in right lower extremity  States that swelling has and redness down to her right foot  53-year-old female recently diagnosed with a DVT on Eliquis presents to the emergency department for evaluation of increased lower leg swelling  The patient reports that she was admitted to the hospital 4 days ago after being diagnosed with a DVT in her right leg  She was discharged the following day on Eliquis as well as clindamycin for a possible cellulitis  The patient states that the redness to her leg has improved but she now has swelling in her right foot  Patient reports that she has otherwise been doing well and denies fever, chills, nausea, vomiting, diarrhea, chest pain and shortness of breath  Prior to Admission Medications   Prescriptions Last Dose Informant Patient Reported? Taking? ALPRAZolam (XANAX) 1 mg tablet   No No   Sig: Take 1 tablet (1 mg total) by mouth daily at bedtime as needed for anxiety   Patient taking differently: Take 0 5 mg by mouth daily at bedtime as needed for anxiety    ALYACEN  1-35 MG-MCG per tablet  Self Yes No   Sig: Take 1 tablet by mouth daily   Pediatric Multiple Vit-C-FA (FRUITY CHEWABLES MULTIVITAMIN) CHEW  Self Yes No   Sig: Chew 1 tablet daily    apixaban (ELIQUIS) 5 mg 2020 at Unknown time  No Yes   Sig: Take 2 tablets (10 mg total) by mouth 2 (two) times a day for 7 days, THEN 1 tablet (5 mg total) 2 (two) times a day for 23 days     cholecalciferol (VITAMIN D3) 1,000 units tablet  Self No No   Sig: Take 1 tablet (1,000 Units total) by mouth daily   clindamycin (CLEOCIN) 300 MG capsule   No No   Sig: Take 1 capsule (300 mg total) by mouth every 6 (six) hours for 8 days   methadone (DOLOPHINE) 10 mg tablet  Self No No   Si tablet 3 times daily as needed for chronic pain,   omeprazole (PriLOSEC) 20 mg delayed release capsule  Self No No Sig: take 1 capsule by mouth daily   sertraline (ZOLOFT) 100 mg tablet  Self No No   Sig: take 2 tablets by mouth daily   Patient taking differently: Take 100 mg by mouth 2 (two) times a day    sucralfate (CARAFATE) 1 g/10 mL suspension  Self No No   Sig: Take 10 mL (1 g total) by mouth 4 (four) times a day as needed (dyspepsia)   topiramate (TOPAMAX) 50 MG tablet  Self No No   Sig: take 1 tablet by mouth twice a day   zolpidem (AMBIEN) 10 mg tablet  Self No No   Sig: Take 1 tablet (10 mg total) by mouth daily at bedtime as needed for sleep      Facility-Administered Medications: None       Past Medical History:   Diagnosis Date    Facial cellulitis     Fracture, cuboid     LAST ASSESSED: 3/26/15    Gastrojejunal ulcer     ANASTOMOTIC    History of small bowel obstruction     Hypertension     pt denies    Insomnia     Iron deficiency anemia     LAST ASSESSED: AND RESOLVED: 4/13/16       Past Surgical History:   Procedure Laterality Date    ABDOMINOPLASTY      x2    CHOLECYSTECTOMY      GASTRIC BYPASS      FOR MORBID OBESITY    INCISIONAL HERNIA REPAIR      SMALL INTESTINE SURGERY         Family History   Problem Relation Age of Onset    Diabetes Family         MELLITUS    Cancer Family     Hypertension Family     Osteoporosis Family     Asthma Family     Stroke Family         SYNDROME    Breast cancer Mother     COPD Father     Asthma Child      I have reviewed and agree with the history as documented  E-Cigarette/Vaping    E-Cigarette Use Never User      E-Cigarette/Vaping Substances     Social History     Tobacco Use    Smoking status: Never Smoker    Smokeless tobacco: Never Used   Substance Use Topics    Alcohol use: No     Comment: CONSUMES ALCOHOL OCCASIONALLY    Drug use: Never     Comment: usues methadone for pain relief        Review of Systems   Constitutional: Negative for chills and fever  HENT: Negative for congestion, sore throat and voice change      Eyes: Negative for photophobia and visual disturbance  Respiratory: Negative for cough, chest tightness and shortness of breath  Cardiovascular: Positive for leg swelling  Negative for chest pain and palpitations  Gastrointestinal: Negative for abdominal pain, constipation, diarrhea, nausea and vomiting  Endocrine: Negative for polydipsia, polyphagia and polyuria  Genitourinary: Negative for difficulty urinating, dysuria, flank pain and urgency  Musculoskeletal: Negative for back pain, gait problem and neck pain  Skin: Negative for pallor and rash  Neurological: Negative for dizziness, syncope, weakness, light-headedness, numbness and headaches  Psychiatric/Behavioral: Negative for agitation and confusion  All other systems reviewed and are negative  Physical Exam  ED Triage Vitals [09/12/20 1030]   Temperature Pulse Respirations Blood Pressure SpO2   97 8 °F (36 6 °C) 61 16 108/54 99 %      Temp Source Heart Rate Source Patient Position - Orthostatic VS BP Location FiO2 (%)   Oral -- -- Right arm --      Pain Score       6             Orthostatic Vital Signs  Vitals:    09/12/20 1030 09/12/20 1333   BP: 108/54 109/62   Pulse: 61 65       Physical Exam  Vitals signs and nursing note reviewed  Constitutional:       Appearance: She is well-developed  HENT:      Head: Normocephalic and atraumatic  Eyes:      Pupils: Pupils are equal, round, and reactive to light  Neck:      Musculoskeletal: Normal range of motion and neck supple  Cardiovascular:      Rate and Rhythm: Normal rate and regular rhythm  Heart sounds: Normal heart sounds  Pulmonary:      Effort: Pulmonary effort is normal       Breath sounds: Normal breath sounds  Abdominal:      General: Bowel sounds are normal  There is no distension  Palpations: Abdomen is soft  Tenderness: There is no abdominal tenderness  There is no guarding or rebound  Musculoskeletal:      Right lower leg: She exhibits swelling        Right foot: Swelling present  Skin:     General: Skin is warm and dry  Capillary Refill: Capillary refill takes less than 2 seconds  Neurological:      Mental Status: She is alert and oriented to person, place, and time  ED Medications  Medications   iohexol (OMNIPAQUE) 350 MG/ML injection (MULTI-DOSE) 120 mL (120 mL Intravenous Given 9/12/20 1236)       Diagnostic Studies  Results Reviewed     Procedure Component Value Units Date/Time    Novel Coronavirus [facility] [place] [facility] [[id number]]  (Normal) Collected:  09/12/20 1413    Lab Status:  Final result Specimen:  Nares from Nose Updated:  09/12/20 1521     SARS-CoV-2 Negative    Narrative: The specimen collection materials, transport medium, and/or testing methodology utilized in the production of these test results have been proven to be reliable in a limited validation with an abbreviated program under the Emergency Utilization Authorization provided by the FDA  Testing reported as "Presumptive positive" will be confirmed with secondary testing with a reference laboratory to ensure result accuracy  Clinical caution and judgement should be used with the interpretation of these results with consideration of the clinical impression and other laboratory testing  Testing reported as "Positive" or "Negative" has been proven to be accurate according to standard laboratory validation requirements  All testing is performed with control materials showing appropriate reactivity at standard intervals        Troponin I [[id number]]  (Normal) Collected:  09/12/20 1128    Lab Status:  Final result Specimen:  Blood from Arm, Left Updated:  09/12/20 1201     Troponin I <0 02 ng/mL     Basic metabolic panel [[id number]]  (Abnormal) Collected:  09/12/20 1059    Lab Status:  Final result Specimen:  Blood from Arm, Right Updated:  09/12/20 1128     Sodium 141 mmol/L      Potassium 4 5 mmol/L      Chloride 113 mmol/L      CO2 24 mmol/L      ANION GAP 4 mmol/L      BUN 15 mg/dL      Creatinine 0 62 mg/dL      Glucose 96 mg/dL      Calcium 7 9 mg/dL      eGFR 104 ml/min/1 73sq m     Narrative:       [place] guidelines for Chronic Kidney Disease (CKD):     Stage 1 with normal or high GFR (GFR > 90 mL/min/1 73 square meters)    Stage 2 Mild CKD (GFR = 60-89 mL/min/1 73 square meters)    Stage 3A Moderate CKD (GFR = 45-59 mL/min/1 73 square meters)    Stage 3B Moderate CKD (GFR = 30-44 mL/min/1 73 square meters)    Stage 4 Severe CKD (GFR = 15-29 mL/min/1 73 square meters)    Stage 5 End Stage CKD (GFR <15 mL/min/1 73 square meters)  Note: GFR calculation is accurate only with a steady state creatinine    CBC and differential [[id number]]  (Abnormal) Collected:  09/12/20 1059    Lab Status:  Final result Specimen:  Blood from Arm, Right Updated:  09/12/20 1115     WBC 9 41 Thousand/uL      RBC 3 85 Million/uL      Hemoglobin 11 3 g/dL      Hematocrit 35 6 %      MCV 93 fL      MCH 29 4 pg      MCHC 31 7 g/dL      RDW 13 8 %      MPV 9 6 fL      Platelets 278 Thousands/uL      nRBC 0 /100 WBCs      Neutrophils Relative 63 %      Immat GRANS % 1 %      Lymphocytes Relative 21 %      Monocytes Relative 8 %      Eosinophils Relative 6 %      Basophils Relative 1 %      Neutrophils Absolute 6 03 Thousands/µL      Immature Grans Absolute 0 08 Thousand/uL      Lymphocytes Absolute 1 93 Thousands/µL      Monocytes Absolute 0 74 Thousand/µL      Eosinophils Absolute 0 58 Thousand/µL      Basophils Absolute 0 05 Thousands/µL                  CTA abdomen pelvis venous thrombosis   Final Result by Nicholas Hatch MD (09/12 1311)   1  Thrombosis of the right femoral vein and visualized segment of proximal popliteal vein  2   Grossly stable dilatation of bile ducts, likely related to prior cholecystectomy           Workstation performed: [id number]         CTA ED chest PE Study   Final Result by Monika Rodrigues MD (09/12 1312)      Small segmental pulmonary embolism in the right lower lobe  Patchy groundglass opacities in the right upper and lower lobes, likely infectious in etiology  Given the somewhat peripheral distribution in the right lower lobe, Covid-19 should be excluded  In the setting of clinically suspected/proven COVID-19, the    above lung parenchymal findings on CT indicate intermediate confidence level for COVID-19  Associated pulmonary infarct cannot be excluded in the setting of pulmonary embolism  The study was marked in [facility] for immediate notification  Workstation performed: [id number]               Procedures  ECG 12 Lead Documentation Only    Date/Time: 9/12/2020 3:59 PM  Performed by: Thelma Aguilar MD  Authorized by: Thelma Aguilar MD     ECG reviewed by me, the ED Provider: yes    Patient location:  ED  Previous ECG:     Previous ECG:  Compared to current    Similarity:  No change    Comparison to cardiac monitor: Yes    Interpretation:     Interpretation: normal    Rate:     ECG rate assessment: normal    Rhythm:     Rhythm: sinus rhythm    Ectopy:     Ectopy: none    QRS:     QRS axis:  Normal  Conduction:     Conduction: normal    ST segments:     ST segments:  Normal  T waves:     T waves: normal            ED Course  ED Course as of Sep 12 1732   Sat Sep 12, 2020   1100 Patient now reporting that she has been having intermittent upper abdominal/chest pain that has been going on for the past week  Will add a troponin and EKG      1500 Per vascular surgery patient can be discharged  She should continue taking her Eliquis and elevate her legs at night  She should follow up in the vascular clinic in 1 week  MDM  Number of Diagnoses or Management Options  Lower extremity edema:   Pulmonary embolism [facility]):   Diagnosis management comments: 49-year-old female presented to the emergency department for evaluation of worsening swelling of her right leg    On arrival the patient was awake, alert, oriented and in no acute distress  Her vital signs were stable and she was afebrile  Physical exam was remarkable for swelling in the patient's right lower extremity  Workup workup initially done in the emergency department to evaluate the extent of the patient's clot burden  On reassessment of the patient she endorsed having intermittent upper abdominal/chest pain over the past couple of weeks so EKG and troponin were added  CT scan showed the patient had a pulmonary embolism  Vascular surgery was consulted who said the patient was appropriate for discharge  They recommended the patient continue taking her Eliquis and elevate her feet at night and follow-up in the vascular clinic in 1 week  All diagnostic studies were discussed in detail with the patient and questions answered  Strict return precautions were discussed  Patient agrees with the plan for discharge and feels comfortable to go home with proper f/u  Advised to return for worsening or additional problems  Diagnostic tests were reviewed and questions answered  Diagnosis, care plan and treatment options were discussed  The patient understands instructions and will follow up as directed  Disposition  Final diagnoses:   Pulmonary embolism ([facility] [address] )   Lower extremity edema   DVT (deep venous thrombosis) ([facility] [address] )     Time reflects when diagnosis was documented in both MDM as applicable and the Disposition within this note     Time User Action Codes Description Comment    9/12/2020  3:24 PM Leretha Bio Add [I26 99] Pulmonary embolism ([address] )     9/12/2020  3:25 PM Leretha Bio Add [R60 0] Lower extremity edema     9/12/2020  5:32 PM Leretha Bio Add [I82 409] DVT (deep venous thrombosis) [facility])       ED Disposition     ED Disposition Condition Date/Time Comment    Discharge Stable Sat Sep 12, 2020  3:23 PM Ely Cantrell discharge to home/self care              Follow-up Information     Follow up With Specialties Details Why Contact Info Additional Information    Marrian Cabot, MD Internal Medicine Go to  If symptoms worsen [address]  [facility] [facility] Emergency Department Emergency Medicine Go to  If symptoms worsen [address]  [phone]  ED, [address] [facility] [address], [place], [place], [facility] [address]    [address] [facility] Vascular Surgery Schedule an appointment as soon as possible for a visit in 1 week  [address] [facility] [address]  [phone] [address] [facility], [address] [facility] [address], [address], [place], [place], [address]           Discharge Medication List as of 9/12/2020  3:27 PM      CONTINUE these medications which have NOT CHANGED    Details   apixaban (ELIQUIS) 5 mg Multiple Dosages:Starting Wed 9/9/2020, Last dose on Tue 9/15/2020, THEN Starting Wed 9/16/2020, Last dose on Thu 10/8/2020Take 2 tablets (10 mg total) by mouth 2 (two) times a day for 7 days, THEN 1 tablet (5 mg total) 2 (two) times a day for 23 days   , Normal      ALPRAZolam (XANAX) 1 mg tablet Take 1 tablet (1 mg total) by mouth daily at bedtime as needed for anxiety, Starting Wed 8/26/2020, Normal      ALYACEN 1/35 1-35 MG-MCG per tablet Take 1 tablet by mouth daily, Starting Thu 5/7/2020, Historical Med      cholecalciferol (VITAMIN D3) 1,000 units tablet Take 1 tablet (1,000 Units total) by mouth daily, Starting Thu 6/14/2018, No Print      clindamycin (CLEOCIN) 300 MG capsule Take 1 capsule (300 mg total) by mouth every 6 (six) hours for 8 days, Starting Wed 9/9/2020, Until Thu 9/17/2020, Normal      methadone (DOLOPHINE) 10 mg tablet 1 tablet 3 times daily as needed for chronic pain,, Normal      omeprazole (PriLOSEC) 20 mg delayed release capsule take 1 capsule by mouth daily, Normal      Pediatric Multiple Vit-C-FA (FRUITY CHEWABLES MULTIVITAMIN) CHEW Chew 1 tablet daily , Historical Med      sertraline (ZOLOFT) 100 mg tablet take 2 tablets by mouth daily, Normal      sucralfate (CARAFATE) 1 g/10 mL suspension Take 10 mL (1 g total) by mouth 4 (four) times a day as needed (dyspepsia), Starting Thu 4/18/2019, Normal      topiramate (TOPAMAX) 50 MG tablet take 1 tablet by mouth twice a day, Normal      zolpidem (AMBIEN) 10 mg tablet Take 1 tablet (10 mg total) by mouth daily at bedtime as needed for sleep, Starting Fri 6/26/2020, Normal           No discharge procedures on file  PDMP Review       Value Time User    PDMP Reviewed  Yes 9/8/2020  6:17 PM Jose Poole MD           ED Provider  Attending physically available and evaluated Trace Flanagan  VINICIO managed the patient along with the ED Attending      Electronically Signed by         Justine Chávez MD  09/12/20 9360

## 2020-09-14 ENCOUNTER — TRANSITIONAL CARE MANAGEMENT (OUTPATIENT)
Dept: INTERNAL MEDICINE CLINIC | Facility: CLINIC | Age: 50
End: 2020-09-14

## 2020-09-14 DIAGNOSIS — K21.9 GERD WITHOUT ESOPHAGITIS: ICD-10-CM

## 2020-09-14 DIAGNOSIS — G89.4 PAIN SYNDROME, CHRONIC: ICD-10-CM

## 2020-09-14 RX ORDER — METHADONE HYDROCHLORIDE 10 MG/1
TABLET ORAL
Qty: 90 TABLET | Refills: 0 | Status: SHIPPED | OUTPATIENT
Start: 2020-09-14 | End: 2020-10-12 | Stop reason: SDUPTHER

## 2020-09-14 RX ORDER — ALPRAZOLAM 1 MG/1
TABLET ORAL
Qty: 10 TABLET | Refills: 0 | Status: SHIPPED | OUTPATIENT
Start: 2020-09-14 | End: 2020-09-20

## 2020-09-14 RX ORDER — SUCRALFATE ORAL 1 G/10ML
1 SUSPENSION ORAL 4 TIMES DAILY PRN
Qty: 420 ML | Refills: 2 | Status: SHIPPED | OUTPATIENT
Start: 2020-09-14

## 2020-09-14 NOTE — PATIENT INSTRUCTIONS
Deep Venous Thrombosis   WHAT YOU NEED TO KNOW:   What is deep venous thrombosis? Deep venous thrombosis (DVT) is a blood clot that forms in a deep vein of the body  The deep veins in the legs, thighs, and hips are the most common sites for DVT  DVT can also occur in a deep vein within your arms  The clot prevents the normal flow of blood in the vein  The blood backs up and causes pain and swelling  The DVT can break into smaller pieces and travel to your lungs and cause a blockage called a pulmonary embolism  A pulmonary embolism can become life-threatening  What increases my risk for DVT? You may be at higher risk if you have had DVT before or you have a family history of blood clots  The following conditions also increase your risk:  · Age older than 60 years    · Obesity    · Injury to a deep vein, or surgery    · Use of hormone replacement therapy or birth control medicine such as pills or patches    · Pregnancy, and up to 6 weeks after childbirth     · A blood disorder that makes your blood clot faster than normal, such as factor V Leiden mutation    · Cancer or heart failure     · Limited mobility caused by bed rest, a leg cast, or sitting for long periods    · Varicose veins    · Catheter placed in a large vein  What are the signs and symptoms of DVT? · Swelling    · Redness    · Warmth, pain, or tenderness  How is DVT diagnosed? · A D-dimer blood test  may be done to check for signs of a blood clot  · An ultrasound  uses sound waves to show pictures on a monitor  An ultrasound may be done to show a clot in your vein  · Contrast venography  is an x-ray of a vein  Contrast liquid is used to make the vein easier to see on the x-ray  Tell a healthcare provider if you have ever had an allergic reaction to contrast liquid  How is DVT treated? · Blood thinners  help prevent the DVT from getting bigger and prevent new clots from forming   Examples of blood thinners include heparin, rivaroxaban, apixiban, and warfarin  The following are general safety guidelines to follow while you are taking a blood thinner:     ¨ Watch for bleeding and bruising  Watch for bleeding from your gums or nose  Watch for blood in your urine and bowel movements  Use a soft washcloth on your skin, and a soft toothbrush to brush your teeth  This can keep your skin and gums from bleeding  If you shave, use an electric shaver  Do not play contact sports  ¨ Tell your dentist and other healthcare providers that you take a blood thinner  Wear a bracelet or necklace that says you take this medicine  ¨ Do not start or stop any medicines unless your healthcare provider tells you to  Many medicines cannot be used with blood thinners  ¨ Tell your healthcare provider right away if you forget to take the blood thinner , or if you take too much  ¨ Warfarin  is a blood thinner that you may need to take  The following are additional things you should be aware of if you take warfarin:    § Foods and medicines can affect the amount of warfarin in your blood  Do not make major changes to your diet  Warfarin works best when you eat about the same amount of vitamin K every day  Vitamin K is found in green leafy vegetables and certain other foods  Ask for more information about what to eat or not to eat  § You will need to see your healthcare provider for follow-up visits  You will need regular blood tests to decide how much warfarin you need  · Clot busters  are emergency medicines that work to dissolve blood clots  They cannot be used during pregnancy or in people with medical conditions that increase their risk of bleeding  · A vena cava filter  may be placed inside your vena cava to treat your DVT  The vena cava is a large vein that brings blood from your lower body up to your heart  The filter traps blood clots and prevents them from going into your lungs       · Surgery , called a thrombectomy, may be done to remove the clot  A procedure called thrombolysis may instead be done to inject a clot buster that helps break the clot apart  How can I manage my DVT? · Wear pressure stockings  The stockings are tight and put pressure on your legs  This improves blood flow and helps prevent clots  Wear the stockings during the day  Do not wear them when you sleep  · Elevate your legs  above the level of your heart as often as you can  This will help decrease swelling and pain  Prop your legs on pillows or blankets to keep them elevated comfortably  · Exercise regularly  to help increase your blood flow  Walking is a good low-impact exercise  Talk to your healthcare provider about the best exercise plan for you  · Change body positions often  If you travel by car or work at a desk, move and stretch in your seat several times each hour  In an airplane, get up and walk every hour  If you are bedridden, ask for help to change your position every 1 to 2 hours  · Maintain a healthy weight  Ask your healthcare provider how much you should weigh  Ask him to help you create a weight loss plan if you are overweight  · Do not smoke  Nicotine and other chemicals in cigarettes and cigars can damage blood vessels and make it more difficult to manage your DVT  Ask your healthcare provider for information if you currently smoke and need help to quit  E-cigarettes or smokeless tobacco still contain nicotine  Talk to your healthcare provider before you use these products  Call 911 for the following:   · You feel lightheaded, short of breath, and have chest pain  · You cough up blood  When should I seek immediate care? · You develop new DVT symptoms in another leg or arm  When should I contact my healthcare provider? · Your gums or nose bleed  · You see blood in your urine or bowel movements      · Your bowel movements are black or darker than normal     · You have questions or concerns about your conditions or care  CARE AGREEMENT:   You have the right to help plan your care  Learn about your health condition and how it may be treated  Discuss treatment options with your caregivers to decide what care you want to receive  You always have the right to refuse treatment  The above information is an  only  It is not intended as medical advice for individual conditions or treatments  Talk to your doctor, nurse or pharmacist before following any medical regimen to see if it is safe and effective for you  © 2017 [employer]  Information is for End User's use only and may not be sold, redistributed or otherwise used for commercial purposes  All illustrations and images included in CareNotes® are the copyrighted property of A [employer] M [employer]  or [employer]  -continue anticoagulation (Eliquis)  Recommend anticoagulation therapy x 3-6 months at least   Possibly may require long-term anticoagulation therapy given unprovoked PE/DVT  Duration of anticoagulation therapy to be determine by hematology  Hematology evaluation scheduled for 10/29/2020   -will repeat lower extremity venous ultrasound in 3 months and return to office for re-evaluation to discuss duration of anticoagulation therapy  -recommend daily use of compression stockings and elevation of legs for symptomatic relief of swelling  -please contact the office with new symptoms or concerns   -follow-up with your PCP regarding cerumen impaction of the left ear prior to flying   -be sure to wear compression stockings and stay significantly hydrated on flight [place]  Do not miss any doses of Eliquis

## 2020-09-15 ENCOUNTER — OFFICE VISIT (OUTPATIENT)
Dept: VASCULAR SURGERY | Facility: CLINIC | Age: 50
End: 2020-09-15
Payer: COMMERCIAL

## 2020-09-15 VITALS
TEMPERATURE: 97.8 F | WEIGHT: 170 LBS | BODY MASS INDEX: 28.32 KG/M2 | DIASTOLIC BLOOD PRESSURE: 70 MMHG | HEART RATE: 68 BPM | SYSTOLIC BLOOD PRESSURE: 106 MMHG | HEIGHT: 65 IN

## 2020-09-15 DIAGNOSIS — I82.411 ACUTE DEEP VEIN THROMBOSIS (DVT) OF FEMORAL VEIN OF RIGHT LOWER EXTREMITY (HCC): Primary | ICD-10-CM

## 2020-09-15 DIAGNOSIS — G89.4 PAIN SYNDROME, CHRONIC: ICD-10-CM

## 2020-09-15 DIAGNOSIS — I83.893 SYMPTOMATIC VARICOSE VEINS OF BOTH LOWER EXTREMITIES: ICD-10-CM

## 2020-09-15 DIAGNOSIS — I82.531 CHRONIC DEEP VEIN THROMBOSIS (DVT) OF RIGHT POPLITEAL VEIN (HCC): ICD-10-CM

## 2020-09-15 PROCEDURE — 1036F TOBACCO NON-USER: CPT | Performed by: PHYSICIAN ASSISTANT

## 2020-09-15 PROCEDURE — 99214 OFFICE O/P EST MOD 30 MIN: CPT | Performed by: PHYSICIAN ASSISTANT

## 2020-09-15 NOTE — ASSESSMENT & PLAN NOTE
-chronic right hip pain resulting from prior abdominoplasty   -on methadone  -continue current medical management per PCP

## 2020-09-15 NOTE — ASSESSMENT & PLAN NOTE
-incidental finding chronic nonocclusive R popliteal impaired peroneal and PT DVT on LEVDR 8/19/2020  -no prior knowledge or hx of DVT/PE  -no family history VTE or hypercoagulable disorder  -see plan as outlined

## 2020-09-15 NOTE — PROGRESS NOTES
Assessment/Plan:    Acute deep vein thrombosis (DVT) of femoral vein of right lower extremity [facility])  47-year-old female nonsmoker with GERD, anxiety/depression, s/p gastric bypass '10 with sustained 200 lbs weight loss, s/p abdominoplasty x 2 c/b by chronic pain syndrome on methadone, malabsorption syndrome, iron deficiency anemia, migraine, hx R lower leg phlebitis, BLE varicose veins, R>L, with minimally symptomatic R anterior thigh truncal varicosity without venous incompetence, incidental finding of chronic nonocclusive R popliteal/peroneal/PT DVT on LEVDR 8/19/2020 and now acute unprovoked propagation to R distal femoral vein DVT w/associated RLL PE 9/8/2020  Eliquis initiated at time of diagnosis  RLE edema and petechial rash nearly resolved  No chest pain, dyspnea on exertion, shortness of breath at rest, orthopnea or PND  -continue anticoagulation (Eliquis)  Recommend anticoagulation therapy at least 6 months, but likely will require long-term anticoagulation therapy given unprovoked and recurrent nature of PE/DVT  Duration of anticoagulation therapy to be determine by hematology  Hematology evaluation scheduled for 10/29/2020  Need to r/o occult malignancy  -will repeat lower extremity venous ultrasound in 3 months to re-evaluate clot burden   -return to office in 3 months with surgeon with [facility] for re-evaluation to discuss duration of anticoagulation therapy  -recommend daily use of compression stockings and elevation of legs for symptomatic relief of swelling  -instructed to contact the office in the interim with any questions, concerns or new symptoms  -discussed at length the pathophysiology of VTE, hypercoagulable workup and long-term management  Multiple questions answered  Discuss the importance of anticoagulation compliance, use of compression and adequate hydration for upcoming flight to [place] to see patient's terminal mother      Chronic deep vein thrombosis (DVT) of right popliteal vein (HCC)  -incidental finding chronic nonocclusive R popliteal impaired peroneal and PT DVT on LEVDR 8/19/2020  -no prior knowledge or hx of DVT/PE  -no family history VTE or hypercoagulable disorder  -see plan as outlined    Symptomatic varicose veins of both lower extremities  -minimally symptomatic right anterior thigh truncal varicosity  Majority of symptoms thought to be unrelated and no intervention recommended by Dr Jodee Hagan Doctor at last appointment   -no evidence of deep or superficial venous incompetence on recent LEVDR  -phlebectomy can be considered in the future if varicosities become symptomatic    Pain syndrome, chronic  -chronic right hip pain resulting from prior abdominoplasty   -on methadone  -continue current medical management per PCP       Diagnoses and all orders for this visit:    Acute deep vein thrombosis (DVT) of femoral vein of right lower extremity (HCC)  -     VAS lower limb venous duplex study, unilateral/limited; Future    Chronic deep vein thrombosis (DVT) of right popliteal vein (HCC)    Symptomatic varicose veins of both lower extremities    Pain syndrome, chronic          Subjective:      Patient ID: Camila Grimes is a 44 y o  female  Patient has a HX of DVT of the RLE and was in patient from 09/8/20-9-9/20  Patient then went back on 9/12/20 for increased RLE sweling  Patient was also discovered to have PE from the CTA of the chest PE study done on 9/12/20  Patient continues to have some swelling of the RLE however it has improved  Patient C/o of aching of the RLE  Patient continues to have Rash on the RLE, but it has improved since taking the Clindamycin   Patient currently takes Eliquis    61-year-old female nonsmoker with GERD, anxiety/depression, s/p gastric bypass '10 with sustained 200 lbs weight loss, s/p abdominoplasty x 2 c/b by chronic pain syndrome on methadone, malabsorption syndrome, iron deficiency anemia, migraine, hx R lower leg phlebitis, BLE varicose veins, R>L, with minimally symptomatic R anterior thigh truncal varicosity without venous incompetence, incidental finding of chronic nonocclusive R popliteal/peroneal/PT DVT on LEVDR 8/19/2020 and now acute unprovoked propagation to R distal femoral vein DVT w/associated RLL PE 9/8/2020  Eliquis initiated at time of diagnosis  Patient evaluated by the vascular service during recent hospitalization re-evaluation the ER 9/12/2020  Patient returns the office for follow-up and clarification of management  RLE edema and petechial rash nearly resolved  Clindamycin initiated during hospitalization for possible right lower extremity cellulitis vs petechial rash secondary to coagulopathy  No chest pain, dyspnea on exertion, shortness of breath at rest, orthopnea or PND  Patient continues to wear compression stocking  The following portions of the patient's history were reviewed and updated as appropriate: allergies, current medications, past family history, past medical history, past social history, past surgical history and problem list     Review of Systems   Constitutional: Negative  Negative for chills and fever  HENT: Negative  Eyes: Negative  Respiratory: Positive for shortness of breath (mostly resolved, still intermittent at times )  Negative for chest tightness  Cardiovascular: Positive for leg swelling (RLE )  Negative for chest pain  Gastrointestinal: Negative  Endocrine: Negative  Genitourinary: Negative  Musculoskeletal:        Patient C/o of aching in the RLE    Skin: Negative  Negative for color change and wound  Allergic/Immunologic: Negative  Neurological: Negative  Hematological: Negative  Psychiatric/Behavioral: Negative  I have reviewed and made appropriate changes to the review of systems input by the medical assistant      Vitals:    09/15/20 1639   BP: 106/70   BP Location: Right arm   Patient Position: Sitting   Cuff Size: Standard   Pulse: 68   Temp: 97 8 °F (36 6 °C)   TempSrc: Tympanic   Weight: 77 1 kg (170 lb)   Height: 5' 5" (1 651 m)       Patient Active Problem List   Diagnosis    Allergic rhinitis    Anxiety    Depression    GERD without esophagitis    Insomnia    Migraine, unspecified, not intractable, without status migrainosus    Pain syndrome, chronic    Postgastrectomy malabsorption    Vitamin D deficiency    Wartenberg syndrome    History of bariatric surgery    Iron deficiency anemia    ASCUS with positive high risk HPV cervical    Urinary tract infection with hematuria    Symptomatic varicose veins of both lower extremities    Subdeltoid bursitis of left shoulder joint    Acute deep vein thrombosis (DVT) of femoral vein of right lower extremity (HCC)    Chronic deep vein thrombosis (DVT) of right popliteal vein (HCC)    Neutrophilic leukocytosis    Impacted cerumen of left ear       Past Surgical History:   Procedure Laterality Date    ABDOMINOPLASTY      x2    CHOLECYSTECTOMY      GASTRIC BYPASS      FOR MORBID OBESITY    INCISIONAL HERNIA REPAIR      SMALL INTESTINE SURGERY         Family History   Problem Relation Age of Onset    Diabetes Family         MELLITUS    Cancer Family     Hypertension Family     Osteoporosis Family     Asthma Family     Stroke Family         SYNDROME    Breast cancer Mother     COPD Father     Asthma Child        Social History     Socioeconomic History    Marital status: /Civil Union     Spouse name: Not on file    Number of children: Not on file    Years of education: Not on file    Highest education level: Not on file   Occupational History    Not on file   Social Needs    Financial resource strain: Not on file    Food insecurity     Worry: Not on file     Inability: Not on file    Transportation needs     Medical: Not on file     Non-medical: Not on file   Tobacco Use    Smoking status: Never Smoker    Smokeless tobacco: Never Used   Substance and Sexual Activity    Alcohol use: No     Comment: CONSUMES ALCOHOL OCCASIONALLY    Drug use: Never     Comment: usues methadone for pain relief    Sexual activity: Not on file   Lifestyle    Physical activity     Days per week: Not on file     Minutes per session: Not on file    Stress: Not on file   Relationships    Social connections     Talks on phone: Not on file     Gets together: Not on file     Attends Restorationist service: Not on file     Active member of club or organization: Not on file     Attends meetings of clubs or organizations: Not on file     Relationship status: Not on file    Intimate partner violence     Fear of current or ex partner: Not on file     Emotionally abused: Not on file     Physically abused: Not on file     Forced sexual activity: Not on file   Other Topics Concern    Not on file   Social History Narrative    Not on file       Allergies   Allergen Reactions    Amoxicillin Hives and Shortness Of Breath    Aspirin Hives and Other (See Comments)     Short of breath    Butorphanol Anaphylaxis and Other (See Comments)     Other reaction(s): BUTORPHANOL TARTRATE (STADOL) (loss of breath)    Morphine Other (See Comments) and Hallucinations     takes vicodin at home    Duloxetine     Azithromycin Rash    Gabapentin Palpitations    Nitrofurantoin Hives and Rash    Sulfa Antibiotics Hives, Rash and Other (See Comments)         Current Outpatient Medications:     ALPRAZolam (XANAX) 1 mg tablet, TAKE ONE TABLET BY MOUTH DAILY AT BEDTIME AS NEEDED FOR ANXIETY, Disp: 10 tablet, Rfl: 0    ALYACEN 1/35 1-35 MG-MCG per tablet, Take 1 tablet by mouth daily, Disp: , Rfl:     apixaban (ELIQUIS) 5 mg, Take 2 tablets (10 mg total) by mouth 2 (two) times a day for 7 days, THEN 1 tablet (5 mg total) 2 (two) times a day for 23 days  , Disp: 74 tablet, Rfl: 2    cholecalciferol (VITAMIN D3) 1,000 units tablet, Take 1 tablet (1,000 Units total) by mouth daily, Disp: 30 tablet, Rfl: 0    clindamycin (CLEOCIN) 300 MG capsule, Take 1 capsule (300 mg total) by mouth every 6 (six) hours for 8 days, Disp: 32 capsule, Rfl: 0    methadone (DOLOPHINE) 10 mg tablet, 1 tablet 3 times daily as needed for chronic pain,, Disp: 90 tablet, Rfl: 0    omeprazole (PriLOSEC) 20 mg delayed release capsule, take 1 capsule by mouth daily, Disp: 90 capsule, Rfl: 3    Pediatric Multiple Vit-C-FA (FRUITY CHEWABLES MULTIVITAMIN) CHEW, Chew 1 tablet daily , Disp: , Rfl:     sertraline (ZOLOFT) 100 mg tablet, take 2 tablets by mouth daily (Patient taking differently: Take 100 mg by mouth 2 (two) times a day ), Disp: 60 tablet, Rfl: 5    sucralfate (Carafate) 1 g/10 mL suspension, Take 10 mL (1 g total) by mouth 4 (four) times a day as needed (dyspepsia), Disp: 420 mL, Rfl: 2    topiramate (TOPAMAX) 50 MG tablet, take 1 tablet by mouth twice a day, Disp: 60 tablet, Rfl: 5    zolpidem (AMBIEN) 10 mg tablet, Take 1 tablet (10 mg total) by mouth daily at bedtime as needed for sleep, Disp: 90 tablet, Rfl: 0    Objective:  Imaging study:  LEVD 9/8/2020:  Imaging study reviewed  Subacute versus chronic DVT of the proximal to distal femoral vein, popliteal, gastrocnemius, paired peroneal and posterior tibial veins    CTA chest 9/12/2020:  Imaging study reviewed  Right lower lobe pulmonary embolus  CTA abdomen/pelvis with venous phase 9/12/2020:  Imaging study reviewed  No aortoiliac occlusive disease or ileal caval DVT  Patent iliac and vena cava  /70 (BP Location: Right arm, Patient Position: Sitting, Cuff Size: Standard)   Pulse 68   Temp 97 8 °F (36 6 °C) (Tympanic)   Ht 5' 5" (1 651 m)   Wt 77 1 kg (170 lb)   BMI 28 29 kg/m²          Physical Exam  Vitals signs and nursing note reviewed  Constitutional:       General: She is not in acute distress  Appearance: She is well-developed  HENT:      Head: Normocephalic and atraumatic  Eyes:      General: No scleral icterus       Conjunctiva/sclera: Conjunctivae normal       Pupils: Pupils are equal, round, and reactive to light  Neck:      Musculoskeletal: Normal range of motion and neck supple  Thyroid: No thyromegaly  Vascular: No carotid bruit or JVD  Trachea: No tracheal deviation  Cardiovascular:      Rate and Rhythm: Normal rate and regular rhythm  Pulses:           Carotid pulses are 2+ on the right side and 2+ on the left side  Radial pulses are 2+ on the right side and 2+ on the left side  Dorsalis pedis pulses are 2+ on the right side and 2+ on the left side  Posterior tibial pulses are 2+ on the right side and 2+ on the left side  Heart sounds: S1 normal and S2 normal  No murmur  No friction rub  No gallop  No S3 sounds  Comments: Minimal right lower leg swelling  No evidence of phlegmasia  No erythema  Bilateral lower extremities warm, pink, motor and sensory intact and well perfused without cyanosis, pallor, rubor, edema, lipodermatosclerosis, hemosiderin staining or tissue loss  Prominent truncal varicosity right anterior thigh from groin to knee  Reticular varicosities noted bilateral anterior thigh, right medial knee and to lesser degree bilateral calves  Pulmonary:      Effort: No respiratory distress  Breath sounds: Normal breath sounds  No stridor  No wheezing, rhonchi or rales  Abdominal:      General: Bowel sounds are normal  There is no distension or abdominal bruit  Palpations: Abdomen is soft  There is no mass or pulsatile mass  Tenderness: There is no abdominal tenderness  There is no rebound  Musculoskeletal: Normal range of motion  General: No deformity  Skin:     General: Skin is warm and dry  Coloration: Skin is not pale  Findings: Rash (Minimal residual petechial rash on right lower leg  No evidence of cellulitis) present  No erythema or lesion  Neurological:      General: No focal deficit present        Mental Status: She is alert and oriented to person, place, and time  Psychiatric:         Mood and Affect: Mood normal          Thought Content:  Thought content normal

## 2020-09-15 NOTE — ASSESSMENT & PLAN NOTE
-minimally symptomatic right anterior thigh truncal varicosity    Majority of symptoms thought to be unrelated and no intervention recommended by Dr Delpha Romberg Doctor at last appointment   -no evidence of deep or superficial venous incompetence on recent LEVDR  -phlebectomy can be considered in the future if varicosities become symptomatic

## 2020-09-15 NOTE — ASSESSMENT & PLAN NOTE
49-year-old female nonsmoker with GERD, anxiety/depression, s/p gastric bypass '10 with sustained 200 lbs weight loss, s/p abdominoplasty x 2 c/b by chronic pain syndrome on methadone, malabsorption syndrome, iron deficiency anemia, migraine, hx R lower leg phlebitis, BLE varicose veins, R>L, with minimally symptomatic R anterior thigh truncal varicosity without venous incompetence, incidental finding of chronic nonocclusive R popliteal/peroneal/PT DVT on LEVDR 8/19/2020 and now acute unprovoked propagation to R distal femoral vein DVT w/associated RLL PE 9/8/2020  Eliquis initiated at time of diagnosis  RLE edema and petechial rash nearly resolved  No chest pain, dyspnea on exertion, shortness of breath at rest, orthopnea or PND  -continue anticoagulation (Eliquis)  Recommend anticoagulation therapy at least 6 months, but likely will require long-term anticoagulation therapy given unprovoked and recurrent nature of PE/DVT  Duration of anticoagulation therapy to be determine by hematology  Hematology evaluation scheduled for 10/29/2020  Need to r/o occult malignancy  -will repeat lower extremity venous ultrasound in 3 months to re-evaluate clot burden   -return to office in 3 months with surgeon with [facility] for re-evaluation to discuss duration of anticoagulation therapy  -recommend daily use of compression stockings and elevation of legs for symptomatic relief of swelling  -instructed to contact the office in the interim with any questions, concerns or new symptoms  -discussed at length the pathophysiology of VTE, hypercoagulable workup and long-term management  Multiple questions answered  Discuss the importance of anticoagulation compliance, use of compression and adequate hydration for upcoming flight to [place] to see patient's terminal mother

## 2020-09-20 DIAGNOSIS — F41.9 ANXIETY: ICD-10-CM

## 2020-09-20 RX ORDER — ALPRAZOLAM 1 MG/1
TABLET ORAL
Qty: 30 TABLET | Refills: 0 | Status: SHIPPED | OUTPATIENT
Start: 2020-09-20 | End: 2020-10-22

## 2020-09-21 DIAGNOSIS — F51.01 PRIMARY INSOMNIA: ICD-10-CM

## 2020-09-22 RX ORDER — ZOLPIDEM TARTRATE 10 MG/1
10 TABLET ORAL
Qty: 30 TABLET | Refills: 0 | Status: SHIPPED | OUTPATIENT
Start: 2020-09-22 | End: 2020-10-21 | Stop reason: SDUPTHER

## 2020-09-23 ENCOUNTER — TELEPHONE (OUTPATIENT)
Dept: VASCULAR SURGERY | Facility: CLINIC | Age: 50
End: 2020-09-23

## 2020-09-23 NOTE — TELEPHONE ENCOUNTER
Patient has history of DVT of RLE 9/8/20 and PE on 9/12/20  Patient is on Eliquis and recently saw Nataly Kyle in office 9/15 and was advised to wear compression stockings and stay significantly hydrated when flying to [facility]  Patient is currently in [facility] visiting her ill mother  She had been doing well, wearing compression daily  She calls office today to report her R calf and foot are swelling  Patient admits to [place] day [place], and being in a car for 4 hours a day ago  Patient states she is not elevating the extremities as she had been after being d/c from hospital   Advised patient she should be elevating her legs several times a day, and try not to keep legs in dependent position for long periods as this will increase swelling  She confirms she has not missed any doses of Eliquis  She will monitor the swelling for the next day or so and will elevate legs more often along with wearing compression  She is planning on flying home on 9/26  She understands if the swelling continues or becomes worse, despite conservative measures, or she develops additional symptoms, she will report to local ED in [facility]

## 2020-09-28 ENCOUNTER — TELEPHONE (OUTPATIENT)
Dept: HEMATOLOGY ONCOLOGY | Facility: CLINIC | Age: 50
End: 2020-09-28

## 2020-09-28 NOTE — TELEPHONE ENCOUNTER
Appointment Confirmation     Appointment with  Dr Dahlia Cox    Appointment date & time 10/29/2020 9am    Location [place]    Patient verbilized Understanding Yes

## 2020-10-12 DIAGNOSIS — G89.4 PAIN SYNDROME, CHRONIC: ICD-10-CM

## 2020-10-13 RX ORDER — METHADONE HYDROCHLORIDE 10 MG/1
TABLET ORAL
Qty: 90 TABLET | Refills: 0 | Status: SHIPPED | OUTPATIENT
Start: 2020-10-13 | End: 2020-11-13 | Stop reason: SDUPTHER

## 2020-10-21 DIAGNOSIS — F51.01 PRIMARY INSOMNIA: ICD-10-CM

## 2020-10-21 RX ORDER — ZOLPIDEM TARTRATE 10 MG/1
10 TABLET ORAL
Qty: 30 TABLET | Refills: 0 | Status: SHIPPED | OUTPATIENT
Start: 2020-10-21 | End: 2020-11-17 | Stop reason: SDUPTHER

## 2020-10-22 DIAGNOSIS — F41.9 ANXIETY: ICD-10-CM

## 2020-10-22 RX ORDER — ALPRAZOLAM 1 MG/1
TABLET ORAL
Qty: 30 TABLET | Refills: 1 | Status: SHIPPED | OUTPATIENT
Start: 2020-10-22 | End: 2020-12-09 | Stop reason: SDUPTHER

## 2020-10-27 ENCOUNTER — TELEPHONE (OUTPATIENT)
Dept: HEMATOLOGY ONCOLOGY | Facility: CLINIC | Age: 50
End: 2020-10-27

## 2020-10-29 ENCOUNTER — CONSULT (OUTPATIENT)
Dept: HEMATOLOGY ONCOLOGY | Facility: CLINIC | Age: 50
End: 2020-10-29
Payer: COMMERCIAL

## 2020-10-29 VITALS
HEIGHT: 65 IN | BODY MASS INDEX: 28.62 KG/M2 | TEMPERATURE: 97.2 F | HEART RATE: 68 BPM | OXYGEN SATURATION: 96 % | SYSTOLIC BLOOD PRESSURE: 122 MMHG | WEIGHT: 171.8 LBS | RESPIRATION RATE: 16 BRPM | DIASTOLIC BLOOD PRESSURE: 76 MMHG

## 2020-10-29 DIAGNOSIS — I82.409 ACUTE DVT (DEEP VENOUS THROMBOSIS) (HCC): ICD-10-CM

## 2020-10-29 DIAGNOSIS — I26.99 OTHER PULMONARY EMBOLISM WITHOUT ACUTE COR PULMONALE, UNSPECIFIED CHRONICITY (HCC): ICD-10-CM

## 2020-10-29 DIAGNOSIS — I82.5Y1 CHRONIC DEEP VEIN THROMBOSIS (DVT) OF PROXIMAL VEIN OF RIGHT LOWER EXTREMITY (HCC): ICD-10-CM

## 2020-10-29 DIAGNOSIS — D72.9 NEUTROPHILIC LEUKOCYTOSIS: Primary | ICD-10-CM

## 2020-10-29 PROCEDURE — 99245 OFF/OP CONSLTJ NEW/EST HI 55: CPT | Performed by: INTERNAL MEDICINE

## 2020-10-29 PROCEDURE — 3008F BODY MASS INDEX DOCD: CPT | Performed by: INTERNAL MEDICINE

## 2020-11-06 DIAGNOSIS — G43.909 MIGRAINE WITHOUT STATUS MIGRAINOSUS, NOT INTRACTABLE, UNSPECIFIED MIGRAINE TYPE: ICD-10-CM

## 2020-11-09 RX ORDER — TOPIRAMATE 50 MG/1
TABLET, FILM COATED ORAL
Qty: 60 TABLET | Refills: 5 | Status: SHIPPED | OUTPATIENT
Start: 2020-11-09 | End: 2021-04-21 | Stop reason: DRUGHIGH

## 2020-11-13 DIAGNOSIS — G89.4 PAIN SYNDROME, CHRONIC: ICD-10-CM

## 2020-11-13 DIAGNOSIS — K21.9 GERD WITHOUT ESOPHAGITIS: ICD-10-CM

## 2020-11-13 RX ORDER — METHADONE HYDROCHLORIDE 10 MG/1
TABLET ORAL
Qty: 90 TABLET | Refills: 0 | Status: SHIPPED | OUTPATIENT
Start: 2020-11-13 | End: 2020-12-10 | Stop reason: SDUPTHER

## 2020-11-17 DIAGNOSIS — F51.01 PRIMARY INSOMNIA: ICD-10-CM

## 2020-11-17 RX ORDER — ZOLPIDEM TARTRATE 10 MG/1
10 TABLET ORAL
Qty: 30 TABLET | Refills: 0 | Status: SHIPPED | OUTPATIENT
Start: 2020-11-17 | End: 2020-12-15 | Stop reason: SDUPTHER

## 2020-11-30 ENCOUNTER — LAB (OUTPATIENT)
Dept: LAB | Age: 50
End: 2020-11-30
Payer: COMMERCIAL

## 2020-11-30 LAB
ALBUMIN SERPL BCP-MCNC: 3.7 G/DL (ref 3.5–5)
ALP SERPL-CCNC: 93 U/L (ref 46–116)
ALT SERPL W P-5'-P-CCNC: 40 U/L (ref 12–78)
ANION GAP SERPL CALCULATED.3IONS-SCNC: 6 MMOL/L (ref 4–13)
AST SERPL W P-5'-P-CCNC: 41 U/L (ref 5–45)
BILIRUB SERPL-MCNC: 0.25 MG/DL (ref 0.2–1)
BUN SERPL-MCNC: 15 MG/DL (ref 5–25)
CALCIUM SERPL-MCNC: 9 MG/DL (ref 8.3–10.1)
CHLORIDE SERPL-SCNC: 111 MMOL/L (ref 100–108)
CO2 SERPL-SCNC: 22 MMOL/L (ref 21–32)
CREAT SERPL-MCNC: 0.87 MG/DL (ref 0.6–1.3)
ERYTHROCYTE [DISTWIDTH] IN BLOOD BY AUTOMATED COUNT: 13.5 % (ref 11.6–15.1)
GFR SERPL CREATININE-BSD FRML MDRD: 77 ML/MIN/1.73SQ M
GLUCOSE SERPL-MCNC: 152 MG/DL (ref 65–140)
HCT VFR BLD AUTO: 45.8 % (ref 34.8–46.1)
HGB BLD-MCNC: 13.5 G/DL (ref 11.5–15.4)
IRON SATN MFR SERPL: 19 %
IRON SERPL-MCNC: 87 UG/DL (ref 50–170)
MCH RBC QN AUTO: 27.6 PG (ref 26.8–34.3)
MCHC RBC AUTO-ENTMCNC: 29.5 G/DL (ref 31.4–37.4)
MCV RBC AUTO: 94 FL (ref 82–98)
PLATELET # BLD AUTO: 243 THOUSANDS/UL (ref 149–390)
PMV BLD AUTO: 10.3 FL (ref 8.9–12.7)
POTASSIUM SERPL-SCNC: 4.4 MMOL/L (ref 3.5–5.3)
PROT SERPL-MCNC: 7.8 G/DL (ref 6.4–8.2)
RBC # BLD AUTO: 4.9 MILLION/UL (ref 3.81–5.12)
SODIUM SERPL-SCNC: 139 MMOL/L (ref 136–145)
TIBC SERPL-MCNC: 454 UG/DL (ref 250–450)
WBC # BLD AUTO: 8.03 THOUSAND/UL (ref 4.31–10.16)

## 2020-11-30 PROCEDURE — 83550 IRON BINDING TEST: CPT | Performed by: INTERNAL MEDICINE

## 2020-11-30 PROCEDURE — 36415 COLL VENOUS BLD VENIPUNCTURE: CPT | Performed by: INTERNAL MEDICINE

## 2020-11-30 PROCEDURE — 85027 COMPLETE CBC AUTOMATED: CPT | Performed by: INTERNAL MEDICINE

## 2020-11-30 PROCEDURE — 80053 COMPREHEN METABOLIC PANEL: CPT | Performed by: INTERNAL MEDICINE

## 2020-11-30 PROCEDURE — 83540 ASSAY OF IRON: CPT | Performed by: INTERNAL MEDICINE

## 2020-12-02 ENCOUNTER — OFFICE VISIT (OUTPATIENT)
Dept: INTERNAL MEDICINE CLINIC | Facility: CLINIC | Age: 50
End: 2020-12-02
Payer: COMMERCIAL

## 2020-12-02 VITALS
TEMPERATURE: 96.3 F | HEIGHT: 65 IN | SYSTOLIC BLOOD PRESSURE: 127 MMHG | HEART RATE: 64 BPM | BODY MASS INDEX: 27.99 KG/M2 | OXYGEN SATURATION: 99 % | DIASTOLIC BLOOD PRESSURE: 75 MMHG | WEIGHT: 168 LBS

## 2020-12-02 DIAGNOSIS — G43.909 MIGRAINE WITHOUT STATUS MIGRAINOSUS, NOT INTRACTABLE, UNSPECIFIED MIGRAINE TYPE: ICD-10-CM

## 2020-12-02 DIAGNOSIS — E55.9 VITAMIN D DEFICIENCY: ICD-10-CM

## 2020-12-02 DIAGNOSIS — G89.4 PAIN SYNDROME, CHRONIC: ICD-10-CM

## 2020-12-02 DIAGNOSIS — M25.512 LEFT SHOULDER PAIN, UNSPECIFIED CHRONICITY: ICD-10-CM

## 2020-12-02 DIAGNOSIS — D50.8 OTHER IRON DEFICIENCY ANEMIA: ICD-10-CM

## 2020-12-02 DIAGNOSIS — H69.82 ACUTE DYSFUNCTION OF LEFT EUSTACHIAN TUBE: ICD-10-CM

## 2020-12-02 DIAGNOSIS — Z23 FLU VACCINE NEED: Primary | ICD-10-CM

## 2020-12-02 DIAGNOSIS — I82.411 ACUTE DEEP VEIN THROMBOSIS (DVT) OF FEMORAL VEIN OF RIGHT LOWER EXTREMITY (HCC): ICD-10-CM

## 2020-12-02 PROBLEM — M75.52 SUBDELTOID BURSITIS OF LEFT SHOULDER JOINT: Status: RESOLVED | Noted: 2020-08-27 | Resolved: 2020-12-02

## 2020-12-02 PROCEDURE — 99214 OFFICE O/P EST MOD 30 MIN: CPT | Performed by: INTERNAL MEDICINE

## 2020-12-02 PROCEDURE — 3725F SCREEN DEPRESSION PERFORMED: CPT | Performed by: INTERNAL MEDICINE

## 2020-12-02 PROCEDURE — 90471 IMMUNIZATION ADMIN: CPT | Performed by: INTERNAL MEDICINE

## 2020-12-02 PROCEDURE — 90682 RIV4 VACC RECOMBINANT DNA IM: CPT | Performed by: INTERNAL MEDICINE

## 2020-12-02 RX ORDER — FLUTICASONE PROPIONATE 50 MCG
1 SPRAY, SUSPENSION (ML) NASAL DAILY
Qty: 1 BOTTLE | Refills: 1 | Status: SHIPPED | OUTPATIENT
Start: 2020-12-02

## 2020-12-03 ENCOUNTER — APPOINTMENT (OUTPATIENT)
Dept: RADIOLOGY | Facility: OTHER | Age: 50
End: 2020-12-03
Payer: COMMERCIAL

## 2020-12-03 ENCOUNTER — OFFICE VISIT (OUTPATIENT)
Dept: OBGYN CLINIC | Facility: OTHER | Age: 50
End: 2020-12-03
Payer: COMMERCIAL

## 2020-12-03 VITALS
BODY MASS INDEX: 27.99 KG/M2 | DIASTOLIC BLOOD PRESSURE: 72 MMHG | WEIGHT: 168 LBS | HEIGHT: 65 IN | HEART RATE: 63 BPM | SYSTOLIC BLOOD PRESSURE: 112 MMHG

## 2020-12-03 DIAGNOSIS — M25.512 LEFT SHOULDER PAIN, UNSPECIFIED CHRONICITY: ICD-10-CM

## 2020-12-03 DIAGNOSIS — M75.02 ADHESIVE CAPSULITIS OF LEFT SHOULDER: Primary | ICD-10-CM

## 2020-12-03 DIAGNOSIS — M25.512 ACUTE PAIN OF LEFT SHOULDER: ICD-10-CM

## 2020-12-03 PROCEDURE — 20610 DRAIN/INJ JOINT/BURSA W/O US: CPT | Performed by: ORTHOPAEDIC SURGERY

## 2020-12-03 PROCEDURE — 99213 OFFICE O/P EST LOW 20 MIN: CPT | Performed by: ORTHOPAEDIC SURGERY

## 2020-12-03 PROCEDURE — 73030 X-RAY EXAM OF SHOULDER: CPT

## 2020-12-03 RX ORDER — BETAMETHASONE SODIUM PHOSPHATE AND BETAMETHASONE ACETATE 3; 3 MG/ML; MG/ML
6 INJECTION, SUSPENSION INTRA-ARTICULAR; INTRALESIONAL; INTRAMUSCULAR; SOFT TISSUE
Status: COMPLETED | OUTPATIENT
Start: 2020-12-03 | End: 2020-12-03

## 2020-12-03 RX ORDER — BUPIVACAINE HYDROCHLORIDE 2.5 MG/ML
2 INJECTION, SOLUTION INFILTRATION; PERINEURAL
Status: COMPLETED | OUTPATIENT
Start: 2020-12-03 | End: 2020-12-03

## 2020-12-03 RX ADMIN — BUPIVACAINE HYDROCHLORIDE 2 ML: 2.5 INJECTION, SOLUTION INFILTRATION; PERINEURAL at 11:33

## 2020-12-03 RX ADMIN — BETAMETHASONE SODIUM PHOSPHATE AND BETAMETHASONE ACETATE 6 MG: 3; 3 INJECTION, SUSPENSION INTRA-ARTICULAR; INTRALESIONAL; INTRAMUSCULAR; SOFT TISSUE at 11:33

## 2020-12-09 DIAGNOSIS — F41.9 ANXIETY: ICD-10-CM

## 2020-12-09 RX ORDER — ALPRAZOLAM 1 MG/1
1 TABLET ORAL
Qty: 30 TABLET | Refills: 1 | Status: SHIPPED | OUTPATIENT
Start: 2020-12-09 | End: 2021-01-06 | Stop reason: SDUPTHER

## 2020-12-10 DIAGNOSIS — G89.4 PAIN SYNDROME, CHRONIC: ICD-10-CM

## 2020-12-10 RX ORDER — METHADONE HYDROCHLORIDE 10 MG/1
TABLET ORAL
Qty: 90 TABLET | Refills: 0 | Status: SHIPPED | OUTPATIENT
Start: 2020-12-10 | End: 2021-01-11 | Stop reason: SDUPTHER

## 2020-12-11 ENCOUNTER — EVALUATION (OUTPATIENT)
Dept: PHYSICAL THERAPY | Facility: REHABILITATION | Age: 50
End: 2020-12-11
Payer: COMMERCIAL

## 2020-12-11 DIAGNOSIS — M25.512 ACUTE PAIN OF LEFT SHOULDER: Primary | ICD-10-CM

## 2020-12-11 DIAGNOSIS — M75.02 ADHESIVE CAPSULITIS OF LEFT SHOULDER: ICD-10-CM

## 2020-12-11 PROCEDURE — 97161 PT EVAL LOW COMPLEX 20 MIN: CPT | Performed by: PHYSICAL THERAPIST

## 2020-12-14 ENCOUNTER — HOSPITAL ENCOUNTER (OUTPATIENT)
Dept: NON INVASIVE DIAGNOSTICS | Facility: CLINIC | Age: 50
Discharge: HOME | End: 2020-12-14
Payer: COMMERCIAL

## 2020-12-14 DIAGNOSIS — I82.411 ACUTE DEEP VEIN THROMBOSIS (DVT) OF FEMORAL VEIN OF RIGHT LOWER EXTREMITY (HCC): ICD-10-CM

## 2020-12-14 PROCEDURE — 93971 EXTREMITY STUDY: CPT

## 2020-12-14 PROCEDURE — 93971 EXTREMITY STUDY: CPT | Performed by: SURGERY

## 2020-12-15 DIAGNOSIS — F51.01 PRIMARY INSOMNIA: ICD-10-CM

## 2020-12-15 RX ORDER — ZOLPIDEM TARTRATE 10 MG/1
10 TABLET ORAL
Qty: 30 TABLET | Refills: 0 | Status: SHIPPED | OUTPATIENT
Start: 2020-12-15 | End: 2021-01-15 | Stop reason: SDUPTHER

## 2020-12-17 ENCOUNTER — TELEMEDICINE (OUTPATIENT)
Dept: VASCULAR SURGERY | Facility: CLINIC | Age: 50
End: 2020-12-17
Payer: COMMERCIAL

## 2020-12-17 ENCOUNTER — APPOINTMENT (OUTPATIENT)
Dept: PHYSICAL THERAPY | Facility: REHABILITATION | Age: 50
End: 2020-12-17
Payer: COMMERCIAL

## 2020-12-17 VITALS — HEIGHT: 65 IN | WEIGHT: 160 LBS | BODY MASS INDEX: 26.66 KG/M2 | TEMPERATURE: 98.3 F

## 2020-12-17 DIAGNOSIS — I82.511 CHRONIC DEEP VEIN THROMBOSIS (DVT) OF RIGHT FEMORAL VEIN (HCC): Primary | Chronic | ICD-10-CM

## 2020-12-17 PROBLEM — I82.411 ACUTE DEEP VEIN THROMBOSIS (DVT) OF FEMORAL VEIN OF RIGHT LOWER EXTREMITY (HCC): Status: RESOLVED | Noted: 2020-09-08 | Resolved: 2020-12-17

## 2020-12-17 PROCEDURE — 3008F BODY MASS INDEX DOCD: CPT | Performed by: SURGERY

## 2020-12-17 PROCEDURE — 99213 OFFICE O/P EST LOW 20 MIN: CPT | Performed by: SURGERY

## 2020-12-17 PROCEDURE — 1036F TOBACCO NON-USER: CPT | Performed by: SURGERY

## 2020-12-18 ENCOUNTER — OFFICE VISIT (OUTPATIENT)
Dept: PHYSICAL THERAPY | Facility: REHABILITATION | Age: 50
End: 2020-12-18
Payer: COMMERCIAL

## 2020-12-18 DIAGNOSIS — M25.512 ACUTE PAIN OF LEFT SHOULDER: Primary | ICD-10-CM

## 2020-12-18 DIAGNOSIS — M75.02 ADHESIVE CAPSULITIS OF LEFT SHOULDER: ICD-10-CM

## 2020-12-18 PROCEDURE — 97110 THERAPEUTIC EXERCISES: CPT

## 2020-12-18 PROCEDURE — 97112 NEUROMUSCULAR REEDUCATION: CPT

## 2020-12-18 PROCEDURE — 97140 MANUAL THERAPY 1/> REGIONS: CPT

## 2020-12-21 ENCOUNTER — OFFICE VISIT (OUTPATIENT)
Dept: PHYSICAL THERAPY | Facility: REHABILITATION | Age: 50
End: 2020-12-21
Payer: COMMERCIAL

## 2020-12-21 DIAGNOSIS — M75.02 ADHESIVE CAPSULITIS OF LEFT SHOULDER: ICD-10-CM

## 2020-12-21 DIAGNOSIS — M25.512 ACUTE PAIN OF LEFT SHOULDER: Primary | ICD-10-CM

## 2020-12-21 PROCEDURE — 97140 MANUAL THERAPY 1/> REGIONS: CPT

## 2020-12-21 PROCEDURE — 97112 NEUROMUSCULAR REEDUCATION: CPT

## 2020-12-21 PROCEDURE — 97110 THERAPEUTIC EXERCISES: CPT

## 2020-12-23 DIAGNOSIS — I82.409 ACUTE DVT (DEEP VENOUS THROMBOSIS) (HCC): ICD-10-CM

## 2020-12-24 ENCOUNTER — OFFICE VISIT (OUTPATIENT)
Dept: PHYSICAL THERAPY | Facility: REHABILITATION | Age: 50
End: 2020-12-24
Payer: COMMERCIAL

## 2020-12-24 DIAGNOSIS — M25.512 ACUTE PAIN OF LEFT SHOULDER: Primary | ICD-10-CM

## 2020-12-24 DIAGNOSIS — M75.02 ADHESIVE CAPSULITIS OF LEFT SHOULDER: ICD-10-CM

## 2020-12-24 PROCEDURE — 97112 NEUROMUSCULAR REEDUCATION: CPT | Performed by: PHYSICAL THERAPIST

## 2020-12-24 PROCEDURE — 97110 THERAPEUTIC EXERCISES: CPT | Performed by: PHYSICAL THERAPIST

## 2020-12-24 PROCEDURE — 97140 MANUAL THERAPY 1/> REGIONS: CPT | Performed by: PHYSICAL THERAPIST

## 2020-12-28 ENCOUNTER — OFFICE VISIT (OUTPATIENT)
Dept: PHYSICAL THERAPY | Facility: REHABILITATION | Age: 50
End: 2020-12-28
Payer: COMMERCIAL

## 2020-12-28 DIAGNOSIS — M25.512 ACUTE PAIN OF LEFT SHOULDER: Primary | ICD-10-CM

## 2020-12-28 DIAGNOSIS — M75.02 ADHESIVE CAPSULITIS OF LEFT SHOULDER: ICD-10-CM

## 2020-12-28 PROCEDURE — 97110 THERAPEUTIC EXERCISES: CPT

## 2020-12-28 PROCEDURE — 97112 NEUROMUSCULAR REEDUCATION: CPT

## 2020-12-28 PROCEDURE — 97140 MANUAL THERAPY 1/> REGIONS: CPT

## 2020-12-30 ENCOUNTER — OFFICE VISIT (OUTPATIENT)
Dept: PHYSICAL THERAPY | Facility: REHABILITATION | Age: 50
End: 2020-12-30
Payer: COMMERCIAL

## 2020-12-30 DIAGNOSIS — M25.512 ACUTE PAIN OF LEFT SHOULDER: Primary | ICD-10-CM

## 2020-12-30 DIAGNOSIS — M75.02 ADHESIVE CAPSULITIS OF LEFT SHOULDER: ICD-10-CM

## 2020-12-30 PROCEDURE — 97110 THERAPEUTIC EXERCISES: CPT | Performed by: PHYSICAL THERAPIST

## 2020-12-30 PROCEDURE — 97140 MANUAL THERAPY 1/> REGIONS: CPT | Performed by: PHYSICAL THERAPIST

## 2020-12-30 PROCEDURE — 97112 NEUROMUSCULAR REEDUCATION: CPT | Performed by: PHYSICAL THERAPIST

## 2021-01-04 ENCOUNTER — APPOINTMENT (OUTPATIENT)
Dept: PHYSICAL THERAPY | Facility: REHABILITATION | Age: 51
End: 2021-01-04
Payer: COMMERCIAL

## 2021-01-06 ENCOUNTER — OFFICE VISIT (OUTPATIENT)
Dept: PHYSICAL THERAPY | Facility: REHABILITATION | Age: 51
End: 2021-01-06
Payer: COMMERCIAL

## 2021-01-06 DIAGNOSIS — M25.512 ACUTE PAIN OF LEFT SHOULDER: Primary | ICD-10-CM

## 2021-01-06 DIAGNOSIS — M75.02 ADHESIVE CAPSULITIS OF LEFT SHOULDER: ICD-10-CM

## 2021-01-06 DIAGNOSIS — F41.9 ANXIETY: ICD-10-CM

## 2021-01-06 PROCEDURE — 97140 MANUAL THERAPY 1/> REGIONS: CPT | Performed by: PHYSICAL THERAPIST

## 2021-01-06 PROCEDURE — 97112 NEUROMUSCULAR REEDUCATION: CPT | Performed by: PHYSICAL THERAPIST

## 2021-01-06 PROCEDURE — 97110 THERAPEUTIC EXERCISES: CPT | Performed by: PHYSICAL THERAPIST

## 2021-01-06 NOTE — PROGRESS NOTES
Daily Note     Today's date: 2021  Patient name: Jozef Walton  : 1970  MRN: [id number]  Referring provider: No ref  provider found  Dx:   Encounter Diagnosis     ICD-10-CM    1  Acute pain of left shoulder  M25 512    2  Adhesive capsulitis of left shoulder  M75 02                   Subjective: Pt comes to therapy reporting moderate soreness in shoulder, attributed to work duties over the past few days  States she had redness and skin abrasion when taking off tape applied last session  Objective: See treatment diary below      Assessment: Tolerated treatment well  Demonstrated continued hypomobility into flexion, abduction, and ER  However, improved motion post-mobilizations and manuals  Patient exhibited good technique with therapeutic exercises and would benefit from continued PT      Plan: Progress treatment as tolerated         Precautions: HTN    Daily Treatment Diary    Date       FOTO IE    55        Re-Eval IE               Manuals    PROM shoulder flex, abd, ext  TE TE PAGE TE NT PAGE      Tape 2 I strips       NT np      Post-inf glides      NT PAGE                   Neuro Re-Ed     scap retract 5"x10 5"x10 5"2x10 5"x30 5"x30 5"x30 GTB 5"x20                                             Ther Ex    Doorway pec stretch - low 20"x5  30"x3 30"x3 30"x3 30"x3 30"x3 30"x3      Supine pec stretch  30"x3 foam roll 30"x3 foam roll 30"x3 foam roll 30"x3 foam roll 30"x3 30"x3      Supine flex AAROM  5"x10 5"x10 5"x20 5"x20 5"x20 5"x20      Supine ER self-PROM  5"x10 5"x10 5"x20 5"x20 5"x20 5"x20      H/L TS ext over towel  nv seated5"x10 seated5"x10 Seated  10"x10 Seated 10"x10 Seated 10"x10      TB shoulder ext      5"x10 GTB 5"x20                                Ther Activity    Pulleys   5 min 5 min 5 min 5 min 5 min 5 min                   Gait Training                              Modalities

## 2021-01-07 ENCOUNTER — OFFICE VISIT (OUTPATIENT)
Dept: PHYSICAL THERAPY | Facility: REHABILITATION | Age: 51
End: 2021-01-07
Payer: COMMERCIAL

## 2021-01-07 DIAGNOSIS — M25.512 ACUTE PAIN OF LEFT SHOULDER: Primary | ICD-10-CM

## 2021-01-07 DIAGNOSIS — M75.02 ADHESIVE CAPSULITIS OF LEFT SHOULDER: ICD-10-CM

## 2021-01-07 PROCEDURE — 97140 MANUAL THERAPY 1/> REGIONS: CPT | Performed by: PHYSICAL THERAPIST

## 2021-01-07 PROCEDURE — 97112 NEUROMUSCULAR REEDUCATION: CPT | Performed by: PHYSICAL THERAPIST

## 2021-01-07 PROCEDURE — 97110 THERAPEUTIC EXERCISES: CPT | Performed by: PHYSICAL THERAPIST

## 2021-01-07 NOTE — PROGRESS NOTES
Daily Note     Today's date: 2021  Patient name: Maritza North  : 1970  MRN: [id number]  Referring provider: Alissa Weinberg MD  Dx:   Encounter Diagnosis     ICD-10-CM    1  Acute pain of left shoulder  M25 512    2  Adhesive capsulitis of left shoulder  M75 02                   Subjective: Pt denies any adverse responses following last therapy session (yesterday)  Objective: See treatment diary below      Assessment: Tolerated treatment well  Continued hypomobility in L GHJ  Patient exhibited good technique with therapeutic exercises and would benefit from continued PT      Plan: Progress treatment as tolerated         Precautions: HTN    Daily Treatment Diary    Date      FOTO IE    55        Re-Eval IE               Manuals    PROM shoulder flex, abd, ext  TE TE PAGE TE NT PAGE PAGE     Tape 2 I strips       NT np      Post-inf glides      NT PAGE Gr IV PAGE Gr IV                  Neuro Re-Ed     scap retract 5"x10 5"x10 5"2x10 5"x30 5"x30 5"x30 GTB 5"x20 GTB 2x15                                            Ther Ex    Doorway pec stretch - low 20"x5  30"x3 30"x3 30"x3 30"x3 30"x3 30"x3 30"x3     Supine pec stretch  30"x3 foam roll 30"x3 foam roll 30"x3 foam roll 30"x3 foam roll 30"x3 30"x3 30"x3     Supine flex AAROM  5"x10 5"x10 5"x20 5"x20 5"x20 5"x20 5"x20     Supine ER self-PROM  5"x10 5"x10 5"x20 5"x20 5"x20 5"x20 5"x20     H/L TS ext over towel  nv seated5"x10 seated5"x10 Seated  10"x10 Seated 10"x10 Seated 10"x10      TB shoulder ext      5"x10 GTB 5"x20 GTB 2x15                               Ther Activity    Pulleys   5 min 5 min 5 min 5 min 5 min 5 min 5 min                  Gait Training                              Modalities

## 2021-01-08 RX ORDER — ALPRAZOLAM 1 MG/1
1 TABLET ORAL
Qty: 30 TABLET | Refills: 1 | Status: SHIPPED | OUTPATIENT
Start: 2021-01-08 | End: 2021-03-02 | Stop reason: SDUPTHER

## 2021-01-11 ENCOUNTER — TELEPHONE (OUTPATIENT)
Dept: INTERNAL MEDICINE CLINIC | Facility: CLINIC | Age: 51
End: 2021-01-11

## 2021-01-11 DIAGNOSIS — Z20.822 COVID-19 RULED OUT: ICD-10-CM

## 2021-01-11 DIAGNOSIS — Z20.822 EXPOSURE TO COVID-19 VIRUS: Primary | ICD-10-CM

## 2021-01-11 DIAGNOSIS — Z20.822 EXPOSURE TO COVID-19 VIRUS: ICD-10-CM

## 2021-01-11 DIAGNOSIS — G89.4 PAIN SYNDROME, CHRONIC: ICD-10-CM

## 2021-01-11 PROCEDURE — U0005 INFEC AGEN DETEC AMPLI PROBE: HCPCS | Performed by: INTERNAL MEDICINE

## 2021-01-11 PROCEDURE — U0003 INFECTIOUS AGENT DETECTION BY NUCLEIC ACID (DNA OR RNA); SEVERE ACUTE RESPIRATORY SYNDROME CORONAVIRUS 2 (SARS-COV-2) (CORONAVIRUS DISEASE [COVID-19]), AMPLIFIED PROBE TECHNIQUE, MAKING USE OF HIGH THROUGHPUT TECHNOLOGIES AS DESCRIBED BY CMS-2020-01-R: HCPCS | Performed by: INTERNAL MEDICINE

## 2021-01-11 NOTE — TELEPHONE ENCOUNTER
Patient called stated she was exposed to COVID-19 through a friend that she was with on Monday and Tuesday of last week and the friend tested positive for COVID  Patient stated she does have a headache and a funnly taste in her mouth

## 2021-01-12 LAB — SARS-COV-2 RNA SPEC QL NAA+PROBE: NOT DETECTED

## 2021-01-12 RX ORDER — METHADONE HYDROCHLORIDE 10 MG/1
TABLET ORAL
Qty: 90 TABLET | Refills: 0 | Status: SHIPPED | OUTPATIENT
Start: 2021-01-12 | End: 2021-02-11 | Stop reason: SDUPTHER

## 2021-01-14 ENCOUNTER — OFFICE VISIT (OUTPATIENT)
Dept: PHYSICAL THERAPY | Facility: REHABILITATION | Age: 51
End: 2021-01-14
Payer: COMMERCIAL

## 2021-01-14 DIAGNOSIS — M75.02 ADHESIVE CAPSULITIS OF LEFT SHOULDER: ICD-10-CM

## 2021-01-14 DIAGNOSIS — M25.512 ACUTE PAIN OF LEFT SHOULDER: Primary | ICD-10-CM

## 2021-01-14 PROCEDURE — 97140 MANUAL THERAPY 1/> REGIONS: CPT

## 2021-01-14 PROCEDURE — 97110 THERAPEUTIC EXERCISES: CPT

## 2021-01-14 PROCEDURE — 97112 NEUROMUSCULAR REEDUCATION: CPT

## 2021-01-14 NOTE — PROGRESS NOTES
Daily Note     Today's date: 2021  Patient name: Maverick Garcia  : 1970  MRN: [id number]  Referring provider: Caitlyn Dyer MD  Dx:   Encounter Diagnosis     ICD-10-CM    1  Acute pain of left shoulder  M25 512    2  Adhesive capsulitis of left shoulder  M75 02                   Subjective: Pt reports decreased shoulder pain and less sleeping disturbance  Objective: See treatment diary below      Assessment: Tolerated treatment well  Responds well with exercises and manuals  Patient demonstrated fatigue post treatment, exhibited good technique with therapeutic exercises and would benefit from continued PT  Issued/reviewed updated HEP with wand flexion/ER  Plan: Continue per plan of care  Progress treatment as tolerated         Precautions: HTN    Daily Treatment Diary    Date     FOTO IE    54        Re-Eval IE               Manuals    PROM shoulder flex, abd, ext  TE TE PAGE TE NT PAGE PAGE TE    Tape 2 I strips       NT np      Post-inf glides      NT PAGE Gr IV PAGE Gr IV TE                 Neuro Re-Ed     scap retract 5"x10 5"x10 5"2x10 5"x30 5"x30 5"x30 GTB 5"x20 GTB 2x15 GTB 2x15                                           Ther Ex    Doorway pec stretch - low 20"x5  30"x3 30"x3 30"x3 30"x3 30"x3 30"x3 30"x3 30"x3    Supine pec stretch  30"x3 foam roll 30"x3 foam roll 30"x3 foam roll 30"x3 foam roll 30"x3 30"x3 30"x3 nv    Supine flex AAROM  5"x10 5"x10 5"x20 5"x20 5"x20 5"x20 5"x20 5"x20    Supine ER self-PROM  5"x10 5"x10 5"x20 5"x20 5"x20 5"x20 5"x20 5"x20    H/L TS ext over towel  nv seated5"x10 seated5"x10 Seated  10"x10 Seated 10"x10 Seated 10"x10      TB shoulder ext      5"x10 GTB 5"x20 GTB 2x15 GTB 2x15                              Ther Activity    Pulleys   5 min 5 min 5 min 5 min 5 min 5 min 5 min 5 min                 Gait Training                              Modalities

## 2021-01-15 DIAGNOSIS — F51.01 PRIMARY INSOMNIA: ICD-10-CM

## 2021-01-15 RX ORDER — ZOLPIDEM TARTRATE 10 MG/1
10 TABLET ORAL
Qty: 30 TABLET | Refills: 0 | Status: SHIPPED | OUTPATIENT
Start: 2021-01-15 | End: 2021-02-11 | Stop reason: SDUPTHER

## 2021-01-18 ENCOUNTER — OFFICE VISIT (OUTPATIENT)
Dept: PHYSICAL THERAPY | Facility: REHABILITATION | Age: 51
End: 2021-01-18
Payer: COMMERCIAL

## 2021-01-18 DIAGNOSIS — M75.02 ADHESIVE CAPSULITIS OF LEFT SHOULDER: ICD-10-CM

## 2021-01-18 DIAGNOSIS — M25.512 ACUTE PAIN OF LEFT SHOULDER: Primary | ICD-10-CM

## 2021-01-18 PROCEDURE — 97110 THERAPEUTIC EXERCISES: CPT

## 2021-01-18 PROCEDURE — 97112 NEUROMUSCULAR REEDUCATION: CPT

## 2021-01-18 PROCEDURE — 97140 MANUAL THERAPY 1/> REGIONS: CPT

## 2021-01-18 NOTE — PROGRESS NOTES
PT Re-Evaluation     Today's date: 2021  Patient name: Artis Grullon  : 1970  MRN: [id number]  Referring provider: Gayathri Early MD  Dx:   Encounter Diagnosis     ICD-10-CM    1  Acute pain of left shoulder  M25 512    2  Adhesive capsulitis of left shoulder  M75 02                   Assessment  Assessment details: Artis Grullon has been treated in outpatient physical therapy over the past 4 weeks for diagnosis/complaints of Acute pain of left shoulder  (primary encounter diagnosis)  Adhesive capsulitis of left shoulder  Pt demonstrates increased range of motion, improved strength, decreased pain, and increased activity tolerance  Pt continues to present with pain, decreased range of motion, decreased strength, and decreased tolerance to activity  Pt would continue to benefit from skilled physical therapy to address limitations and to achieve goals  Thank you for this referral   Impairments: abnormal coordination, abnormal or restricted ROM, activity intolerance, impaired physical strength and pain with function  Understanding of Dx/Px/POC: good   Prognosis: good    Goals  ST  Patient will report 25% decrease in pain in 4 weeks  - MET  2  Patient will be able to reach to shoulder-height shelf without pain in 4 weeks  - MET    LT  Patient will be able to perform IADLS without restriction or pain by discharge  2  Patient will be independent in HEP by discharge  3  Patient will be able to return to recreational/work duties without restriction or pain by discharge        Plan  Patient would benefit from: PT eval and skilled PT  Planned modality interventions: cryotherapy and thermotherapy: hydrocollator packs  Planned therapy interventions: IADL retraining, body mechanics training, flexibility, functional ROM exercises, home exercise program, neuromuscular re-education, manual therapy, postural training, strengthening, stretching, therapeutic activities, therapeutic exercise and joint mobilization  Frequency: 2x week  Duration in visits: 8  Duration in weeks: 4  Treatment plan discussed with: patient        Subjective Evaluation    History of Present Illness  Mechanism of injury: 20  Pt comes to therapy reporting approximately 6-month history of left shoulder pain, attributed to gradual onset 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[employer])  States her shoulder pain has progressively increased over time, making many ADLs difficult to perform  Reports her PCP gave her a steroid injection, which she reports did not provide relief  Consulted orthopedic physician, who recommended therapy and 3 month follow up  AGGS: sleep disturbances, washing hair, dressing, donning coat, lifting above shoulder  LOC: anterior aspect of L shoulder; constant-aching, movement-stab  EASES: none yet    GOALS: dress and sleep without pain      21  Pt reports she has noticed improvements in her ability to dress, don/doff coat, and wash her hair with her left UE since starting therapy  States she continues to have discomfort with these activities, however, less than pre-therapy levels  States she continues to have discomfort with sleeping on left side, however, notes this is also improved since the beginning  LOC: anterior aspect of L shoulder; constant-aching, movement-stab  Pain  Current pain ratin  At best pain ratin  At worst pain ratin    Patient Goals  Patient goals for therapy: decreased pain, increased motion and independence with ADLs/IADLs          Objective     Palpation   Left   No palpable tenderness to the anterior deltoid, biceps, middle deltoid, rhomboids, supraspinatus, teres minor, triceps and upper trapezius  Hypertonic in the pectoralis minor  Tenderness of the infraspinatus and pectoralis minor       Active Range of Motion   Left Shoulder   Flexion: 95 degrees with pain  Extension: 42 degrees with pain  Abduction: 50 degrees with pain  External rotation BTH: T1 with pain  Internal rotation BTB: sacrum (greater troch) with pain    Passive Range of Motion   Left Shoulder   Flexion: 125 degrees with pain  Abduction: 115 degrees with pain  External rotation 45°: 60 degrees   Internal rotation 45°: 45 degrees     Strength/Myotome Testing     Left Shoulder     Planes of Motion   Flexion: 3-   Extension: 4-   Abduction: 3-   External rotation at 0°: 3-   Internal rotation at 0°: 3-     Isolated Muscles   Biceps: 4   Triceps: 4

## 2021-01-18 NOTE — PROGRESS NOTES
Daily Note     Today's date: 2021  Patient name: Edgardo Eaton  : 1970  MRN: [id number]  Referring provider: Wilmer Tyson MD  Dx:   Encounter Diagnosis     ICD-10-CM    1  Acute pain of left shoulder  M25 512    2  Adhesive capsulitis of left shoulder  M75 02                   Subjective: Pt reports feeling better since beginning therapy  Less pain overall with reaching behind her back and head  Objective: See treatment diary below      Assessment: Tolerated treatment well  Progressed AAROM to standing and added prone scap exercises  Patient demonstrated fatigue post treatment, exhibited good technique with therapeutic exercises and would benefit from continued PT      Plan: Continue per plan of care  Progress treatment as tolerated         Precautions: HTN    Daily Treatment Diary    Date    FOTO IE    54     nv   Re-Eval IE         PAGE      Manuals    PROM shoulder flex, abd, ext  TE TE PAGE TE NT PAGE PAGE TE TE   Tape 2 I strips       NT np      Post-inf glides      NT PAGE Gr IV PAGE Gr IV TE PAGE Gr IV                Neuro Re-Ed     scap retract 5"x10 5"x10 5"2x10 5"x30 5"x30 5"x30 GTB 5"x20 GTB 2x15 GTB 2x15 GTB 2x15                                          Ther Ex    Doorway pec stretch - low 20"x5  30"x3 30"x3 30"x3 30"x3 30"x3 30"x3 30"x3 30"x3 30"x3   Supine pec stretch  30"x3 foam roll 30"x3 foam roll 30"x3 foam roll 30"x3 foam roll 30"x3 30"x3 30"x3 nv    Supine flex AAROM  5"x10 5"x10 5"x20 5"x20 5"x20 5"x20 5"x20 5"x20 Stand flex/  scap x10ea   Supine ER self-PROM  5"x10 5"x10 5"x20 5"x20 5"x20 5"x20 5"x20 5"x20 5"x20   H/L TS ext over towel  nv seated5"x10 seated5"x10 Seated  10"x10 Seated 10"x10 Seated 10"x10   D/C   TB shoulder ext      5"x10 GTB 5"x20 GTB 2x15 GTB 2x15 GTB 2x15   sidelying ER          3"x10   Prone row, I's          3"x10 ea   Ther Activity    Pulleys   5 min 5 min 5 min 5 min 5 min 5 min 5 min 5 min 5 min Gait Training                              Modalities

## 2021-01-20 ENCOUNTER — APPOINTMENT (OUTPATIENT)
Dept: PHYSICAL THERAPY | Facility: REHABILITATION | Age: 51
End: 2021-01-20
Payer: COMMERCIAL

## 2021-01-22 ENCOUNTER — OFFICE VISIT (OUTPATIENT)
Dept: PHYSICAL THERAPY | Facility: REHABILITATION | Age: 51
End: 2021-01-22
Payer: COMMERCIAL

## 2021-01-22 DIAGNOSIS — M25.512 ACUTE PAIN OF LEFT SHOULDER: Primary | ICD-10-CM

## 2021-01-22 DIAGNOSIS — M75.02 ADHESIVE CAPSULITIS OF LEFT SHOULDER: ICD-10-CM

## 2021-01-22 PROCEDURE — 97140 MANUAL THERAPY 1/> REGIONS: CPT | Performed by: PHYSICAL THERAPIST

## 2021-01-22 PROCEDURE — 97110 THERAPEUTIC EXERCISES: CPT

## 2021-01-22 PROCEDURE — 97112 NEUROMUSCULAR REEDUCATION: CPT

## 2021-01-22 NOTE — PROGRESS NOTES
Daily Note     Today's date: 2021  Patient name: Erica Ramírez  : 1970  MRN: [id number]  Referring provider: Carlotta Luu MD  Dx:   Encounter Diagnosis     ICD-10-CM    1  Acute pain of left shoulder  M25 512    2  Adhesive capsulitis of left shoulder  M75 02        Start Time: 1115          Subjective:  Not too sore with work  ADL are getting easier      Objective: See treatment diary below      Assessment: Tolerated treatment well  Patient would benefit from continued PT  Needed cuing for ex  Plan: Continue per plan of care        Precautions: HTN    Daily Treatment Diary    Date    FOTO     55     nv   Re-Eval          PAGE      Manuals    PROM shoulder flex, abd, ext CD TE TE PAGE TE NT PAGE PAGE TE TE   Tape 2 I strips       NT np      Post-inf glides PAGE     NT PAGE Gr IV PAGE Gr IV TE PAGE Gr IV                Neuro Re-Ed     scap retract BTB      2x10 5"x10 5"2x10 5"x30 5"x30 5"x30 GTB 5"x20 GTB 2x15 GTB 2x15 GTB 2x15                                          Ther Ex    Doorway pec stretch - low 30"x 3 30"x3 30"x3 30"x3 30"x3 30"x3 30"x3 30"x3 30"x3 30"x3   Supine pec stretch 30" x3   Foam roll 30"x3 foam roll 30"x3 foam roll 30"x3 foam roll 30"x3 foam roll 30"x3 30"x3 30"x3 nv    Supine flex AAROM Stand flx/scap 10x 5"x10 5"x10 5"x20 5"x20 5"x20 5"x20 5"x20 5"x20 Stand flex/  scap x10ea   Supine ER self-PROM 5"x 5"x10 5"x10 5"x20 5"x20 5"x20 5"x20 5"x20 5"x20 5"x20   H/L TS ext over towel D/C nv seated5"x10 seated5"x10 Seated  10"x10 Seated 10"x10 Seated 10"x10   D/C   TB shoulder ext BTB      2x10     5"x10 GTB 5"x20 GTB 2x15 GTB 2x15 GTB 2x15   sidelying ER 3" 10x         3"x10   Prone row, I's 3" 10x         3"x10 ea   Ther Activity    Pulleys  5' 5 min 5 min 5 min 5 min 5 min 5 min 5 min 5 min 5 min                Gait Training                              Modalities

## 2021-01-26 ENCOUNTER — OFFICE VISIT (OUTPATIENT)
Dept: PHYSICAL THERAPY | Facility: REHABILITATION | Age: 51
End: 2021-01-26
Payer: COMMERCIAL

## 2021-01-26 DIAGNOSIS — M75.02 ADHESIVE CAPSULITIS OF LEFT SHOULDER: ICD-10-CM

## 2021-01-26 DIAGNOSIS — M25.512 ACUTE PAIN OF LEFT SHOULDER: Primary | ICD-10-CM

## 2021-01-26 PROCEDURE — 97112 NEUROMUSCULAR REEDUCATION: CPT | Performed by: PHYSICAL THERAPIST

## 2021-01-26 PROCEDURE — 97110 THERAPEUTIC EXERCISES: CPT | Performed by: PHYSICAL THERAPIST

## 2021-01-26 PROCEDURE — 97140 MANUAL THERAPY 1/> REGIONS: CPT | Performed by: PHYSICAL THERAPIST

## 2021-01-26 NOTE — PROGRESS NOTES
Daily Note     Today's date: 2021  Patient name: Yaneth Hurst  : 1970  MRN: [id number]  Referring provider: No ref  provider found  Dx:   Encounter Diagnosis     ICD-10-CM    1  Acute pain of left shoulder  M25 512    2  Adhesive capsulitis of left shoulder  M75 02                   Subjective: Pt comes to therapy denying notable shoulder concerns today  Objective: See treatment diary below      Assessment: Tolerated treatment well  Patient demonstrated fatigue post treatment, exhibited good technique with therapeutic exercises and would benefit from continued PT      Plan: Progress treatment as tolerated         Precautions: HTN    Daily Treatment Diary    Date    FOTO     55     nv   Re-Eval          PAGE      Manuals    PROM shoulder flex, abd, ext CD PAGE TE PAGE TE NT PAGE PAGE TE TE   Tape 2 I strips       NT np      Post-inf glides PAGE PAGE Gr IV    NT PAGE Gr IV PAGE Gr IV TE PAGE Gr IV                Neuro Re-Ed     scap retract BTB      2x10 Black 2x10 5"2x10 5"x30 5"x30 5"x30 GTB 5"x20 GTB 2x15 GTB 2x15 GTB 2x15                                          Ther Ex    Doorway pec stretch - low 30"x 3 30" x3  30"x3 30"x3 30"x3 30"x3 30"x3 30"x3 30"x3 30"x3   Supine pec stretch 30" x3   Foam roll 30" x3  30"x3 foam roll 30"x3 foam roll 30"x3 foam roll 30"x3 30"x3 30"x3 nv    Supine flex AAROM Stand flx/scap 10x Stand flx/scap 20x 5"x10 5"x20 5"x20 5"x20 5"x20 5"x20 5"x20 Stand flex/  scap x10ea   Supine ER self-PROM 5"x20 5"x20 5"x10 5"x20 5"x20 5"x20 5"x20 5"x20 5"x20 5"x20   H/L TS ext over towel D/C  seated5"x10 seated5"x10 Seated  10"x10 Seated 10"x10 Seated 10"x10   D/C   TB shoulder ext BTB      2x10 Black  2x10    5"x10 GTB 5"x20 GTB 2x15 GTB 2x15 GTB 2x15   sidelying ER 3" 10x nv        3"x10   Prone row, I's 3" 10x nv        3"x10 ea   Ther Activity    Pulleys  5' 5 min 5 min 5 min 5 min 5 min 5 min 5 min 5 min 5 min                Gait Training Modalities

## 2021-01-28 ENCOUNTER — OFFICE VISIT (OUTPATIENT)
Dept: PHYSICAL THERAPY | Facility: REHABILITATION | Age: 51
End: 2021-01-28
Payer: COMMERCIAL

## 2021-01-28 DIAGNOSIS — M75.02 ADHESIVE CAPSULITIS OF LEFT SHOULDER: ICD-10-CM

## 2021-01-28 DIAGNOSIS — M25.512 ACUTE PAIN OF LEFT SHOULDER: Primary | ICD-10-CM

## 2021-01-28 PROCEDURE — 97110 THERAPEUTIC EXERCISES: CPT | Performed by: PHYSICAL THERAPIST

## 2021-01-28 PROCEDURE — 97140 MANUAL THERAPY 1/> REGIONS: CPT | Performed by: PHYSICAL THERAPIST

## 2021-01-28 PROCEDURE — 97112 NEUROMUSCULAR REEDUCATION: CPT | Performed by: PHYSICAL THERAPIST

## 2021-01-28 NOTE — PROGRESS NOTES
Daily Note     Today's date: 2021  Patient name: Fannie Max  : 1970  MRN: [id number]  Referring provider: No ref  provider found  Dx:   Encounter Diagnosis     ICD-10-CM    1  Acute pain of left shoulder  M25 512    2  Adhesive capsulitis of left shoulder  M75 02                   Subjective: Pt comes to therapy denying notable adverse effects from last session  Objective: See treatment diary below      Assessment: Tolerated treatment well  Soreness and discomfort noted with manual scap mobs and prone exercises  Patient demonstrated fatigue post treatment, exhibited good technique with therapeutic exercises and would benefit from continued PT      Plan: Progress treatment as tolerated         Precautions: HTN    Daily Treatment Diary    Date    FOTO     55     nv   Re-Eval          PAGE      Manuals    PROM shoulder flex, abd, ext CD PAGE TE PAGE TE NT PAGE PAGE TE TE   Tape 2 I strips       NT np      Post-inf glides PAGE PAGE Gr IV PAGE gr IV   NT PAGE Gr IV PAGE Gr IV TE PAGE Gr IV   Scap mobs   PAGE gr IV                       Neuro Re-Ed     scap retract BTB      2x10 Black 2x10 Black x25 5"x30 5"x30 5"x30 GTB 5"x20 GTB 2x15 GTB 2x15 GTB 2x15                                          Ther Ex    Doorway pec stretch - low 30"x 3 30" x3  30"x3 30"x3 30"x3 30"x3 30"x3 30"x3 30"x3 30"x3   Supine pec stretch 30" x3   Foam roll 30" x3  30"x3 30"x3 foam roll 30"x3 foam roll 30"x3 30"x3 30"x3 nv    Supine flex AAROM Stand flx/scap 10x Stand flx/scap 20x Stand flx/scap 20x 5"x20 5"x20 5"x20 5"x20 5"x20 5"x20 Stand flex/  scap x10ea   Supine ER self-PROM 5"x20 5"x20 5"x10 5"x20 5"x20 5"x20 5"x20 5"x20 5"x20 5"x20   H/L TS ext over towel D/C   seated5"x10 Seated  10"x10 Seated 10"x10 Seated 10"x10   D/C   TB shoulder ext BTB      2x10 Black  2x10 Black 2x10   5"x10 GTB 5"x20 GTB 2x15 GTB 2x15 GTB 2x15   sidelying ER 3" 10x nv 3"x20       3"x10   Prone row, I's 3" 10x nv 3"x15 ea       3"x10 ea   Ther Activity    Pulleys  5' 5 min 5 min 5 min 5 min 5 min 5 min 5 min 5 min 5 min                Gait Training                              Modalities

## 2021-02-04 DIAGNOSIS — F32.A DEPRESSION, UNSPECIFIED DEPRESSION TYPE: ICD-10-CM

## 2021-02-04 RX ORDER — SERTRALINE HYDROCHLORIDE 100 MG/1
100 TABLET, FILM COATED ORAL 2 TIMES DAILY
Qty: 60 TABLET | Refills: 5 | Status: SHIPPED | OUTPATIENT
Start: 2021-02-04 | End: 2021-03-10

## 2021-02-05 ENCOUNTER — OFFICE VISIT (OUTPATIENT)
Dept: PHYSICAL THERAPY | Facility: REHABILITATION | Age: 51
End: 2021-02-05
Payer: COMMERCIAL

## 2021-02-05 DIAGNOSIS — M75.02 ADHESIVE CAPSULITIS OF LEFT SHOULDER: ICD-10-CM

## 2021-02-05 DIAGNOSIS — M25.512 ACUTE PAIN OF LEFT SHOULDER: Primary | ICD-10-CM

## 2021-02-05 PROCEDURE — 97112 NEUROMUSCULAR REEDUCATION: CPT

## 2021-02-05 PROCEDURE — 97140 MANUAL THERAPY 1/> REGIONS: CPT

## 2021-02-05 PROCEDURE — 97110 THERAPEUTIC EXERCISES: CPT

## 2021-02-05 NOTE — PROGRESS NOTES
Daily Note     Today's date: 2021  Patient name: Shari Wilkinson  : 1970  MRN: [id number]  Referring provider: No ref  provider found  Dx:   Encounter Diagnosis     ICD-10-CM    1  Acute pain of left shoulder  M25 512    2  Adhesive capsulitis of left shoulder  M75 02                   Subjective: Pt reports falling earlier in the week 2* black ice, but denied injury  She noted soreness in lower back only and no increase in shoulder pain  Objective: See treatment diary below      Assessment: Tolerated treatment well  Some cues required to avoid compensatory movements with AAROM in standing; corrected upon cues  Patient demonstrated fatigue post treatment, exhibited good technique with therapeutic exercises and would benefit from continued PT      Plan: Continue per plan of care  Progress treatment as tolerated         Precautions: HTN    Daily Treatment Diary    Date    FOTO     55     nv   Re-Eval          PAGE      Manuals    PROM shoulder flex, abd, ext CD PAGE TE TE TE NT PAGE PAGE TE TE   Tape 2 I strips       NT np      Post-inf glides PAGE PAGE Gr IV PAGE gr IV PAGE gr IV  NT PAGE Gr IV PAGE Gr IV TE PAGE Gr IV   Scap mobs   PAGE gr IV PAGE gr IV                      Neuro Re-Ed     scap retract BTB      2x10 Black 2x10 Black x25 Black 2x10 5"x30 5"x30 GTB 5"x20 GTB 2x15 GTB 2x15 GTB 2x15                                          Ther Ex    Doorway pec stretch - low 30"x 3 30" x3  30"x3 30"x3 30"x3 30"x3 30"x3 30"x3 30"x3 30"x3   Supine pec stretch 30" x3   Foam roll 30" x3  30"x3  30"x3 foam roll 30"x3 30"x3 30"x3 nv    Supine flex AAROM Stand flx/scap 10x Stand flx/scap 20x Stand flx/scap 20x Stand flx/scap 20x 5"x20 5"x20 5"x20 5"x20 5"x20 Stand flex/  scap x10ea   Supine ER self-PROM 5"x20 5"x20 5"x10 5"x20 5"x20 5"x20 5"x20 5"x20 5"x20 5"x20   H/L TS ext over towel D/C    Seated  10"x10 Seated 10"x10 Seated 10"x10   D/C   TB shoulder ext BTB      2x10 Black  2x10 Black 2x10 Black 2x10  5"x10 GTB 5"x20 GTB 2x15 GTB 2x15 GTB 2x15   sidelying ER 3" 10x nv 3"x20 3"x20      3"x10   Prone row, I's 3" 10x nv 3"x15 ea 3"x20      3"x10 ea   Ther Activity    Pulleys  5' 5 min 5 min 5 min 5 min 5 min 5 min 5 min 5 min 5 min                Gait Training                              Modalities

## 2021-02-08 ENCOUNTER — OFFICE VISIT (OUTPATIENT)
Dept: URGENT CARE | Age: 51
End: 2021-02-08
Payer: COMMERCIAL

## 2021-02-08 VITALS
OXYGEN SATURATION: 98 % | RESPIRATION RATE: 20 BRPM | DIASTOLIC BLOOD PRESSURE: 61 MMHG | HEART RATE: 78 BPM | SYSTOLIC BLOOD PRESSURE: 140 MMHG | TEMPERATURE: 98.1 F

## 2021-02-08 DIAGNOSIS — H69.82 ACUTE DYSFUNCTION OF EUSTACHIAN TUBE, LEFT: Primary | ICD-10-CM

## 2021-02-08 PROCEDURE — 99213 OFFICE O/P EST LOW 20 MIN: CPT | Performed by: PHYSICIAN ASSISTANT

## 2021-02-08 RX ORDER — METHYLPREDNISOLONE 4 MG/1
TABLET ORAL
Qty: 21 EACH | Refills: 0 | Status: SHIPPED | OUTPATIENT
Start: 2021-02-08 | End: 2021-02-15

## 2021-02-09 NOTE — PATIENT INSTRUCTIONS
Eustachian Tube Dysfunction   AMBULATORY CARE:   Eustachian tube dysfunction (ETD)  is a condition that prevents your eustachian tubes from opening properly  It can also cause them to become blocked  Eustachian tubes connect your middle ear to the back of your nose and throat  These tubes open and allow air to flow in and out when you sneeze, swallow, or yawn  Common signs and symptoms include the following:   · Fullness or pressure in your ears    · Muffled hearing, or a feeling you are hearing under water or have clogged ears    · Pain in one or both ears    · Ringing in your ears    · Popping, crackling, or clicking feeling in your ears    · Trouble keeping your balance    Call your doctor or otolaryngologist if:   · Your symptoms do not improve or get worse  · You have a fever  · You have any hearing loss  · You have questions or concerns about your condition or care  Treatment:  ETD may get better on its own without any treatment  If it continues, you may need any of the following:  · Swallow, yawn, or chew gum  to help open your eustachian tubes  Your healthcare provider may also recommend you blow with your mouth shut and your nostrils pinched closed  · Air pressure devices  push air into your nose and eustachian tubes to help relieve air pressure in your ear  · Treatment for allergies  such as decongestants, antihistamines, and nasal steroids may improve ETD  They may help decrease swelling of the eustachian tubes  · A myringotomy  is surgery to make a hole in your eardrum  The hole relieves pressure and lets fluid drain from your ear  A pressure equalizing (PE) tube may be used to keep the hole open and to help drain fluid  · Tuboplasty  is a procedure to widen your eustachian tubes  Follow up with your doctor or otolaryngologist as directed:  Write down your questions so you remember to ask them during your visits    © Copyright [employer] 2020 Information is for End User's use only and may not be sold, redistributed or otherwise used for commercial purposes  All illustrations and images included in CareNotes® are the copyrighted property of [employer]  or [employer]  The above information is an  only  It is not intended as medical advice for individual conditions or treatments  Talk to your doctor, nurse or pharmacist before following any medical regimen to see if it is safe and effective for you

## 2021-02-09 NOTE — PROGRESS NOTES
[facility]        NAME: Edgardo Eaton is a 44 y o  female  : 1970    MRN: [id number]  DATE: 2021  TIME: 9:58 PM    Assessment and Plan   Acute dysfunction of Eustachian tube, left [H69 82]  1  Acute dysfunction of Eustachian tube, left  methylPREDNISolone 4 MG tablet therapy pack         Patient Instructions     Discussed condition with pt  I suspect she has a ETD of the left ear  Will prescribe her a MDP and rec OTC Flonase and Sudafed which she has at home  May also take Tylenol for pain  Should be reevaluated if condition persists/worsens  Follow up with PCP in 3-5 days  Proceed to  ER if symptoms worsen  Chief Complaint     Chief Complaint   Patient presents with   Adelaida Stein     pt states started with pain in left ear yesterday         History of Present Illness       Pt presents with onset yesterday of acute left ear pain  She reports pressure/fullness, altered hearing, but denies otorrhea, vertigo, tinnitus  Denies symptoms in right ear  She does have allergy/sinus issues  Denies grinding/clenching teeth or jaw pain  Has not tried taking anything for the pain  She does have Flonase at home but does not take it every day  Review of Systems   Review of Systems   Constitutional: Negative  HENT: Positive for congestion, ear pain (Left) and hearing loss (Left)  Negative for ear discharge and tinnitus  Left ear pressure/fullness   Respiratory: Negative  Cardiovascular: Negative  Gastrointestinal: Negative  Genitourinary: Negative  Neurological: Negative for dizziness           Current Medications       Current Outpatient Medications:     ALPRAZolam (XANAX) 1 mg tablet, Take 1 tablet (1 mg total) by mouth daily at bedtime as needed for anxiety, Disp: 30 tablet, Rfl: 1    apixaban (ELIQUIS) 5 mg, Take 1 tablet (5 mg total) by mouth 2 (two) times a day, Disp: 180 tablet, Rfl: 1    cholecalciferol (VITAMIN D3) 1,000 units tablet, Take 1 tablet (1,000 Units total) by mouth daily, Disp: 30 tablet, Rfl: 0    fluticasone (FLONASE) 50 mcg/act nasal spray, 1 spray into each nostril daily, Disp: 1 Bottle, Rfl: 1    methadone (DOLOPHINE) 10 mg tablet, 1 tablet 3 times daily as needed for chronic pain,, Disp: 90 tablet, Rfl: 0    omeprazole (PriLOSEC) 20 mg delayed release capsule, take 1 capsule by mouth daily, Disp: 90 capsule, Rfl: 3    Pediatric Multiple Vit-C-FA (FRUITY CHEWABLES MULTIVITAMIN) CHEW, Chew 1 tablet daily , Disp: , Rfl:     sertraline (ZOLOFT) 100 mg tablet, Take 1 tablet (100 mg total) by mouth 2 (two) times a day, Disp: 60 tablet, Rfl: 5    sucralfate (Carafate) 1 g/10 mL suspension, Take 10 mL (1 g total) by mouth 4 (four) times a day as needed (dyspepsia), Disp: 420 mL, Rfl: 2    topiramate (TOPAMAX) 50 MG tablet, take 1 tablet by mouth twice a day, Disp: 60 tablet, Rfl: 5    zolpidem (AMBIEN) 10 mg tablet, Take 1 tablet (10 mg total) by mouth daily at bedtime as needed for sleep, Disp: 30 tablet, Rfl: 0    methylPREDNISolone 4 MG tablet therapy pack, Use as directed on package, Disp: 21 each, Rfl: 0    Current Allergies     Allergies as of 02/08/2021 - Reviewed 02/08/2021   Allergen Reaction Noted    Amoxicillin Hives and Shortness Of Breath 11/16/2015    Aspirin Hives and Other (See Comments) 08/23/2009    Butorphanol Anaphylaxis and Other (See Comments) 08/23/2009    Morphine Other (See Comments) and Hallucinations 08/23/2009    Duloxetine  07/30/2013    Azithromycin Rash 05/14/2020    Gabapentin Palpitations 02/15/2016    Nitrofurantoin Hives and Rash 06/14/2012    Sulfa antibiotics Hives, Rash, and Other (See Comments) 08/23/2009            The following portions of the patient's history were reviewed and updated as appropriate: allergies, current medications, past family history, past medical history, past social history, past surgical history and problem list      Past Medical History:   Diagnosis Date    Facial cellulitis  Fracture, cuboid     LAST ASSESSED: 3/26/15    Gastrojejunal ulcer     ANASTOMOTIC    History of small bowel obstruction     Hypertension     pt denies    Insomnia     Iron deficiency anemia     LAST ASSESSED: AND RESOLVED: 4/13/16       Past Surgical History:   Procedure Laterality Date    ABDOMINOPLASTY      x2    CHOLECYSTECTOMY      GASTRIC BYPASS      FOR MORBID OBESITY    INCISIONAL HERNIA REPAIR      SMALL INTESTINE SURGERY         Family History   Problem Relation Age of Onset    Diabetes Family         MELLITUS    Cancer Family     Hypertension Family     Osteoporosis Family     Asthma Family     Stroke Family         SYNDROME    Breast cancer Mother     COPD Father     Asthma Child          Medications have been verified  Objective   /61   Pulse 78   Temp 98 1 °F (36 7 °C)   Resp 20   LMP  (Within Months)   SpO2 98%   No LMP recorded (within months)  Physical Exam     Physical Exam  Vitals signs reviewed  Constitutional:       General: She is not in acute distress  Appearance: She is well-developed  HENT:      Head:      Comments: No TTP or crepitus noted over the left TMJ and when opening and closing mandible  Right Ear: Hearing, tympanic membrane, ear canal and external ear normal       Left Ear: Hearing, ear canal and external ear normal       Ears:      Comments: Left TM slightly dull  Neck:      Musculoskeletal: Neck supple  Cardiovascular:      Rate and Rhythm: Normal rate and regular rhythm  Pulses: Normal pulses  Heart sounds: No murmur  Pulmonary:      Effort: Pulmonary effort is normal  No respiratory distress  Breath sounds: Normal breath sounds  Lymphadenopathy:      Cervical: No cervical adenopathy  Neurological:      Mental Status: She is alert and oriented to person, place, and time

## 2021-02-11 ENCOUNTER — OFFICE VISIT (OUTPATIENT)
Dept: PHYSICAL THERAPY | Facility: REHABILITATION | Age: 51
End: 2021-02-11
Payer: COMMERCIAL

## 2021-02-11 DIAGNOSIS — M75.02 ADHESIVE CAPSULITIS OF LEFT SHOULDER: ICD-10-CM

## 2021-02-11 DIAGNOSIS — F51.01 PRIMARY INSOMNIA: ICD-10-CM

## 2021-02-11 DIAGNOSIS — G89.4 PAIN SYNDROME, CHRONIC: ICD-10-CM

## 2021-02-11 DIAGNOSIS — M25.512 ACUTE PAIN OF LEFT SHOULDER: Primary | ICD-10-CM

## 2021-02-11 PROCEDURE — 97140 MANUAL THERAPY 1/> REGIONS: CPT

## 2021-02-11 PROCEDURE — 97110 THERAPEUTIC EXERCISES: CPT

## 2021-02-11 PROCEDURE — 97112 NEUROMUSCULAR REEDUCATION: CPT

## 2021-02-11 RX ORDER — METHADONE HYDROCHLORIDE 10 MG/1
TABLET ORAL
Qty: 90 TABLET | Refills: 0 | Status: SHIPPED | OUTPATIENT
Start: 2021-02-11 | End: 2021-03-15 | Stop reason: SDUPTHER

## 2021-02-11 RX ORDER — ZOLPIDEM TARTRATE 10 MG/1
10 TABLET ORAL
Qty: 30 TABLET | Refills: 0 | Status: SHIPPED | OUTPATIENT
Start: 2021-02-11 | End: 2021-03-10 | Stop reason: SDUPTHER

## 2021-02-11 NOTE — TELEPHONE ENCOUNTER
Ambien last filled 1 15 21  #30 x 0  Methadone last filled 1 11 21  #90 x 0    Last OV 12 2 20  Next OV 3 10 21

## 2021-02-11 NOTE — PROGRESS NOTES
Daily Note     Today's date: 2021  Patient name: Jung White  : 1970  MRN: [id number]  Referring provider: No ref  provider found  Dx:   Encounter Diagnosis     ICD-10-CM    1  Acute pain of left shoulder  M25 512    2  Adhesive capsulitis of left shoulder  M75 02                   Subjective: Pt offered no new complaints  Objective: See treatment diary below      Assessment: Tolerated treatment well  AROM and PROM appear to be improving nicely  Improved neuromuscular control with exercises  Patient demonstrated fatigue post treatment, exhibited good technique with therapeutic exercises and would benefit from continued PT      Plan: Continue per plan of care  Progress treatment as tolerated         Precautions: HTN    Daily Treatment Diary    Date    FOTO          nv   Re-Eval          PAGE      Manuals    PROM shoulder flex, abd, ext CD PAGE TE TE TE NT PAGE PAGE TE TE   Tape 2 I strips       NT np      Post-inf glides PAGE PAGE Gr IV PAGE gr IV PAGE gr IV PAGE Gr IV NT PAGE Gr IV PAGE Gr IV TE PAGE Gr IV   Scap mobs   PAGE gr IV PAGE gr IV PAGE Gr IV                     Neuro Re-Ed     scap retract BTB      2x10 Black 2x10 Black x25 Black 2x10 Black  3x10 5"x30 GTB 5"x20 GTB 2x15 GTB 2x15 GTB 2x15                                          Ther Ex    Doorway pec stretch - low 30"x 3 30" x3  30"x3 30"x3 30"x3 30"x3 30"x3 30"x3 30"x3 30"x3   Supine pec stretch 30" x3   Foam roll 30" x3  30"x3   30"x3 30"x3 30"x3 nv    Supine flex AAROM Stand flx/scap 10x Stand flx/scap 20x Stand flx/scap 20x Stand flx/scap 20x Stand flx/scap 20x 5"x20 5"x20 5"x20 5"x20 Stand flex/  scap x10ea   Supine ER self-PROM 5"x20 5"x20 5"x10 5"x20 5"x20 5"x20 5"x20 5"x20 5"x20 5"x20   H/L TS ext over towel D/C     Seated 10"x10 Seated 10"x10   D/C   TB shoulder ext BTB      2x10 Black  2x10 Black 2x10 Black 2x10 Black  3x10 5"x10 GTB 5"x20 GTB 2x15 GTB 2x15 GTB 2x15   sidelying ER 3" 10x nv 3"x20 3"x20 1# 3" 2x10     3"x10   Prone row, I's 3" 10x nv 3"x15 ea 3"x20 1# 3" 2x10     3"x10 ea   Ther Activity    Pulleys  5' 5 min 5 min 5 min 5 min 5 min 5 min 5 min 5 min 5 min                Gait Training                              Modalities

## 2021-02-15 ENCOUNTER — OFFICE VISIT (OUTPATIENT)
Dept: OBGYN CLINIC | Facility: OTHER | Age: 51
End: 2021-02-15
Payer: COMMERCIAL

## 2021-02-15 VITALS
HEIGHT: 65 IN | HEART RATE: 71 BPM | BODY MASS INDEX: 26.66 KG/M2 | WEIGHT: 160 LBS | SYSTOLIC BLOOD PRESSURE: 119 MMHG | DIASTOLIC BLOOD PRESSURE: 76 MMHG

## 2021-02-15 DIAGNOSIS — M75.02 ADHESIVE CAPSULITIS OF LEFT SHOULDER: Primary | ICD-10-CM

## 2021-02-15 PROCEDURE — 99213 OFFICE O/P EST LOW 20 MIN: CPT | Performed by: PHYSICIAN ASSISTANT

## 2021-02-15 NOTE — PROGRESS NOTES
Assessment  Diagnoses and all orders for this visit:    Adhesive capsulitis of left shoulder      Discussion and Plan:    1  Continue with HEP - stretching multiple times daily  2  Continue with formal therapy - gentle stretching  3  Tylenol and Ice if needed for pain  4  Work note provided - stating she was here today for an appointment  5  Follow up in 3 months  Ely does not recall significant improvement in pain with the injection from last visit, but it is always an option should pain worsen in future  It is too soon to repeat the injection today  Should she want an injection for pain, she can call the office at any time and we will be happy to see her sooner  Subjective:   Patient ID: Brie Pabon is a 46 y o  female      Ely presents to the office in follow up of her left shoulder  She was diagnosed with adhesive capsulitis in December  She was given an intra-articular steroid injection for pain relief  She denies significant improvement with that injection  She has been in physical therapy twice a week with improvement in motion  She is able to do more with the shoulder than 2 months ago  Ely has been compliant with her HEP  Pain is worse with motion  Pain improves with rest   She is using Ice and Tylenol if needed for pain  Denies new injury or trauma  The following portions of the patient's history were reviewed and updated as appropriate: allergies, current medications, past family history, past medical history, past social history, past surgical history and problem list     Review of Systems   Constitutional: Negative for chills and fever  HENT: Negative for hearing loss  Eyes: Negative for visual disturbance  Respiratory: Negative for shortness of breath  Cardiovascular: Negative for chest pain  Gastrointestinal: Negative for abdominal pain  Musculoskeletal:        As reviewed in the HPI   Skin: Negative for rash     Neurological:        As reviewed in the HPI   Psychiatric/Behavioral: Negative for agitation  Objective:  /76   Pulse 71   Ht 5' 5" (1 651 m)   Wt 72 6 kg (160 lb)   BMI 26 63 kg/m²       Left Shoulder Exam     Tenderness   The patient is experiencing no tenderness  Range of Motion   Passive abduction: 70   External rotation: 20   Forward flexion: 90 (passive 110)   Internal rotation 0 degrees: Sacrum     Tests   Drop arm: negative    Other   Erythema: absent  Sensation: normal  Pulse: present             Physical Exam  Constitutional:       Appearance: She is well-developed  HENT:      Head: Normocephalic  Neck:      Musculoskeletal: Normal range of motion  Pulmonary:      Breath sounds: Normal breath sounds  No wheezing  Skin:     General: Skin is warm and dry  Neurological:      Mental Status: She is alert and oriented to person, place, and time  Psychiatric:         Behavior: Behavior normal          Thought Content:  Thought content normal          Judgment: Judgment normal

## 2021-02-15 NOTE — LETTER
February 15, 2021     Patient: Erica Ramírez   YOB: 1970   Date of Visit: 2/15/2021       To Whom it May Concern:    Erica Ramírez is under my professional care  She was seen in my office on 2/15/2021  She may return to work on 2/15/2021 - please excuse for today's office visit  If you have any questions or concerns, please don't hesitate to call           Sincerely,          Shweta Dubois PA-C        CC: No Recipients

## 2021-02-16 ENCOUNTER — EVALUATION (OUTPATIENT)
Dept: PHYSICAL THERAPY | Facility: REHABILITATION | Age: 51
End: 2021-02-16
Payer: COMMERCIAL

## 2021-02-16 ENCOUNTER — TELEPHONE (OUTPATIENT)
Dept: OTHER | Facility: OTHER | Age: 51
End: 2021-02-16

## 2021-02-16 DIAGNOSIS — M75.02 ADHESIVE CAPSULITIS OF LEFT SHOULDER: ICD-10-CM

## 2021-02-16 DIAGNOSIS — M25.512 ACUTE PAIN OF LEFT SHOULDER: Primary | ICD-10-CM

## 2021-02-16 PROCEDURE — 97110 THERAPEUTIC EXERCISES: CPT | Performed by: PHYSICAL THERAPIST

## 2021-02-16 PROCEDURE — 97140 MANUAL THERAPY 1/> REGIONS: CPT | Performed by: PHYSICAL THERAPIST

## 2021-02-16 NOTE — PROGRESS NOTES
PT Re-Evaluation     Today's date: 2021  Patient name: Conrado Allison  : 1970  MRN: [id number]  Referring provider: Pernell Barragan MD  Dx:   Encounter Diagnosis     ICD-10-CM    1  Acute pain of left shoulder  M25 512    2  Adhesive capsulitis of left shoulder  M75 02                   Assessment  Assessment details: Cornado Allison has been treated in outpatient physical therapy over the past 4 weeks for diagnosis/complaints of Acute pain of left shoulder  (primary encounter diagnosis)  Adhesive capsulitis of left shoulder  Pt demonstrates increased range of motion, improved strength, decreased pain, and increased activity tolerance  Pt continues to present with pain, decreased range of motion, decreased strength, and decreased tolerance to activity  Pt would continue to benefit from skilled physical therapy to address limitations and to achieve goals  Thank you for this referral   Impairments: abnormal coordination, abnormal or restricted ROM, activity intolerance, impaired physical strength and pain with function  Understanding of Dx/Px/POC: good   Prognosis: good    Goals  ST  Patient will report 25% decrease in pain in 4 weeks  - MET  2  Patient will be able to reach to shoulder-height shelf without pain in 4 weeks  - MET    LT  Patient will be able to perform IADLS without restriction or pain by discharge  2  Patient will be independent in HEP by discharge  3  Patient will be able to return to recreational/work duties without restriction or pain by discharge        Plan  Patient would benefit from: PT eval and skilled PT  Planned modality interventions: cryotherapy and thermotherapy: hydrocollator packs  Planned therapy interventions: IADL retraining, body mechanics training, flexibility, functional ROM exercises, home exercise program, neuromuscular re-education, manual therapy, postural training, strengthening, stretching, therapeutic activities, therapeutic exercise and joint mobilization  Frequency: 2x week  Duration in visits: 8  Duration in weeks: 4  Treatment plan discussed with: patient        Subjective Evaluation    History of Present Illness  Mechanism of injury: 20  Pt comes to therapy reporting approximately 6-month history of left shoulder pain, attributed to gradual onset 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[employer])  States her shoulder pain has progressively increased over time, making many ADLs difficult to perform  Reports her PCP gave her a steroid injection, which she reports did not provide relief  Consulted orthopedic physician, who recommended therapy and 3 month follow up  AGGS: sleep disturbances, washing hair, dressing, donning coat, lifting above shoulder  LOC: anterior aspect of L shoulder; constant-aching, movement-stab  EASES: none yet    GOALS: dress and sleep without pain      21  Pt reports she has noticed improvements in her ability to dress, don/doff coat, and wash her hair with her left UE since starting therapy  States she continues to have discomfort with these activities, however, less than pre-therapy levels  States she continues to have discomfort with sleeping on left side, however, notes this is also improved since the beginning  LOC: anterior aspect of L shoulder; constant-aching, movement-stab      21  Pt reports she continues to have difficulty with sleeping on her left side  However, notes minimal limitations with dressing, donning/doffing coat  States she had follow up with orthopedic's office yesterday, noting she was encouraged to continue PT and wean as able    Pain  Current pain ratin  At best pain ratin  At worst pain ratin    Patient Goals  Patient goals for therapy: decreased pain, increased motion and independence with ADLs/IADLs          Objective     Palpation   Left   No palpable tenderness to the anterior deltoid, biceps, infraspinatus, middle deltoid, pectoralis minor, rhomboids, supraspinatus, teres minor, triceps and upper trapezius       Active Range of Motion   Left Shoulder   Flexion: 95 degrees with pain  Extension: 52 degrees with pain  Abduction: 62 degrees with pain  External rotation BTH: T1 with pain  Internal rotation BTB: L3 (greater troch) with pain    Passive Range of Motion   Left Shoulder   Flexion: 125 degrees with pain  Abduction: 115 degrees with pain  External rotation 45°: 60 degrees   Internal rotation 45°: 45 degrees     Strength/Myotome Testing     Left Shoulder     Planes of Motion   Flexion: 3-   Extension: 4-   Abduction: 3-   External rotation at 0°: 4-   Internal rotation at 0°: 4     Isolated Muscles   Biceps: 4   Triceps: 4           Precautions: HTN    Daily Treatment Diary    Date 1-22 1/26 1/28 2/5 2/11 2/16 1/6 1/7 1/14 1/18   FOTO      perf    nv   Re-Eval      PAGE    PAGE      Manuals    PROM shoulder flex, abd, ext CD PAGE TE TE TE  PAGE PAGE TE TE   Tape 2 I strips        np      Post-inf glides PAGE PAGE Gr IV PAGE gr IV PAGE gr IV PAGE Gr IV PAGE Gr IV PAGE Gr IV PAGE Gr IV TE PAGE Gr IV   Scap mobs   PAGE gr IV PAGE gr IV PAGE Gr IV PAGE Gr IV                    Neuro Re-Ed     scap retract BTB      2x10 Black 2x10 Black x25 Black 2x10 Black  3x10 Kesier  nv GTB 5"x20 GTB 2x15 GTB 2x15 GTB 2x15                                          Ther Ex    Doorway pec stretch - low 30"x 3 30" x3  30"x3 30"x3 30"x3 30"x3 30"x3 30"x3 30"x3 30"x3   Supine pec stretch 30" x3   Foam roll 30" x3  30"x3    30"x3 30"x3 nv    Supine flex AAROM Stand flx/scap 10x Stand flx/scap 20x Stand flx/scap 20x Stand flx/scap 20x Stand flx/scap 20x Stand flx/scap 20x 5"x20 5"x20 5"x20 Stand flex/  scap x10ea   Supine ER self-PROM 5"x20 5"x20 5"x10 5"x20 5"x20 5"x20 5"x20 5"x20 5"x20 5"x20   H/L TS ext over towel D/C      Seated 10"x10   D/C   TB shoulder ext BTB      2x10 Black  2x10 Black 2x10 Black 2x10 Black  3x10 Kesier  nv GTB 5"x20 GTB 2x15 GTB 2x15 GTB 2x15   sidelying ER 3" 10x nv 3"x20 3"x20 1# 3" 2x10 1# 3" 2x10    3"x10   Prone row, I's 3" 10x nv 3"x15 ea 3"x20 1# 3" 2x10 1# 3" 2x10    3"x10 ea   Ther Activity    Pulleys  5' 5 min 5 min 5 min 5 min 5 min 5 min 5 min 5 min 5 min                Gait Training                              Modalities

## 2021-02-18 ENCOUNTER — APPOINTMENT (OUTPATIENT)
Dept: PHYSICAL THERAPY | Facility: REHABILITATION | Age: 51
End: 2021-02-18
Payer: COMMERCIAL

## 2021-02-23 ENCOUNTER — TELEPHONE (OUTPATIENT)
Dept: HEMATOLOGY ONCOLOGY | Facility: CLINIC | Age: 51
End: 2021-02-23

## 2021-02-23 NOTE — TELEPHONE ENCOUNTER
Patient had Eliquis filled by her PCP  She is still actively taking it  Patient has a 3/4/21 appointment with Dr Umesh Machado  Patient was to have a thrombosis panel done after being off of Eliqis  Please review if patient needs to stop Eliquis now and have lab drawn post 3-4 days off medication and reschedule appointment 2 weeks post labs being drawn  Will forward to Dr Tiffany Montes RN  Ely's call back #[phone] (H)

## 2021-02-24 NOTE — TELEPHONE ENCOUNTER
Patient called back to follow up on the call that was placed yesterday (noted by Helon Angelucci Danco)Best call back [phone]

## 2021-02-24 NOTE — TELEPHONE ENCOUNTER
Spoke with pt and let her know that she is to stop her Eliquis 3-4 days prior to getting her thrombosis panel  She is to restart the Eliquis right after she gets the blood work  Pts appt was moved to 3/15 because she forgot about getting labs done  Pt voiced understanding

## 2021-03-02 ENCOUNTER — APPOINTMENT (OUTPATIENT)
Dept: LAB | Facility: IMAGING CENTER | Age: 51
End: 2021-03-02
Payer: COMMERCIAL

## 2021-03-02 DIAGNOSIS — D50.8 OTHER IRON DEFICIENCY ANEMIA: ICD-10-CM

## 2021-03-02 DIAGNOSIS — F41.9 ANXIETY: ICD-10-CM

## 2021-03-02 DIAGNOSIS — E55.9 VITAMIN D DEFICIENCY: ICD-10-CM

## 2021-03-02 DIAGNOSIS — I82.5Y1 CHRONIC DEEP VEIN THROMBOSIS (DVT) OF PROXIMAL VEIN OF RIGHT LOWER EXTREMITY (HCC): ICD-10-CM

## 2021-03-02 DIAGNOSIS — I26.99 OTHER PULMONARY EMBOLISM WITHOUT ACUTE COR PULMONALE, UNSPECIFIED CHRONICITY (HCC): ICD-10-CM

## 2021-03-02 DIAGNOSIS — I82.411 ACUTE DEEP VEIN THROMBOSIS (DVT) OF FEMORAL VEIN OF RIGHT LOWER EXTREMITY (HCC): ICD-10-CM

## 2021-03-02 LAB
25(OH)D3 SERPL-MCNC: 28.2 NG/ML (ref 30–100)
ALBUMIN SERPL BCP-MCNC: 4 G/DL (ref 3.5–5)
ALP SERPL-CCNC: 127 U/L (ref 46–116)
ALT SERPL W P-5'-P-CCNC: 65 U/L (ref 12–78)
ANION GAP SERPL CALCULATED.3IONS-SCNC: 5 MMOL/L (ref 4–13)
AST SERPL W P-5'-P-CCNC: 69 U/L (ref 5–45)
BILIRUB SERPL-MCNC: 0.4 MG/DL (ref 0.2–1)
BUN SERPL-MCNC: 9 MG/DL (ref 5–25)
CALCIUM SERPL-MCNC: 8.9 MG/DL (ref 8.3–10.1)
CHLORIDE SERPL-SCNC: 111 MMOL/L (ref 100–108)
CO2 SERPL-SCNC: 27 MMOL/L (ref 21–32)
CREAT SERPL-MCNC: 0.75 MG/DL (ref 0.6–1.3)
ERYTHROCYTE [DISTWIDTH] IN BLOOD BY AUTOMATED COUNT: 14.1 % (ref 11.6–15.1)
GFR SERPL CREATININE-BSD FRML MDRD: 91 ML/MIN/1.73SQ M
GLUCOSE P FAST SERPL-MCNC: 55 MG/DL (ref 65–99)
HCT VFR BLD AUTO: 44.4 % (ref 34.8–46.1)
HGB BLD-MCNC: 12.9 G/DL (ref 11.5–15.4)
IRON SATN MFR SERPL: 8 %
IRON SERPL-MCNC: 38 UG/DL (ref 50–170)
MCH RBC QN AUTO: 27.7 PG (ref 26.8–34.3)
MCHC RBC AUTO-ENTMCNC: 29.1 G/DL (ref 31.4–37.4)
MCV RBC AUTO: 96 FL (ref 82–98)
PLATELET # BLD AUTO: 276 THOUSANDS/UL (ref 149–390)
PMV BLD AUTO: 9.9 FL (ref 8.9–12.7)
POTASSIUM SERPL-SCNC: 3.6 MMOL/L (ref 3.5–5.3)
PROT SERPL-MCNC: 8.2 G/DL (ref 6.4–8.2)
RBC # BLD AUTO: 4.65 MILLION/UL (ref 3.81–5.12)
SODIUM SERPL-SCNC: 143 MMOL/L (ref 136–145)
TIBC SERPL-MCNC: 471 UG/DL (ref 250–450)
WBC # BLD AUTO: 5.71 THOUSAND/UL (ref 4.31–10.16)

## 2021-03-02 PROCEDURE — 85705 THROMBOPLASTIN INHIBITION: CPT

## 2021-03-02 PROCEDURE — 36415 COLL VENOUS BLD VENIPUNCTURE: CPT

## 2021-03-02 PROCEDURE — 85306 CLOT INHIBIT PROT S FREE: CPT

## 2021-03-02 PROCEDURE — 85613 RUSSELL VIPER VENOM DILUTED: CPT

## 2021-03-02 PROCEDURE — 83540 ASSAY OF IRON: CPT

## 2021-03-02 PROCEDURE — 81241 F5 GENE: CPT

## 2021-03-02 PROCEDURE — 85670 THROMBIN TIME PLASMA: CPT

## 2021-03-02 PROCEDURE — 86146 BETA-2 GLYCOPROTEIN ANTIBODY: CPT

## 2021-03-02 PROCEDURE — 83550 IRON BINDING TEST: CPT

## 2021-03-02 PROCEDURE — 86147 CARDIOLIPIN ANTIBODY EA IG: CPT

## 2021-03-02 PROCEDURE — 85732 THROMBOPLASTIN TIME PARTIAL: CPT

## 2021-03-02 PROCEDURE — 82306 VITAMIN D 25 HYDROXY: CPT

## 2021-03-02 PROCEDURE — 80053 COMPREHEN METABOLIC PANEL: CPT

## 2021-03-02 PROCEDURE — 85027 COMPLETE CBC AUTOMATED: CPT

## 2021-03-02 PROCEDURE — 81240 F2 GENE: CPT

## 2021-03-02 PROCEDURE — 85300 ANTITHROMBIN III ACTIVITY: CPT

## 2021-03-02 PROCEDURE — 85305 CLOT INHIBIT PROT S TOTAL: CPT

## 2021-03-02 PROCEDURE — 85303 CLOT INHIBIT PROT C ACTIVITY: CPT

## 2021-03-03 LAB
CARDIOLIPIN IGA SER IA-ACNC: <9 APL U/ML (ref 0–11)
CARDIOLIPIN IGG SER IA-ACNC: <9 GPL U/ML (ref 0–14)
CARDIOLIPIN IGM SER IA-ACNC: <9 MPL U/ML (ref 0–12)
DEPRECATED AT III PPP: 123 % OF NORMAL (ref 92–136)

## 2021-03-03 RX ORDER — ALPRAZOLAM 1 MG/1
1 TABLET ORAL
Qty: 30 TABLET | Refills: 1 | Status: SHIPPED | OUTPATIENT
Start: 2021-03-03 | End: 2021-04-29 | Stop reason: SDUPTHER

## 2021-03-04 ENCOUNTER — EVALUATION (OUTPATIENT)
Dept: PHYSICAL THERAPY | Facility: REHABILITATION | Age: 51
End: 2021-03-04
Payer: COMMERCIAL

## 2021-03-04 DIAGNOSIS — M75.02 ADHESIVE CAPSULITIS OF LEFT SHOULDER: ICD-10-CM

## 2021-03-04 DIAGNOSIS — M25.512 ACUTE PAIN OF LEFT SHOULDER: Primary | ICD-10-CM

## 2021-03-04 LAB
B2 GLYCOPROT1 IGA SER-ACNC: <9 GPI IGA UNITS (ref 0–25)
B2 GLYCOPROT1 IGG SER-ACNC: <9 GPI IGG UNITS (ref 0–20)
B2 GLYCOPROT1 IGM SER-ACNC: <9 GPI IGM UNITS (ref 0–32)
F5 GENE MUT ANL BLD/T: ABNORMAL
PROT C AG ACT/NOR PPP IA: 87 % OF NORMAL (ref 60–150)
PROT S ACT/NOR PPP: 57 % (ref 68–108)

## 2021-03-04 PROCEDURE — 97110 THERAPEUTIC EXERCISES: CPT | Performed by: PHYSICAL THERAPIST

## 2021-03-04 PROCEDURE — 97140 MANUAL THERAPY 1/> REGIONS: CPT | Performed by: PHYSICAL THERAPIST

## 2021-03-04 NOTE — PROGRESS NOTES
Daily Note     Today's date: 3/4/2021  Patient name: Maritza North  : 1970  MRN: [id number]  Referring provider: Alissa Weinberg MD  Dx:   Encounter Diagnosis     ICD-10-CM    1  Acute pain of left shoulder  M25 512    2  Adhesive capsulitis of left shoulder  M75 02                   Subjective: Pt comes to therapy reporting minimal changes since last session  Objective: See treatment diary below      Assessment: Tolerated treatment well  Patient demonstrated fatigue post treatment, exhibited good technique with therapeutic exercises and would benefit from continued PT      Plan: Progress treatment as tolerated          Precautions: HTN    Daily Treatment Diary    Date 1-22 1/26 1/28 2/5 2/11 2/16 3/4 1/7 1/14 1/18   FOTO      perf    nv   Re-Eval      PAGE    PAGE      Manuals    PROM shoulder flex, abd, ext CD PAGE TE TE TE  Solmon Rear TE TE   Tape 2 I strips              Post-inf glides PAGE PAGE Gr IV PAGE gr IV PAGE gr IV PAGE Gr IV PAGE Gr IV PAGE Gr IV PAGE Gr IV TE PAGE Gr IV   Scap mobs   PAGE gr IV PAGE gr IV PAGE Gr IV PAGE Gr IV                    Neuro Re-Ed     scap retract BTB      2x10 Black 2x10 Black x25 Black 2x10 Black  3x10 Kesier  nv Naresh 14 4# x20 GTB 2x15 GTB 2x15 GTB 2x15                                          Ther Ex    Doorway pec stretch - low 30"x 3 30" x3  30"x3 30"x3 30"x3 30"x3 30"x3 30"x3 30"x3 30"x3   Supine pec stretch 30" x3   Foam roll 30" x3  30"x3     30"x3 nv    Supine flex AAROM Stand flx/scap 10x Stand flx/scap 20x Stand flx/scap 20x Stand flx/scap 20x Stand flx/scap 20x Stand flx/scap 20x Stand flx/scap 20x 5"x20 5"x20 Stand flex/  scap x10ea   Supine ER self-PROM 5"x20 5"x20 5"x10 5"x20 5"x20 5"x20 5"x20 5"x20 5"x20 5"x20   H/L TS ext over towel D/C      Seated 10"x10   D/C   TB shoulder ext BTB      2x10 Black  2x10 Black 2x10 Black 2x10 Black  3x10 Kesier  nv Naresh  6 4#  x20 GTB 2x15 GTB 2x15 GTB 2x15   sidelying ER 3" 10x nv 3"x20 3"x20 1# 3" 2x10 1# 3" 2x10 2# x15   3"x10   Prone row, I's 3" 10x nv 3"x15 ea 3"x20 1# 3" 2x10 1# 3" 2x10 Row 4# x20  I, T 2#  x20   3"x10 ea   Ther Activity    Pulleys  5' 5 min 5 min 5 min 5 min 5 min 5 min 5 min 5 min 5 min                Gait Training                              Modalities

## 2021-03-06 LAB
APTT SCREEN TO CONFIRM RATIO: 1.02 RATIO (ref 0–1.4)
CONFIRM APTT/NORMAL: 33.6 SEC (ref 0–55)
LA PPP-IMP: NORMAL
SCREEN APTT: 44.4 SEC (ref 0–51.9)
SCREEN DRVVT: 35.3 SEC (ref 0–47)
THROMBIN TIME: 19 SEC (ref 0–23)

## 2021-03-07 LAB
PROT S ACT/NOR PPP: 102 % (ref 57–157)
PROT S PPP-ACNC: 79 % (ref 60–150)

## 2021-03-08 LAB — F2 GENE MUT ANL BLD/T: NORMAL

## 2021-03-10 ENCOUNTER — OFFICE VISIT (OUTPATIENT)
Dept: INTERNAL MEDICINE CLINIC | Facility: CLINIC | Age: 51
End: 2021-03-10
Payer: COMMERCIAL

## 2021-03-10 VITALS
DIASTOLIC BLOOD PRESSURE: 82 MMHG | HEART RATE: 64 BPM | BODY MASS INDEX: 29.79 KG/M2 | TEMPERATURE: 98 F | SYSTOLIC BLOOD PRESSURE: 130 MMHG | OXYGEN SATURATION: 97 % | RESPIRATION RATE: 20 BRPM | HEIGHT: 65 IN | WEIGHT: 178.8 LBS

## 2021-03-10 DIAGNOSIS — F51.01 PRIMARY INSOMNIA: ICD-10-CM

## 2021-03-10 DIAGNOSIS — F32.A DEPRESSION, UNSPECIFIED DEPRESSION TYPE: ICD-10-CM

## 2021-03-10 DIAGNOSIS — I82.511 CHRONIC DEEP VEIN THROMBOSIS (DVT) OF RIGHT FEMORAL VEIN (HCC): Primary | Chronic | ICD-10-CM

## 2021-03-10 DIAGNOSIS — Z23 ENCOUNTER FOR IMMUNIZATION: ICD-10-CM

## 2021-03-10 DIAGNOSIS — D50.8 OTHER IRON DEFICIENCY ANEMIA: ICD-10-CM

## 2021-03-10 DIAGNOSIS — G89.4 PAIN SYNDROME, CHRONIC: ICD-10-CM

## 2021-03-10 DIAGNOSIS — G43.909 MIGRAINE WITHOUT STATUS MIGRAINOSUS, NOT INTRACTABLE, UNSPECIFIED MIGRAINE TYPE: ICD-10-CM

## 2021-03-10 DIAGNOSIS — E55.9 VITAMIN D DEFICIENCY: ICD-10-CM

## 2021-03-10 DIAGNOSIS — K91.2 POSTGASTRECTOMY MALABSORPTION: ICD-10-CM

## 2021-03-10 DIAGNOSIS — Z90.3 POSTGASTRECTOMY MALABSORPTION: ICD-10-CM

## 2021-03-10 PROCEDURE — 99214 OFFICE O/P EST MOD 30 MIN: CPT | Performed by: INTERNAL MEDICINE

## 2021-03-10 RX ORDER — DULOXETIN HYDROCHLORIDE 30 MG/1
30 CAPSULE, DELAYED RELEASE ORAL DAILY
Qty: 90 CAPSULE | Refills: 0 | Status: SHIPPED | OUTPATIENT
Start: 2021-03-10 | End: 2021-03-10 | Stop reason: SINTOL

## 2021-03-10 RX ORDER — SERTRALINE HYDROCHLORIDE 100 MG/1
100 TABLET, FILM COATED ORAL DAILY
Qty: 60 TABLET | Refills: 5 | Status: SHIPPED | OUTPATIENT
Start: 2021-03-10 | End: 2021-09-14 | Stop reason: SDUPTHER

## 2021-03-10 RX ORDER — ZOLPIDEM TARTRATE 10 MG/1
10 TABLET ORAL
Qty: 30 TABLET | Refills: 0 | Status: SHIPPED | OUTPATIENT
Start: 2021-03-10 | End: 2021-04-13 | Stop reason: SDUPTHER

## 2021-03-10 RX ORDER — VENLAFAXINE HYDROCHLORIDE 75 MG/1
75 CAPSULE, EXTENDED RELEASE ORAL
Qty: 90 CAPSULE | Refills: 3 | Status: SHIPPED | OUTPATIENT
Start: 2021-03-10 | End: 2022-02-08 | Stop reason: SDUPTHER

## 2021-03-10 RX ORDER — SODIUM CHLORIDE 9 MG/ML
20 INJECTION, SOLUTION INTRAVENOUS ONCE
Status: CANCELLED | OUTPATIENT
Start: 2021-03-15

## 2021-03-10 NOTE — PROGRESS NOTES
[facility]      NAME: Ely Cantrell  AGE: 46 y o  SEX: female  : 1970   MRN: [id number]    DATE: 3/10/2021  TIME: 5:21 PM    Assessment and Plan   1  Chronic deep vein thrombosis (DVT) of right femoral vein (HCC)   the patient recently had a thrombosis panel that showed her to be heterozygous for factor 5 Leiden  She will be seeing her hematologist next week  2  Other iron deficiency anemia    The patient is iron deficient though she has not yet become anemic  She will be started on infusion therapy  3  Vitamin D deficiency    On replacement  4  Postgastrectomy malabsorption    This is the cause of the patient's iron deficiency  5  Migraine without status migrainosus, not intractable, unspecified migraine type      Stable on Topamax  6  Depression    Not optimally controlled  We will taper sertraline and try to replace this with venlafaxine  - sertraline (ZOLOFT) 100 mg tablet; Take 1 tablet (100 mg total) by mouth daily  Dispense: 60 tablet; Refill: 5  - venlafaxine (EFFEXOR-XR) 75 mg 24 hr capsule; Take 1 capsule (75 mg total) by mouth daily with breakfast  Dispense: 90 capsule; Refill: 3    7  Pain syndrome, chronic    Stable on current therapy  8  Primary insomnia    Tolerating Ambien   - zolpidem (AMBIEN) 10 mg tablet; Take 1 tablet (10 mg total) by mouth daily at bedtime as needed for sleep  Dispense: 30 tablet; Refill: 0          Return to office in:   One month    Chief Complaint     Chief Complaint   Patient presents with    Follow-up     4 month        History of Present Illness      the patient returned to the office for re-evaluation of DVT, post  Gastrectomy malabsorption, depression, migraine, and iron deficiency  Since her last visit she has been feeling fatigued  She has a very physically demanding job  She is currently in physical therapy because of left shoulder problems  She is sleeping well  Her mood is not good    She does not think the sertraline is working that well  She will be seeing Hematology next week to follow-up a recent thrombosis panel  The following portions of the patient's history were reviewed and updated as appropriate: allergies, current medications, past family history, past medical history, past social history, past surgical history and problem list     Review of Systems   Review of Systems   Constitutional: Positive for fatigue  Negative for activity change, appetite change, chills, diaphoresis, fever and unexpected weight change  HENT: Negative for congestion, ear pain, postnasal drip, rhinorrhea, sore throat and trouble swallowing  Eyes: Negative for pain, discharge, redness and visual disturbance  Respiratory: Negative for cough, shortness of breath and wheezing  Cardiovascular: Negative  Gastrointestinal: Negative  Endocrine: Negative  Genitourinary: Negative for difficulty urinating, dysuria, frequency, hematuria and urgency  Musculoskeletal: Positive for arthralgias  Negative for gait problem, joint swelling and myalgias  Skin: Negative for rash  Neurological: Negative for dizziness, speech difficulty, weakness, light-headedness, numbness and headaches  Hematological: Negative  Psychiatric/Behavioral: Negative for confusion, decreased concentration, dysphoric mood and sleep disturbance  The patient is not nervous/anxious          Active Problem List     Patient Active Problem List   Diagnosis    Allergic rhinitis    Anxiety    Depression    GERD without esophagitis    Insomnia    Migraine, unspecified, not intractable, without status migrainosus    Pain syndrome, chronic    Postgastrectomy malabsorption    Vitamin D deficiency    Wartenberg syndrome    History of bariatric surgery    Iron deficiency anemia    ASCUS with positive high risk HPV cervical    Urinary tract infection with hematuria    Symptomatic varicose veins of both lower extremities    Chronic deep vein thrombosis (DVT) of right femoral vein (HCC)    Neutrophilic leukocytosis    Impacted cerumen of left ear    Left shoulder pain    Adhesive capsulitis of left shoulder       Objective   /82 (BP Location: Left arm, Patient Position: Sitting, Cuff Size: Standard)   Pulse 64   Temp 98 °F (36 7 °C)   Resp 20   Ht 5' 5" (1 651 m)   Wt 81 1 kg (178 lb 12 8 oz)   SpO2 97%   BMI 29 75 kg/m²     Physical Exam  Constitutional:       General: She is not in acute distress  Appearance: She is well-developed  HENT:      Head: Normocephalic and atraumatic  Neck:      Musculoskeletal: Neck supple  Thyroid: No thyromegaly  Vascular: No JVD  Trachea: No tracheal deviation  Cardiovascular:      Rate and Rhythm: Normal rate and regular rhythm  Heart sounds: Normal heart sounds  No murmur  No friction rub  No gallop  Pulmonary:      Effort: Pulmonary effort is normal       Breath sounds: Normal breath sounds  No wheezing or rales  Chest:      Chest wall: No tenderness  Abdominal:      General: Bowel sounds are normal  There is no distension  Palpations: Abdomen is soft  There is no mass  Tenderness: There is no abdominal tenderness  There is no rebound  Musculoskeletal: Normal range of motion  General: No tenderness  Lymphadenopathy:      Cervical: No cervical adenopathy  Skin:     General: Skin is warm and dry  Neurological:      Mental Status: She is alert and oriented to person, place, and time  Psychiatric:         Mood and Affect: Mood normal          Behavior: Behavior normal          Thought Content:  Thought content normal          Judgment: Judgment normal          Pertinent Laboratory/Diagnostic Studies:  Appointment on 03/02/2021   Component Date Value Ref Range Status    WBC 03/02/2021 5 71  4 31 - 10 16 Thousand/uL Final    RBC 03/02/2021 4 65  3 81 - 5 12 Million/uL Final    Hemoglobin 03/02/2021 12 9  11 5 - 15 4 g/dL Final    Hematocrit 03/02/2021 44 4  34 8 - 46 1 % Final    MCV 03/02/2021 96  82 - 98 fL Final    MCH 03/02/2021 27 7  26 8 - 34 3 pg Final    MCHC 03/02/2021 29 1* 31 4 - 37 4 g/dL Final    RDW 03/02/2021 14 1  11 6 - 15 1 % Final    Platelets 94/81/5432 276  149 - 390 Thousands/uL Final    MPV 03/02/2021 9 9  8 9 - 12 7 fL Final    Sodium 03/02/2021 143  136 - 145 mmol/L Final    Potassium 03/02/2021 3 6  3 5 - 5 3 mmol/L Final    Chloride 03/02/2021 111* 100 - 108 mmol/L Final    CO2 03/02/2021 27  21 - 32 mmol/L Final    ANION GAP 03/02/2021 5  4 - 13 mmol/L Final    BUN 03/02/2021 9  5 - 25 mg/dL Final    Creatinine 03/02/2021 0 75  0 60 - 1 30 mg/dL Final    Glucose, Fasting 03/02/2021 55* 65 - 99 mg/dL Final    Calcium 03/02/2021 8 9  8 3 - 10 1 mg/dL Final    AST 03/02/2021 69* 5 - 45 U/L Final    ALT 03/02/2021 65  12 - 78 U/L Final    Alkaline Phosphatase 03/02/2021 127* 46 - 116 U/L Final    Total Protein 03/02/2021 8 2  6 4 - 8 2 g/dL Final    Albumin 03/02/2021 4 0  3 5 - 5 0 g/dL Final    Total Bilirubin 03/02/2021 0 40  0 20 - 1 00 mg/dL Final    eGFR 03/02/2021 91  ml/min/1 73sq m Final    Vit D, 25-Hydroxy 03/02/2021 28 2* 30 0 - 100 0 ng/mL Final    Iron Saturation 03/02/2021 8  % Final    TIBC 03/02/2021 471* 250 - 450 ug/dL Final    Iron 03/02/2021 38* 50 - 170 ug/dL Final    AntiThrombIN III Activity 03/02/2021 123  92 - 136 % of Normal Final    Anticardiolipin IgA 03/02/2021 <9  0 - 11 APL U/mL Final    Anticardiolipin IgG 03/02/2021 <9  0 - 14 GPL U/mL Final    Anticardiolipin IgM 03/02/2021 <9  0 - 12 MPL U/mL Final    Factor V Leiden 03/02/2021 Comment*  Final    PTT Lupus Anticoagulant 03/02/2021 44 4  0 0 - 51 9 sec Final    Dilute Viper Venom Time 03/02/2021 35 3  0 0 - 47 0 sec Final    DILUTE PROTHROMBIN TIME(DPT) 03/02/2021 33 6  0 0 - 55 0 sec Final    THROMBIN TIME (DRVW) 03/02/2021 19 0  0 0 - 23 0 sec Final    DPT CONFIRM RATIO 03/02/2021 1 02  0 00 - 1 40 Ratio Final    LUPUS REFLEX INTERPRETATION 03/02/2021 Comment:   Final    Protein C Activity 03/02/2021 87 0  60 - 150 % of Normal Final    PROTEIN S ACTIVITY 03/02/2021 57* 68 - 108 % Final    Protein S Ag, Total 03/02/2021 79  60 - 150 % Final    Protein S Ag, Free 03/02/2021 102  57 - 157 % Final    Prothrombin Mutation 03/02/2021 Comment   Final    Beta-2 Glyco 1 IgG 03/02/2021 <9  0 - 20 GPI IgG units Final    Beta-2 Glyco 1 IgA 03/02/2021 <9  0 - 25 GPI IgA units Final    Beta-2 Glyco 1 IgM 03/02/2021 <9  0 - 32 GPI IgM units Final   Orders Only on 01/11/2021   Component Date Value Ref Range Status    SARS-CoV-2  01/11/2021 Not Detected  Not Detected Final           Current Medications     Current Outpatient Medications:     ALPRAZolam (XANAX) 1 mg tablet, Take 1 tablet (1 mg total) by mouth daily at bedtime as needed for anxiety, Disp: 30 tablet, Rfl: 1    apixaban (ELIQUIS) 5 mg, Take 1 tablet (5 mg total) by mouth 2 (two) times a day, Disp: 180 tablet, Rfl: 1    cholecalciferol (VITAMIN D3) 1,000 units tablet, Take 1 tablet (1,000 Units total) by mouth daily, Disp: 30 tablet, Rfl: 0    fluticasone (FLONASE) 50 mcg/act nasal spray, 1 spray into each nostril daily, Disp: 1 Bottle, Rfl: 1    methadone (DOLOPHINE) 10 mg tablet, 1 tablet 3 times daily as needed for chronic pain,, Disp: 90 tablet, Rfl: 0    omeprazole (PriLOSEC) 20 mg delayed release capsule, take 1 capsule by mouth daily, Disp: 90 capsule, Rfl: 3    Pediatric Multiple Vit-C-FA (FRUITY CHEWABLES MULTIVITAMIN) CHEW, Chew 1 tablet daily , Disp: , Rfl:     sertraline (ZOLOFT) 100 mg tablet, Take 1 tablet (100 mg total) by mouth daily, Disp: 60 tablet, Rfl: 5    sucralfate (Carafate) 1 g/10 mL suspension, Take 10 mL (1 g total) by mouth 4 (four) times a day as needed (dyspepsia), Disp: 420 mL, Rfl: 2    topiramate (TOPAMAX) 50 MG tablet, take 1 tablet by mouth twice a day, Disp: 60 tablet, Rfl: 5    zolpidem (AMBIEN) 10 mg tablet, Take 1 tablet (10 mg total) by mouth daily at bedtime as needed for sleep, Disp: 30 tablet, Rfl: 0    venlafaxine (EFFEXOR-XR) 75 mg 24 hr capsule, Take 1 capsule (75 mg total) by mouth daily with breakfast, Disp: 90 capsule, Rfl: 3      Jazz Pugh MD

## 2021-03-15 ENCOUNTER — OFFICE VISIT (OUTPATIENT)
Dept: HEMATOLOGY ONCOLOGY | Facility: CLINIC | Age: 51
End: 2021-03-15
Payer: COMMERCIAL

## 2021-03-15 ENCOUNTER — OFFICE VISIT (OUTPATIENT)
Dept: PHYSICAL THERAPY | Facility: REHABILITATION | Age: 51
End: 2021-03-15
Payer: COMMERCIAL

## 2021-03-15 VITALS
TEMPERATURE: 96.4 F | HEIGHT: 65 IN | WEIGHT: 179 LBS | SYSTOLIC BLOOD PRESSURE: 108 MMHG | HEART RATE: 66 BPM | RESPIRATION RATE: 18 BRPM | BODY MASS INDEX: 29.82 KG/M2 | OXYGEN SATURATION: 97 % | DIASTOLIC BLOOD PRESSURE: 72 MMHG

## 2021-03-15 DIAGNOSIS — G89.4 PAIN SYNDROME, CHRONIC: ICD-10-CM

## 2021-03-15 DIAGNOSIS — M75.02 ADHESIVE CAPSULITIS OF LEFT SHOULDER: ICD-10-CM

## 2021-03-15 DIAGNOSIS — M25.512 ACUTE PAIN OF LEFT SHOULDER: Primary | ICD-10-CM

## 2021-03-15 DIAGNOSIS — I26.99 OTHER PULMONARY EMBOLISM WITHOUT ACUTE COR PULMONALE, UNSPECIFIED CHRONICITY (HCC): ICD-10-CM

## 2021-03-15 DIAGNOSIS — I82.5Y1 CHRONIC DEEP VEIN THROMBOSIS (DVT) OF PROXIMAL VEIN OF RIGHT LOWER EXTREMITY (HCC): Primary | ICD-10-CM

## 2021-03-15 DIAGNOSIS — D68.51 FACTOR 5 LEIDEN MUTATION, HETEROZYGOUS (HCC): ICD-10-CM

## 2021-03-15 DIAGNOSIS — D68.59 PROTEIN S DEFICIENCY (HCC): ICD-10-CM

## 2021-03-15 PROCEDURE — 3008F BODY MASS INDEX DOCD: CPT | Performed by: INTERNAL MEDICINE

## 2021-03-15 PROCEDURE — 99215 OFFICE O/P EST HI 40 MIN: CPT | Performed by: INTERNAL MEDICINE

## 2021-03-15 PROCEDURE — 97140 MANUAL THERAPY 1/> REGIONS: CPT

## 2021-03-15 PROCEDURE — 1036F TOBACCO NON-USER: CPT | Performed by: INTERNAL MEDICINE

## 2021-03-15 PROCEDURE — 97110 THERAPEUTIC EXERCISES: CPT

## 2021-03-15 RX ORDER — METHADONE HYDROCHLORIDE 10 MG/1
TABLET ORAL
Qty: 90 TABLET | Refills: 0 | Status: SHIPPED | OUTPATIENT
Start: 2021-03-15 | End: 2021-04-13 | Stop reason: SDUPTHER

## 2021-03-15 NOTE — TELEPHONE ENCOUNTER
Pt is calling again for methadone she states this is her 3rd time calling and her hands are now shaking

## 2021-03-15 NOTE — PROGRESS NOTES
[facility] [place]  [address] [id number] [phone]    Anna Cantrell,1970, [id number]  03/15/21    Discussion:   In summary, this is a 24-year-old female history of DVT/PE as previously outlined  She had been on estrogen preparation which was discontinued  She has been on Eliquis  She denies any bleeding symptoms  Recent blood work shows heterozygous factor 5 Leiden mutation as well as mild Protein S deficiency, 57%, low normal 68%  Given both of these findings, my inclination would be to continue indefinite anticoagulation  We reviewed that there is no clear guidance regarding dosing for this particular scenario  She prefers to continue on 5 mg b i d  And I think that that is reasonable  We reviewed the potential utility and implications of testing for first-degree family members  She also has iron deficiency with parental replacement planned  This is attributable to status post bariatric surgery  She denies any melena or hematochezia  We reviewed that colonoscopy is recommended  She plans on having this done  I have not set up a follow-up appointment at this time but remain available if any questions or problems arise in the future  I discussed the above with the patient  The patient  voiced understanding and agreement   ______________________________________________________________________    Chief Complaint   Patient presents with    Follow-up       HPI:  Oncology History    No history exists  Interval History:  Clinically stable  ECOG-  1 - Symptomatic but completely ambulatory    Review of Systems   Constitutional: Positive for fatigue  Negative for appetite change, diaphoresis and fever  HENT: Negative for sinus pain  Eyes: Negative for discharge  Respiratory: Negative for cough and shortness of breath  Cardiovascular: Negative for chest pain     Gastrointestinal: Negative for abdominal pain, constipation and diarrhea  Endocrine: Negative for cold intolerance  Genitourinary: Negative for difficulty urinating and hematuria  Musculoskeletal: Negative for joint swelling  Skin: Negative for rash  Allergic/Immunologic: Negative for environmental allergies  Neurological: Negative for dizziness and headaches  Hematological: Negative for adenopathy  Psychiatric/Behavioral: Negative for agitation         Past Medical History:   Diagnosis Date    Facial cellulitis     Fracture, cuboid     LAST ASSESSED: 3/26/15    Gastrojejunal ulcer     ANASTOMOTIC    History of small bowel obstruction     Hypertension     pt denies    Insomnia     Iron deficiency anemia     LAST ASSESSED: AND RESOLVED: 4/13/16     Patient Active Problem List   Diagnosis    Allergic rhinitis    Anxiety    Depression    GERD without esophagitis    Insomnia    Migraine, unspecified, not intractable, without status migrainosus    Pain syndrome, chronic    Postgastrectomy malabsorption    Vitamin D deficiency    Wartenberg syndrome    History of bariatric surgery    Iron deficiency anemia    ASCUS with positive high risk HPV cervical    Urinary tract infection with hematuria    Symptomatic varicose veins of both lower extremities    Chronic deep vein thrombosis (DVT) of right femoral vein (HCC)    Neutrophilic leukocytosis    Impacted cerumen of left ear    Left shoulder pain    Adhesive capsulitis of left shoulder       Current Outpatient Medications:     ALPRAZolam (XANAX) 1 mg tablet, Take 1 tablet (1 mg total) by mouth daily at bedtime as needed for anxiety, Disp: 30 tablet, Rfl: 1    apixaban (ELIQUIS) 5 mg, Take 1 tablet (5 mg total) by mouth 2 (two) times a day, Disp: 180 tablet, Rfl: 1    cholecalciferol (VITAMIN D3) 1,000 units tablet, Take 1 tablet (1,000 Units total) by mouth daily, Disp: 30 tablet, Rfl: 0    fluticasone (FLONASE) 50 mcg/act nasal spray, 1 spray into each nostril daily, Disp: 1 Bottle, Rfl: 1    methadone (DOLOPHINE) 10 mg tablet, 1 tablet 3 times daily as needed for chronic pain,, Disp: 90 tablet, Rfl: 0    omeprazole (PriLOSEC) 20 mg delayed release capsule, take 1 capsule by mouth daily, Disp: 90 capsule, Rfl: 3    Pediatric Multiple Vit-C-FA (FRUITY CHEWABLES MULTIVITAMIN) CHEW, Chew 1 tablet daily , Disp: , Rfl:     sertraline (ZOLOFT) 100 mg tablet, Take 1 tablet (100 mg total) by mouth daily, Disp: 60 tablet, Rfl: 5    sucralfate (Carafate) 1 g/10 mL suspension, Take 10 mL (1 g total) by mouth 4 (four) times a day as needed (dyspepsia), Disp: 420 mL, Rfl: 2    topiramate (TOPAMAX) 50 MG tablet, take 1 tablet by mouth twice a day, Disp: 60 tablet, Rfl: 5    venlafaxine (EFFEXOR-XR) 75 mg 24 hr capsule, Take 1 capsule (75 mg total) by mouth daily with breakfast, Disp: 90 capsule, Rfl: 3    zolpidem (AMBIEN) 10 mg tablet, Take 1 tablet (10 mg total) by mouth daily at bedtime as needed for sleep, Disp: 30 tablet, Rfl: 0  Allergies   Allergen Reactions    Amoxicillin Hives and Shortness Of Breath    Aspirin Hives and Other (See Comments)     Short of breath    Butorphanol Anaphylaxis and Other (See Comments)     Other reaction(s): BUTORPHANOL TARTRATE (STADOL) (loss of breath)    Morphine Other (See Comments) and Hallucinations     takes vicodin at home    Duloxetine     Azithromycin Rash    Gabapentin Palpitations    Nitrofurantoin Hives and Rash    Sulfa Antibiotics Hives, Rash and Other (See Comments)     Past Surgical History:   Procedure Laterality Date    ABDOMINOPLASTY      x2    CHOLECYSTECTOMY      GASTRIC BYPASS      FOR MORBID OBESITY    INCISIONAL HERNIA REPAIR      SMALL INTESTINE SURGERY       Social History     Objective:  Vitals:    03/15/21 1122   BP: 108/72   BP Location: Right arm   Patient Position: Sitting   Pulse: 66   Resp: 18   Temp: (!) 96 4 °F (35 8 °C)   TempSrc: Tympanic   SpO2: 97%   Weight: 81 2 kg (179 lb)   Height: 5' 5" (1 651 m) Physical Exam  Constitutional:       Appearance: She is well-developed  HENT:      Head: Normocephalic and atraumatic  Eyes:      Pupils: Pupils are equal, round, and reactive to light  Neck:      Musculoskeletal: Neck supple  Cardiovascular:      Rate and Rhythm: Normal rate  Heart sounds: No murmur  Pulmonary:      Effort: No respiratory distress  Breath sounds: No wheezing or rales  Abdominal:      General: There is no distension  Palpations: Abdomen is soft  Tenderness: There is no abdominal tenderness  There is no rebound  Musculoskeletal:         General: No tenderness  Lymphadenopathy:      Cervical: No cervical adenopathy  Skin:     General: Skin is warm  Findings: No rash  Neurological:      Mental Status: She is alert and oriented to person, place, and time  Deep Tendon Reflexes: Reflexes normal    Psychiatric:         Thought Content: Thought content normal            Labs: I personally reviewed the labs and imaging pertinent to this patient care

## 2021-03-15 NOTE — PROGRESS NOTES
Daily Note     Today's date: 3/15/2021  Patient name: Jm Long  : 1970  MRN: [id number]  Referring provider: Syeda Remy MD  Dx:   Encounter Diagnosis     ICD-10-CM    1  Acute pain of left shoulder  M25 512    2  Adhesive capsulitis of left shoulder  M75 02                   Subjective: pt reports improvement with sleeping at night time and less pain with work activities  Objective: See treatment diary below      Assessment: Tolerated treatment well  Patient demonstrated fatigue post treatment, exhibited good technique with therapeutic exercises and would benefit from continued PT      Plan: Continue per plan of care  Progress treatment as tolerated          Precautions: HTN    Daily Treatment Diary    Date 1-22 1/26 1/28 2/5 2/11 2/16 3/4 3/15 1/14 1/18   FOTO      perf    nv   Re-Eval      PAGE    PAGE      Manuals    PROM shoulder flex, abd, ext CD PAGE TE TE TE  PAGE TE TE TE   Tape 2 I strips              Post-inf glides PAGE PAGE Gr IV PAGE gr IV PAGE gr IV PAGE Gr IV PAGE Gr IV PAGE Gr IV PAGE Gr IV TE PAGE Gr IV   Scap mobs   PAGE gr IV PAGE gr IV PAGE Gr IV PAGE Gr IV                    Neuro Re-Ed     scap retract BTB      2x10 Black 2x10 Black x25 Black 2x10 Black  3x10 Kesier  nv Naresh 14 4# x20 [place] 14 4# x20 GTB 2x15 GTB 2x15                                          Ther Ex    Doorway pec stretch - low 30"x 3 30" x3  30"x3 30"x3 30"x3 30"x3 30"x3 30"x3 30"x3 30"x3   Supine pec stretch 30" x3   Foam roll 30" x3  30"x3      nv    Supine flex AAROM Stand flx/scap 10x Stand flx/scap 20x Stand flx/scap 20x Stand flx/scap 20x Stand flx/scap 20x Stand flx/scap 20x Stand flx/scap 20x Stand flx/scap to 90* 2x10 ea 5"x20 Stand flex/  scap x10ea   Supine ER self-PROM 5"x20 5"x20 5"x10 5"x20 5"x20 5"x20 5"x20  5"x20 5"x20   H/L TS ext over towel D/C      Seated 10"x10   D/C   TB shoulder ext BTB      2x10 Black  2x10 Black 2x10 Black 2x10 Black  3x10 Bessy gomez Naresh  6 4#  x20 Naresh  6 4#  x20 GTB 2x15 GTB 2x15 sidelying ER 3" 10x nv 3"x20 3"x20 1# 3" 2x10 1# 3" 2x10 2# x15 2# 2x10  3"x10   Prone row, I's 3" 10x nv 3"x15 ea 3"x20 1# 3" 2x10 1# 3" 2x10 Row 4# x20  I, T 2#  x20 Row 4# x20  I, T 2#  x20  3"x10 ea   Ther Activity    Pulleys  5' 5 min 5 min 5 min 5 min 5 min 5 min 5 min 5 min 5 min                Gait Training                              Modalities

## 2021-03-17 ENCOUNTER — OFFICE VISIT (OUTPATIENT)
Dept: PHYSICAL THERAPY | Facility: REHABILITATION | Age: 51
End: 2021-03-17
Payer: COMMERCIAL

## 2021-03-17 DIAGNOSIS — M25.512 ACUTE PAIN OF LEFT SHOULDER: Primary | ICD-10-CM

## 2021-03-17 DIAGNOSIS — M75.02 ADHESIVE CAPSULITIS OF LEFT SHOULDER: ICD-10-CM

## 2021-03-17 PROCEDURE — 97110 THERAPEUTIC EXERCISES: CPT

## 2021-03-17 PROCEDURE — 97140 MANUAL THERAPY 1/> REGIONS: CPT

## 2021-03-17 NOTE — PROGRESS NOTES
Daily Note     Today's date: 3/17/2021  Patient name: Edgardo Eaton  : 1970  MRN: [id number]  Referring provider: Wilmer Tysno MD  Dx:   Encounter Diagnosis     ICD-10-CM    1  Acute pain of left shoulder  M25 512    2  Adhesive capsulitis of left shoulder  M75 02                   Subjective: Pt reports her shoulder feels pretty good  She noted she will be changing positions at work as a  2* increased pain with working in [employer]  Objective: See treatment diary below      Assessment: Tolerated treatment well  Patient demonstrated fatigue post treatment, exhibited good technique with therapeutic exercises and would benefit from continued PT      Plan: Continue per plan of care  Progress treatment as tolerated          Precautions: HTN    Daily Treatment Diary    Date 1-22 1/26 1/28 2/5 2/11 2/16 3/4 3/15 3/17 1/18   FOTO      perf    nv   Re-Eval      PAGE    PAGE      Manuals    PROM shoulder flex, abd, ext CD PAGE TE TE TE  PAGE TE TE TE   Tape 2 I strips              Post-inf glides PAGE PAGE Gr IV PAGE gr IV PAGE gr IV PAGE Gr IV PAGE Gr IV PAGE Gr IV PAGE Gr IV NC Gr IV PAGE Gr IV   Scap mobs   PAGE gr IV PAGE gr IV PAGE Gr IV PAGE Gr IV                    Neuro Re-Ed     scap retract BTB      2x10 Black 2x10 Black x25 Black 2x10 Black  3x10 Kesier  nv [place] 14 4# x20 Naresh 14 4# x20 Naresh 14 4# x20 GTB 2x15   Ball stabs on wall         NV    bodyblade at 0* abd         NV                 Ther Ex    Doorway pec stretch - low 30"x 3 30" x3  30"x3 30"x3 30"x3 30"x3 30"x3 30"x3 30"x3 30"x3   Supine pec stretch 30" x3   Foam roll 30" x3  30"x3          Supine flex AAROM Stand flx/scap 10x Stand flx/scap 20x Stand flx/scap 20x Stand flx/scap 20x Stand flx/scap 20x Stand flx/scap 20x Stand flx/scap 20x Stand flx/scap to 1# 90* 2x10 ea Stand flx/scap to 1# 90* 2x10 ea Stand flex/  scap x10ea   Supine ER self-PROM 5"x20 5"x20 5"x10 5"x20 5"x20 5"x20 5"x20  5"x20 5"x20   H/L TS ext over towel D/C      Seated 10"x10   D/C TB shoulder ext BTB      2x10 Black  2x10 Black 2x10 Black 2x10 Black  3x10 Kesier  nv Naresh  6 4#  x20 Naresh  6 4#  x20 missed GTB 2x15   sidelying ER 3" 10x nv 3"x20 3"x20 1# 3" 2x10 1# 3" 2x10 2# x15 2# 2x10 2# 2x10 3"x10   Prone row, I's 3" 10x nv 3"x15 ea 3"x20 1# 3" 2x10 1# 3" 2x10 Row 4# x20  I, T 2#  x20 Row 4# x20  I, T 2#  x20 Row 4# x20  I, T 2#  x20 3"x10 ea   Ther Activity    Pulleys  5' 5 min 5 min 5 min 5 min 5 min 5 min 5 min 5 min 5 min                Gait Training                              Modalities

## 2021-03-19 ENCOUNTER — HOSPITAL ENCOUNTER (OUTPATIENT)
Dept: INFUSION CENTER | Facility: CLINIC | Age: 51
Discharge: HOME | End: 2021-03-19
Payer: COMMERCIAL

## 2021-03-19 VITALS
DIASTOLIC BLOOD PRESSURE: 81 MMHG | HEART RATE: 73 BPM | TEMPERATURE: 97.3 F | SYSTOLIC BLOOD PRESSURE: 133 MMHG | RESPIRATION RATE: 18 BRPM

## 2021-03-19 DIAGNOSIS — Z90.3 POSTGASTRECTOMY MALABSORPTION: Primary | ICD-10-CM

## 2021-03-19 DIAGNOSIS — K91.2 POSTGASTRECTOMY MALABSORPTION: Primary | ICD-10-CM

## 2021-03-19 PROCEDURE — 96365 THER/PROPH/DIAG IV INF INIT: CPT

## 2021-03-19 RX ORDER — SODIUM CHLORIDE 9 MG/ML
20 INJECTION, SOLUTION INTRAVENOUS ONCE
Status: COMPLETED | OUTPATIENT
Start: 2021-03-19 | End: 2021-03-19

## 2021-03-19 RX ORDER — SODIUM CHLORIDE 9 MG/ML
20 INJECTION, SOLUTION INTRAVENOUS ONCE
Status: CANCELLED | OUTPATIENT
Start: 2021-03-26

## 2021-03-19 RX ADMIN — SODIUM CHLORIDE 20 ML/HR: 0.9 INJECTION, SOLUTION INTRAVENOUS at 13:40

## 2021-03-19 RX ADMIN — SODIUM CHLORIDE 200 MG: 9 INJECTION, SOLUTION INTRAVENOUS at 13:59

## 2021-03-19 NOTE — PLAN OF CARE
Problem: Potential for Falls  Goal: Patient will remain free of falls  Description: INTERVENTIONS:  - Assess patient frequently for physical needs  -  Identify cognitive and physical deficits and behaviors that affect risk of falls    -  [place] fall precautions as indicated by assessment   - Educate patient/family on patient safety including physical limitations  - Instruct patient to call for assistance with activity based on assessment  - Modify environment to reduce risk of injury  - Consider OT/PT consult to assist with strengthening/mobility  Outcome: Progressing

## 2021-03-23 ENCOUNTER — APPOINTMENT (OUTPATIENT)
Dept: PHYSICAL THERAPY | Facility: REHABILITATION | Age: 51
End: 2021-03-23
Payer: COMMERCIAL

## 2021-03-26 ENCOUNTER — HOSPITAL ENCOUNTER (OUTPATIENT)
Dept: INFUSION CENTER | Facility: CLINIC | Age: 51
Discharge: HOME | End: 2021-03-26
Payer: COMMERCIAL

## 2021-03-26 VITALS
HEART RATE: 65 BPM | SYSTOLIC BLOOD PRESSURE: 114 MMHG | DIASTOLIC BLOOD PRESSURE: 77 MMHG | RESPIRATION RATE: 16 BRPM | TEMPERATURE: 97.3 F

## 2021-03-26 DIAGNOSIS — K91.2 POSTGASTRECTOMY MALABSORPTION: Primary | ICD-10-CM

## 2021-03-26 DIAGNOSIS — Z90.3 POSTGASTRECTOMY MALABSORPTION: Primary | ICD-10-CM

## 2021-03-26 PROCEDURE — 96365 THER/PROPH/DIAG IV INF INIT: CPT

## 2021-03-26 RX ORDER — SODIUM CHLORIDE 9 MG/ML
20 INJECTION, SOLUTION INTRAVENOUS ONCE
Status: COMPLETED | OUTPATIENT
Start: 2021-03-26 | End: 2021-03-26

## 2021-03-26 RX ORDER — SODIUM CHLORIDE 9 MG/ML
20 INJECTION, SOLUTION INTRAVENOUS ONCE
Status: CANCELLED | OUTPATIENT
Start: 2021-04-02

## 2021-03-26 RX ADMIN — SODIUM CHLORIDE 20 ML/HR: 0.9 INJECTION, SOLUTION INTRAVENOUS at 13:43

## 2021-03-26 RX ADMIN — SODIUM CHLORIDE 200 MG: 9 INJECTION, SOLUTION INTRAVENOUS at 13:47

## 2021-03-26 NOTE — PROGRESS NOTES
Patient here for venofer infusion, tolerated  PIV removed, AVS given, patient aware of next appointment  Patient left infusion center in baseline condition

## 2021-03-26 NOTE — PLAN OF CARE
Problem: Potential for Falls  Goal: Patient will remain free of falls  Description: INTERVENTIONS:  - Assess patient frequently for physical needs  -  Identify cognitive and physical deficits and behaviors that affect risk of falls    -  [place] fall precautions as indicated by assessment   - Educate patient/family on patient safety including physical limitations  - Instruct patient to call for assistance with activity based on assessment  - Modify environment to reduce risk of injury  - Consider OT/PT consult to assist with strengthening/mobility  Outcome: Progressing

## 2021-04-02 ENCOUNTER — HOSPITAL ENCOUNTER (OUTPATIENT)
Dept: INFUSION CENTER | Facility: CLINIC | Age: 51
Discharge: HOME | End: 2021-04-02
Payer: COMMERCIAL

## 2021-04-02 VITALS
RESPIRATION RATE: 18 BRPM | TEMPERATURE: 99 F | HEART RATE: 84 BPM | DIASTOLIC BLOOD PRESSURE: 76 MMHG | SYSTOLIC BLOOD PRESSURE: 124 MMHG

## 2021-04-02 DIAGNOSIS — K91.2 POSTGASTRECTOMY MALABSORPTION: Primary | ICD-10-CM

## 2021-04-02 DIAGNOSIS — Z90.3 POSTGASTRECTOMY MALABSORPTION: Primary | ICD-10-CM

## 2021-04-02 PROCEDURE — 96365 THER/PROPH/DIAG IV INF INIT: CPT

## 2021-04-02 RX ORDER — SODIUM CHLORIDE 9 MG/ML
20 INJECTION, SOLUTION INTRAVENOUS ONCE
Status: CANCELLED | OUTPATIENT
Start: 2021-04-09

## 2021-04-02 RX ORDER — SODIUM CHLORIDE 9 MG/ML
20 INJECTION, SOLUTION INTRAVENOUS ONCE
Status: COMPLETED | OUTPATIENT
Start: 2021-04-02 | End: 2021-04-02

## 2021-04-02 RX ADMIN — SODIUM CHLORIDE 200 MG: 9 INJECTION, SOLUTION INTRAVENOUS at 13:59

## 2021-04-02 RX ADMIN — SODIUM CHLORIDE 20 ML/HR: 0.9 INJECTION, SOLUTION INTRAVENOUS at 13:52

## 2021-04-02 NOTE — PROGRESS NOTES
Patient tolerated Venofer infusion without incident  Aware of next appointment   AVS given to patient

## 2021-04-12 DIAGNOSIS — F51.01 PRIMARY INSOMNIA: ICD-10-CM

## 2021-04-12 DIAGNOSIS — G89.4 PAIN SYNDROME, CHRONIC: ICD-10-CM

## 2021-04-12 RX ORDER — ZOLPIDEM TARTRATE 10 MG/1
10 TABLET ORAL
Qty: 30 TABLET | Refills: 0 | Status: CANCELLED | OUTPATIENT
Start: 2021-04-12

## 2021-04-12 RX ORDER — METHADONE HYDROCHLORIDE 10 MG/1
TABLET ORAL
Qty: 90 TABLET | Refills: 0 | Status: CANCELLED | OUTPATIENT
Start: 2021-04-12

## 2021-04-13 DIAGNOSIS — F51.01 PRIMARY INSOMNIA: ICD-10-CM

## 2021-04-13 DIAGNOSIS — G89.4 PAIN SYNDROME, CHRONIC: ICD-10-CM

## 2021-04-13 RX ORDER — ZOLPIDEM TARTRATE 10 MG/1
10 TABLET ORAL
Qty: 30 TABLET | Refills: 0 | Status: SHIPPED | OUTPATIENT
Start: 2021-04-13 | End: 2021-05-21 | Stop reason: SDUPTHER

## 2021-04-13 RX ORDER — METHADONE HYDROCHLORIDE 10 MG/1
TABLET ORAL
Qty: 90 TABLET | Refills: 0 | Status: SHIPPED | OUTPATIENT
Start: 2021-04-13 | End: 2021-05-26 | Stop reason: SDUPTHER

## 2021-04-13 RX ORDER — NALOXONE HYDROCHLORIDE 4 MG/.1ML
SPRAY NASAL
Qty: 1 EACH | Refills: 1 | Status: SHIPPED | OUTPATIENT
Start: 2021-04-13

## 2021-04-15 DIAGNOSIS — K21.9 GERD WITHOUT ESOPHAGITIS: ICD-10-CM

## 2021-04-15 RX ORDER — OMEPRAZOLE 20 MG/1
20 CAPSULE, DELAYED RELEASE ORAL DAILY
Qty: 90 CAPSULE | Refills: 0 | Status: SHIPPED | OUTPATIENT
Start: 2021-04-15 | End: 2021-07-13

## 2021-04-16 ENCOUNTER — HOSPITAL ENCOUNTER (OUTPATIENT)
Dept: INFUSION CENTER | Facility: CLINIC | Age: 51
Discharge: HOME | End: 2021-04-16
Payer: COMMERCIAL

## 2021-04-16 VITALS
DIASTOLIC BLOOD PRESSURE: 78 MMHG | HEART RATE: 66 BPM | TEMPERATURE: 99 F | RESPIRATION RATE: 18 BRPM | SYSTOLIC BLOOD PRESSURE: 127 MMHG

## 2021-04-16 DIAGNOSIS — Z90.3 POSTGASTRECTOMY MALABSORPTION: Primary | ICD-10-CM

## 2021-04-16 DIAGNOSIS — K91.2 POSTGASTRECTOMY MALABSORPTION: Primary | ICD-10-CM

## 2021-04-16 PROCEDURE — 96365 THER/PROPH/DIAG IV INF INIT: CPT

## 2021-04-16 RX ORDER — SODIUM CHLORIDE 9 MG/ML
20 INJECTION, SOLUTION INTRAVENOUS ONCE
Status: CANCELLED | OUTPATIENT
Start: 2021-04-23

## 2021-04-16 RX ORDER — SODIUM CHLORIDE 9 MG/ML
20 INJECTION, SOLUTION INTRAVENOUS ONCE
Status: COMPLETED | OUTPATIENT
Start: 2021-04-16 | End: 2021-04-16

## 2021-04-16 RX ADMIN — SODIUM CHLORIDE 20 ML/HR: 0.9 INJECTION, SOLUTION INTRAVENOUS at 13:42

## 2021-04-16 RX ADMIN — SODIUM CHLORIDE 200 MG: 9 INJECTION, SOLUTION INTRAVENOUS at 13:49

## 2021-04-16 NOTE — PLAN OF CARE
Problem: Potential for Falls  Goal: Patient will remain free of falls  Description: INTERVENTIONS:  - Assess patient frequently for physical needs  -  Identify cognitive and physical deficits and behaviors that affect risk of falls    -  [place] fall precautions as indicated by assessment   - Educate patient/family on patient safety including physical limitations  - Instruct patient to call for assistance with activity based on assessment  - Modify environment to reduce risk of injury  - Consider OT/PT consult to assist with strengthening/mobility  Outcome: Progressing

## 2021-04-20 ENCOUNTER — HOSPITAL ENCOUNTER (OUTPATIENT)
Dept: INFUSION CENTER | Facility: CLINIC | Age: 51
Discharge: HOME | End: 2021-04-20

## 2021-04-21 ENCOUNTER — OFFICE VISIT (OUTPATIENT)
Dept: INTERNAL MEDICINE CLINIC | Facility: CLINIC | Age: 51
End: 2021-04-21
Payer: COMMERCIAL

## 2021-04-21 VITALS
BODY MASS INDEX: 29.66 KG/M2 | HEIGHT: 65 IN | TEMPERATURE: 98.1 F | DIASTOLIC BLOOD PRESSURE: 84 MMHG | RESPIRATION RATE: 12 BRPM | HEART RATE: 80 BPM | WEIGHT: 178 LBS | SYSTOLIC BLOOD PRESSURE: 130 MMHG

## 2021-04-21 DIAGNOSIS — I82.511 CHRONIC DEEP VEIN THROMBOSIS (DVT) OF RIGHT FEMORAL VEIN (HCC): Chronic | ICD-10-CM

## 2021-04-21 DIAGNOSIS — Z98.84 HISTORY OF BARIATRIC SURGERY: ICD-10-CM

## 2021-04-21 DIAGNOSIS — D50.8 OTHER IRON DEFICIENCY ANEMIA: ICD-10-CM

## 2021-04-21 DIAGNOSIS — G43.909 MIGRAINE WITHOUT STATUS MIGRAINOSUS, NOT INTRACTABLE, UNSPECIFIED MIGRAINE TYPE: ICD-10-CM

## 2021-04-21 DIAGNOSIS — E55.9 VITAMIN D DEFICIENCY: ICD-10-CM

## 2021-04-21 DIAGNOSIS — G43.909 MIGRAINE WITHOUT STATUS MIGRAINOSUS, NOT INTRACTABLE, UNSPECIFIED MIGRAINE TYPE: Primary | ICD-10-CM

## 2021-04-21 DIAGNOSIS — F32.A DEPRESSION, UNSPECIFIED DEPRESSION TYPE: ICD-10-CM

## 2021-04-21 PROCEDURE — 3008F BODY MASS INDEX DOCD: CPT | Performed by: INTERNAL MEDICINE

## 2021-04-21 PROCEDURE — 99396 PREV VISIT EST AGE 40-64: CPT | Performed by: INTERNAL MEDICINE

## 2021-04-21 PROCEDURE — 1036F TOBACCO NON-USER: CPT | Performed by: INTERNAL MEDICINE

## 2021-04-21 RX ORDER — MAGNESIUM 200 MG
TABLET ORAL
COMMUNITY

## 2021-04-21 RX ORDER — TOPIRAMATE 100 MG/1
100 TABLET, FILM COATED ORAL 2 TIMES DAILY
Qty: 180 TABLET | Refills: 1 | Status: SHIPPED | OUTPATIENT
Start: 2021-04-21 | End: 2021-10-15 | Stop reason: SDUPTHER

## 2021-04-21 NOTE — PROGRESS NOTES
[facility] [place]      NAME: Ely Cantrell  AGE: 46 y o  SEX: female  : 1970   MRN: [id number]    DATE: 2021  TIME: 5:07 PM    Assessment and Plan   1  Migraine without status migrainosus, not intractable, unspecified migraine type    The patient will increase Topamax both for migraine prophylaxis and also to see if this can help her with weight loss  - topiramate (TOPAMAX) 100 mg tablet; Take 1 tablet (100 mg total) by mouth 2 (two) times a day  Dispense: 180 tablet; Refill: 1  - Comprehensive metabolic panel    2  Chronic deep vein thrombosis (DVT) of right femoral vein (HCC)    Stable on apixaban  3  Depression    Not yet in remission  The patient will maintain sertraline 200 mg daily and venlafaxine 75 mg daily for now  4  Other iron deficiency anemia    The patient's hemoglobin improved with iron infusion  CBC will be recheck  - CBC    5  History of bariatric surgery    The patient is concerned that she is regaining weight  Topamax will be increased  If this is ineffective, re-evaluation by the bariatric team may be appropriate  6  Vitamin D deficiency    On replacement  - Vitamin D 25 hydroxy      The patient is trying to maintain a healthy lifestyle  She does not drink, smoke, or use illicit drugs  She is struggling to follow her bariatric maintenance diet at the moment  She is struggling with depression as described above  She has no cognitive issues  She has no problems with her activities of daily living  She is up-to-date with her vaccines with the exception of COVID-19  She was due to get the J and J vaccine the day that was withdrawn  She is nervous about getting vaccinated because she frequently has side effects and has already had problems with blood clots  I strongly encouraged her to proceed  I tried to reassure her that her risk of side effects is not objectively significantly elevated  The patient is due for colonoscopy    A discussion of this was deferred until her depression has improved somewhat  Return to office in:   One month    Chief Complaint     Chief Complaint   Patient presents with    Follow-up     Needs colonscopy       History of Present Illness      the patient returned to the office for yearly physical and also for re-evaluation of depression, DVT, iron deficiency anemia, and obesity with past bariatric surgery  Since her last visit she has remained depressed  She initially reduced sertraline to 100 mg daily when she started venlafaxine  She became more depressed thereafter so resumed sertraline 200 mg daily  Evaluation of her therapy is confounded by a serious illness that her mother has ongoing  She took a turn for the worse in the patient had to go to [place] to be with her  The patient is also very discouraged that she is gaining weight  She has been treated with Topamax in the past which has been helpful  She is currently on low-dose of this  The following portions of the patient's history were reviewed and updated as appropriate: allergies, current medications, past family history, past medical history, past social history, past surgical history and problem list     Review of Systems   Review of Systems   Constitutional: Negative  HENT: Negative for congestion, ear pain, postnasal drip, rhinorrhea, sore throat and trouble swallowing  Eyes: Negative for pain, discharge, redness and visual disturbance  Respiratory: Negative for cough, shortness of breath and wheezing  Cardiovascular: Negative  Gastrointestinal: Negative  Endocrine: Negative  Genitourinary: Negative for difficulty urinating, dysuria, frequency, hematuria and urgency  Musculoskeletal: Negative for arthralgias, gait problem, joint swelling and myalgias  Skin: Negative for rash  Neurological: Negative for dizziness, speech difficulty, weakness, light-headedness, numbness and headaches  Hematological: Negative  Psychiatric/Behavioral: Positive for dysphoric mood  Negative for confusion, decreased concentration and sleep disturbance  The patient is not nervous/anxious  Active Problem List     Patient Active Problem List   Diagnosis    Allergic rhinitis    Anxiety    Depression    GERD without esophagitis    Insomnia    Migraine, unspecified, not intractable, without status migrainosus    Pain syndrome, chronic    Postgastrectomy malabsorption    Vitamin D deficiency    Wartenberg syndrome    History of bariatric surgery    Iron deficiency anemia    ASCUS with positive high risk HPV cervical    Urinary tract infection with hematuria    Symptomatic varicose veins of both lower extremities    Chronic deep vein thrombosis (DVT) of right femoral vein (HCC)    Neutrophilic leukocytosis    Impacted cerumen of left ear    Left shoulder pain    Adhesive capsulitis of left shoulder    Factor 5 Leiden mutation, heterozygous (HCC)    Protein S deficiency (HCC)       Objective   /84 (BP Location: Left arm, Patient Position: Sitting, Cuff Size: Standard)   Pulse 80   Temp 98 1 °F (36 7 °C)   Resp 12   Ht 5' 5" (1 651 m)   Wt 80 7 kg (178 lb)   BMI 29 62 kg/m²     Physical Exam  Constitutional:       General: She is not in acute distress  Appearance: She is well-developed  HENT:      Head: Normocephalic and atraumatic  Neck:      Musculoskeletal: Neck supple  Thyroid: No thyromegaly  Vascular: No JVD  Trachea: No tracheal deviation  Cardiovascular:      Rate and Rhythm: Normal rate and regular rhythm  Heart sounds: Normal heart sounds  No murmur  No friction rub  No gallop  Pulmonary:      Effort: Pulmonary effort is normal       Breath sounds: Normal breath sounds  No wheezing or rales  Chest:      Chest wall: No tenderness  Abdominal:      General: Bowel sounds are normal  There is no distension  Palpations: Abdomen is soft  There is no mass  Tenderness: There is no abdominal tenderness  There is no rebound  Musculoskeletal: Normal range of motion  General: No tenderness  Lymphadenopathy:      Cervical: No cervical adenopathy  Skin:     General: Skin is warm and dry  Neurological:      Mental Status: She is alert and oriented to person, place, and time  Psychiatric:         Behavior: Behavior normal          Thought Content: Thought content normal          Judgment: Judgment normal       Comments: The patient's affect is reasonably normal during the interview in the office           Pertinent Laboratory/Diagnostic Studies:  Appointment on 03/02/2021   Component Date Value Ref Range Status    WBC 03/02/2021 5 71  4 31 - 10 16 Thousand/uL Final    RBC 03/02/2021 4 65  3 81 - 5 12 Million/uL Final    Hemoglobin 03/02/2021 12 9  11 5 - 15 4 g/dL Final    Hematocrit 03/02/2021 44 4  34 8 - 46 1 % Final    MCV 03/02/2021 96  82 - 98 fL Final    MCH 03/02/2021 27 7  26 8 - 34 3 pg Final    MCHC 03/02/2021 29 1* 31 4 - 37 4 g/dL Final    RDW 03/02/2021 14 1  11 6 - 15 1 % Final    Platelets 63/63/1513 276  149 - 390 Thousands/uL Final    MPV 03/02/2021 9 9  8 9 - 12 7 fL Final    Sodium 03/02/2021 143  136 - 145 mmol/L Final    Potassium 03/02/2021 3 6  3 5 - 5 3 mmol/L Final    Chloride 03/02/2021 111* 100 - 108 mmol/L Final    CO2 03/02/2021 27  21 - 32 mmol/L Final    ANION GAP 03/02/2021 5  4 - 13 mmol/L Final    BUN 03/02/2021 9  5 - 25 mg/dL Final    Creatinine 03/02/2021 0 75  0 60 - 1 30 mg/dL Final    Glucose, Fasting 03/02/2021 55* 65 - 99 mg/dL Final    Calcium 03/02/2021 8 9  8 3 - 10 1 mg/dL Final    AST 03/02/2021 69* 5 - 45 U/L Final    ALT 03/02/2021 65  12 - 78 U/L Final    Alkaline Phosphatase 03/02/2021 127* 46 - 116 U/L Final    Total Protein 03/02/2021 8 2  6 4 - 8 2 g/dL Final    Albumin 03/02/2021 4 0  3 5 - 5 0 g/dL Final    Total Bilirubin 03/02/2021 0 40  0 20 - 1 00 mg/dL Final    eGFR 03/02/2021 91  ml/min/1 73sq m Final    Vit D, 25-Hydroxy 03/02/2021 28 2* 30 0 - 100 0 ng/mL Final    Iron Saturation 03/02/2021 8  % Final    TIBC 03/02/2021 471* 250 - 450 ug/dL Final    Iron 03/02/2021 38* 50 - 170 ug/dL Final    AntiThrombIN III Activity 03/02/2021 123  92 - 136 % of Normal Final    Anticardiolipin IgA 03/02/2021 <9  0 - 11 APL U/mL Final    Anticardiolipin IgG 03/02/2021 <9  0 - 14 GPL U/mL Final    Anticardiolipin IgM 03/02/2021 <9  0 - 12 MPL U/mL Final    Factor V Leiden 03/02/2021 Comment*  Final    PTT Lupus Anticoagulant 03/02/2021 44 4  0 0 - 51 9 sec Final    Dilute Viper Venom Time 03/02/2021 35 3  0 0 - 47 0 sec Final    DILUTE PROTHROMBIN TIME(DPT) 03/02/2021 33 6  0 0 - 55 0 sec Final    THROMBIN TIME (DRVW) 03/02/2021 19 0  0 0 - 23 0 sec Final    DPT CONFIRM RATIO 03/02/2021 1 02  0 00 - 1 40 Ratio Final    LUPUS REFLEX INTERPRETATION 03/02/2021 Comment:   Final    Protein C Activity 03/02/2021 87 0  60 - 150 % of Normal Final    PROTEIN S ACTIVITY 03/02/2021 57* 68 - 108 % Final    Protein S Ag, Total 03/02/2021 79  60 - 150 % Final    Protein S Ag, Free 03/02/2021 102  57 - 157 % Final    Prothrombin Mutation 03/02/2021 Comment   Final    Beta-2 Glyco 1 IgG 03/02/2021 <9  0 - 20 GPI IgG units Final    Beta-2 Glyco 1 IgA 03/02/2021 <9  0 - 25 GPI IgA units Final    Beta-2 Glyco 1 IgM 03/02/2021 <9  0 - 32 GPI IgM units Final           Current Medications     Current Outpatient Medications:     ALPRAZolam (XANAX) 1 mg tablet, Take 1 tablet (1 mg total) by mouth daily at bedtime as needed for anxiety, Disp: 30 tablet, Rfl: 1    cholecalciferol (VITAMIN D3) 1,000 units tablet, Take 1 tablet (1,000 Units total) by mouth daily, Disp: 30 tablet, Rfl: 0    Cyanocobalamin (B-12) 1000 MCG SUBL, Place under the tongue, Disp: , Rfl:     methadone (DOLOPHINE) 10 mg tablet, 1 tablet 3 times daily as needed for chronic pain,, Disp: 90 tablet, Rfl: 0    Pediatric Multiple Vit-C-FA (FRUITY CHEWABLES MULTIVITAMIN) CHEW, Chew 1 tablet daily , Disp: , Rfl:     sertraline (ZOLOFT) 100 mg tablet, Take 1 tablet (100 mg total) by mouth daily, Disp: 60 tablet, Rfl: 5    venlafaxine (EFFEXOR-XR) 75 mg 24 hr capsule, Take 1 capsule (75 mg total) by mouth daily with breakfast, Disp: 90 capsule, Rfl: 3    zolpidem (AMBIEN) 10 mg tablet, Take 1 tablet (10 mg total) by mouth daily at bedtime as needed for sleep, Disp: 30 tablet, Rfl: 0    apixaban (ELIQUIS) 5 mg, Take 1 tablet (5 mg total) by mouth 2 (two) times a day, Disp: 180 tablet, Rfl: 1    fluticasone (FLONASE) 50 mcg/act nasal spray, 1 spray into each nostril daily (Patient taking differently: 1 spray into each nostril as needed ), Disp: 1 Bottle, Rfl: 1    naloxone (NARCAN) 4 mg/0 1 mL nasal spray, Administer 1 spray into a nostril   If no response after 2-3 minutes, give another dose in the other nostril using a new spray , Disp: 1 each, Rfl: 1    omeprazole (PriLOSEC) 20 mg delayed release capsule, Take 1 capsule (20 mg total) by mouth daily, Disp: 90 capsule, Rfl: 0    sucralfate (Carafate) 1 g/10 mL suspension, Take 10 mL (1 g total) by mouth 4 (four) times a day as needed (dyspepsia), Disp: 420 mL, Rfl: 2    topiramate (TOPAMAX) 100 mg tablet, Take 1 tablet (100 mg total) by mouth 2 (two) times a day, Disp: 180 tablet, Rfl: 1      Josiephine Lundborg, MD

## 2021-04-22 RX ORDER — TOPIRAMATE 50 MG/1
50 TABLET, FILM COATED ORAL 2 TIMES DAILY
Qty: 60 TABLET | Refills: 3 | OUTPATIENT
Start: 2021-04-22

## 2021-04-23 ENCOUNTER — HOSPITAL ENCOUNTER (OUTPATIENT)
Dept: INFUSION CENTER | Facility: CLINIC | Age: 51
Discharge: HOME | End: 2021-04-23

## 2021-04-29 DIAGNOSIS — F41.9 ANXIETY: ICD-10-CM

## 2021-04-30 RX ORDER — ALPRAZOLAM 1 MG/1
1 TABLET ORAL
Qty: 30 TABLET | Refills: 1 | Status: SHIPPED | OUTPATIENT
Start: 2021-04-30 | End: 2021-05-21 | Stop reason: SDUPTHER

## 2021-05-06 ENCOUNTER — HOSPITAL ENCOUNTER (OUTPATIENT)
Dept: INFUSION CENTER | Facility: CLINIC | Age: 51
Discharge: HOME | End: 2021-05-06
Payer: COMMERCIAL

## 2021-05-06 VITALS
HEART RATE: 74 BPM | RESPIRATION RATE: 18 BRPM | SYSTOLIC BLOOD PRESSURE: 114 MMHG | TEMPERATURE: 98.4 F | DIASTOLIC BLOOD PRESSURE: 76 MMHG

## 2021-05-06 DIAGNOSIS — K91.2 POSTGASTRECTOMY MALABSORPTION: Primary | ICD-10-CM

## 2021-05-06 DIAGNOSIS — Z90.3 POSTGASTRECTOMY MALABSORPTION: Primary | ICD-10-CM

## 2021-05-06 PROCEDURE — 96365 THER/PROPH/DIAG IV INF INIT: CPT

## 2021-05-06 RX ORDER — SODIUM CHLORIDE 9 MG/ML
20 INJECTION, SOLUTION INTRAVENOUS ONCE
Status: CANCELLED | OUTPATIENT
Start: 2021-05-07

## 2021-05-06 RX ORDER — SODIUM CHLORIDE 9 MG/ML
20 INJECTION, SOLUTION INTRAVENOUS ONCE
Status: COMPLETED | OUTPATIENT
Start: 2021-05-06 | End: 2021-05-06

## 2021-05-06 RX ADMIN — SODIUM CHLORIDE 200 MG: 9 INJECTION, SOLUTION INTRAVENOUS at 12:23

## 2021-05-06 RX ADMIN — SODIUM CHLORIDE 20 ML/HR: 0.9 INJECTION, SOLUTION INTRAVENOUS at 12:20

## 2021-05-11 ENCOUNTER — TELEPHONE (OUTPATIENT)
Dept: INTERNAL MEDICINE CLINIC | Facility: CLINIC | Age: 51
End: 2021-05-11

## 2021-05-11 DIAGNOSIS — F32.A DEPRESSION, UNSPECIFIED DEPRESSION TYPE: ICD-10-CM

## 2021-05-11 NOTE — TELEPHONE ENCOUNTER
Apolinar Wilkins RN from [facility] (tel [phone]) called she stated that she was calling to review medication list because on pts medication list it stated that she was taking Zoloft 100 mg daily and the patient stated that she increased her medication to 200 mg daily instead of zoloft 100 mg

## 2021-05-12 RX ORDER — SERTRALINE HYDROCHLORIDE 100 MG/1
TABLET, FILM COATED ORAL
Qty: 60 TABLET | Refills: 5 | Status: CANCELLED
Start: 2021-05-12

## 2021-05-12 NOTE — TELEPHONE ENCOUNTER
I dont know the pt, u may have to look in Dr Saul Zaldivar notes to figure out if he incraesed the dose

## 2021-05-21 DIAGNOSIS — F51.01 PRIMARY INSOMNIA: ICD-10-CM

## 2021-05-21 DIAGNOSIS — F41.9 ANXIETY: ICD-10-CM

## 2021-05-24 RX ORDER — ZOLPIDEM TARTRATE 10 MG/1
10 TABLET ORAL
Qty: 30 TABLET | Refills: 0 | Status: SHIPPED | OUTPATIENT
Start: 2021-05-24 | End: 2021-06-25 | Stop reason: SDUPTHER

## 2021-05-24 RX ORDER — ALPRAZOLAM 1 MG/1
1 TABLET ORAL
Qty: 30 TABLET | Refills: 0 | Status: SHIPPED | OUTPATIENT
Start: 2021-05-24 | End: 2021-06-25 | Stop reason: SDUPTHER

## 2021-05-26 DIAGNOSIS — G89.4 PAIN SYNDROME, CHRONIC: ICD-10-CM

## 2021-05-26 RX ORDER — METHADONE HYDROCHLORIDE 10 MG/1
TABLET ORAL
Qty: 90 TABLET | Refills: 0 | Status: SHIPPED | OUTPATIENT
Start: 2021-05-26 | End: 2021-06-25 | Stop reason: SDUPTHER

## 2021-06-14 ENCOUNTER — APPOINTMENT (OUTPATIENT)
Dept: LAB | Age: 51
End: 2021-06-14
Payer: COMMERCIAL

## 2021-06-14 LAB
25(OH)D3 SERPL-MCNC: 18 NG/ML (ref 30–100)
ALBUMIN SERPL BCP-MCNC: 3.4 G/DL (ref 3.5–5)
ALP SERPL-CCNC: 79 U/L (ref 46–116)
ALT SERPL W P-5'-P-CCNC: 20 U/L (ref 12–78)
ANION GAP SERPL CALCULATED.3IONS-SCNC: 5 MMOL/L (ref 4–13)
AST SERPL W P-5'-P-CCNC: 28 U/L (ref 5–45)
BILIRUB SERPL-MCNC: 0.24 MG/DL (ref 0.2–1)
BUN SERPL-MCNC: 10 MG/DL (ref 5–25)
CALCIUM ALBUM COR SERPL-MCNC: 9.1 MG/DL (ref 8.3–10.1)
CALCIUM SERPL-MCNC: 8.6 MG/DL (ref 8.3–10.1)
CHLORIDE SERPL-SCNC: 113 MMOL/L (ref 100–108)
CO2 SERPL-SCNC: 24 MMOL/L (ref 21–32)
CREAT SERPL-MCNC: 0.55 MG/DL (ref 0.6–1.3)
ERYTHROCYTE [DISTWIDTH] IN BLOOD BY AUTOMATED COUNT: 15.2 % (ref 11.6–15.1)
GFR SERPL CREATININE-BSD FRML MDRD: 107 ML/MIN/1.73SQ M
GLUCOSE SERPL-MCNC: 42 MG/DL (ref 65–140)
HCT VFR BLD AUTO: 37.9 % (ref 34.8–46.1)
HGB BLD-MCNC: 11.3 G/DL (ref 11.5–15.4)
MCH RBC QN AUTO: 28.3 PG (ref 26.8–34.3)
MCHC RBC AUTO-ENTMCNC: 29.8 G/DL (ref 31.4–37.4)
MCV RBC AUTO: 95 FL (ref 82–98)
PLATELET # BLD AUTO: 268 THOUSANDS/UL (ref 149–390)
PMV BLD AUTO: 10.2 FL (ref 8.9–12.7)
POTASSIUM SERPL-SCNC: 3.9 MMOL/L (ref 3.5–5.3)
PROT SERPL-MCNC: 7.2 G/DL (ref 6.4–8.2)
RBC # BLD AUTO: 3.99 MILLION/UL (ref 3.81–5.12)
SODIUM SERPL-SCNC: 142 MMOL/L (ref 136–145)
WBC # BLD AUTO: 5.29 THOUSAND/UL (ref 4.31–10.16)

## 2021-06-14 PROCEDURE — 80053 COMPREHEN METABOLIC PANEL: CPT | Performed by: INTERNAL MEDICINE

## 2021-06-14 PROCEDURE — 36415 COLL VENOUS BLD VENIPUNCTURE: CPT | Performed by: INTERNAL MEDICINE

## 2021-06-14 PROCEDURE — 85027 COMPLETE CBC AUTOMATED: CPT | Performed by: INTERNAL MEDICINE

## 2021-06-14 PROCEDURE — 82306 VITAMIN D 25 HYDROXY: CPT | Performed by: INTERNAL MEDICINE

## 2021-06-16 ENCOUNTER — OFFICE VISIT (OUTPATIENT)
Dept: INTERNAL MEDICINE CLINIC | Facility: CLINIC | Age: 51
End: 2021-06-16
Payer: COMMERCIAL

## 2021-06-16 VITALS
RESPIRATION RATE: 12 BRPM | TEMPERATURE: 97.8 F | DIASTOLIC BLOOD PRESSURE: 70 MMHG | HEIGHT: 65 IN | WEIGHT: 161.4 LBS | HEART RATE: 79 BPM | SYSTOLIC BLOOD PRESSURE: 110 MMHG | BODY MASS INDEX: 26.89 KG/M2

## 2021-06-16 DIAGNOSIS — D50.8 OTHER IRON DEFICIENCY ANEMIA: ICD-10-CM

## 2021-06-16 DIAGNOSIS — E55.9 VITAMIN D DEFICIENCY: ICD-10-CM

## 2021-06-16 DIAGNOSIS — G89.4 PAIN SYNDROME, CHRONIC: ICD-10-CM

## 2021-06-16 DIAGNOSIS — D68.51 FACTOR 5 LEIDEN MUTATION, HETEROZYGOUS (HCC): ICD-10-CM

## 2021-06-16 DIAGNOSIS — K56.609 SBO (SMALL BOWEL OBSTRUCTION) (HCC): Primary | ICD-10-CM

## 2021-06-16 DIAGNOSIS — D68.59 PROTEIN S DEFICIENCY (HCC): ICD-10-CM

## 2021-06-16 DIAGNOSIS — I82.511 CHRONIC DEEP VEIN THROMBOSIS (DVT) OF RIGHT FEMORAL VEIN (HCC): Chronic | ICD-10-CM

## 2021-06-16 PROBLEM — H61.22 IMPACTED CERUMEN OF LEFT EAR: Status: RESOLVED | Noted: 2020-09-08 | Resolved: 2021-06-16

## 2021-06-16 PROCEDURE — 99214 OFFICE O/P EST MOD 30 MIN: CPT | Performed by: INTERNAL MEDICINE

## 2021-06-16 PROCEDURE — 1036F TOBACCO NON-USER: CPT | Performed by: INTERNAL MEDICINE

## 2021-06-16 PROCEDURE — 3008F BODY MASS INDEX DOCD: CPT | Performed by: INTERNAL MEDICINE

## 2021-06-16 RX ORDER — MELATONIN
2000 DAILY
Qty: 30 TABLET | Refills: 0
Start: 2021-06-16

## 2021-06-16 RX ORDER — SENNA PLUS 8.6 MG/1
1 TABLET ORAL AS NEEDED
COMMUNITY

## 2021-06-16 RX ORDER — METHOCARBAMOL 500 MG/1
TABLET, FILM COATED ORAL
COMMUNITY
Start: 2021-06-04 | End: 2022-01-04

## 2021-06-16 NOTE — PROGRESS NOTES
[facility] [place]      NAME: Ely Cantrell  AGE: 46 y o  SEX: female  : 1970   MRN: [id number]    DATE: 2021  TIME: 5:42 PM    Assessment and Plan   1  SBO (small bowel obstruction) [facility])    The patient underwent emergency surgery at [facility]   A section of small bowel had to be removed  Fortunately, primary anastomosis was proximal   She did develop wound dehiscence and is still involved in wound care  2  Chronic deep vein thrombosis (DVT) of right femoral vein (HCC)    On apixaban  3  Pain syndrome, chronic    On her usual dose of methadone  4  Other iron deficiency anemia    The patient's hemoglobin has been stable  5  Vitamin D deficiency    Recent vitamin-D level was somewhat low  Her replacement dose will be increased  - cholecalciferol (VITAMIN D3) 1,000 units tablet; Take 2 tablets (2,000 Units total) by mouth daily  Dispense: 30 tablet; Refill: 0    6  Factor 5 Leiden mutation, heterozygous ([facility] [address] )    The patient was found to be heterozygous for factor 5 Leiden  I think that lifetime anticoagulation would be appropriate  7  Protein S deficiency ([facility] [address] )    As above     the patient has not yet received a COVID-19 vaccine  I strongly encouraged her to do so promptly  Return to office in:  3 months    Chief Complaint     Chief Complaint   Patient presents with    Follow-up     D/c LVH-CC 2021 NOT TCM       History of Present Illness       The patient returned to the office for re-evaluation after a recent hospitalization at [facility]   She was admitted with a small-bowel obstruction  She underwent emergency surgery and had a part of her small bowel resected  She developed a wound dehiscence  Initially this was treated with a wound VAC but she developed local reaction to this  She is now on wet to dry dressings and the wound is healing by secondary intention  She was seen by General surgery yesterday    She is able to eat  She is not having much pain  She has no nausea or vomiting  Her bowel function is somewhat sluggish and she is using Senokot  She has no chest pain, shortness of breath, palpitations, or dizziness  She is tolerating her medications well  The following portions of the patient's history were reviewed and updated as appropriate: allergies, current medications, past family history, past medical history, past social history, past surgical history and problem list     Review of Systems   Review of Systems   Constitutional: Negative  HENT: Negative for congestion, ear pain, postnasal drip, rhinorrhea, sore throat and trouble swallowing  Eyes: Negative for pain, discharge, redness and visual disturbance  Respiratory: Negative for cough, shortness of breath and wheezing  Cardiovascular: Negative  Gastrointestinal: Negative  Endocrine: Negative  Genitourinary: Negative for difficulty urinating, dysuria, frequency, hematuria and urgency  Musculoskeletal: Negative for arthralgias, gait problem, joint swelling and myalgias  Skin: Positive for wound  Negative for rash  Neurological: Negative for dizziness, speech difficulty, weakness, light-headedness, numbness and headaches  Hematological: Negative  Psychiatric/Behavioral: Negative for confusion, decreased concentration, dysphoric mood and sleep disturbance  The patient is not nervous/anxious          Active Problem List     Patient Active Problem List   Diagnosis    Allergic rhinitis    Anxiety    Depression    GERD without esophagitis    Insomnia    Migraine, unspecified, not intractable, without status migrainosus    Pain syndrome, chronic    Postgastrectomy malabsorption    Vitamin D deficiency    Wartenberg syndrome    History of bariatric surgery    Iron deficiency anemia    ASCUS with positive high risk HPV cervical    Urinary tract infection with hematuria    Symptomatic varicose veins of both lower extremities    Chronic deep vein thrombosis (DVT) of right femoral vein (HCC)    Neutrophilic leukocytosis    Left shoulder pain    Adhesive capsulitis of left shoulder    Factor 5 Leiden mutation, heterozygous (HCC)    Protein S deficiency (HCC)    SBO (small bowel obstruction) ([facility])       Objective   /70 (BP Location: Left arm, Patient Position: Sitting, Cuff Size: Standard)   Pulse 79   Temp 97 8 °F (36 6 °C)   Resp 12   Ht 5' 5" (1 651 m)   Wt 73 2 kg (161 lb 6 4 oz)   BMI 26 86 kg/m²     Physical Exam  Constitutional:       General: She is not in acute distress  Appearance: She is well-developed  HENT:      Head: Normocephalic and atraumatic  Neck:      Thyroid: No thyromegaly  Vascular: No JVD  Trachea: No tracheal deviation  Cardiovascular:      Rate and Rhythm: Normal rate and regular rhythm  Heart sounds: Normal heart sounds  No murmur heard  No friction rub  No gallop  Pulmonary:      Effort: Pulmonary effort is normal       Breath sounds: Normal breath sounds  No wheezing or rales  Chest:      Chest wall: No tenderness  Abdominal:      General: Bowel sounds are normal  There is no distension  Palpations: Abdomen is soft  There is no mass  Tenderness: There is no abdominal tenderness  There is no rebound  Musculoskeletal:         General: No tenderness  Normal range of motion  Cervical back: Neck supple  Lymphadenopathy:      Cervical: No cervical adenopathy  Skin:     General: Skin is warm and dry  Neurological:      Mental Status: She is alert and oriented to person, place, and time  Psychiatric:         Mood and Affect: Mood normal          Behavior: Behavior normal          Thought Content:  Thought content normal          Judgment: Judgment normal          Pertinent Laboratory/Diagnostic Studies:  Office Visit on 04/21/2021   Component Date Value Ref Range Status    WBC 06/14/2021 5 29  4 31 - 10 16 Thousand/uL Final    RBC 06/14/2021 3 99  3 81 - 5 12 Million/uL Final    Hemoglobin 06/14/2021 11 3* 11 5 - 15 4 g/dL Final    Hematocrit 06/14/2021 37 9  34 8 - 46 1 % Final    MCV 06/14/2021 95  82 - 98 fL Final    MCH 06/14/2021 28 3  26 8 - 34 3 pg Final    MCHC 06/14/2021 29 8* 31 4 - 37 4 g/dL Final    RDW 06/14/2021 15 2* 11 6 - 15 1 % Final    Platelets 58/57/3904 268  149 - 390 Thousands/uL Final    MPV 06/14/2021 10 2  8 9 - 12 7 fL Final    Sodium 06/14/2021 142  136 - 145 mmol/L Final    Potassium 06/14/2021 3 9  3 5 - 5 3 mmol/L Final    Chloride 06/14/2021 113* 100 - 108 mmol/L Final    CO2 06/14/2021 24  21 - 32 mmol/L Final    ANION GAP 06/14/2021 5  4 - 13 mmol/L Final    BUN 06/14/2021 10  5 - 25 mg/dL Final    Creatinine 06/14/2021 0 55* 0 60 - 1 30 mg/dL Final    Glucose 06/14/2021 42* 65 - 140 mg/dL Final    Calcium 06/14/2021 8 6  8 3 - 10 1 mg/dL Final    Corrected Calcium 06/14/2021 9 1  8 3 - 10 1 mg/dL Final    AST 06/14/2021 28  5 - 45 U/L Final    ALT 06/14/2021 20  12 - 78 U/L Final    Alkaline Phosphatase 06/14/2021 79  46 - 116 U/L Final    Total Protein 06/14/2021 7 2  6 4 - 8 2 g/dL Final    Albumin 06/14/2021 3 4* 3 5 - 5 0 g/dL Final    Total Bilirubin 06/14/2021 0 24  0 20 - 1 00 mg/dL Final    eGFR 06/14/2021 107  ml/min/1 73sq m Final    Vit D, 25-Hydroxy 06/14/2021 18 0* 30 0 - 100 0 ng/mL Final           Current Medications     Current Outpatient Medications:     ALPRAZolam (XANAX) 1 mg tablet, Take 1 tablet (1 mg total) by mouth daily at bedtime as needed for anxiety, Disp: 30 tablet, Rfl: 0    apixaban (ELIQUIS) 5 mg, Take 1 tablet (5 mg total) by mouth 2 (two) times a day, Disp: 180 tablet, Rfl: 1    cholecalciferol (VITAMIN D3) 1,000 units tablet, Take 2 tablets (2,000 Units total) by mouth daily, Disp: 30 tablet, Rfl: 0    Cyanocobalamin (B-12) 1000 MCG SUBL, Place under the tongue, Disp: , Rfl:     fluticasone (FLONASE) 50 mcg/act nasal spray, 1 spray into each nostril daily (Patient taking differently: 1 spray into each nostril as needed ), Disp: 1 Bottle, Rfl: 1    methadone (DOLOPHINE) 10 mg tablet, 1 tablet 3 times daily as needed for chronic pain,, Disp: 90 tablet, Rfl: 0    methocarbamol (ROBAXIN) 500 mg tablet, take 1 tablet by mouth four times a day if needed for muscle spasm, Disp: , Rfl:     omeprazole (PriLOSEC) 20 mg delayed release capsule, Take 1 capsule (20 mg total) by mouth daily, Disp: 90 capsule, Rfl: 0    Pediatric Multiple Vit-C-FA (FRUITY CHEWABLES MULTIVITAMIN) CHEW, Chew 1 tablet daily , Disp: , Rfl:     senna (SENOKOT) 8 6 MG tablet, Take 1 tablet by mouth daily, Disp: , Rfl:     sertraline (ZOLOFT) 100 mg tablet, Take 1 tablet (100 mg total) by mouth daily (Patient taking differently: Take 100 mg by mouth 2 (two) times a day ), Disp: 60 tablet, Rfl: 5    sucralfate (Carafate) 1 g/10 mL suspension, Take 10 mL (1 g total) by mouth 4 (four) times a day as needed (dyspepsia), Disp: 420 mL, Rfl: 2    topiramate (TOPAMAX) 100 mg tablet, Take 1 tablet (100 mg total) by mouth 2 (two) times a day, Disp: 180 tablet, Rfl: 1    venlafaxine (EFFEXOR-XR) 75 mg 24 hr capsule, Take 1 capsule (75 mg total) by mouth daily with breakfast, Disp: 90 capsule, Rfl: 3    zolpidem (AMBIEN) 10 mg tablet, Take 1 tablet (10 mg total) by mouth daily at bedtime as needed for sleep, Disp: 30 tablet, Rfl: 0    naloxone (NARCAN) 4 mg/0 1 mL nasal spray, Administer 1 spray into a nostril   If no response after 2-3 minutes, give another dose in the other nostril using a new spray , Disp: 1 each, Rfl: 1      Ben Carrasquillo MD

## 2021-06-23 DIAGNOSIS — I82.409 ACUTE DVT (DEEP VENOUS THROMBOSIS) (HCC): ICD-10-CM

## 2021-06-23 NOTE — TELEPHONE ENCOUNTER
Jamarcus  From [facility] (tel- [phone]) he called and stated that she is not moving her bowls  Would like something for her to go to the bathroom FYI: she is taking senna but its not working please advise  Called Dr Marlin Mcdowell he would like her to take miralax 1 to 2 packets until she goes     Called jamarcus left detailed message for him in voice mail

## 2021-06-25 DIAGNOSIS — F41.9 ANXIETY: ICD-10-CM

## 2021-06-25 DIAGNOSIS — F51.01 PRIMARY INSOMNIA: ICD-10-CM

## 2021-06-25 DIAGNOSIS — G89.4 PAIN SYNDROME, CHRONIC: ICD-10-CM

## 2021-06-25 RX ORDER — METHADONE HYDROCHLORIDE 10 MG/1
TABLET ORAL
Qty: 90 TABLET | Refills: 0 | Status: SHIPPED | OUTPATIENT
Start: 2021-06-25 | End: 2021-07-23 | Stop reason: SDUPTHER

## 2021-06-25 RX ORDER — ALPRAZOLAM 1 MG/1
1 TABLET ORAL
Qty: 30 TABLET | Refills: 0 | Status: SHIPPED | OUTPATIENT
Start: 2021-06-25 | End: 2021-09-20 | Stop reason: SDUPTHER

## 2021-06-25 RX ORDER — ZOLPIDEM TARTRATE 10 MG/1
10 TABLET ORAL
Qty: 30 TABLET | Refills: 0 | Status: SHIPPED | OUTPATIENT
Start: 2021-06-25 | End: 2021-07-23 | Stop reason: SDUPTHER

## 2021-07-08 ENCOUNTER — APPOINTMENT (EMERGENCY)
Dept: RADIOLOGY | Facility: HOSPITAL | Age: 51
End: 2021-07-08
Payer: COMMERCIAL

## 2021-07-08 ENCOUNTER — HOSPITAL ENCOUNTER (EMERGENCY)
Facility: HOSPITAL | Age: 51
Discharge: HOME | End: 2021-07-08
Admitting: SURGERY
Payer: COMMERCIAL

## 2021-07-08 VITALS
WEIGHT: 175.93 LBS | OXYGEN SATURATION: 99 % | TEMPERATURE: 97.1 F | DIASTOLIC BLOOD PRESSURE: 57 MMHG | HEART RATE: 70 BPM | SYSTOLIC BLOOD PRESSURE: 110 MMHG | RESPIRATION RATE: 16 BRPM

## 2021-07-08 DIAGNOSIS — S01.01XA LACERATION OF OCCIPITAL SCALP, INITIAL ENCOUNTER: ICD-10-CM

## 2021-07-08 DIAGNOSIS — S09.90XA INJURY OF HEAD, INITIAL ENCOUNTER: Primary | ICD-10-CM

## 2021-07-08 LAB
ABO GROUP BLD: NORMAL
ANION GAP SERPL CALCULATED.3IONS-SCNC: 6 MMOL/L (ref 4–13)
APTT PPP: 49 SECONDS (ref 23–37)
BASE EXCESS BLDA CALC-SCNC: -3 MMOL/L (ref -2–3)
BASOPHILS # BLD AUTO: 0.03 THOUSANDS/ΜL (ref 0–0.1)
BASOPHILS NFR BLD AUTO: 0 % (ref 0–1)
BLD GP AB SCN SERPL QL: NEGATIVE
BUN SERPL-MCNC: 23 MG/DL (ref 5–25)
CALCIUM SERPL-MCNC: 8.9 MG/DL (ref 8.3–10.1)
CHLORIDE SERPL-SCNC: 110 MMOL/L (ref 100–108)
CO2 SERPL-SCNC: 23 MMOL/L (ref 21–32)
CREAT SERPL-MCNC: 0.73 MG/DL (ref 0.6–1.3)
EOSINOPHIL # BLD AUTO: 0.24 THOUSAND/ΜL (ref 0–0.61)
EOSINOPHIL NFR BLD AUTO: 2 % (ref 0–6)
ERYTHROCYTE [DISTWIDTH] IN BLOOD BY AUTOMATED COUNT: 14.1 % (ref 11.6–15.1)
GFR SERPL CREATININE-BSD FRML MDRD: 94 ML/MIN/1.73SQ M
GLUCOSE SERPL-MCNC: 70 MG/DL (ref 65–140)
GLUCOSE SERPL-MCNC: 70 MG/DL (ref 65–140)
HCO3 BLDA-SCNC: 23.4 MMOL/L (ref 24–30)
HCT VFR BLD AUTO: 37.1 % (ref 34.8–46.1)
HCT VFR BLD CALC: 34 % (ref 34.8–46.1)
HGB BLD-MCNC: 11.3 G/DL (ref 11.5–15.4)
HGB BLDA-MCNC: 11.6 G/DL (ref 11.5–15.4)
HOLD SPECIMEN: NORMAL
HOLD SPECIMEN: NORMAL
IMM GRANULOCYTES # BLD AUTO: 0.04 THOUSAND/UL (ref 0–0.2)
IMM GRANULOCYTES NFR BLD AUTO: 0 % (ref 0–2)
INR PPP: 1.67 (ref 0.84–1.19)
LYMPHOCYTES # BLD AUTO: 2.11 THOUSANDS/ΜL (ref 0.6–4.47)
LYMPHOCYTES NFR BLD AUTO: 21 % (ref 14–44)
MCH RBC QN AUTO: 28.2 PG (ref 26.8–34.3)
MCHC RBC AUTO-ENTMCNC: 30.5 G/DL (ref 31.4–37.4)
MCV RBC AUTO: 93 FL (ref 82–98)
MONOCYTES # BLD AUTO: 0.97 THOUSAND/ΜL (ref 0.17–1.22)
MONOCYTES NFR BLD AUTO: 10 % (ref 4–12)
NEUTROPHILS # BLD AUTO: 6.65 THOUSANDS/ΜL (ref 1.85–7.62)
NEUTS SEG NFR BLD AUTO: 67 % (ref 43–75)
NRBC BLD AUTO-RTO: 0 /100 WBCS
PCO2 BLD: 25 MMOL/L (ref 21–32)
PCO2 BLD: 44.7 MM HG (ref 42–50)
PH BLD: 7.33 [PH] (ref 7.3–7.4)
PLATELET # BLD AUTO: 232 THOUSANDS/UL (ref 149–390)
PMV BLD AUTO: 9.8 FL (ref 8.9–12.7)
PO2 BLD: 33 MM HG (ref 35–45)
POTASSIUM BLD-SCNC: 4.5 MMOL/L (ref 3.5–5.3)
POTASSIUM SERPL-SCNC: 4.2 MMOL/L (ref 3.5–5.3)
PROTHROMBIN TIME: 19.7 SECONDS (ref 11.6–14.5)
RBC # BLD AUTO: 4.01 MILLION/UL (ref 3.81–5.12)
RH BLD: POSITIVE
SAO2 % BLD FROM PO2: 58 % (ref 60–85)
SODIUM BLD-SCNC: 142 MMOL/L (ref 136–145)
SODIUM SERPL-SCNC: 139 MMOL/L (ref 136–145)
SPECIMEN EXPIRATION DATE: NORMAL
SPECIMEN SOURCE: ABNORMAL
WBC # BLD AUTO: 10.04 THOUSAND/UL (ref 4.31–10.16)

## 2021-07-08 PROCEDURE — 82947 ASSAY GLUCOSE BLOOD QUANT: CPT

## 2021-07-08 PROCEDURE — 36415 COLL VENOUS BLD VENIPUNCTURE: CPT

## 2021-07-08 PROCEDURE — 85025 COMPLETE CBC W/AUTO DIFF WBC: CPT | Performed by: SURGERY

## 2021-07-08 PROCEDURE — NC001 PR NO CHARGE: Performed by: SURGERY

## 2021-07-08 PROCEDURE — 80048 BASIC METABOLIC PNL TOTAL CA: CPT | Performed by: SURGERY

## 2021-07-08 PROCEDURE — 12001 RPR S/N/AX/GEN/TRNK 2.5CM/<: CPT | Performed by: SURGERY

## 2021-07-08 PROCEDURE — 99284 EMERGENCY DEPT VISIT MOD MDM: CPT

## 2021-07-08 PROCEDURE — 84132 ASSAY OF SERUM POTASSIUM: CPT

## 2021-07-08 PROCEDURE — 86900 BLOOD TYPING SEROLOGIC ABO: CPT | Performed by: SURGERY

## 2021-07-08 PROCEDURE — 85730 THROMBOPLASTIN TIME PARTIAL: CPT | Performed by: SURGERY

## 2021-07-08 PROCEDURE — 85610 PROTHROMBIN TIME: CPT | Performed by: SURGERY

## 2021-07-08 PROCEDURE — 84295 ASSAY OF SERUM SODIUM: CPT

## 2021-07-08 PROCEDURE — 70450 CT HEAD/BRAIN W/O DYE: CPT

## 2021-07-08 PROCEDURE — NC001 PR NO CHARGE: Performed by: EMERGENCY MEDICINE

## 2021-07-08 PROCEDURE — 82803 BLOOD GASES ANY COMBINATION: CPT

## 2021-07-08 PROCEDURE — 93308 TTE F-UP OR LMTD: CPT | Performed by: SURGERY

## 2021-07-08 PROCEDURE — 76705 ECHO EXAM OF ABDOMEN: CPT | Performed by: SURGERY

## 2021-07-08 PROCEDURE — 85014 HEMATOCRIT: CPT

## 2021-07-08 PROCEDURE — 74177 CT ABD & PELVIS W/CONTRAST: CPT

## 2021-07-08 PROCEDURE — 86850 RBC ANTIBODY SCREEN: CPT | Performed by: SURGERY

## 2021-07-08 PROCEDURE — 86901 BLOOD TYPING SEROLOGIC RH(D): CPT | Performed by: SURGERY

## 2021-07-08 PROCEDURE — 99285 EMERGENCY DEPT VISIT HI MDM: CPT | Performed by: SURGERY

## 2021-07-08 PROCEDURE — 72125 CT NECK SPINE W/O DYE: CPT

## 2021-07-08 RX ORDER — LIDOCAINE HYDROCHLORIDE 10 MG/ML
5 INJECTION, SOLUTION EPIDURAL; INFILTRATION; INTRACAUDAL; PERINEURAL ONCE
Status: COMPLETED | OUTPATIENT
Start: 2021-07-08 | End: 2021-07-08

## 2021-07-08 RX ORDER — ACETAMINOPHEN 325 MG/1
975 TABLET ORAL ONCE
Status: COMPLETED | OUTPATIENT
Start: 2021-07-08 | End: 2021-07-08

## 2021-07-08 RX ADMIN — ACETAMINOPHEN 975 MG: 325 TABLET, FILM COATED ORAL at 05:08

## 2021-07-08 RX ADMIN — IOHEXOL 100 ML: 350 INJECTION, SOLUTION INTRAVENOUS at 03:21

## 2021-07-08 RX ADMIN — LIDOCAINE HYDROCHLORIDE 5 ML: 10 INJECTION, SOLUTION EPIDURAL; INFILTRATION; INTRACAUDAL; PERINEURAL at 05:09

## 2021-07-08 NOTE — PROCEDURES
Laceration repair    Date/Time: 7/8/2021 3:15 AM  Performed by: Vera English MD  Authorized by: Vera English MD   Consent: Verbal consent obtained  Risks and benefits: risks, benefits and alternatives were discussed  Consent given by: patient  Patient understanding: patient states understanding of the procedure being performed  Patient consent: the patient's understanding of the procedure matches consent given  Procedure consent: procedure consent matches procedure scheduled  Relevant documents: relevant documents present and verified  Test results: test results available and properly labeled  Site marked: the operative site was marked  Radiology Images displayed and confirmed  If images not available, report reviewed: imaging studies available  Required items: required blood products, implants, devices, and special equipment available  Patient identity confirmed: verbally with patient  Time out: Immediately prior to procedure a "time out" was called to verify the correct patient, procedure, equipment, support staff and site/side marked as required  Body area: head/neck  Location details: scalp  Laceration length: 0 5 cm  Tendon involvement: none  Nerve involvement: none  Vascular damage: no    Anesthesia:  Anesthetic total: 0 mL    Sedation:  Patient sedated: no      Wound Dehiscence:  Superficial Wound Dehiscence: simple closure      Procedure Details:  Preparation: Patient was prepped and draped in the usual sterile fashion  Irrigation solution: saline  Irrigation method: jet lavage  Amount of cleaning: standard  Debridement: none  Degree of undermining: none  Skin closure: 3-0 Prolene  Number of sutures: 1  Suture technique: figure 8    Approximation: close  Approximation difficulty: complex (figure 8 suture overlying arterial bleed)  Patient tolerance: patient tolerated the procedure well with no immediate complications

## 2021-07-08 NOTE — H&P
H&P Exam - Trauma   Ely Cantrell 46 y o  female MRN: [id number]  Unit/Bed#: ED 29 Encounter: [id number]    Assessment/Plan   Trauma Alert: Level B  Model of Arrival: Ambulance  Trauma Team: Attending Deidre Nayak and Residents Truong Johnson  Consultants: None    Trauma Active Problems:   1  Head injury  2  Scalp laceration  3  Lumbar back TTP    Trauma Plan:   1  CT head and c-spine  - tylenol  - no traumatic injuries - discharged    2  Washed out  - initial closure with figure 8 for hemostasis  - figure 8 removed and transitioned to 2 staples    3  CT abd/pelvis- no acute process    Chief Complaint: head pain    History of Present Illness   HPI:  Katerine Parikh is a 46 y o  female hx of factor V Leiden and SBO s/p recent bowel resection who presents via ambulance as a level B trauma for mechanical fall with headstrike on eliquis  Denies LOC  Reports was washing her hands and got water on the floor that she slipped on  Struck posterior head  Was able to reach phone quickly to call EMS  Also complaining lumbar back pain  Ambulatory after event  Mechanism:Fall    Review of Systems   Constitutional: Negative for appetite change, chills, diaphoresis, fever and unexpected weight change  HENT: Negative for congestion and rhinorrhea  Eyes: Negative for photophobia and visual disturbance  Respiratory: Negative for cough, chest tightness and shortness of breath  Cardiovascular: Negative for chest pain, palpitations and leg swelling  Gastrointestinal: Negative for abdominal distention, abdominal pain, blood in stool, constipation, diarrhea, nausea and vomiting  Genitourinary: Negative for dysuria and hematuria  Musculoskeletal: Negative for back pain, joint swelling, neck pain and neck stiffness  Skin: Positive for wound  Negative for color change, pallor and rash  Neurological: Positive for headaches  Negative for dizziness, syncope, weakness and light-headedness     Psychiatric/Behavioral: Negative for agitation  All other systems reviewed and are negative  12-point, complete review of systems was reviewed and negative except as stated above  Historical Information   History is unobtainable from the patient due to none  Efforts to obtain history included the following sources: other medical personnel, obtained from other records    No past medical history on file  No past surgical history on file  Social History   Social History     Substance and Sexual Activity   Alcohol Use Not on file     Social History     Substance and Sexual Activity   Drug Use Not on file     Social History     Tobacco Use   Smoking Status Not on file     No existing history information found  No existing history information found  There is no immunization history on file for this patient  Last Tetanus: 10/22/19  Family History: Non-contributory  Unable to obtain/limited by none      Meds/Allergies   all current active meds have been reviewed, current meds:   No current facility-administered medications for this encounter  and PTA meds:   None       Not on File      PHYSICAL EXAM    PE limited by: c-collar    Objective   Vitals:   First set: Temperature: (!) 97 1 °F (36 2 °C) (07/08/21 0302)  Pulse: 79 (07/08/21 0302)  Respirations: 12 (07/08/21 0302)  Blood Pressure: 129/60 (07/08/21 0302)    Primary Survey:   (A) Airway: patent  (B) Breathing: CTAB  (C) Circulation: Pulses:   normal, pedal  2/4 and radial  2/4  (D) Disabliity:  GCS Total:  15  (E) Expose:  Completed    Secondary Survey: (Click on Physical Exam tab above)  Physical Exam  Vitals and nursing note reviewed  Constitutional:       General: She is not in acute distress  Appearance: Normal appearance  She is well-developed  She is not ill-appearing, toxic-appearing or diaphoretic  HENT:      Head: Normocephalic  Comments: Approximately 2cm laceration with arterial bleeding occiput    Figure 8 stitch with hemostasis transitioned to 2 staples Nose: Nose normal  No congestion or rhinorrhea  Mouth/Throat:      Mouth: Mucous membranes are moist       Pharynx: Oropharynx is clear  No oropharyngeal exudate or posterior oropharyngeal erythema  Eyes:      General: No scleral icterus  Right eye: No discharge  Left eye: No discharge  Extraocular Movements: Extraocular movements intact  Conjunctiva/sclera: Conjunctivae normal       Pupils: Pupils are equal, round, and reactive to light  Neck:      Vascular: No JVD  Trachea: No tracheal deviation  Comments: C-collar in place  No midline tenderness  Neck supple  Cardiovascular:      Rate and Rhythm: Normal rate and regular rhythm  Heart sounds: Normal heart sounds  No murmur heard  No friction rub  No gallop  Comments: Normal rate and regular rhythm  Pulmonary:      Effort: Pulmonary effort is normal  No respiratory distress  Breath sounds: Normal breath sounds  No stridor  No wheezing or rales  Comments: Clear to auscultation bilaterally  Chest:      Chest wall: No tenderness  Abdominal:      General: Bowel sounds are normal  There is no distension  Palpations: Abdomen is soft  Tenderness: There is no abdominal tenderness  There is no right CVA tenderness, left CVA tenderness, guarding or rebound  Comments: Soft, nontender, nondistended  Normal bowel sounds throughout   Musculoskeletal:         General: Tenderness present  No swelling, deformity or signs of injury  Normal range of motion  Cervical back: Normal range of motion and neck supple  No rigidity or tenderness  No muscular tenderness  Right lower leg: No edema  Left lower leg: No edema  Comments: Lumbar tenderness palpation  No other midline tenderness  Head tenderness over laceration   Lymphadenopathy:      Cervical: No cervical adenopathy  Skin:     General: Skin is warm and dry  Coloration: Skin is not pale        Findings: No erythema or rash    Neurological:      General: No focal deficit present  Mental Status: She is alert and oriented to person, place, and time  Mental status is at baseline  Cranial Nerves: No cranial nerve deficit  Sensory: No sensory deficit  Motor: No weakness or abnormal muscle tone  Coordination: Coordination normal       Gait: Gait normal       Comments: A&Ox3 to person, place, and time  CN 2-12 intact  Strength 5/5 throughout  Sensation grossly intact  Cerebellar exam including gait intact  Psychiatric:         Behavior: Behavior normal          Thought Content: Thought content normal          Invasive Devices     Peripheral Intravenous Line            Peripheral IV 07/08/21 Right Wrist <1 day                Lab Results:   Results: I have personally reviewed pertinent reports   , BMP/CMP:   Lab Results   Component Value Date    SODIUM 139 07/08/2021    K 4 2 07/08/2021     (H) 07/08/2021    CO2 25 07/08/2021    BUN 23 07/08/2021    CREATININE 0 73 07/08/2021    GLUCOSE 70 07/08/2021    CALCIUM 8 9 07/08/2021    EGFR 94 07/08/2021   , CBC:   Lab Results   Component Value Date    WBC 10 04 07/08/2021    HGB 11 6 07/08/2021    HCT 34 (L) 07/08/2021    MCV 93 07/08/2021     07/08/2021    MCH 28 2 07/08/2021    MCHC 30 5 (L) 07/08/2021    RDW 14 1 07/08/2021    MPV 9 8 07/08/2021    NRBC 0 07/08/2021    and Coagulation:   Lab Results   Component Value Date    INR 1 67 (H) 07/08/2021     Imaging/EKG Studies: Results: I have personally reviewed pertinent reports  , FAST: negative     7/8 CTH and c-spine: no acute process    7/8 CAP: No evidence of acute intra-abdominal or intrapelvic parenchymal organ injury      There is asymmetric enlargement and some heterogeneity of the right rectus abdominis muscle near the level of the umbilicus  This may possibly be postoperative, as the patient has reportedly undergone recent abdominal surgery    Clinical correlation and   follow-up is recommended  If prior studies become available, direct comparison could be made      Hepatomegaly  Prior cholecystectomy  There is intra and extrahepatic biliary ductal dilatation  This may possibly be related to prior cholecystectomy  The pancreatic duct is borderline prominent  Direct comparison with prior studies is recommended   in order to ensure long-term stability of these findings      Prior bariatric surgery  Several bowel loops appear rather closely opposed to the anterior abdominal wall  There is no bowel obstruction  There is a small hiatal hernia      Other Studies: none    Code Status: No Order  Advance Directive and Living Will:      Power of :    POLST:

## 2021-07-08 NOTE — PROCEDURES
Laceration repair    Date/Time: 7/8/2021 5:16 AM  Performed by: Adriana Block MD  Authorized by: Adriana Block MD   Consent: Verbal consent obtained  Risks and benefits: risks, benefits and alternatives were discussed  Consent given by: patient  Patient understanding: patient states understanding of the procedure being performed  Patient consent: the patient's understanding of the procedure matches consent given  Procedure consent: procedure consent matches procedure scheduled  Relevant documents: relevant documents present and verified  Test results: test results available and properly labeled  Site marked: the operative site was marked  Radiology Images displayed and confirmed  If images not available, report reviewed: imaging studies available  Required items: required blood products, implants, devices, and special equipment available  Patient identity confirmed: verbally with patient  Time out: Immediately prior to procedure a "time out" was called to verify the correct patient, procedure, equipment, support staff and site/side marked as required  Body area: head/neck  Location details: scalp  Laceration length: 2 cm  Tendon involvement: none  Nerve involvement: none  Vascular damage: no    Anesthesia:  Local Anesthetic: lidocaine 1% without epinephrine  Anesthetic total: 2 mL    Wound Dehiscence:  Superficial Wound Dehiscence: simple closure      Procedure Details:  Preparation: Patient was prepped and draped in the usual sterile fashion  Irrigation solution: saline  Irrigation method: jet lavage  Amount of cleaning: standard  Debridement: none  Degree of undermining: none  Wound skin closure material used: staples    Number of sutures: 2  Technique: simple  Approximation: close  Approximation difficulty: simple  Dressing: 4x4 sterile gauze and gauze roll  Patient tolerance: patient tolerated the procedure well with no immediate complications

## 2021-07-08 NOTE — DISCHARGE INSTRUCTIONS
Staple Care   WHAT YOU NEED TO KNOW:   Staples are often used to close a wound  Your staples may be placed for 3 to 14 days, depending on the location of your wound  DISCHARGE INSTRUCTIONS:    Care for your wound:   · Clean:      ? You may be able to shower in 24 hours  Do not soak your wound under water  ? Gently wash your wound with soap and warm water daily  Lightly pat it dry  Do not cover your wound unless your healthcare provider tells you to      ? You may also need to clean your wound with a mixture of hydrogen peroxide and water  Ask how to do this  ? Do not apply ointment or cream to the wound unless your healthcare provider tells you to  · Elevate:      ? Rest any arm or leg that has a wound on pillows above the level of your heart  Do this as often as possible for 2 days  This will help decrease swelling and pain, and help you heal faster  · Minimize scarring:      ? Avoid sunshine on your wound to reduce scarring  Follow up with your healthcare provider as directed: You may need to return for a wound checkup 3 days after your staples are placed  Ask when you should return to get your staples removed  Staple removal:   · A medical staple remover  will be used to take out your staples  Your healthcare provider will slide the tool under each staple, squeeze the handle, and gently pull the staple out  · Medical tape  will be placed on your wound once your staples are removed  This will help keep your wound closed  The medical tape will fall off on its own after several days  Contact your healthcare provider if:   · You have redness, pain, swelling, or pus draining from your wound  · Your pain medicine does not relieve your pain  · You have a fever of 101°F (38 5°C) or higher  · You have an odor coming from your wound  · You have questions or concerns about your condition or care  Seek care immediately if:   · Your wound reopens      · You have red streaks on your skin that spread out from your wound  · You have severe pain or vomiting  © Copyright [address] Information is for End User's use only and may not be sold, redistributed or otherwise used for commercial purposes  All illustrations and images included in CareNotes® are the copyrighted property of A D A M [employer] Inc  or [employer]   The above information is an  only  It is not intended as medical advice for individual conditions or treatments  Talk to your doctor, nurse or pharmacist before following any medical regimen to see if it is safe and effective for you  Facial Laceration   WHAT YOU NEED TO KNOW:   A facial laceration is a tear or cut in the skin  Facial lacerations may be closed within 24 hours of injury  DISCHARGE INSTRUCTIONS:   Return to the emergency department if:   · You have a fever and the wound is painful, warm, or swollen  The wound area may be red, or fluid may come out of it  · You have heavy bleeding or bleeding that does not stop after 10 minutes of holding firm, direct pressure over the wound  Call your doctor if:   · Your wound reopens or your tape comes off  · Your wound is very painful  · Your wound is not healing, or you think there is an object in the wound  · The skin around your wound stays numb  · You have questions or concerns about your condition or care  Medicines:   · Antibiotics  may be given to prevent an infection if your wound was deep and had to be cleaned out  · Take your medicine as directed  Contact your healthcare provider if you think your medicine is not helping or if you have side effects  Tell him of her if you are allergic to any medicine  Keep a list of the medicines, vitamins, and herbs you take  Include the amounts, and when and why you take them  Bring the list or the pill bottles to follow-up visits  Carry your medicine list with you in case of an emergency      Care for your wound:  Care for your wound as directed to prevent infection and help it heal  Wash your hands with soap and warm water before and after you care for your wound  You may need to keep the wound dry for the first 24 to 48 hours  When your healthcare provider says it is okay, wash around your wound with soap and water, or as directed  Gently pat the area dry  Do not use alcohol or hydrogen peroxide to clean your wound unless you are directed to  · Do not take aspirin or NSAIDs for 24 hours after being injured  Aspirin and NSAIDs can increase blood flow  Your laceration may continue to bleed  · Do not take hot showers, eat or drink hot foods and liquids for 48 hours after being injured  Also, do not use a heating pad near your laceration  The heat can cause swelling in and around your laceration  · If your wound was covered with a bandage,  leave your bandage on as long as directed  Bandages keep your wound clean and protected  They can also prevent swelling  Ask when and how to change your bandage  Be careful not to apply the bandage or tape too tightly  This could cut off blood flow and cause more injury  · If your wound was closed with stitches,  keep your wound clean  Your healthcare provider may recommend that you apply antibiotic ointment after you clean your wound  · If your wound was closed with wound tape or medical strips,  keep the area clean and dry  The strips will usually fall off on their own after several days  · If your wound was closed with tissue glue,  do not use any ointments or lotions on the area  You may shower, but do not swim or soak in a bathtub  Gently pat the area dry after you take a shower  Do not pick at or scrub the glue area  Decrease scarring: The skin in the area of your wound may turn a different color if it is exposed to direct sunlight  After your wound is healed, use sunscreen over the area when you are out in the sun   You should do this for at least 6 months to 1 year after your injury  Some wounds scar less if they are covered while they heal   Follow up with your doctor as directed: You may need to follow up with your healthcare provider in 24 to 48 hours to have your wound checked for infection  You may need to return in 3 to 5 days if you have stitches that need to be removed  Write down your questions so you remember to ask them during your visits  © Copyright [address] Information is for End User's use only and may not be sold, redistributed or otherwise used for commercial purposes  All illustrations and images included in CareNotes® are the copyrighted property of A D A M , Inc  or [address] [place], [place] [address]   The above information is an  only  It is not intended as medical advice for individual conditions or treatments  Talk to your doctor, nurse or pharmacist before following any medical regimen to see if it is safe and effective for you

## 2021-07-08 NOTE — Clinical Note
Iggy Betts was seen and treated in our emergency department on 7/8/2021  Diagnosis: head injury    Ely    She may return on this date: 07/12/2021         If you have any questions or concerns, please don't hesitate to call        Adriana Block MD    ______________________________           _______________          _______________  Hospital Representative                              Date                                Time

## 2021-07-08 NOTE — PROCEDURES
POC FAST US    Date/Time: 7/8/2021 3:15 AM  Performed by: Trevon Trevizo MD  Authorized by: Trevon Trevizo MD     Patient location:  Trauma  Other Assisting Provider: Yes (comment) Barrett Horton)    Procedure details:     Exam Type:  Diagnostic    Indications: blunt abdominal trauma and blunt chest trauma      Assess for:  Intra-abdominal fluid, pericardial effusion and pneumothorax    Technique: FAST      Views obtained:  Heart - Pericardial sac, RUQ - Shahid's Pouch, LUQ - Splenorenal space and Suprapubic - Pouch of Thomas    Image quality: limited diagnostic      Image availability:  Images available in PACS, still images obtained and video obtained  FAST Findings:     RUQ (Hepatorenal) free fluid: absent      LUQ (Splenorenal) free fluid: absent      Suprapubic free fluid: absent      Cardiac wall motion: identified      Pericardial effusion: absent    Interpretation:     Impressions: negative

## 2021-07-08 NOTE — ED PROVIDER NOTES
Emergency Department Airway Evaluation and Management Form    History  Obtained from: EMS    Patient has no allergy information on record  No chief complaint on file  HPI  25-year-old woman presenting by EMS after mechanical fall  She is on Eliquis  Sustained head injury with laceration  No past medical history on file  No past surgical history on file  No family history on file  Social History     Tobacco Use    Smoking status: Not on file   Substance Use Topics    Alcohol use: Not on file    Drug use: Not on file     I have reviewed and agree with the history as documented  Review of Systems    Physical Exam  /60   Pulse 79   Resp 12   SpO2 98%     Physical Exam  Airway intact  Clear bilateral breath sounds  Scalp is bandaged  Pulses intact  GCS 15  ED Medications  Medications - No data to display    Intubation  Procedures    Notes  No indication for airway intervention in the [address] [facility]      Final Diagnosis  Final diagnoses:   None       ED Provider  Electronically Signed by     Irene Reyna MD  07/08/21 4827

## 2021-07-13 DIAGNOSIS — K21.9 GERD WITHOUT ESOPHAGITIS: ICD-10-CM

## 2021-07-13 RX ORDER — OMEPRAZOLE 20 MG/1
CAPSULE, DELAYED RELEASE ORAL
Qty: 90 CAPSULE | Refills: 0 | Status: SHIPPED | OUTPATIENT
Start: 2021-07-13 | End: 2021-10-14 | Stop reason: SDUPTHER

## 2021-07-23 DIAGNOSIS — F51.01 PRIMARY INSOMNIA: ICD-10-CM

## 2021-07-23 DIAGNOSIS — G89.4 PAIN SYNDROME, CHRONIC: ICD-10-CM

## 2021-07-23 RX ORDER — ZOLPIDEM TARTRATE 10 MG/1
10 TABLET ORAL
Qty: 30 TABLET | Refills: 0 | Status: SHIPPED | OUTPATIENT
Start: 2021-07-23 | End: 2021-08-23 | Stop reason: SDUPTHER

## 2021-07-23 RX ORDER — METHADONE HYDROCHLORIDE 10 MG/1
TABLET ORAL
Qty: 90 TABLET | Refills: 0 | Status: SHIPPED | OUTPATIENT
Start: 2021-07-23 | End: 2021-08-23 | Stop reason: SDUPTHER

## 2021-08-06 ENCOUNTER — TELEPHONE (OUTPATIENT)
Dept: INTERNAL MEDICINE CLINIC | Facility: CLINIC | Age: 51
End: 2021-08-06

## 2021-08-23 DIAGNOSIS — F51.01 PRIMARY INSOMNIA: ICD-10-CM

## 2021-08-23 DIAGNOSIS — G89.4 PAIN SYNDROME, CHRONIC: ICD-10-CM

## 2021-08-23 RX ORDER — METHADONE HYDROCHLORIDE 10 MG/1
TABLET ORAL
Qty: 90 TABLET | Refills: 0 | Status: SHIPPED | OUTPATIENT
Start: 2021-08-23 | End: 2021-09-20 | Stop reason: SDUPTHER

## 2021-08-23 RX ORDER — ZOLPIDEM TARTRATE 10 MG/1
10 TABLET ORAL
Qty: 30 TABLET | Refills: 0 | Status: SHIPPED | OUTPATIENT
Start: 2021-08-23 | End: 2021-09-20 | Stop reason: SDUPTHER

## 2021-09-14 DIAGNOSIS — F32.A DEPRESSION, UNSPECIFIED DEPRESSION TYPE: ICD-10-CM

## 2021-09-14 RX ORDER — SERTRALINE HYDROCHLORIDE 100 MG/1
100 TABLET, FILM COATED ORAL 2 TIMES DAILY
Qty: 90 TABLET | Refills: 1 | Status: SHIPPED | OUTPATIENT
Start: 2021-09-14 | End: 2021-12-06 | Stop reason: SDUPTHER

## 2021-09-20 ENCOUNTER — TELEPHONE (OUTPATIENT)
Dept: INTERNAL MEDICINE CLINIC | Facility: CLINIC | Age: 51
End: 2021-09-20

## 2021-09-20 DIAGNOSIS — R50.9 FEVER, UNSPECIFIED FEVER CAUSE: ICD-10-CM

## 2021-09-20 DIAGNOSIS — G89.4 PAIN SYNDROME, CHRONIC: ICD-10-CM

## 2021-09-20 DIAGNOSIS — F41.9 ANXIETY: ICD-10-CM

## 2021-09-20 DIAGNOSIS — R51.9 NONINTRACTABLE HEADACHE, UNSPECIFIED CHRONICITY PATTERN, UNSPECIFIED HEADACHE TYPE: ICD-10-CM

## 2021-09-20 DIAGNOSIS — R09.89 RUNNY NOSE: Primary | ICD-10-CM

## 2021-09-20 DIAGNOSIS — F51.01 PRIMARY INSOMNIA: ICD-10-CM

## 2021-09-20 PROCEDURE — U0003 INFECTIOUS AGENT DETECTION BY NUCLEIC ACID (DNA OR RNA); SEVERE ACUTE RESPIRATORY SYNDROME CORONAVIRUS 2 (SARS-COV-2) (CORONAVIRUS DISEASE [COVID-19]), AMPLIFIED PROBE TECHNIQUE, MAKING USE OF HIGH THROUGHPUT TECHNOLOGIES AS DESCRIBED BY CMS-2020-01-R: HCPCS | Performed by: INTERNAL MEDICINE

## 2021-09-20 PROCEDURE — U0005 INFEC AGEN DETEC AMPLI PROBE: HCPCS | Performed by: INTERNAL MEDICINE

## 2021-09-20 RX ORDER — ZOLPIDEM TARTRATE 10 MG/1
10 TABLET ORAL
Qty: 30 TABLET | Refills: 0 | Status: SHIPPED | OUTPATIENT
Start: 2021-09-20 | End: 2021-10-19 | Stop reason: SDUPTHER

## 2021-09-20 RX ORDER — METHADONE HYDROCHLORIDE 10 MG/1
TABLET ORAL
Qty: 90 TABLET | Refills: 0 | Status: SHIPPED | OUTPATIENT
Start: 2021-09-20 | End: 2021-10-15 | Stop reason: SDUPTHER

## 2021-09-20 RX ORDER — ALPRAZOLAM 1 MG/1
1 TABLET ORAL
Qty: 30 TABLET | Refills: 0 | Status: SHIPPED | OUTPATIENT
Start: 2021-09-20 | End: 2021-10-18

## 2021-09-20 NOTE — TELEPHONE ENCOUNTER
PC from pt with C/O of runny nose, possible fever, and headache x5 days  Pt requesting to be tested for COVID-19  I spoke with Dr Martha Stone and he stated ok to order COVID-19 test         I spoke with pt and she is aware of directions

## 2021-09-22 ENCOUNTER — TELEPHONE (OUTPATIENT)
Dept: INTERNAL MEDICINE CLINIC | Facility: CLINIC | Age: 51
End: 2021-09-22

## 2021-09-22 NOTE — TELEPHONE ENCOUNTER
Spoke to patient and she states that she received the result thru her MyChart      ----- Message from [facility] [place] sent at 9/21/2021  3:41 PM EDT -----   Negative COVID test

## 2021-09-29 DIAGNOSIS — N63.10 MASS OF RIGHT BREAST: Primary | ICD-10-CM

## 2021-09-29 NOTE — PROGRESS NOTES
[facility] called  Mass found on right breast, further tersting needed  Placed order for Mammo diagnostic right  and faxed

## 2021-10-14 DIAGNOSIS — K21.9 GERD WITHOUT ESOPHAGITIS: ICD-10-CM

## 2021-10-14 RX ORDER — OMEPRAZOLE 20 MG/1
20 CAPSULE, DELAYED RELEASE ORAL DAILY
Qty: 90 CAPSULE | Refills: 0 | Status: SHIPPED | OUTPATIENT
Start: 2021-10-14 | End: 2022-01-10 | Stop reason: SDUPTHER

## 2021-10-15 DIAGNOSIS — F41.9 ANXIETY: ICD-10-CM

## 2021-10-15 DIAGNOSIS — G43.909 MIGRAINE WITHOUT STATUS MIGRAINOSUS, NOT INTRACTABLE, UNSPECIFIED MIGRAINE TYPE: ICD-10-CM

## 2021-10-15 DIAGNOSIS — G89.4 PAIN SYNDROME, CHRONIC: ICD-10-CM

## 2021-10-15 RX ORDER — TOPIRAMATE 100 MG/1
100 TABLET, FILM COATED ORAL 2 TIMES DAILY
Qty: 180 TABLET | Refills: 1 | Status: SHIPPED | OUTPATIENT
Start: 2021-10-15 | End: 2022-04-04 | Stop reason: SDUPTHER

## 2021-10-18 RX ORDER — METHADONE HYDROCHLORIDE 10 MG/1
TABLET ORAL
Qty: 90 TABLET | Refills: 0 | Status: SHIPPED | OUTPATIENT
Start: 2021-10-18 | End: 2021-11-16 | Stop reason: SDUPTHER

## 2021-10-18 RX ORDER — ALPRAZOLAM 1 MG/1
TABLET ORAL
Qty: 30 TABLET | Refills: 0 | Status: SHIPPED | OUTPATIENT
Start: 2021-10-18 | End: 2021-11-17

## 2021-10-19 DIAGNOSIS — F51.01 PRIMARY INSOMNIA: ICD-10-CM

## 2021-10-20 ENCOUNTER — OFFICE VISIT (OUTPATIENT)
Dept: INTERNAL MEDICINE CLINIC | Facility: CLINIC | Age: 51
End: 2021-10-20
Payer: COMMERCIAL

## 2021-10-20 VITALS
BODY MASS INDEX: 25.96 KG/M2 | HEIGHT: 65 IN | HEART RATE: 71 BPM | TEMPERATURE: 97.6 F | WEIGHT: 155.8 LBS | DIASTOLIC BLOOD PRESSURE: 78 MMHG | RESPIRATION RATE: 16 BRPM | SYSTOLIC BLOOD PRESSURE: 118 MMHG

## 2021-10-20 DIAGNOSIS — D68.51 FACTOR 5 LEIDEN MUTATION, HETEROZYGOUS (HCC): ICD-10-CM

## 2021-10-20 DIAGNOSIS — N63.10 BREAST MASS, RIGHT: Primary | ICD-10-CM

## 2021-10-20 DIAGNOSIS — R87.610 ASCUS WITH POSITIVE HIGH RISK HPV CERVICAL: ICD-10-CM

## 2021-10-20 DIAGNOSIS — R87.810 ASCUS WITH POSITIVE HIGH RISK HPV CERVICAL: ICD-10-CM

## 2021-10-20 DIAGNOSIS — K91.2 POSTGASTRECTOMY MALABSORPTION: ICD-10-CM

## 2021-10-20 DIAGNOSIS — E55.9 VITAMIN D DEFICIENCY: ICD-10-CM

## 2021-10-20 DIAGNOSIS — D68.59 PROTEIN S DEFICIENCY (HCC): ICD-10-CM

## 2021-10-20 DIAGNOSIS — G89.4 PAIN SYNDROME, CHRONIC: ICD-10-CM

## 2021-10-20 DIAGNOSIS — I82.511 CHRONIC DEEP VEIN THROMBOSIS (DVT) OF RIGHT FEMORAL VEIN (HCC): Chronic | ICD-10-CM

## 2021-10-20 DIAGNOSIS — K56.609 SBO (SMALL BOWEL OBSTRUCTION) (HCC): ICD-10-CM

## 2021-10-20 DIAGNOSIS — D50.8 OTHER IRON DEFICIENCY ANEMIA: ICD-10-CM

## 2021-10-20 DIAGNOSIS — Z90.3 POSTGASTRECTOMY MALABSORPTION: ICD-10-CM

## 2021-10-20 PROCEDURE — 3008F BODY MASS INDEX DOCD: CPT | Performed by: INTERNAL MEDICINE

## 2021-10-20 PROCEDURE — 99214 OFFICE O/P EST MOD 30 MIN: CPT | Performed by: INTERNAL MEDICINE

## 2021-10-20 PROCEDURE — 1036F TOBACCO NON-USER: CPT | Performed by: INTERNAL MEDICINE

## 2021-10-20 RX ORDER — ZOLPIDEM TARTRATE 10 MG/1
10 TABLET ORAL
Qty: 30 TABLET | Refills: 0 | Status: SHIPPED | OUTPATIENT
Start: 2021-10-20 | End: 2021-11-18 | Stop reason: SDUPTHER

## 2021-11-13 DIAGNOSIS — F41.9 ANXIETY: ICD-10-CM

## 2021-11-16 DIAGNOSIS — F41.9 ANXIETY: ICD-10-CM

## 2021-11-16 DIAGNOSIS — G89.4 PAIN SYNDROME, CHRONIC: ICD-10-CM

## 2021-11-17 RX ORDER — ALPRAZOLAM 1 MG/1
1 TABLET ORAL
Qty: 30 TABLET | Refills: 0 | OUTPATIENT
Start: 2021-11-17

## 2021-11-17 RX ORDER — ALPRAZOLAM 1 MG/1
TABLET ORAL
Qty: 30 TABLET | Refills: 0 | Status: SHIPPED | OUTPATIENT
Start: 2021-11-17 | End: 2021-12-15 | Stop reason: SDUPTHER

## 2021-11-17 RX ORDER — METHADONE HYDROCHLORIDE 10 MG/1
TABLET ORAL
Qty: 90 TABLET | Refills: 0 | Status: SHIPPED | OUTPATIENT
Start: 2021-11-17 | End: 2021-12-20 | Stop reason: SDUPTHER

## 2021-11-18 DIAGNOSIS — F51.01 PRIMARY INSOMNIA: ICD-10-CM

## 2021-11-19 RX ORDER — ZOLPIDEM TARTRATE 10 MG/1
10 TABLET ORAL
Qty: 30 TABLET | Refills: 0 | Status: SHIPPED | OUTPATIENT
Start: 2021-11-19 | End: 2021-12-15 | Stop reason: SDUPTHER

## 2021-11-22 DIAGNOSIS — Z20.822 EXPOSURE TO CONFIRMED CASE OF COVID-19: Primary | ICD-10-CM

## 2021-11-22 PROCEDURE — U0003 INFECTIOUS AGENT DETECTION BY NUCLEIC ACID (DNA OR RNA); SEVERE ACUTE RESPIRATORY SYNDROME CORONAVIRUS 2 (SARS-COV-2) (CORONAVIRUS DISEASE [COVID-19]), AMPLIFIED PROBE TECHNIQUE, MAKING USE OF HIGH THROUGHPUT TECHNOLOGIES AS DESCRIBED BY CMS-2020-01-R: HCPCS | Performed by: INTERNAL MEDICINE

## 2021-11-22 PROCEDURE — U0005 INFEC AGEN DETEC AMPLI PROBE: HCPCS | Performed by: INTERNAL MEDICINE

## 2021-12-06 DIAGNOSIS — F32.A DEPRESSION, UNSPECIFIED DEPRESSION TYPE: ICD-10-CM

## 2021-12-06 RX ORDER — SERTRALINE HYDROCHLORIDE 100 MG/1
100 TABLET, FILM COATED ORAL 2 TIMES DAILY
Qty: 90 TABLET | Refills: 1 | Status: SHIPPED | OUTPATIENT
Start: 2021-12-06 | End: 2021-12-13 | Stop reason: SDUPTHER

## 2021-12-13 DIAGNOSIS — F32.A DEPRESSION, UNSPECIFIED DEPRESSION TYPE: ICD-10-CM

## 2021-12-13 RX ORDER — SERTRALINE HYDROCHLORIDE 100 MG/1
100 TABLET, FILM COATED ORAL 2 TIMES DAILY
Qty: 180 TABLET | Refills: 0 | Status: SHIPPED | OUTPATIENT
Start: 2021-12-13 | End: 2022-03-07 | Stop reason: SDUPTHER

## 2021-12-15 DIAGNOSIS — F51.01 PRIMARY INSOMNIA: ICD-10-CM

## 2021-12-15 DIAGNOSIS — F41.9 ANXIETY: ICD-10-CM

## 2021-12-17 RX ORDER — ALPRAZOLAM 1 MG/1
1 TABLET ORAL
Qty: 30 TABLET | Refills: 0 | Status: SHIPPED | OUTPATIENT
Start: 2021-12-17 | End: 2022-01-14 | Stop reason: SDUPTHER

## 2021-12-17 RX ORDER — ZOLPIDEM TARTRATE 10 MG/1
10 TABLET ORAL
Qty: 30 TABLET | Refills: 0 | Status: SHIPPED | OUTPATIENT
Start: 2021-12-17 | End: 2022-01-14 | Stop reason: SDUPTHER

## 2021-12-19 VITALS
HEART RATE: 56 BPM | TEMPERATURE: 98.1 F | DIASTOLIC BLOOD PRESSURE: 65 MMHG | BODY MASS INDEX: 26.16 KG/M2 | WEIGHT: 157.19 LBS | SYSTOLIC BLOOD PRESSURE: 145 MMHG | RESPIRATION RATE: 18 BRPM

## 2021-12-19 PROCEDURE — 99283 EMERGENCY DEPT VISIT LOW MDM: CPT

## 2021-12-20 ENCOUNTER — TELEPHONE (OUTPATIENT)
Dept: OBGYN CLINIC | Facility: HOSPITAL | Age: 51
End: 2021-12-20

## 2021-12-20 ENCOUNTER — HOSPITAL ENCOUNTER (EMERGENCY)
Facility: HOSPITAL | Age: 51
Discharge: HOME | End: 2021-12-20
Attending: EMERGENCY MEDICINE
Payer: COMMERCIAL

## 2021-12-20 ENCOUNTER — APPOINTMENT (EMERGENCY)
Dept: RADIOLOGY | Facility: HOSPITAL | Age: 51
End: 2021-12-20
Payer: COMMERCIAL

## 2021-12-20 DIAGNOSIS — S52.602A CLOSED FRACTURE OF LEFT DISTAL RADIUS AND ULNA: Primary | ICD-10-CM

## 2021-12-20 DIAGNOSIS — G89.4 PAIN SYNDROME, CHRONIC: ICD-10-CM

## 2021-12-20 DIAGNOSIS — S52.502A CLOSED FRACTURE OF LEFT DISTAL RADIUS AND ULNA: Primary | ICD-10-CM

## 2021-12-20 PROCEDURE — 73110 X-RAY EXAM OF WRIST: CPT

## 2021-12-20 PROCEDURE — 99284 EMERGENCY DEPT VISIT MOD MDM: CPT | Performed by: EMERGENCY MEDICINE

## 2021-12-20 PROCEDURE — 29125 APPL SHORT ARM SPLINT STATIC: CPT | Performed by: EMERGENCY MEDICINE

## 2021-12-20 RX ORDER — OXYCODONE HYDROCHLORIDE 5 MG/1
5 TABLET ORAL ONCE
Status: COMPLETED | OUTPATIENT
Start: 2021-12-20 | End: 2021-12-20

## 2021-12-20 RX ORDER — ACETAMINOPHEN 325 MG/1
975 TABLET ORAL ONCE
Status: COMPLETED | OUTPATIENT
Start: 2021-12-20 | End: 2021-12-20

## 2021-12-20 RX ADMIN — OXYCODONE HYDROCHLORIDE 5 MG: 5 TABLET ORAL at 00:37

## 2021-12-20 RX ADMIN — ACETAMINOPHEN 975 MG: 325 TABLET, FILM COATED ORAL at 00:36

## 2021-12-21 RX ORDER — METHADONE HYDROCHLORIDE 10 MG/1
TABLET ORAL
Qty: 90 TABLET | Refills: 0 | Status: SHIPPED | OUTPATIENT
Start: 2021-12-21 | End: 2022-01-04

## 2021-12-22 ENCOUNTER — TELEPHONE (OUTPATIENT)
Dept: INTERNAL MEDICINE CLINIC | Facility: CLINIC | Age: 51
End: 2021-12-22

## 2021-12-22 ENCOUNTER — TELEPHONE (OUTPATIENT)
Dept: OBGYN CLINIC | Facility: HOSPITAL | Age: 51
End: 2021-12-22

## 2021-12-22 NOTE — TELEPHONE ENCOUNTER
PRIOR AUTH DONE ANA LUISA WORRELL FOR [address] [facility] 10 MG AND IT WAS DENIED BY INSURANCE   CALLED PHARMACY AND SPOKE TO PHARMACIST AND SHE STATED THAT THE PATIENTS INSURANCE WILL NOT COVER IT BECAUSE THIS IS NOT A MAINTENANCE DRUG THIS IS TO BE USED AS A HELP  AND THE PT PAYS OUT OF POCKET 11 DOLLARS

## 2021-12-23 ENCOUNTER — TELEPHONE (OUTPATIENT)
Dept: OBGYN CLINIC | Facility: HOSPITAL | Age: 51
End: 2021-12-23

## 2021-12-23 ENCOUNTER — OFFICE VISIT (OUTPATIENT)
Dept: OBGYN CLINIC | Facility: HOSPITAL | Age: 51
End: 2021-12-23
Payer: COMMERCIAL

## 2021-12-23 ENCOUNTER — HOSPITAL ENCOUNTER (OUTPATIENT)
Dept: RADIOLOGY | Facility: HOSPITAL | Age: 51
Discharge: HOME | End: 2021-12-23
Attending: ORTHOPAEDIC SURGERY
Payer: COMMERCIAL

## 2021-12-23 VITALS
SYSTOLIC BLOOD PRESSURE: 135 MMHG | DIASTOLIC BLOOD PRESSURE: 85 MMHG | WEIGHT: 157 LBS | BODY MASS INDEX: 26.16 KG/M2 | HEART RATE: 71 BPM | HEIGHT: 65 IN

## 2021-12-23 DIAGNOSIS — S52.502A CLOSED FRACTURE OF DISTAL ENDS OF LEFT RADIUS AND ULNA, INITIAL ENCOUNTER: ICD-10-CM

## 2021-12-23 DIAGNOSIS — S52.602A CLOSED FRACTURE OF DISTAL ENDS OF LEFT RADIUS AND ULNA, INITIAL ENCOUNTER: ICD-10-CM

## 2021-12-23 DIAGNOSIS — S52.502A CLOSED FRACTURE OF DISTAL ENDS OF LEFT RADIUS AND ULNA, INITIAL ENCOUNTER: Primary | ICD-10-CM

## 2021-12-23 DIAGNOSIS — S52.602A CLOSED FRACTURE OF DISTAL ENDS OF LEFT RADIUS AND ULNA, INITIAL ENCOUNTER: Primary | ICD-10-CM

## 2021-12-23 PROCEDURE — 1036F TOBACCO NON-USER: CPT | Performed by: ORTHOPAEDIC SURGERY

## 2021-12-23 PROCEDURE — 99213 OFFICE O/P EST LOW 20 MIN: CPT | Performed by: ORTHOPAEDIC SURGERY

## 2021-12-23 PROCEDURE — 73110 X-RAY EXAM OF WRIST: CPT

## 2021-12-23 PROCEDURE — 25605 CLTX DST RDL FX/EPHYS SEP W/: CPT | Performed by: ORTHOPAEDIC SURGERY

## 2021-12-23 RX ORDER — METHOCARBAMOL 750 MG/1
750 TABLET, FILM COATED ORAL 3 TIMES DAILY PRN
Qty: 40 TABLET | Refills: 1 | Status: SHIPPED | OUTPATIENT
Start: 2021-12-23 | End: 2022-01-28 | Stop reason: ALTCHOICE

## 2021-12-27 ENCOUNTER — TELEPHONE (OUTPATIENT)
Dept: OBGYN CLINIC | Facility: HOSPITAL | Age: 51
End: 2021-12-27

## 2021-12-28 ENCOUNTER — APPOINTMENT (OUTPATIENT)
Dept: RADIOLOGY | Facility: CLINIC | Age: 51
End: 2021-12-28
Payer: COMMERCIAL

## 2021-12-28 ENCOUNTER — OFFICE VISIT (OUTPATIENT)
Dept: OBGYN CLINIC | Facility: CLINIC | Age: 51
End: 2021-12-28
Payer: COMMERCIAL

## 2021-12-28 VITALS
HEIGHT: 65 IN | SYSTOLIC BLOOD PRESSURE: 133 MMHG | HEART RATE: 81 BPM | BODY MASS INDEX: 25.83 KG/M2 | WEIGHT: 155 LBS | DIASTOLIC BLOOD PRESSURE: 79 MMHG

## 2021-12-28 DIAGNOSIS — S52.602A CLOSED FRACTURE OF DISTAL ENDS OF LEFT RADIUS AND ULNA, INITIAL ENCOUNTER: ICD-10-CM

## 2021-12-28 DIAGNOSIS — M25.522 PAIN IN LEFT ELBOW: ICD-10-CM

## 2021-12-28 DIAGNOSIS — S52.502A CLOSED FRACTURE OF DISTAL ENDS OF LEFT RADIUS AND ULNA, INITIAL ENCOUNTER: ICD-10-CM

## 2021-12-28 DIAGNOSIS — S52.502A CLOSED FRACTURE OF DISTAL ENDS OF LEFT RADIUS AND ULNA, INITIAL ENCOUNTER: Primary | ICD-10-CM

## 2021-12-28 DIAGNOSIS — S52.602A CLOSED FRACTURE OF DISTAL ENDS OF LEFT RADIUS AND ULNA, INITIAL ENCOUNTER: Primary | ICD-10-CM

## 2021-12-28 PROCEDURE — 73110 X-RAY EXAM OF WRIST: CPT

## 2021-12-28 PROCEDURE — 29125 APPL SHORT ARM SPLINT STATIC: CPT | Performed by: ORTHOPAEDIC SURGERY

## 2021-12-28 PROCEDURE — 73080 X-RAY EXAM OF ELBOW: CPT

## 2021-12-28 PROCEDURE — 99024 POSTOP FOLLOW-UP VISIT: CPT | Performed by: ORTHOPAEDIC SURGERY

## 2021-12-28 PROCEDURE — 3008F BODY MASS INDEX DOCD: CPT | Performed by: ORTHOPAEDIC SURGERY

## 2021-12-28 RX ORDER — CHLORHEXIDINE GLUCONATE 0.12 MG/ML
15 RINSE ORAL ONCE
Status: CANCELLED | OUTPATIENT
Start: 2021-12-28 | End: 2021-12-28

## 2021-12-28 NOTE — H&P (VIEW-ONLY)
Orthopaedics Office Visit - Post-op Patient Visit    ASSESSMENT/PLAN:    Assessment:   Left distal radius fracture, DOI 12/20/21  Indicated for operative management    Plan:   · Repeat x-rays were obtained today   · Distal radius fracture has displaced since previous visit   · Left distal radius fracture fixation and all necessary procedures was discussed at length including risks and benefits   · Risks of surgery consists of but not limited to bleeding, infection, stiffness, injury to surrounding structures, need for further surgery etc    · Ely elected to proceed with surgical intervention and informed consent was signed   · Surgery will be planned for 1/6/21  · She was placed into a volar slab splint, to be kept clean, dry and intact until day of surgery  · I will reach out to Dr Dleoris Arias regarding post operative pain medication as he is prescribing her Methadone, he will also provide her with pre operative clearance  She is on Elqiuis, which is usually held 48 hrs prior to surgical intervention   · Follow up in the office 14 days after surgery for suture removal and left wrist x-rays      To Do Next Visit:  Splint removal, suture removal, x-ray left wrist     _____________________________________________________  CHIEF COMPLAINT:  Chief Complaint   Patient presents with    Left Wrist - Post-op         SUBJECTIVE:  Rudy Tillman is a 46 y o  female who presents to the office today for a follow up regarding a left wrist fracture, DOI 12/20/21  She underwent closed reduction in the office on 12/23/21 and was placed into a splint  She notes increased pain since her last office visit  She did have her friend re-wrap the splint last night as she felt it was too tight  She notes pain to her elbow along with bruising  Ely is on Eliquis       SOCIAL HISTORY:  Social History     Tobacco Use    Smoking status: Never Smoker    Smokeless tobacco: Never Used   Vaping Use    Vaping Use: Never used   Substance Use Topics    Alcohol use: Yes     Comment: occasional    Drug use: Never     Comment: usues methadone for pain relief       MEDICATIONS:    Current Outpatient Medications:     ALPRAZolam (XANAX) 1 mg tablet, Take 1 tablet (1 mg total) by mouth daily at bedtime as needed for anxiety, Disp: 30 tablet, Rfl: 0    apixaban (ELIQUIS) 5 mg, Take 1 tablet (5 mg total) by mouth 2 (two) times a day, Disp: 180 tablet, Rfl: 1    cholecalciferol (VITAMIN D3) 1,000 units tablet, Take 2 tablets (2,000 Units total) by mouth daily, Disp: 30 tablet, Rfl: 0    Cyanocobalamin (B-12) 1000 MCG SUBL, Place under the tongue, Disp: , Rfl:     fluticasone (FLONASE) 50 mcg/act nasal spray, 1 spray into each nostril daily (Patient taking differently: 1 spray into each nostril as needed ), Disp: 1 Bottle, Rfl: 1    methadone (DOLOPHINE) 10 mg tablet, 1 tablet 3 times daily as needed for chronic pain,, Disp: 90 tablet, Rfl: 0    methocarbamol (ROBAXIN) 500 mg tablet, take 1 tablet by mouth four times a day if needed for muscle spasm, Disp: , Rfl:     methocarbamol (Robaxin-750) 750 mg tablet, Take 1 tablet (750 mg total) by mouth 3 (three) times a day as needed for muscle spasms, Disp: 40 tablet, Rfl: 1    naloxone (NARCAN) 4 mg/0 1 mL nasal spray, Administer 1 spray into a nostril   If no response after 2-3 minutes, give another dose in the other nostril using a new spray , Disp: 1 each, Rfl: 1    omeprazole (PriLOSEC) 20 mg delayed release capsule, Take 1 capsule (20 mg total) by mouth daily, Disp: 90 capsule, Rfl: 0    Pediatric Multiple Vit-C-FA (FRUITY CHEWABLES MULTIVITAMIN) CHEW, Chew 1 tablet daily , Disp: , Rfl:     senna (SENOKOT) 8 6 MG tablet, Take 1 tablet by mouth daily, Disp: , Rfl:     sertraline (ZOLOFT) 100 mg tablet, Take 1 tablet (100 mg total) by mouth 2 (two) times a day, Disp: 180 tablet, Rfl: 0    sucralfate (Carafate) 1 g/10 mL suspension, Take 10 mL (1 g total) by mouth 4 (four) times a day as needed (dyspepsia), Disp: 420 mL, Rfl: 2    topiramate (TOPAMAX) 100 mg tablet, Take 1 tablet (100 mg total) by mouth 2 (two) times a day, Disp: 180 tablet, Rfl: 1    venlafaxine (EFFEXOR-XR) 75 mg 24 hr capsule, Take 1 capsule (75 mg total) by mouth daily with breakfast, Disp: 90 capsule, Rfl: 3    zolpidem (AMBIEN) 10 mg tablet, Take 1 tablet (10 mg total) by mouth daily at bedtime as needed for sleep, Disp: 30 tablet, Rfl: 0    REVIEW OF SYSTEMS:  MSK: as noted in HPI  Neuro: WNL  Pertinent items are otherwise noted in HPI  A comprehensive review of systems was otherwise negative     _____________________________________________________  PHYSICAL EXAMINATION:  Vital signs: /79   Pulse 81   Ht 5' 5" (1 651 m)   Wt 70 3 kg (155 lb)   LMP 08/12/2020   BMI 25 79 kg/m²   General: No acute distress, awake and alert  Psychiatric: Mood and affect appear appropriate  HEENT: Trachea Midline, No torticollis, no apparent facial trauma  Cardiovascular: No audible murmurs; Extremities appear perfused  Pulmonary: No audible wheezing or stridor  Skin: No open lesions; see further details (if any) below    MUSCULOSKELETAL EXAMINATION:    Extremities: Left wrist:   No erythema   Ecchymosis noted   Mild edema noted   Tender to palpation over fracture site   Able to flex and extend all digits   Brisk capillary refill     _____________________________________________________  STUDIES REVIEWED:  I personally reviewed the images and interpretation is as follows:  X-ray left wrist demonstrates distal radius fracture with interval displacement  X-ray left elbow demonstrates no acute fracture or dislocation  PROCEDURES PERFORMED:  Splint application    Date/Time: 12/28/2021 4:00 PM  Performed by: Ron Barajas MD  Authorized by: Ron Barajas MD   Universal Protocol:  Consent: Verbal consent obtained  Written consent not obtained    Risks and benefits: risks, benefits and alternatives were discussed  Consent given by: patient  Patient identity confirmed: verbally with patient      Pre-procedure details:     Sensation:  Normal  Procedure details:     Laterality:  Left    Location:  Wrist    Wrist:  L wrist    Strapping: no      Splint type:  Volar short arm    Supplies:  Cotton padding, 2 layer wrap, skin protective strip and Ortho-Glass  Post-procedure details:     Sensation:  Normal    Patient tolerance of procedure:   Tolerated well, no immediate complications      Scribe Attestation    I,:  Ingrid Raman am acting as a scribe while in the presence of the attending physician :       I,:  Kathryn Monterroso MD personally performed the services described in this documentation    as scribed in my presence :

## 2021-12-29 ENCOUNTER — TELEPHONE (OUTPATIENT)
Dept: OBGYN CLINIC | Facility: CLINIC | Age: 51
End: 2021-12-29

## 2021-12-29 ENCOUNTER — APPOINTMENT (OUTPATIENT)
Dept: LAB | Age: 51
End: 2021-12-29
Payer: COMMERCIAL

## 2021-12-29 ENCOUNTER — CLINICAL SUPPORT (OUTPATIENT)
Dept: URGENT CARE | Age: 51
End: 2021-12-29
Payer: COMMERCIAL

## 2021-12-29 DIAGNOSIS — S52.602A CLOSED FRACTURE OF DISTAL ENDS OF LEFT RADIUS AND ULNA, INITIAL ENCOUNTER: ICD-10-CM

## 2021-12-29 DIAGNOSIS — K91.2 POSTGASTRECTOMY MALABSORPTION: ICD-10-CM

## 2021-12-29 DIAGNOSIS — S52.502A CLOSED FRACTURE OF DISTAL ENDS OF LEFT RADIUS AND ULNA, INITIAL ENCOUNTER: ICD-10-CM

## 2021-12-29 DIAGNOSIS — Z90.3 POSTGASTRECTOMY MALABSORPTION: ICD-10-CM

## 2021-12-29 LAB
25(OH)D3 SERPL-MCNC: 39.2 NG/ML (ref 30–100)
ALBUMIN SERPL BCP-MCNC: 3.7 G/DL (ref 3.5–5)
ALP SERPL-CCNC: 168 U/L (ref 46–116)
ALT SERPL W P-5'-P-CCNC: 76 U/L (ref 12–78)
ANION GAP SERPL CALCULATED.3IONS-SCNC: 3 MMOL/L (ref 4–13)
AST SERPL W P-5'-P-CCNC: 96 U/L (ref 5–45)
BASOPHILS # BLD AUTO: 0.02 THOUSANDS/ΜL (ref 0–0.1)
BASOPHILS NFR BLD AUTO: 0 % (ref 0–1)
BILIRUB SERPL-MCNC: 0.51 MG/DL (ref 0.2–1)
BUN SERPL-MCNC: 13 MG/DL (ref 5–25)
CALCIUM SERPL-MCNC: 9 MG/DL (ref 8.3–10.1)
CHLORIDE SERPL-SCNC: 108 MMOL/L (ref 100–108)
CHOLEST SERPL-MCNC: 175 MG/DL
CO2 SERPL-SCNC: 27 MMOL/L (ref 21–32)
CREAT SERPL-MCNC: 0.61 MG/DL (ref 0.6–1.3)
EOSINOPHIL # BLD AUTO: 0.26 THOUSAND/ΜL (ref 0–0.61)
EOSINOPHIL NFR BLD AUTO: 5 % (ref 0–6)
ERYTHROCYTE [DISTWIDTH] IN BLOOD BY AUTOMATED COUNT: 14.5 % (ref 11.6–15.1)
FOLATE SERPL-MCNC: >20 NG/ML (ref 3.1–17.5)
GFR SERPL CREATININE-BSD FRML MDRD: 103 ML/MIN/1.73SQ M
GLUCOSE P FAST SERPL-MCNC: 73 MG/DL (ref 65–99)
HCT VFR BLD AUTO: 38.3 % (ref 34.8–46.1)
HDLC SERPL-MCNC: 63 MG/DL
HGB BLD-MCNC: 11.7 G/DL (ref 11.5–15.4)
IMM GRANULOCYTES # BLD AUTO: 0.01 THOUSAND/UL (ref 0–0.2)
IMM GRANULOCYTES NFR BLD AUTO: 0 % (ref 0–2)
IRON SATN MFR SERPL: 11 % (ref 15–50)
IRON SERPL-MCNC: 43 UG/DL (ref 50–170)
LDLC SERPL CALC-MCNC: 97 MG/DL (ref 0–100)
LYMPHOCYTES # BLD AUTO: 1.73 THOUSANDS/ΜL (ref 0.6–4.47)
LYMPHOCYTES NFR BLD AUTO: 36 % (ref 14–44)
MCH RBC QN AUTO: 27.1 PG (ref 26.8–34.3)
MCHC RBC AUTO-ENTMCNC: 30.5 G/DL (ref 31.4–37.4)
MCV RBC AUTO: 89 FL (ref 82–98)
MONOCYTES # BLD AUTO: 0.42 THOUSAND/ΜL (ref 0.17–1.22)
MONOCYTES NFR BLD AUTO: 9 % (ref 4–12)
NEUTROPHILS # BLD AUTO: 2.44 THOUSANDS/ΜL (ref 1.85–7.62)
NEUTS SEG NFR BLD AUTO: 50 % (ref 43–75)
NONHDLC SERPL-MCNC: 112 MG/DL
NRBC BLD AUTO-RTO: 0 /100 WBCS
PLATELET # BLD AUTO: 280 THOUSANDS/UL (ref 149–390)
PMV BLD AUTO: 9.7 FL (ref 8.9–12.7)
POTASSIUM SERPL-SCNC: 4 MMOL/L (ref 3.5–5.3)
PROT SERPL-MCNC: 7.7 G/DL (ref 6.4–8.2)
RBC # BLD AUTO: 4.31 MILLION/UL (ref 3.81–5.12)
SODIUM SERPL-SCNC: 138 MMOL/L (ref 136–145)
TIBC SERPL-MCNC: 409 UG/DL (ref 250–450)
TRIGL SERPL-MCNC: 75 MG/DL
VIT B12 SERPL-MCNC: 5365 PG/ML (ref 100–900)
WBC # BLD AUTO: 4.88 THOUSAND/UL (ref 4.31–10.16)

## 2021-12-29 PROCEDURE — 93005 ELECTROCARDIOGRAM TRACING: CPT

## 2021-12-29 PROCEDURE — 82746 ASSAY OF FOLIC ACID SERUM: CPT

## 2021-12-29 PROCEDURE — 82607 VITAMIN B-12: CPT | Performed by: INTERNAL MEDICINE

## 2021-12-29 PROCEDURE — 80061 LIPID PANEL: CPT | Performed by: INTERNAL MEDICINE

## 2021-12-29 PROCEDURE — 82306 VITAMIN D 25 HYDROXY: CPT | Performed by: INTERNAL MEDICINE

## 2021-12-29 PROCEDURE — 36415 COLL VENOUS BLD VENIPUNCTURE: CPT | Performed by: INTERNAL MEDICINE

## 2021-12-29 PROCEDURE — 83540 ASSAY OF IRON: CPT | Performed by: INTERNAL MEDICINE

## 2021-12-29 PROCEDURE — 85025 COMPLETE CBC W/AUTO DIFF WBC: CPT | Performed by: INTERNAL MEDICINE

## 2021-12-29 PROCEDURE — 80053 COMPREHEN METABOLIC PANEL: CPT | Performed by: INTERNAL MEDICINE

## 2021-12-29 PROCEDURE — 83550 IRON BINDING TEST: CPT | Performed by: INTERNAL MEDICINE

## 2021-12-30 LAB
ATRIAL RATE: 77 BPM
P AXIS: 58 DEGREES
PR INTERVAL: 122 MS
QRS AXIS: 7 DEGREES
QRSD INTERVAL: 88 MS
QT INTERVAL: 392 MS
QTC INTERVAL: 443 MS
T WAVE AXIS: 6 DEGREES
VENTRICULAR RATE: 77 BPM

## 2021-12-30 PROCEDURE — 93010 ELECTROCARDIOGRAM REPORT: CPT | Performed by: INTERNAL MEDICINE

## 2022-01-03 ENCOUNTER — TELEPHONE (OUTPATIENT)
Dept: OBGYN CLINIC | Facility: HOSPITAL | Age: 52
End: 2022-01-03

## 2022-01-03 NOTE — TELEPHONE ENCOUNTER
Dr Yael Trejo    [phone]    Patient is scheduled for surgery on 1/6  She noticed she failed her EEG, can she still have her surgery?

## 2022-01-04 ENCOUNTER — ANESTHESIA EVENT (OUTPATIENT)
Dept: PERIOP | Facility: HOSPITAL | Age: 52
End: 2022-01-04
Payer: MEDICARE

## 2022-01-04 NOTE — PRE-PROCEDURE INSTRUCTIONS
Pre-Surgery Instructions:   Medication Instructions    ALPRAZolam (XANAX) 1 mg tablet pt takes at hs     apixaban (ELIQUIS) 5 mg Patient was instructed by Physician and understands   cholecalciferol (VITAMIN D3) 1,000 units tablet pt instructed to stop prior to surgery     Cyanocobalamin (B-12) 1000 MCG SUBL pt instructed to stop prior to surgery     fluticasone (FLONASE) 50 mcg/act nasal spray pt can use prn on day of surgery     Methadone HCl (METHADOSE PO) pt instructed to take on day of surgery with 1-2 sips of water    methocarbamol (Robaxin-750) 750 mg tablet pt can take prn on day of surgery     omeprazole (PriLOSEC) 20 mg delayed release capsule pt instructed to take on day of surgery with 1-2 sips of water    Pediatric Multiple Vit-C-FA (FRUITY CHEWABLES MULTIVITAMIN) CHEW pt instructed to stop prior to surgery     senna (SENOKOT) 8 6 MG tablet pt instructed to not take on day of surgery     sertraline (ZOLOFT) 100 mg tablet pt instructed to take on day of surgery with 1-2 sips of water     sucralfate (Carafate) 1 g/10 mL suspension pt instructed to not take on day of surgery     topiramate (TOPAMAX) 100 mg tablet pt instructed to take on day of surgery with 1-2 sips of water    venlafaxine (EFFEXOR-XR) 75 mg 24 hr capsule pt instructed to take on day of surgery with 1-2 sips of water     zolpidem (AMBIEN) 10 mg tablet pt takes at hs     Pt denies fever, sob, sore throat and cough  Pt verbalized understanding of shower and med instructions  Pt instructed to stop nsaids and supplements prior to surgery

## 2022-01-06 ENCOUNTER — HOSPITAL ENCOUNTER (OUTPATIENT)
Facility: HOSPITAL | Age: 52
Setting detail: OUTPATIENT SURGERY
Discharge: HOME | End: 2022-01-06
Attending: ORTHOPAEDIC SURGERY | Admitting: ORTHOPAEDIC SURGERY
Payer: MEDICARE

## 2022-01-06 ENCOUNTER — ANESTHESIA (OUTPATIENT)
Dept: PERIOP | Facility: HOSPITAL | Age: 52
End: 2022-01-06
Payer: MEDICARE

## 2022-01-06 ENCOUNTER — CONSULT (OUTPATIENT)
Dept: INTERNAL MEDICINE CLINIC | Facility: CLINIC | Age: 52
End: 2022-01-06
Payer: MEDICARE

## 2022-01-06 ENCOUNTER — HOSPITAL ENCOUNTER (OUTPATIENT)
Dept: RADIOLOGY | Facility: HOSPITAL | Age: 52
Setting detail: OUTPATIENT SURGERY
Discharge: HOME | End: 2022-01-06
Payer: MEDICARE

## 2022-01-06 VITALS
WEIGHT: 161.2 LBS | DIASTOLIC BLOOD PRESSURE: 71 MMHG | BODY MASS INDEX: 26.83 KG/M2 | TEMPERATURE: 96.4 F | SYSTOLIC BLOOD PRESSURE: 109 MMHG | HEART RATE: 79 BPM

## 2022-01-06 VITALS
WEIGHT: 160 LBS | HEIGHT: 65 IN | TEMPERATURE: 98.9 F | RESPIRATION RATE: 18 BRPM | DIASTOLIC BLOOD PRESSURE: 77 MMHG | BODY MASS INDEX: 26.66 KG/M2 | HEART RATE: 94 BPM | SYSTOLIC BLOOD PRESSURE: 139 MMHG | OXYGEN SATURATION: 94 %

## 2022-01-06 DIAGNOSIS — K91.2 POSTGASTRECTOMY MALABSORPTION: ICD-10-CM

## 2022-01-06 DIAGNOSIS — D68.51 FACTOR 5 LEIDEN MUTATION, HETEROZYGOUS (HCC): ICD-10-CM

## 2022-01-06 DIAGNOSIS — Z90.3 POSTGASTRECTOMY MALABSORPTION: ICD-10-CM

## 2022-01-06 DIAGNOSIS — Z01.818 PREOP EXAMINATION: Primary | ICD-10-CM

## 2022-01-06 DIAGNOSIS — K56.609 SBO (SMALL BOWEL OBSTRUCTION) (HCC): ICD-10-CM

## 2022-01-06 DIAGNOSIS — G89.18 POSTOPERATIVE PAIN: ICD-10-CM

## 2022-01-06 DIAGNOSIS — S52.602A CLOSED FRACTURE OF DISTAL ENDS OF LEFT RADIUS AND ULNA, INITIAL ENCOUNTER: ICD-10-CM

## 2022-01-06 DIAGNOSIS — D68.59 PROTEIN S DEFICIENCY (HCC): ICD-10-CM

## 2022-01-06 DIAGNOSIS — S52.502A CLOSED FRACTURE OF DISTAL ENDS OF LEFT RADIUS AND ULNA, INITIAL ENCOUNTER: ICD-10-CM

## 2022-01-06 DIAGNOSIS — F41.9 ACUTE ANXIETY: ICD-10-CM

## 2022-01-06 DIAGNOSIS — I82.511 CHRONIC DEEP VEIN THROMBOSIS (DVT) OF RIGHT FEMORAL VEIN (HCC): Chronic | ICD-10-CM

## 2022-01-06 PROCEDURE — C1713 ANCHOR/SCREW BN/BN,TIS/BN: HCPCS | Performed by: ORTHOPAEDIC SURGERY

## 2022-01-06 PROCEDURE — C1781 MESH (IMPLANTABLE): HCPCS | Performed by: ORTHOPAEDIC SURGERY

## 2022-01-06 PROCEDURE — 99243 OFF/OP CNSLTJ NEW/EST LOW 30: CPT | Performed by: INTERNAL MEDICINE

## 2022-01-06 PROCEDURE — 73100 X-RAY EXAM OF WRIST: CPT

## 2022-01-06 PROCEDURE — 25607 OPTX DST RD XARTC FX/EPI SEP: CPT | Performed by: ORTHOPAEDIC SURGERY

## 2022-01-06 DEVICE — 2.4MM CORTEX SCREW SLF-TPNG WITH T8 STARDRIVE RECESS 20MM: Type: IMPLANTABLE DEVICE | Site: WRIST | Status: FUNCTIONAL

## 2022-01-06 DEVICE — GRAFT BONE CANCELLOUS CHIPS 15ML: Type: IMPLANTABLE DEVICE | Site: WRIST | Status: FUNCTIONAL

## 2022-01-06 DEVICE — 2.4MM LOCKING SCREW SLF-TPNG WITH STARDRIVE RECESS 18MM: Type: IMPLANTABLE DEVICE | Site: WRIST | Status: FUNCTIONAL

## 2022-01-06 DEVICE — 2.4MM VA LOCKING SCREW STARDRIVE 14MM: Type: IMPLANTABLE DEVICE | Site: WRIST | Status: FUNCTIONAL

## 2022-01-06 DEVICE — 2.7MM CORTEX SCREW SLF-TPNG WITH T8 STARDRIVE RECESS 12MM: Type: IMPLANTABLE DEVICE | Site: WRIST | Status: FUNCTIONAL

## 2022-01-06 DEVICE — 2.4MM VA-LCP 2-CLMN VLR DSTL RADIUS PL 6H HD/3H SHAFT/LEFT
Type: IMPLANTABLE DEVICE | Site: WRIST | Status: FUNCTIONAL
Brand: VA-LCP

## 2022-01-06 DEVICE — 2.4MM LOCKING SCREW SLF-TPNG WITH STARDRIVE RECESS 16MM: Type: IMPLANTABLE DEVICE | Site: WRIST | Status: FUNCTIONAL

## 2022-01-06 RX ORDER — PROPOFOL 10 MG/ML
INJECTION, EMULSION INTRAVENOUS AS NEEDED
Status: DISCONTINUED | OUTPATIENT
Start: 2022-01-06 | End: 2022-01-06

## 2022-01-06 RX ORDER — SODIUM CHLORIDE, SODIUM LACTATE, POTASSIUM CHLORIDE, CALCIUM CHLORIDE 600; 310; 30; 20 MG/100ML; MG/100ML; MG/100ML; MG/100ML
125 INJECTION, SOLUTION INTRAVENOUS CONTINUOUS
Status: DISCONTINUED | OUTPATIENT
Start: 2022-01-06 | End: 2022-01-06 | Stop reason: HOSPADM

## 2022-01-06 RX ORDER — LIDOCAINE HYDROCHLORIDE 10 MG/ML
INJECTION, SOLUTION EPIDURAL; INFILTRATION; INTRACAUDAL; PERINEURAL AS NEEDED
Status: DISCONTINUED | OUTPATIENT
Start: 2022-01-06 | End: 2022-01-06

## 2022-01-06 RX ORDER — LIDOCAINE HYDROCHLORIDE 10 MG/ML
0.5 INJECTION, SOLUTION EPIDURAL; INFILTRATION; INTRACAUDAL; PERINEURAL ONCE AS NEEDED
Status: DISCONTINUED | OUTPATIENT
Start: 2022-01-06 | End: 2022-01-06 | Stop reason: HOSPADM

## 2022-01-06 RX ORDER — CLINDAMYCIN PHOSPHATE 900 MG/50ML
INJECTION INTRAVENOUS AS NEEDED
Status: DISCONTINUED | OUTPATIENT
Start: 2022-01-06 | End: 2022-01-06

## 2022-01-06 RX ORDER — CHLORHEXIDINE GLUCONATE 0.12 MG/ML
15 RINSE ORAL ONCE
Status: DISCONTINUED | OUTPATIENT
Start: 2022-01-06 | End: 2022-01-06

## 2022-01-06 RX ORDER — DEXAMETHASONE SODIUM PHOSPHATE 10 MG/ML
INJECTION, SOLUTION INTRAMUSCULAR; INTRAVENOUS AS NEEDED
Status: DISCONTINUED | OUTPATIENT
Start: 2022-01-06 | End: 2022-01-06

## 2022-01-06 RX ORDER — CLINDAMYCIN PHOSPHATE 900 MG/50ML
900 INJECTION INTRAVENOUS ONCE
Status: DISCONTINUED | OUTPATIENT
Start: 2022-01-06 | End: 2022-01-06 | Stop reason: HOSPADM

## 2022-01-06 RX ORDER — FENTANYL CITRATE/PF 50 MCG/ML
50 SYRINGE (ML) INJECTION
Status: DISCONTINUED | OUTPATIENT
Start: 2022-01-06 | End: 2022-01-06 | Stop reason: HOSPADM

## 2022-01-06 RX ORDER — MIDAZOLAM HYDROCHLORIDE 2 MG/2ML
INJECTION, SOLUTION INTRAMUSCULAR; INTRAVENOUS AS NEEDED
Status: DISCONTINUED | OUTPATIENT
Start: 2022-01-06 | End: 2022-01-06

## 2022-01-06 RX ORDER — KETAMINE HYDROCHLORIDE 50 MG/ML
INJECTION, SOLUTION, CONCENTRATE INTRAMUSCULAR; INTRAVENOUS AS NEEDED
Status: DISCONTINUED | OUTPATIENT
Start: 2022-01-06 | End: 2022-01-06

## 2022-01-06 RX ORDER — LORAZEPAM 0.5 MG/1
0.5 TABLET ORAL EVERY 8 HOURS PRN
Qty: 25 TABLET | Refills: 0 | Status: SHIPPED | OUTPATIENT
Start: 2022-01-06 | End: 2022-01-28 | Stop reason: ALTCHOICE

## 2022-01-06 RX ORDER — ROCURONIUM BROMIDE 10 MG/ML
INJECTION, SOLUTION INTRAVENOUS AS NEEDED
Status: DISCONTINUED | OUTPATIENT
Start: 2022-01-06 | End: 2022-01-06

## 2022-01-06 RX ORDER — HYDROMORPHONE HCL 110MG/55ML
PATIENT CONTROLLED ANALGESIA SYRINGE INTRAVENOUS AS NEEDED
Status: DISCONTINUED | OUTPATIENT
Start: 2022-01-06 | End: 2022-01-06

## 2022-01-06 RX ORDER — SODIUM CHLORIDE, SODIUM LACTATE, POTASSIUM CHLORIDE, CALCIUM CHLORIDE 600; 310; 30; 20 MG/100ML; MG/100ML; MG/100ML; MG/100ML
INJECTION, SOLUTION INTRAVENOUS CONTINUOUS PRN
Status: DISCONTINUED | OUTPATIENT
Start: 2022-01-06 | End: 2022-01-06

## 2022-01-06 RX ORDER — BUPIVACAINE HYDROCHLORIDE 5 MG/ML
INJECTION, SOLUTION EPIDURAL; INTRACAUDAL AS NEEDED
Status: DISCONTINUED | OUTPATIENT
Start: 2022-01-06 | End: 2022-01-06

## 2022-01-06 RX ORDER — FENTANYL CITRATE 50 UG/ML
INJECTION, SOLUTION INTRAMUSCULAR; INTRAVENOUS AS NEEDED
Status: DISCONTINUED | OUTPATIENT
Start: 2022-01-06 | End: 2022-01-06

## 2022-01-06 RX ORDER — ONDANSETRON 2 MG/ML
INJECTION INTRAMUSCULAR; INTRAVENOUS AS NEEDED
Status: DISCONTINUED | OUTPATIENT
Start: 2022-01-06 | End: 2022-01-06

## 2022-01-06 RX ORDER — KETOROLAC TROMETHAMINE 30 MG/ML
INJECTION, SOLUTION INTRAMUSCULAR; INTRAVENOUS AS NEEDED
Status: DISCONTINUED | OUTPATIENT
Start: 2022-01-06 | End: 2022-01-06

## 2022-01-06 RX ORDER — MAGNESIUM HYDROXIDE 1200 MG/15ML
LIQUID ORAL AS NEEDED
Status: DISCONTINUED | OUTPATIENT
Start: 2022-01-06 | End: 2022-01-06 | Stop reason: HOSPADM

## 2022-01-06 RX ADMIN — PROPOFOL 200 MG: 10 INJECTION, EMULSION INTRAVENOUS at 14:04

## 2022-01-06 RX ADMIN — ROCURONIUM BROMIDE 50 MG: 50 INJECTION, SOLUTION INTRAVENOUS at 14:04

## 2022-01-06 RX ADMIN — SUGAMMADEX 150 MG: 100 INJECTION, SOLUTION INTRAVENOUS at 15:49

## 2022-01-06 RX ADMIN — MIDAZOLAM 2 MG: 1 INJECTION INTRAMUSCULAR; INTRAVENOUS at 14:02

## 2022-01-06 RX ADMIN — SODIUM CHLORIDE, SODIUM LACTATE, POTASSIUM CHLORIDE, AND CALCIUM CHLORIDE: .6; .31; .03; .02 INJECTION, SOLUTION INTRAVENOUS at 13:35

## 2022-01-06 RX ADMIN — FENTANYL CITRATE 50 MCG: 50 INJECTION INTRAMUSCULAR; INTRAVENOUS at 14:04

## 2022-01-06 RX ADMIN — FENTANYL CITRATE 50 MCG: 50 INJECTION INTRAMUSCULAR; INTRAVENOUS at 14:39

## 2022-01-06 RX ADMIN — ONDANSETRON 4 MG: 2 INJECTION INTRAMUSCULAR; INTRAVENOUS at 15:29

## 2022-01-06 RX ADMIN — PHENYLEPHRINE HYDROCHLORIDE 40 MCG/MIN: 10 INJECTION INTRAVENOUS at 14:53

## 2022-01-06 RX ADMIN — BUPIVACAINE HYDROCHLORIDE 30 ML: 5 INJECTION, SOLUTION EPIDURAL; INTRACAUDAL at 17:20

## 2022-01-06 RX ADMIN — HYDROMORPHONE HYDROCHLORIDE 0.5 MG: 2 INJECTION, SOLUTION INTRAMUSCULAR; INTRAVENOUS; SUBCUTANEOUS at 14:39

## 2022-01-06 RX ADMIN — KETAMINE HYDROCHLORIDE 50 MG: 50 INJECTION, SOLUTION INTRAMUSCULAR; INTRAVENOUS at 14:04

## 2022-01-06 RX ADMIN — LIDOCAINE HYDROCHLORIDE 1 ML: 10 INJECTION, SOLUTION EPIDURAL; INFILTRATION; INTRACAUDAL; PERINEURAL at 17:20

## 2022-01-06 RX ADMIN — HYDROMORPHONE HYDROCHLORIDE 0.5 MG: 2 INJECTION, SOLUTION INTRAMUSCULAR; INTRAVENOUS; SUBCUTANEOUS at 15:57

## 2022-01-06 RX ADMIN — LIDOCAINE HYDROCHLORIDE 50 MG: 10 INJECTION, SOLUTION EPIDURAL; INFILTRATION; INTRACAUDAL; PERINEURAL at 14:04

## 2022-01-06 RX ADMIN — KETOROLAC TROMETHAMINE 30 MG: 30 INJECTION, SOLUTION INTRAMUSCULAR at 15:42

## 2022-01-06 RX ADMIN — Medication 50 MCG: at 16:07

## 2022-01-06 RX ADMIN — DEXAMETHASONE SODIUM PHOSPHATE 4 MG: 10 INJECTION, SOLUTION INTRAMUSCULAR; INTRAVENOUS at 14:04

## 2022-01-06 RX ADMIN — CLINDAMYCIN PHOSPHATE 900 MG: 900 INJECTION, SOLUTION INTRAVENOUS at 14:07

## 2022-01-06 NOTE — ANESTHESIA PREPROCEDURE EVALUATION
Procedure:  OPEN REDUCTION W/ INTERNAL FIXATION (ORIF) LEFT DISTAL RADIUS FRACTURE (Left Wrist)    Relevant Problems   CARDIO   (+) Chronic deep vein thrombosis (DVT) of right femoral vein (HCC)   (+) Migraine, unspecified, not intractable, without status migrainosus      GI/HEPATIC   (+) GERD without esophagitis   (+) History of bariatric surgery   (+) SBO (small bowel obstruction) (HCC)      HEMATOLOGY   (+) Iron deficiency anemia   (+) Protein S deficiency (HCC)      MUSCULOSKELETAL   (+) Adhesive capsulitis of left shoulder      NEURO/PSYCH   (+) Anxiety   (+) Depression   (+) Migraine, unspecified, not intractable, without status migrainosus   (+) Pain syndrome, chronic        Physical Exam    Airway    Mallampati score: II  TM Distance: >3 FB  Neck ROM: full     Dental       Cardiovascular      Pulmonary      Other Findings  Due to overall universal COVID-19 precautions and pandemic, I did not auscultate heart, lungs and abdomen due to the low yield in an asymptomatic patient  I preferred not to introduce a stethoscope during my examination due to the potential of spread of COVID-19 from patient to patient  Results for Allyson Enriquez (MRN [id number]) as of 1/6/2022 12:53   Ref   Range 12/29/2021 11:52   Sodium Latest Ref Range: 136 - 145 mmol/L 138   Potassium Latest Ref Range: 3 5 - 5 3 mmol/L 4 0   Chloride Latest Ref Range: 100 - 108 mmol/L 108   CO2 Latest Ref Range: 21 - 32 mmol/L 27   Anion Gap Latest Ref Range: 4 - 13 mmol/L 3 (L)   BUN Latest Ref Range: 5 - 25 mg/dL 13   Creatinine Latest Ref Range: 0 60 - 1 30 mg/dL 0 61   GLUCOSE FASTING Latest Ref Range: 65 - 99 mg/dL 73   Calcium Latest Ref Range: 8 3 - 10 1 mg/dL 9 0   AST Latest Ref Range: 5 - 45 U/L 96 (H)   ALT Latest Ref Range: 12 - 78 U/L 76   Alkaline Phosphatase Latest Ref Range: 46 - 116 U/L 168 (H)   Total Protein Latest Ref Range: 6 4 - 8 2 g/dL 7 7   Albumin Latest Ref Range: 3 5 - 5 0 g/dL 3 7   TOTAL BILIRUBIN Latest Ref Range: 0 20 - 1 00 mg/dL 0 51   eGFR Latest Units: ml/min/1 73sq m 103     Results for Lazarus Hudson (MRN [id number]) as of 1/6/2022 12:53   Ref  Range 12/29/2021 11:52   WBC Latest Ref Range: 4 31 - 10 16 Thousand/uL 4 88   Red Blood Cell Count Latest Ref Range: 3 81 - 5 12 Million/uL 4 31   Hemoglobin Latest Ref Range: 11 5 - 15 4 g/dL 11 7   HCT Latest Ref Range: 34 8 - 46 1 % 38 3   MCV Latest Ref Range: 82 - 98 fL 89   MCH Latest Ref Range: 26 8 - 34 3 pg 27 1   MCHC Latest Ref Range: 31 4 - 37 4 g/dL 30 5 (L)   RDW Latest Ref Range: 11 6 - 15 1 % 14 5   Platelet Count Latest Ref Range: 149 - 390 Thousands/uL 280   MPV Latest Ref Range: 8 9 - 12 7 fL 9 7   nRBC Latest Units: /100 WBCs 0     EKG (12/2021)  Normal sinus rhythm  Nonspecific ST and T wave abnormality  Abnormal ECG    Anesthesia Plan  ASA Score- 2     Anesthesia Type- general and regional with ASA Monitors  Additional Monitors:   Airway Plan: LMA  Comment: NPO after MN    Patient takes 30mg Methadone daily (for about 3 years) and was able to take her dose this morning  Postop supraclavicular to be done as surgeon needs to perform a neurovascular exam afterwards  Plan Factors-Exercise tolerance (METS): >4 METS  Chart reviewed  EKG reviewed  Existing labs reviewed  Patient summary reviewed  Induction- intravenous  Postoperative Plan- Plan for postoperative opioid use  Planned trial extubation    Informed Consent- Anesthetic plan and risks discussed with patient  I personally reviewed this patient with the CRNA  Discussed and agreed on the Anesthesia Plan with the PORTIA Gonzalez

## 2022-01-06 NOTE — PROGRESS NOTES
INTERNAL MEDICINE OFFICE VISIT  [facility] Internal Medicine- [place]    NAME: Ely Cantrell  AGE: 46 y o  SEX: female    DATE OF ENCOUNTER: 1/6/2022    Assessment and Plan     1  Preop examination  -She has an RCRI of 0 points  History as noted below  Her Eliquis has appropriately been on hold preoperatively  She appears to be at low risk from a cardiovascular standpoint for this procedure  She may proceed as scheduled without any further diagnostic testing from my standpoint  -she is concerned about postoperative pain  She states that when hospitalized this past May for small bowel obstruction she responded very well to a combination of Robaxin and Ativan  -she already has a script for Robaxin at home  I am comfortable with giving her a short course of Ativan to help with postoperative anxiety and pain  She does also have a script for Xanax and I advised her not to combine the 2 benzodiazepines    2  Closed fracture of distal ends of left radius and ulna, initial encounter    3  Acute anxiety  - LORazepam (Ativan) 0 5 mg tablet; Take 1 tablet (0 5 mg total) by mouth every 8 (eight) hours as needed for anxiety  Dispense: 25 tablet; Refill: 0    4  Postoperative pain  - LORazepam (Ativan) 0 5 mg tablet; Take 1 tablet (0 5 mg total) by mouth every 8 (eight) hours as needed for anxiety  Dispense: 25 tablet; Refill: 0    5  Chronic deep vein thrombosis (DVT) of right femoral vein (HCC)    6  SBO (small bowel obstruction) ([facility] )    7  Postgastrectomy malabsorption    8  Factor 5 Leiden mutation, heterozygous ([facility] )    9  Protein S deficiency ([facility] [address] )              No orders of the defined types were placed in this encounter  Chief Complaint     No chief complaint on file  History of Present Illness     Ely comes in today for preop evaluation  She is scheduled for ORIF for left distal radius fracture later this afternoon with Dr Nini Ga    She sustained this injury after slipping on black ice in the parking lot at her place of work    She has a medical history of protein S deficiency, factor 5 Leiden mutation with chronic DVT on Eliquis, SBO in May of this year status post surgical intervention, chronic pain maintained on methadone, status post gastric bypass procedure with iron deficiency anemia    She has not had any recent anginal symptoms  Denies any shortness of breath or dyspnea on exertion  Does not appear to have any relevant history of ischemic heart disease, valvular heart disease, TIA/CVA, COPD, sleep apnea  No history of diabetes or renal dysfunction    Her Eliquis has been on hold for the last 48 hours preoperatively    Reviewed her EKG from 12/29 which shows some artifact but appears to be normal sinus rhythm without any significant ST or T-wave changes    The following portions of the patient's history were reviewed and updated as appropriate: allergies, current medications, past family history, past medical history, past social history, past surgical history and problem list     Review of Systems     10 point ROS negative except per HPI    Active Problem List     Patient Active Problem List   Diagnosis    Allergic rhinitis    Anxiety    Depression    GERD without esophagitis    Insomnia    Migraine, unspecified, not intractable, without status migrainosus    Pain syndrome, chronic    Postgastrectomy malabsorption    Vitamin D deficiency    Wartenberg syndrome    History of bariatric surgery    Iron deficiency anemia    ASCUS with positive high risk HPV cervical    Urinary tract infection with hematuria    Symptomatic varicose veins of both lower extremities    Chronic deep vein thrombosis (DVT) of right femoral vein (HCC)    Neutrophilic leukocytosis    Left shoulder pain    Adhesive capsulitis of left shoulder    Factor 5 Leiden mutation, heterozygous ([address] )    Protein S deficiency ([address] )    SBO (small bowel obstruction) ([address] )    Breast mass, right    Closed fracture of left distal radius and ulna       Objective     LMP 08/12/2020     Physical Exam  Constitutional:       Appearance: Normal appearance  She is not ill-appearing  HENT:      Head: Normocephalic and atraumatic  Eyes:      General: No scleral icterus  Right eye: No discharge  Left eye: No discharge  Cardiovascular:      Rate and Rhythm: Normal rate and regular rhythm  Heart sounds: No murmur heard  No friction rub  Pulmonary:      Effort: Pulmonary effort is normal       Breath sounds: Normal breath sounds  No wheezing or rales  Abdominal:      General: Abdomen is flat  There is no distension  Palpations: Abdomen is soft  Tenderness: There is no abdominal tenderness  Musculoskeletal:         General: Deformity and signs of injury present  No swelling or tenderness  Comments: Left wrist wrapped in Ace bandage   Skin:     General: Skin is warm and dry  Findings: No erythema  Neurological:      Mental Status: She is alert  Mental status is at baseline  Motor: No weakness  Psychiatric:         Mood and Affect: Mood normal          Behavior: Behavior normal          Pertinent Laboratory/Diagnostic Studies:  No results found      Images and diagnostics reviewed     Current Medications     Current Outpatient Medications:     ALPRAZolam (XANAX) 1 mg tablet, Take 1 tablet (1 mg total) by mouth daily at bedtime as needed for anxiety (Patient taking differently: Take 1 mg by mouth daily at bedtime  ), Disp: 30 tablet, Rfl: 0    apixaban (ELIQUIS) 5 mg, Take 1 tablet (5 mg total) by mouth 2 (two) times a day, Disp: 180 tablet, Rfl: 1    cholecalciferol (VITAMIN D3) 1,000 units tablet, Take 2 tablets (2,000 Units total) by mouth daily, Disp: 30 tablet, Rfl: 0    Cyanocobalamin (B-12) 1000 MCG SUBL, Place under the tongue, Disp: , Rfl:     fluticasone (FLONASE) 50 mcg/act nasal spray, 1 spray into each nostril daily (Patient taking differently: 1 spray into each nostril as needed ), Disp: 1 Bottle, Rfl: 1    Methadone HCl (METHADOSE PO), Take 30 mg by mouth daily before lunch, Disp: , Rfl:     methocarbamol (Robaxin-750) 750 mg tablet, Take 1 tablet (750 mg total) by mouth 3 (three) times a day as needed for muscle spasms, Disp: 40 tablet, Rfl: 1    naloxone (NARCAN) 4 mg/0 1 mL nasal spray, Administer 1 spray into a nostril   If no response after 2-3 minutes, give another dose in the other nostril using a new spray , Disp: 1 each, Rfl: 1    omeprazole (PriLOSEC) 20 mg delayed release capsule, Take 1 capsule (20 mg total) by mouth daily, Disp: 90 capsule, Rfl: 0    Pediatric Multiple Vit-C-FA (FRUITY CHEWABLES MULTIVITAMIN) CHEW, Chew 1 tablet daily , Disp: , Rfl:     senna (SENOKOT) 8 6 MG tablet, Take 1 tablet by mouth as needed  , Disp: , Rfl:     sertraline (ZOLOFT) 100 mg tablet, Take 1 tablet (100 mg total) by mouth 2 (two) times a day, Disp: 180 tablet, Rfl: 0    sucralfate (Carafate) 1 g/10 mL suspension, Take 10 mL (1 g total) by mouth 4 (four) times a day as needed (dyspepsia), Disp: 420 mL, Rfl: 2    topiramate (TOPAMAX) 100 mg tablet, Take 1 tablet (100 mg total) by mouth 2 (two) times a day, Disp: 180 tablet, Rfl: 1    venlafaxine (EFFEXOR-XR) 75 mg 24 hr capsule, Take 1 capsule (75 mg total) by mouth daily with breakfast, Disp: 90 capsule, Rfl: 3    zolpidem (AMBIEN) 10 mg tablet, Take 1 tablet (10 mg total) by mouth daily at bedtime as needed for sleep (Patient taking differently: Take 10 mg by mouth daily at bedtime  ), Disp: 30 tablet, Rfl: 0    Health Maintenance     Health Maintenance   Topic Date Due    COVID-19 Vaccine (1) Never done    Osteoporosis Screening  Never done    OT PLAN OF CARE  07/25/2018    Colorectal Cancer Screening  04/21/2020    PT PLAN OF CARE  03/18/2021    Breast Cancer Screening: Mammogram  05/27/2021    Annual Physical  04/21/2022    BMI: Followup Plan  10/25/2022    BMI: Adult  12/28/2022    Cervical Cancer Screening  03/12/2024    DTaP,Tdap,and Td Vaccines (2 - Td or Tdap) 10/22/2029    HIV Screening  Completed    Hepatitis C Screening  Completed    Influenza Vaccine  Completed    Pneumococcal Vaccine: Pediatrics (0 to 5 Years) and At-Risk Patients (6 to 59 Years)  Aged Out    HIB Vaccine  Aged Out    Hepatitis B Vaccine  Aged Out    IPV Vaccine  Aged Out    Hepatitis A Vaccine  Aged Out    Meningococcal ACWY Vaccine  Aged Out    HPV Vaccine  Aged [employer] History   Administered Date(s) Administered    INFLUENZA 10/08/2014, 11/25/2016, 10/11/2021    Influenza Quadrivalent Preservative Free 3 years and older IM 10/08/2014    Influenza Quadrivalent, 6-35 Months IM 11/25/2016, 10/12/2017    Influenza, recombinant, quadrivalent,injectable, preservative free 01/03/2019, 10/22/2019, 12/02/2020    Influenza, seasonal, injectable 10/21/2011, 12/05/2012, 10/22/2013, 10/30/2015    Tdap 10/22/2019    Tuberculin Skin Test-PPD Intradermal 06/06/2018       RO Rai  Internal Medicine - [place]  [address], [facility] [address]  [facility], [address]  Office: [phone]

## 2022-01-06 NOTE — ANESTHESIA POSTPROCEDURE EVALUATION
Post-Op Assessment Note    CV Status:  Stable  Pain Score: 5    Pain management: adequate     Mental Status:  Alert and awake   Hydration Status:  Euvolemic   PONV Controlled:  Controlled   Airway Patency:  Patent      Post Op Vitals Reviewed: Yes      Staff: CRNA, Anesthesiologist         No complications documented      BP   146/78   Temp  98 4   Pulse  91   Resp   15   SpO2   94 room air

## 2022-01-06 NOTE — OP NOTE
PERATIVE REPORT  PATIENT NAME: Pallavi Trotter    :  1970  MRN: [id number]  Pt Location:  OR ROOM 15    SURGERY DATE: 2022    Surgeon(s) and Role:     * Michel Zheng MD - Primary     * Eduar Hernandez MD - Assisting    Preop Diagnosis:  Left distal radius fracture    Post-Op Diagnosis Codes:  Same    Procedure(s) (LRB):  OPEN REDUCTION W/ INTERNAL FIXATION (ORIF) LEFT DISTAL RADIUS FRACTURE (Left)    Procedure:  ORIF L distal radius extra-articular fracture (CPT 79963)  Application LUE static short arm splint (CPT 91024)    Specimen(s):  * No specimens in log *    Estimated Blood Loss:   Minimal    Drains:  * No LDAs found *    Anesthesia Type:   General    Operative Indications:  Left distal radius fracture not in satisfactory alignment for non operative management    Operative Findings:  See below    Complications:   None    Implants:  Synthes 3x6 hl VA distal radius plate, cancellous allograft bone chips  Procedure and Technique:  The patient's operative site, laterality, procedure, consent were verified in the preoperative area and the patient was transitioned to the operating room  General anesthesia was provided by the anesthesia team and a nonsterile tourniquet was applied to the operative extremity and used for a total of less than 120 total minutes  The operative extremity was prepped and draped in the usual sterile fashion  After a timeout for safety, standard FCR approach took place to the wrist  The volar and dorsal FCR tendon sheath were excised and tendon retracted, FPL was retracted and pronator quadratus was elevated sharply from the radial and appropriate distal border of the bone  Fracture was then aligned and held in place with percutaneous K wire introduced at radial styloid    There was noted to be a bony void given the significant impaction of the fracture and cancellous bone allograft chips were placed into this void and impacted which was found to maintain satisfactory alignment of the fracture  The plate was then applied and held to bone proximally and distally with cortical screws  Locking screw cluster was then placed without incident  Initial distal cortical screw was then replaced with a locking screw  Reduction was found to be maintained after removal of reduction wire  Fixation was found to be satisfactory on biplanar fluoroscopic imaging confirming no intra-articular penetration of screws and satisfactory screw lengths  The DRUJ was then tested in neutral position, full supination, and full pronation and found to be stable  The wound was then copiously irrigated with sterile saline  Subcutaneous layer closure took place with vicryl suture, skin layer closure took place with nylon suture  Sterile dressing consisted of adaptic, gauze, ABD, webrill  All final counts, including but not limited to instrument, sponge, needle counts were correct  The patient was placed in a well padded volar short arm splint held with ACE wrap  I was present for the entire procedure  The patient was awakened, extubated, and transitioned to the PACU in stable condition  There were no immediate complications  The patient will be nonweight bearing for an anticipated 6 weeks pending bony union  We will consider suture removal in 2 weeks and initiate range of motion at that time      Patient Disposition:  PACU       SIGNATURE: Chayito Landry MD  DATE: January 6, 2022  TIME: 5:10 PM

## 2022-01-06 NOTE — INTERVAL H&P NOTE
H&P reviewed  After examining the patient I find no changes in the patients condition since the H&P had been written      ABD: soft, nontender    Vitals:    01/06/22 1246   BP: 118/83   Pulse: 75   Resp: 18   Temp: (!) 96 7 °F (35 9 °C)   SpO2: 99%

## 2022-01-06 NOTE — ANESTHESIA PROCEDURE NOTES
Peripheral Block    Patient location during procedure: post-op  Start time: 1/6/2022 5:20 PM  Reason for block: at surgeon's request and post-op pain management  Staffing  Performed: Anesthesiologist   Anesthesiologist: Mario Bowden MD  Preanesthetic Checklist  Completed: patient identified, IV checked, site marked, risks and benefits discussed, surgical consent, monitors and equipment checked, pre-op evaluation and timeout performed  Peripheral Block  Patient position: supine  Prep: ChloraPrep  Patient monitoring: heart rate, continuous pulse ox and frequent blood pressure checks  Block type: supraclavicular  Laterality: left  Injection technique: single-shot  Procedures: ultrasound guided, Ultrasound guidance required for the procedure to increase accuracy and safety of medication placement and decrease risk of complications  Ultrasound permanent image saved  Needle  Needle type: Stimuplex   Needle gauge: 22 G  Needle length: 5 cm  Needle localization: ultrasound guidance  Needle insertion depth: 2 cm  Assessment  Injection assessment: incremental injection, local visualized surrounding nerve on ultrasound, negative aspiration for heme and no paresthesia on injection  Paresthesia pain: none  Heart rate change: no  Slow fractionated injection: yes  Post-procedure:  site cleaned  patient tolerated the procedure well with no immediate complications  Additional Notes  After identification of the appropriate nerve bundle of the brachial plexus, the skin was cleaned and 1mL of 1% lidocaine was used to make a skin wheal   A 2in stimuplex needle was used to approach the nerve bundle and upon entry of the sheath, the needle was aspirated  After confirming  That the needle tip was not in a vessel, 2-5mL of local (0 5% bupivacaine with 1:200,000 epi) was injected in 2-5mL increments  Local seen infiltrating the nerve bundle  This was repeated several times until 30mL of local had incrementally been injected    The patient tolerated the procedure well without any signs of systemic absorption

## 2022-01-06 NOTE — DISCHARGE INSTRUCTIONS
Discharge Instructions - Orthopedics  Ely Cantrell 46 y o  female MRN: [id number]  Unit/Bed#: PACU 10    Weight Bearing Status:                                           No weight bearing on operative extremity    DVT prophylaxis  Continue eliquis    Pain:  Continue analgesics as directed    Dressing Instructions:   Please keep clean, dry and intact until follow up  Must keep dry and do not remove until followup appt    Appt Instructions: If you do not have your appointment, please call the clinic at [phone] to schedule followup in 2 wks      Contact the office sooner if you experience any increased numbness/tingling in the extremities

## 2022-01-07 ENCOUNTER — TELEPHONE (OUTPATIENT)
Dept: OBGYN CLINIC | Facility: HOSPITAL | Age: 52
End: 2022-01-07

## 2022-01-07 NOTE — TELEPHONE ENCOUNTER
Spoke to patient  She stated she has questions PO regarding the actual surgery  Reports that she would like to know where the incision is on her arm exactly and how big it is  She would like to know what the bones looked like when surgery was done and how surgery went over all  Stated someone called her daughter but she didn't speak to Dr Shikha Jolly and would like to know what went on during sx  She would also like to know how long she should expect to be out of work  Did advise patient that she should elevate above heart level  Ice 20 mins on 20 mins off for pain and swelling  Please advise if the other answers to her questions can be provided to her at this time

## 2022-01-10 DIAGNOSIS — K21.9 GERD WITHOUT ESOPHAGITIS: ICD-10-CM

## 2022-01-10 RX ORDER — OMEPRAZOLE 20 MG/1
20 CAPSULE, DELAYED RELEASE ORAL DAILY
Qty: 90 CAPSULE | Refills: 0 | Status: SHIPPED | OUTPATIENT
Start: 2022-01-10 | End: 2022-04-04 | Stop reason: SDUPTHER

## 2022-01-10 NOTE — TELEPHONE ENCOUNTER
Left message for patient to return phone call  Will be able answer her questions in regards to her operation at this time

## 2022-01-14 DIAGNOSIS — I82.409 ACUTE DVT (DEEP VENOUS THROMBOSIS) (HCC): ICD-10-CM

## 2022-01-14 DIAGNOSIS — F51.01 PRIMARY INSOMNIA: ICD-10-CM

## 2022-01-14 DIAGNOSIS — F41.9 ANXIETY: ICD-10-CM

## 2022-01-14 RX ORDER — ALPRAZOLAM 1 MG/1
1 TABLET ORAL
Qty: 30 TABLET | Refills: 0 | Status: SHIPPED | OUTPATIENT
Start: 2022-01-14 | End: 2022-02-11 | Stop reason: SDUPTHER

## 2022-01-14 RX ORDER — ZOLPIDEM TARTRATE 10 MG/1
10 TABLET ORAL
Qty: 30 TABLET | Refills: 0 | Status: SHIPPED | OUTPATIENT
Start: 2022-01-14 | End: 2022-02-14 | Stop reason: SDUPTHER

## 2022-01-18 ENCOUNTER — APPOINTMENT (OUTPATIENT)
Dept: RADIOLOGY | Facility: CLINIC | Age: 52
End: 2022-01-18
Payer: MEDICARE

## 2022-01-18 ENCOUNTER — TELEPHONE (OUTPATIENT)
Dept: OBGYN CLINIC | Facility: OTHER | Age: 52
End: 2022-01-18

## 2022-01-18 ENCOUNTER — OFFICE VISIT (OUTPATIENT)
Dept: OBGYN CLINIC | Facility: CLINIC | Age: 52
End: 2022-01-18

## 2022-01-18 ENCOUNTER — TELEPHONE (OUTPATIENT)
Dept: OBGYN CLINIC | Facility: HOSPITAL | Age: 52
End: 2022-01-18

## 2022-01-18 VITALS
HEART RATE: 84 BPM | SYSTOLIC BLOOD PRESSURE: 112 MMHG | HEIGHT: 65 IN | WEIGHT: 160 LBS | DIASTOLIC BLOOD PRESSURE: 72 MMHG | BODY MASS INDEX: 26.66 KG/M2

## 2022-01-18 DIAGNOSIS — S52.502A CLOSED FRACTURE OF DISTAL ENDS OF LEFT RADIUS AND ULNA, INITIAL ENCOUNTER: ICD-10-CM

## 2022-01-18 DIAGNOSIS — S52.602A CLOSED FRACTURE OF DISTAL ENDS OF LEFT RADIUS AND ULNA, INITIAL ENCOUNTER: ICD-10-CM

## 2022-01-18 DIAGNOSIS — S52.602A CLOSED FRACTURE OF DISTAL ENDS OF LEFT RADIUS AND ULNA, INITIAL ENCOUNTER: Primary | ICD-10-CM

## 2022-01-18 DIAGNOSIS — S52.502A CLOSED FRACTURE OF DISTAL ENDS OF LEFT RADIUS AND ULNA, INITIAL ENCOUNTER: Primary | ICD-10-CM

## 2022-01-18 PROCEDURE — 99024 POSTOP FOLLOW-UP VISIT: CPT | Performed by: ORTHOPAEDIC SURGERY

## 2022-01-18 PROCEDURE — 73110 X-RAY EXAM OF WRIST: CPT

## 2022-01-18 NOTE — PROGRESS NOTES
Orthopaedics Office Visit - Post-op Patient Visit    ASSESSMENT/PLAN:    Assessment:   12 days s/p ORIF left distal radius   DOS 1/6/22      Plan:   - Continue non-weight bearing left upper extremity   - Sutures removed in the office  Patient tolerated well  - Patient placed in a cock up wrist brace as directed   - Begin motion / occupational therapy of the left wrist as directed  - Over the counter analgesics as needed / directed   - Ice / heat as directed   - Cleared for work on a limited duty status (no lifting of the left upper extremity)   - Follow up 4 weeks with repeat XR       To Do Next Visit:  XR left wrist     _____________________________________________________  CHIEF COMPLAINT:  Chief Complaint   Patient presents with    Left Wrist - Post-op         SUBJECTIVE:  Dina Wu is a 46 y o  female who presents  12 days s/p ORIF left distal radius   DOS 1/6/22  Patient states that her wrist is doing well overall  Patient states she has some pain in the wrist but is decreasing since the date of surgery  Patient states she has been compliant with nonweightbearing restrictions of the left upper extremity  Patient denies any numbness or tingling in her hand  Patient offers no other complaints at this time      SOCIAL HISTORY:  Social History     Tobacco Use    Smoking status: Never Smoker    Smokeless tobacco: Never Used   Vaping Use    Vaping Use: Never used   Substance Use Topics    Alcohol use: Not Currently    Drug use: Never     Comment: usues methadone for pain relief       MEDICATIONS:    Current Outpatient Medications:     ALPRAZolam (XANAX) 1 mg tablet, Take 1 tablet (1 mg total) by mouth daily at bedtime as needed for anxiety, Disp: 30 tablet, Rfl: 0    apixaban (ELIQUIS) 5 mg, Take 1 tablet (5 mg total) by mouth 2 (two) times a day, Disp: 180 tablet, Rfl: 1    cholecalciferol (VITAMIN D3) 1,000 units tablet, Take 2 tablets (2,000 Units total) by mouth daily, Disp: 30 tablet, Rfl: 0    Cyanocobalamin (B-12) 1000 MCG SUBL, Place under the tongue, Disp: , Rfl:     fluticasone (FLONASE) 50 mcg/act nasal spray, 1 spray into each nostril daily (Patient taking differently: 1 spray into each nostril as needed ), Disp: 1 Bottle, Rfl: 1    LORazepam (Ativan) 0 5 mg tablet, Take 1 tablet (0 5 mg total) by mouth every 8 (eight) hours as needed for anxiety, Disp: 25 tablet, Rfl: 0    Methadone HCl (METHADOSE PO), Take 30 mg by mouth daily before lunch, Disp: , Rfl:     methocarbamol (Robaxin-750) 750 mg tablet, Take 1 tablet (750 mg total) by mouth 3 (three) times a day as needed for muscle spasms, Disp: 40 tablet, Rfl: 1    naloxone (NARCAN) 4 mg/0 1 mL nasal spray, Administer 1 spray into a nostril  If no response after 2-3 minutes, give another dose in the other nostril using a new spray , Disp: 1 each, Rfl: 1    omeprazole (PriLOSEC) 20 mg delayed release capsule, Take 1 capsule (20 mg total) by mouth daily, Disp: 90 capsule, Rfl: 0    Pediatric Multiple Vit-C-FA (FRUITY CHEWABLES MULTIVITAMIN) CHEW, Chew 1 tablet daily , Disp: , Rfl:     senna (SENOKOT) 8 6 MG tablet, Take 1 tablet by mouth as needed  , Disp: , Rfl:     sertraline (ZOLOFT) 100 mg tablet, Take 1 tablet (100 mg total) by mouth 2 (two) times a day, Disp: 180 tablet, Rfl: 0    sucralfate (Carafate) 1 g/10 mL suspension, Take 10 mL (1 g total) by mouth 4 (four) times a day as needed (dyspepsia), Disp: 420 mL, Rfl: 2    topiramate (TOPAMAX) 100 mg tablet, Take 1 tablet (100 mg total) by mouth 2 (two) times a day, Disp: 180 tablet, Rfl: 1    venlafaxine (EFFEXOR-XR) 75 mg 24 hr capsule, Take 1 capsule (75 mg total) by mouth daily with breakfast, Disp: 90 capsule, Rfl: 3    zolpidem (AMBIEN) 10 mg tablet, Take 1 tablet (10 mg total) by mouth daily at bedtime, Disp: 30 tablet, Rfl: 0    REVIEW OF SYSTEMS:  MSK: left wrist pain  Neuro: WNL   Pertinent items are otherwise noted in HPI    A comprehensive review of systems was otherwise negative     _____________________________________________________  PHYSICAL EXAMINATION:  Vital signs: Ht 5' 5" (1 651 m)   Wt 72 6 kg (160 lb)   LMP 08/12/2020   BMI 26 63 kg/m²   General: No acute distress, awake and alert  Psychiatric: Mood and affect appear appropriate  HEENT: Trachea Midline, No torticollis, no apparent facial trauma  Cardiovascular: No audible murmurs; Extremities appear perfused  Pulmonary: No audible wheezing or stridor  Skin: No open lesions; see further details (if any) below    MUSCULOSKELETAL EXAMINATION:  Left wrist examination:  - Patient sitting comfortably in the office in no acute distress   - healed incision site with sutures intact at this irregularity or ecchymosis present  - tenderness palpation over incision site  No other bony or soft tissue tender palpation of this time  - limited range of motion of the wrist in all planes of motion secondary to pain   - NV intact    _____________________________________________________  STUDIES REVIEWED:  I personally reviewed the images and interpretation is as follows:  Left wrist XR 3 views:  Healing distal radius fracture in acceptable alignment with hardware intact  PROCEDURES PERFORMED:  Suture removal    Date/Time: 1/18/2022 3:08 PM  Performed by: Haile Deluca PA-C  Authorized by: Phuong Elizondo MD   Universal Protocol:  Consent: Verbal consent obtained  Risks and benefits: risks, benefits and alternatives were discussed  Consent given by: patient  Patient understanding: patient states understanding of the procedure being performed  Site marked: the operative site was marked  Patient identity confirmed: verbally with patient        Patient location:  Bedside  Location:     Laterality:  Left    Location:  Upper extremity    Upper extremity location:  Wrist  Procedure details:      Tools used:  Suture removal kit    Wound appearance:  No sign(s) of infection, good wound healing and clean  Post-procedure details:     Post-removal:  Steri-Strips applied    Patient tolerance of procedure: Tolerated well, no immediate complications          Jarome Perfect, PA-C             Portions of the record may have been created with voice recognition software  Occasional wrong word or "sound a like" substitutions may have occurred due to the inherent limitations of voice recognition software  Read the chart carefully and recognize, using context, where substitutions have occurred

## 2022-01-18 NOTE — PATIENT INSTRUCTIONS
- Continue non-weight bearing left upper extremity   - Sutures removed in the office  Patient tolerated well  - Patient placed in a cock up wrist brace as directed   - Begin motion / occupational therapy of the left wrist as directed     - Over the counter analgesics as needed / directed   - Ice / heat as directed   - Cleared for work on a limited duty status (no lifting of the left upper extremity)   - Follow up 4 weeks with repeat XR

## 2022-01-18 NOTE — TELEPHONE ENCOUNTER
Patient called in , she got lost     As I was directing her on how to get to the office she literally got pulled over I heard the       I called clinical and check in to advise she was running behind no answer

## 2022-01-18 NOTE — LETTER
January 18, 2022     Patient: Janett Hernandez   YOB: 1970   Date of Visit: 1/18/2022       To Whom it May Concern:    Janett Hernandez is under my professional care  She was seen in my office on 1/18/2022  She may return to work on a modified duty on 01/08/2022 with the following restriction:  No lifting of the left upper extremity  Must be allowed to maintain cock-up wrist brace at all times  If you have any questions or concerns, please don't hesitate to call           Sincerely,          Corey Anderson MD        CC: Janett Hernandez
05-Jan-2021

## 2022-01-18 NOTE — TELEPHONE ENCOUNTER
Dr Hawk Stephens    [phone]    Patient is requesting an order for hand therapy for her left wrist   Please place order into Epic  Please send AVS summary to patients home address

## 2022-01-20 DIAGNOSIS — M25.512 LEFT SHOULDER PAIN, UNSPECIFIED CHRONICITY: Primary | ICD-10-CM

## 2022-01-20 RX ORDER — METHADONE HYDROCHLORIDE 10 MG/1
30 TABLET ORAL DAILY
Qty: 90 TABLET | Refills: 0 | Status: SHIPPED | OUTPATIENT
Start: 2022-01-20 | End: 2022-02-17 | Stop reason: SDUPTHER

## 2022-01-20 RX ORDER — NALOXONE HYDROCHLORIDE 4 MG/.1ML
SPRAY NASAL
Qty: 1 EACH | Refills: 1 | Status: SHIPPED | OUTPATIENT
Start: 2022-01-20 | End: 2022-01-26 | Stop reason: ALTCHOICE

## 2022-01-26 ENCOUNTER — EVALUATION (OUTPATIENT)
Dept: PHYSICAL THERAPY | Facility: MEDICAL CENTER | Age: 52
End: 2022-01-26
Payer: MEDICARE

## 2022-01-26 ENCOUNTER — OFFICE VISIT (OUTPATIENT)
Dept: INTERNAL MEDICINE CLINIC | Facility: CLINIC | Age: 52
End: 2022-01-26
Payer: MEDICARE

## 2022-01-26 VITALS
TEMPERATURE: 97.1 F | BODY MASS INDEX: 26.52 KG/M2 | HEART RATE: 77 BPM | SYSTOLIC BLOOD PRESSURE: 130 MMHG | HEIGHT: 65 IN | WEIGHT: 159.2 LBS | DIASTOLIC BLOOD PRESSURE: 84 MMHG | OXYGEN SATURATION: 99 %

## 2022-01-26 DIAGNOSIS — D68.51 FACTOR 5 LEIDEN MUTATION, HETEROZYGOUS (HCC): ICD-10-CM

## 2022-01-26 DIAGNOSIS — S52.502A CLOSED FRACTURE OF DISTAL ENDS OF LEFT RADIUS AND ULNA, INITIAL ENCOUNTER: Primary | ICD-10-CM

## 2022-01-26 DIAGNOSIS — S52.602A CLOSED FRACTURE OF DISTAL ENDS OF LEFT RADIUS AND ULNA, INITIAL ENCOUNTER: Primary | ICD-10-CM

## 2022-01-26 DIAGNOSIS — D50.8 OTHER IRON DEFICIENCY ANEMIA: ICD-10-CM

## 2022-01-26 DIAGNOSIS — G43.909 MIGRAINE WITHOUT STATUS MIGRAINOSUS, NOT INTRACTABLE, UNSPECIFIED MIGRAINE TYPE: ICD-10-CM

## 2022-01-26 DIAGNOSIS — K91.2 POSTGASTRECTOMY MALABSORPTION: ICD-10-CM

## 2022-01-26 DIAGNOSIS — Z90.3 POSTGASTRECTOMY MALABSORPTION: ICD-10-CM

## 2022-01-26 DIAGNOSIS — I82.511 CHRONIC DEEP VEIN THROMBOSIS (DVT) OF RIGHT FEMORAL VEIN (HCC): Chronic | ICD-10-CM

## 2022-01-26 DIAGNOSIS — E55.9 VITAMIN D DEFICIENCY: ICD-10-CM

## 2022-01-26 DIAGNOSIS — K43.9 VENTRAL HERNIA WITHOUT OBSTRUCTION OR GANGRENE: ICD-10-CM

## 2022-01-26 DIAGNOSIS — F32.A DEPRESSION, UNSPECIFIED DEPRESSION TYPE: ICD-10-CM

## 2022-01-26 PROCEDURE — 3008F BODY MASS INDEX DOCD: CPT | Performed by: ORTHOPAEDIC SURGERY

## 2022-01-26 PROCEDURE — 97161 PT EVAL LOW COMPLEX 20 MIN: CPT

## 2022-01-26 PROCEDURE — 99214 OFFICE O/P EST MOD 30 MIN: CPT | Performed by: INTERNAL MEDICINE

## 2022-01-26 NOTE — PROGRESS NOTES
PT Evaluation     Today's date: 2022  Patient name: Valentino Aranda  : 1970  MRN: [id number]  Referring provider: ORLIN Acevedo*  Dx:   Encounter Diagnosis     ICD-10-CM    1  Closed fracture of distal ends of left radius and ulna, initial encounter  [id number] Ambulatory Referral to Occupational Therapy    [id number]                   Assessment  Assessment details: Pt is a 46year old RHD female who presents to PT following L distal radius and ulna fracture s/p ORIF (22)  Pt presents with healed incision and mild to moderate edema  She has limited ROM especially into wrist extension and forearm pronation  She also has limited comp flexion of digits  Pt should benefit from skilled PT to help reduce pain and edema, improve ROM, improve strength when appropriate, and better her overall functioning   Thank you kindly for your referral    Impairments: abnormal or restricted ROM, activity intolerance, impaired physical strength and pain with function  Understanding of Dx/Px/POC: good   Prognosis: good    Goals  STG (2-3 weeks)  1: pt independent in HEP  2: improve AROM 10 degrees  3: full comp flexion of digits    LTG (4-6 weeks)  1: Improve FOTO 10-15 pts  2: wrist AROM WFL  3: wrist PROM WNL  4: pt able to lift greater than 10 lbs without limitation  5:  strength within 20 lbs of uninvolved side  6: Pinch strength within 5 lbs of uninvolved side  7: wrist stabilizers 4+ to 5/5 throughout    Plan  Plan details: Initiate PT as per POC  Patient would benefit from: skilled physical therapy  Planned modality interventions: thermotherapy: hydrocollator packs and cryotherapy  Planned therapy interventions: joint mobilization, massage, manual therapy, neuromuscular re-education, fine motor coordination training, therapeutic activities, stretching, therapeutic exercise, flexibility, functional ROM exercises and home exercise program  Other planned therapy interventions: strengthening after 8 weeks  Frequency: 2x week  Duration in visits: 16  Duration in weeks: 8  Plan of Care beginning date: 2022  Plan of Care expiration date: 3/23/2022  Treatment plan discussed with: patient        Subjective Evaluation    History of Present Illness  Date of onset: 2021  Date of surgery: 2022  Mechanism of injury: surgery and trauma  Mechanism of injury: Leaving work, slipped on ice  Went to ER  Surgery 21  -right now helping out with self-checkout  Wrist has mostly been stiff  Wearing brace at night          Not a recurrent problem   Quality of life: good    Pain  Current pain rating: 3  At best pain ratin  At worst pain ratin  Location: radial wrist  Quality: dull ache, discomfort and tight  Relieving factors: support  Progression: improved    Social Support    Employment status: working ([employer])  Hand dominance: right    Patient Goals  Patient goals for therapy: decreased pain, increased motion, increased strength, independence with ADLs/IADLs and return to work          Objective     Observations     Left Wrist/Hand   Positive for edema and incision  Additional Observation Details  Mild to moderate edema throughout wrist  Incision is healed, closed, NEI, soft moveable    Tenderness     Left Wrist/Hand   Tenderness in the distal radioulnar joint       Active Range of Motion     Left Wrist   Wrist flexion: 38 degrees   Wrist extension: 20 degrees   Radial deviation: 12 degrees   Ulnar deviation: 20 degrees     Right Wrist   Wrist flexion: 65 degrees   Wrist extension: 65 degrees   Radial deviation: 18 degrees   Ulnar deviation: 30 degrees     Left Thumb   Opposition: Full thumb opp    Additional Active Range of Motion Details  Sup/pron 45/75; R 90/85  Pt able to form about 80% comp fist- 1 2 cm from [facility]    Passive Range of Motion     Left Wrist   Wrist flexion: 40 degrees   Wrist extension: 25 degrees     Additional Passive Range of Motion Details  MERLY Precautions:   HEP: wrist and forearm brandon MORELAND, ERIC's    Manuals             Scar/retromassage             Wrist, FA digits AAROM                                       Neuro Re-Ed             Grooved pegs             Pegs apprehension                                                                              Ther Ex             Towel bundima              ball roll for wrist              pegs grasping             Wrist AROM             Cones rotation                                                    Ther Activity                                       Gait Training                                       Modalities             MH             CP

## 2022-01-28 NOTE — PROGRESS NOTES
[facility] Medicine [place]      NAME: Ely Cantrell  AGE: 46 y o  SEX: female  : 1970   MRN: [id number]    DATE: 2022  TIME: 4:42 PM    Assessment and Plan   1  Closed fracture of distal ends of left radius and ulna, initial encounter    The patient  Underwent surgical repair of this  Dexa scan has been requested to assess the possibility of osteoporosis  - DXA bone density spine hip and pelvis; Future    2  Factor 5 Leiden mutation, heterozygous ([address] )    On  Long-term apixaban     3  Chronic deep vein thrombosis (DVT) of right femoral vein (HCC)  As above  4  Postgastrectomy malabsorption    On appropriate vitamin supplementation  5  Other iron deficiency anemia    The patient's iron saturation was on the low side when last measured  She was not anemic  This will be monitored carefully  - CBC; Future  - Iron Saturation %; Future    6  Ventral hernia without obstruction or gangrene    The patient was recently evaluated by surgery  Observation was recommended  7  Vitamin D deficiency    On replacement  8  Depression  Patient's mood is stable on her current therapy  Return to office in: Three months    Chief Complaint     Chief Complaint   Patient presents with    Follow-up     3 months  History of Present Illness      the patient returned to the office for re-evaluation of  DVT with underlying factor 5 Leiden abnormality in protein S deficiency, migraine, postsurgical malabsorption after bariatric surgery, and depression  Since her last visit she fell and broke her left radius and ulna  She had surgical repair of this  She is progressing well postoperatively  She was also seen by her general surgeon  She was noted to have a ventral hernia  Observation has been recommended for this  She will be re-evaluated by surgery in the near future  She has otherwise been doing reasonably well    She has had no chest pain, shortness of breath, palpitations, or dizziness  Her migraines have been few and far between  She has been sleeping pretty well  She has no issues with her medications  The following portions of the patient's history were reviewed and updated as appropriate: allergies, current medications, past family history, past medical history, past social history, past surgical history and problem list     Review of Systems   Review of Systems   Constitutional: Negative  HENT: Negative for congestion, ear pain, postnasal drip, rhinorrhea, sore throat and trouble swallowing  Eyes: Negative for pain, discharge, redness and visual disturbance  Respiratory: Negative for cough, shortness of breath and wheezing  Cardiovascular: Negative  Gastrointestinal: Negative  Endocrine: Negative  Genitourinary: Negative for difficulty urinating, dysuria, frequency, hematuria and urgency  Musculoskeletal: Negative for arthralgias, gait problem, joint swelling and myalgias  Skin: Negative for rash  Neurological: Negative for dizziness, speech difficulty, weakness, light-headedness, numbness and headaches  Hematological: Negative  Psychiatric/Behavioral: Negative for confusion, decreased concentration, dysphoric mood and sleep disturbance  The patient is not nervous/anxious          Active Problem List     Patient Active Problem List   Diagnosis    Allergic rhinitis    Anxiety    Depression    GERD without esophagitis    Insomnia    Migraine, unspecified, not intractable, without status migrainosus    Pain syndrome, chronic    Postgastrectomy malabsorption    Vitamin D deficiency    Ventral hernia without obstruction or gangrene    History of bariatric surgery    Iron deficiency anemia    ASCUS with positive high risk HPV cervical    Urinary tract infection with hematuria    Symptomatic varicose veins of both lower extremities    Chronic deep vein thrombosis (DVT) of right femoral vein (HCC)    Neutrophilic leukocytosis    Left shoulder pain    Adhesive capsulitis of left shoulder    Factor 5 Leiden mutation, heterozygous ([facility] [address] )    Protein S deficiency ([facility] [address] )    SBO (small bowel obstruction) (HCC)    Breast mass, right    Closed fracture of left distal radius and ulna       Objective   /84 (BP Location: Right arm, Patient Position: Sitting, Cuff Size: Adult)   Pulse 77   Temp (!) 97 1 °F (36 2 °C)   Ht 5' 5" (1 651 m)   Wt 72 2 kg (159 lb 3 2 oz)   LMP 08/12/2020   SpO2 99%   BMI 26 49 kg/m²     Physical Exam  Constitutional:       General: She is not in acute distress  Appearance: She is well-developed  HENT:      Head: Normocephalic and atraumatic  Neck:      Thyroid: No thyromegaly  Vascular: No JVD  Trachea: No tracheal deviation  Cardiovascular:      Rate and Rhythm: Normal rate and regular rhythm  Heart sounds: Normal heart sounds  No murmur heard  No friction rub  No gallop  Pulmonary:      Effort: Pulmonary effort is normal       Breath sounds: Normal breath sounds  No wheezing or rales  Chest:      Chest wall: No tenderness  Abdominal:      General: Bowel sounds are normal  There is no distension  Palpations: Abdomen is soft  There is no mass  Tenderness: There is no abdominal tenderness  There is no rebound  Hernia: A hernia is present  Musculoskeletal:         General: No tenderness  Normal range of motion  Cervical back: Neck supple  Lymphadenopathy:      Cervical: No cervical adenopathy  Skin:     General: Skin is warm and dry  Neurological:      Mental Status: She is alert and oriented to person, place, and time  Psychiatric:         Mood and Affect: Mood normal          Behavior: Behavior normal          Thought Content:  Thought content normal          Judgment: Judgment normal          Pertinent Laboratory/Diagnostic Studies:  Clinical Support on 12/29/2021   Component Date Value Ref Range Status    Ventricular Rate 12/29/2021 77  BPM Final    Atrial Rate 12/29/2021 77  BPM Final    AL Interval 12/29/2021 122  ms Final    QRSD Interval 12/29/2021 88  ms Final    QT Interval 12/29/2021 392  ms Final    QTC Interval 12/29/2021 443  ms Final    P Axis 12/29/2021 58  degrees Final    QRS Axis 12/29/2021 7  degrees Final    T Wave Axis 12/29/2021 6  degrees Final   Appointment on 12/29/2021   Component Date Value Ref Range Status    Folate 12/29/2021 >20 0* 3 1 - 17 5 ng/mL Final   Orders Only on 11/22/2021   Component Date Value Ref Range Status    SARS-CoV-2 11/22/2021 Negative  Negative Final           Current Medications     Current Outpatient Medications:     ALPRAZolam (XANAX) 1 mg tablet, Take 1 tablet (1 mg total) by mouth daily at bedtime as needed for anxiety, Disp: 30 tablet, Rfl: 0    apixaban (ELIQUIS) 5 mg, Take 1 tablet (5 mg total) by mouth 2 (two) times a day, Disp: 180 tablet, Rfl: 1    cholecalciferol (VITAMIN D3) 1,000 units tablet, Take 2 tablets (2,000 Units total) by mouth daily, Disp: 30 tablet, Rfl: 0    Cyanocobalamin (B-12) 1000 MCG SUBL, Place under the tongue, Disp: , Rfl:     fluticasone (FLONASE) 50 mcg/act nasal spray, 1 spray into each nostril daily (Patient taking differently: 1 spray into each nostril as needed ), Disp: 1 Bottle, Rfl: 1    methadone (DOLOPHINE) 10 mg tablet, Take 3 tablets by mouth daily,, Disp: 90 tablet, Rfl: 0    naloxone (NARCAN) 4 mg/0 1 mL nasal spray, Administer 1 spray into a nostril   If no response after 2-3 minutes, give another dose in the other nostril using a new spray , Disp: 1 each, Rfl: 1    omeprazole (PriLOSEC) 20 mg delayed release capsule, Take 1 capsule (20 mg total) by mouth daily, Disp: 90 capsule, Rfl: 0    Pediatric Multiple Vit-C-FA (FRUITY CHEWABLES MULTIVITAMIN) CHEW, Chew 1 tablet daily , Disp: , Rfl:     senna (SENOKOT) 8 6 MG tablet, Take 1 tablet by mouth as needed  , Disp: , Rfl:     sertraline (ZOLOFT) 100 mg tablet, Take 1 tablet (100 mg total) by mouth 2 (two) times a day, Disp: 180 tablet, Rfl: 0    sucralfate (Carafate) 1 g/10 mL suspension, Take 10 mL (1 g total) by mouth 4 (four) times a day as needed (dyspepsia), Disp: 420 mL, Rfl: 2    topiramate (TOPAMAX) 100 mg tablet, Take 1 tablet (100 mg total) by mouth 2 (two) times a day, Disp: 180 tablet, Rfl: 1    venlafaxine (EFFEXOR-XR) 75 mg 24 hr capsule, Take 1 capsule (75 mg total) by mouth daily with breakfast, Disp: 90 capsule, Rfl: 3    zolpidem (AMBIEN) 10 mg tablet, Take 1 tablet (10 mg total) by mouth daily at bedtime, Disp: 30 tablet, Rfl: 0    LORazepam (Ativan) 0 5 mg tablet, Take 1 tablet (0 5 mg total) by mouth every 8 (eight) hours as needed for anxiety (Patient not taking: Reported on 1/26/2022 ), Disp: 25 tablet, Rfl: 0    methocarbamol (Robaxin-750) 750 mg tablet, Take 1 tablet (750 mg total) by mouth 3 (three) times a day as needed for muscle spasms (Patient not taking: Reported on 1/26/2022 ), Disp: 40 tablet, Rfl: 1      Amber Bonilla MD

## 2022-02-01 ENCOUNTER — OFFICE VISIT (OUTPATIENT)
Dept: PHYSICAL THERAPY | Facility: MEDICAL CENTER | Age: 52
End: 2022-02-01
Payer: MEDICARE

## 2022-02-01 DIAGNOSIS — S52.502A CLOSED FRACTURE OF DISTAL ENDS OF LEFT RADIUS AND ULNA, INITIAL ENCOUNTER: Primary | ICD-10-CM

## 2022-02-01 DIAGNOSIS — S52.602A CLOSED FRACTURE OF DISTAL ENDS OF LEFT RADIUS AND ULNA, INITIAL ENCOUNTER: Primary | ICD-10-CM

## 2022-02-01 PROCEDURE — 97140 MANUAL THERAPY 1/> REGIONS: CPT

## 2022-02-01 PROCEDURE — 97110 THERAPEUTIC EXERCISES: CPT

## 2022-02-01 PROCEDURE — 97010 HOT OR COLD PACKS THERAPY: CPT

## 2022-02-01 NOTE — PROGRESS NOTES
Daily Note     Today's date: 2022  Patient name: Susan Rojas  : 1970  MRN: [id number]  Referring provider: ORLIN Hopper*  Dx:   Encounter Diagnosis     ICD-10-CM    1  Closed fracture of distal ends of left radius and ulna, initial encounter  S52 502A     S52 226A                   Subjective: Pt reports mild soreness in her wrist  Has been doing her exercises regularly  Objective: See treatment diary below      Assessment: Tolerated treatment well  Patient exhibited good technique with therapeutic exercises and would benefit from continued PT  Initiated program, pt tolerated well  Pt had some trouble performing fine motor skills but able to complete grooved pegs  No complaints post session, declined CP    Plan: Continue per plan of care        Precautions:   HEP: wrist and forearm AAROM, towel bunches, TGE's    Manuals             Scar/retromassage 5            Wrist, FA digits AAROM 10                          15            Neuro Re-Ed             Grooved pegs 1 board            Pegs apprehension 3 min                                                                 8            Ther Ex             Towel bunches 2 min             ball roll for wrist 3 min             pegs grasping 3 min            Wrist AROM 2x10            Cones rotation 2x10                                       10            Ther Activity                                       Gait Training                                       Modalities             MH 10            CP NP

## 2022-02-04 ENCOUNTER — OFFICE VISIT (OUTPATIENT)
Dept: PHYSICAL THERAPY | Facility: MEDICAL CENTER | Age: 52
End: 2022-02-04
Payer: MEDICARE

## 2022-02-04 DIAGNOSIS — S52.602A CLOSED FRACTURE OF DISTAL ENDS OF LEFT RADIUS AND ULNA, INITIAL ENCOUNTER: Primary | ICD-10-CM

## 2022-02-04 DIAGNOSIS — S52.502A CLOSED FRACTURE OF DISTAL ENDS OF LEFT RADIUS AND ULNA, INITIAL ENCOUNTER: Primary | ICD-10-CM

## 2022-02-04 PROCEDURE — 97110 THERAPEUTIC EXERCISES: CPT

## 2022-02-04 PROCEDURE — 97140 MANUAL THERAPY 1/> REGIONS: CPT

## 2022-02-04 NOTE — PROGRESS NOTES
Daily Note     Today's date: 2022  Patient name: Jluis Means  : 1970  MRN: [id number]  Referring provider: ORLIN Garner*  Dx:   Encounter Diagnosis     ICD-10-CM    1  Closed fracture of distal ends of left radius and ulna, initial encounter  S52 502A     S52 606C                   Subjective: Did a lot yesterday, soreness in her wrist today      Objective: See treatment diary below      Assessment: Tolerated treatment well  Patient exhibited good technique with therapeutic exercises and would benefit from continued PT  Mild tension to extrinsic extensors still- approaching comp fist- able for passively form about 75%  Added cone rotation stacking to program    No complaints post session      Plan: Continue per plan of care        Precautions:   HEP: wrist and forearm AAROM, towel bunches, TGE's    Manuals  2/4           Scar/retromassage 5 5           Wrist, FA digits AAROM 10 10                         15 15           Neuro Re-Ed             Grooved pegs 1 board NP           Pegs apprehension 3 min 3 min                                                                8            Ther Ex             Towel bunches 2 min 2min            ball roll for wrist 3 min 3 min            pegs grasping 3 min 3 min           Wrist AROM 2x10 2x10           Cones rotation 2x10  2 stacks                                      10 15           Ther Activity                                       Gait Training                                       Modalities             MH 10 10           CP NP

## 2022-02-08 ENCOUNTER — OFFICE VISIT (OUTPATIENT)
Dept: PHYSICAL THERAPY | Facility: MEDICAL CENTER | Age: 52
End: 2022-02-08
Payer: MEDICARE

## 2022-02-08 DIAGNOSIS — F32.A DEPRESSION, UNSPECIFIED DEPRESSION TYPE: ICD-10-CM

## 2022-02-08 DIAGNOSIS — S52.502A CLOSED FRACTURE OF DISTAL ENDS OF LEFT RADIUS AND ULNA, INITIAL ENCOUNTER: Primary | ICD-10-CM

## 2022-02-08 DIAGNOSIS — S52.602A CLOSED FRACTURE OF DISTAL ENDS OF LEFT RADIUS AND ULNA, INITIAL ENCOUNTER: Primary | ICD-10-CM

## 2022-02-08 PROCEDURE — 97140 MANUAL THERAPY 1/> REGIONS: CPT

## 2022-02-08 PROCEDURE — 97110 THERAPEUTIC EXERCISES: CPT

## 2022-02-08 NOTE — PROGRESS NOTES
Daily Note     Today's date: 2022  Patient name: Raymundo Davis  : 1970  MRN: [id number]  Referring provider: ORLIN Scales*  Dx:   Encounter Diagnosis     ICD-10-CM    1  Closed fracture of distal ends of left radius and ulna, initial encounter  S52 502A     S52 602A                   Subjective: Pt reports she had to lift her granddaughter when taking care of her yesterday  L wrist is feeling just as sore when she first broke it  Home remedies are not providing much relief      Objective: See treatment diary below      Assessment: Tolerated treatment well  Patient exhibited good technique with therapeutic exercises and would benefit from continued PT  Performed manuals gentle today, no significant overpressure  Pt had trouble tolerating weight of MH- only 6 min     pt able to complete exercises  Moderate soreness after  Plan: Continue per plan of care        Precautions:   HEP: wrist and forearm AAROM, towel bunches, TGE's    Manuals 2/ 2/ 2/8          Scar/retromassage 5 5 5          Wrist, FA digits AAROM 10 10 10                        15 15 15          Neuro Re-Ed             Grooved pegs 1 board NP           Pegs apprehension 3 min 3 min NP                                                               8            Ther Ex             Towel bunches 2 min 2min 2 min           ball roll for wrist 3 min 3 min 3 min           pegs grasping 3 min 3 min  3 min          Wrist AROM 2x10 2x10 2x10          Cones rotation 2x10  2 stacks 20x no stacks today                                     10 15 15          Ther Activity                                       Gait Training                                       Modalities             MH 10 10 10          CP NP  10

## 2022-02-09 RX ORDER — VENLAFAXINE HYDROCHLORIDE 75 MG/1
75 CAPSULE, EXTENDED RELEASE ORAL
Qty: 90 CAPSULE | Refills: 1 | Status: SHIPPED | OUTPATIENT
Start: 2022-02-09 | End: 2022-08-03

## 2022-02-11 ENCOUNTER — OFFICE VISIT (OUTPATIENT)
Dept: PHYSICAL THERAPY | Facility: MEDICAL CENTER | Age: 52
End: 2022-02-11
Payer: MEDICARE

## 2022-02-11 DIAGNOSIS — S52.602A CLOSED FRACTURE OF DISTAL ENDS OF LEFT RADIUS AND ULNA, INITIAL ENCOUNTER: Primary | ICD-10-CM

## 2022-02-11 DIAGNOSIS — S52.502A CLOSED FRACTURE OF DISTAL ENDS OF LEFT RADIUS AND ULNA, INITIAL ENCOUNTER: Primary | ICD-10-CM

## 2022-02-11 DIAGNOSIS — F41.9 ANXIETY: ICD-10-CM

## 2022-02-11 PROCEDURE — 97140 MANUAL THERAPY 1/> REGIONS: CPT

## 2022-02-11 PROCEDURE — 97110 THERAPEUTIC EXERCISES: CPT

## 2022-02-11 NOTE — PROGRESS NOTES
Daily Note     Today's date: 2022  Patient name: Tha Bagley  : 1970  MRN: [id number]  Referring provider: ORLIN Triplett*  Dx:   Encounter Diagnosis     ICD-10-CM    1  Closed fracture of distal ends of left radius and ulna, initial encounter  S52 502A     S52 602A                   Subjective: Pt reports she has been feeling better  Accidentally lifted something at work by accident but pain only lasted for a short while  Has noticed bar in wrist brace is digging into radial wrist        Objective: See treatment diary below      Assessment: Tolerated treatment well  Patient exhibited good technique with therapeutic exercises and would benefit from continued PT  Provided pt replacement for metal bar in brace- shaped splinting material to Pt then placed in brace for better comfort- pt reported it felt better with replaced piece  Added wrist widget and fingerweb, pt tolerated well  Plan: Continue per plan of care        Precautions:   HEP: wrist and forearm AAROM, towel bunches, TGE's    Manuals          Scar/retromassage 5 5 5 5         Wrist, FA digits AAROM 10 10 10 10                       15 15 15 15         Neuro Re-Ed             Grooved pegs 1 board NP           Pegs apprehension 3 min 3 min NP          Wrist widget    3 min                                                 8            Ther Ex             Towel bunches 2 min 2min 2 min 2 min          ball roll for wrist 3 min 3 min 3 min 3 min          pegs grasping 3 min 3 min  3 min NP         Wrist AROM 2x10 2x10 2x10 2x10         Cones rotation 2x10  2 stacks 20x no stacks today 2 stacks         fingerweb    yellow 20x                       10 15 15 15         Ther Activity                                       Gait Training                                       Modalities             MH 10 10 10 10         CP NP  10

## 2022-02-12 DIAGNOSIS — S52.502A CLOSED FRACTURE OF DISTAL ENDS OF LEFT RADIUS AND ULNA, INITIAL ENCOUNTER: Primary | ICD-10-CM

## 2022-02-12 DIAGNOSIS — S52.602A CLOSED FRACTURE OF DISTAL ENDS OF LEFT RADIUS AND ULNA, INITIAL ENCOUNTER: Primary | ICD-10-CM

## 2022-02-12 RX ORDER — ALPRAZOLAM 1 MG/1
1 TABLET ORAL
Qty: 30 TABLET | Refills: 1 | Status: SHIPPED | OUTPATIENT
Start: 2022-02-12 | End: 2022-04-11 | Stop reason: SDUPTHER

## 2022-02-14 DIAGNOSIS — F51.01 PRIMARY INSOMNIA: ICD-10-CM

## 2022-02-15 ENCOUNTER — APPOINTMENT (OUTPATIENT)
Dept: PHYSICAL THERAPY | Facility: MEDICAL CENTER | Age: 52
End: 2022-02-15
Payer: MEDICARE

## 2022-02-16 RX ORDER — ZOLPIDEM TARTRATE 10 MG/1
10 TABLET ORAL
Qty: 30 TABLET | Refills: 0 | Status: SHIPPED | OUTPATIENT
Start: 2022-02-16 | End: 2022-03-18 | Stop reason: SDUPTHER

## 2022-02-17 DIAGNOSIS — M25.512 LEFT SHOULDER PAIN, UNSPECIFIED CHRONICITY: ICD-10-CM

## 2022-02-18 ENCOUNTER — OFFICE VISIT (OUTPATIENT)
Dept: PHYSICAL THERAPY | Facility: MEDICAL CENTER | Age: 52
End: 2022-02-18
Payer: MEDICARE

## 2022-02-18 DIAGNOSIS — S52.602A CLOSED FRACTURE OF DISTAL ENDS OF LEFT RADIUS AND ULNA, INITIAL ENCOUNTER: Primary | ICD-10-CM

## 2022-02-18 DIAGNOSIS — S52.502A CLOSED FRACTURE OF DISTAL ENDS OF LEFT RADIUS AND ULNA, INITIAL ENCOUNTER: Primary | ICD-10-CM

## 2022-02-18 PROCEDURE — 97140 MANUAL THERAPY 1/> REGIONS: CPT | Performed by: PHYSICAL THERAPIST

## 2022-02-18 PROCEDURE — 97110 THERAPEUTIC EXERCISES: CPT | Performed by: PHYSICAL THERAPIST

## 2022-02-18 RX ORDER — METHADONE HYDROCHLORIDE 10 MG/1
30 TABLET ORAL DAILY
Qty: 90 TABLET | Refills: 0 | Status: SHIPPED | OUTPATIENT
Start: 2022-02-18 | End: 2022-03-18 | Stop reason: SDUPTHER

## 2022-02-18 NOTE — PROGRESS NOTES
Daily Note     Today's date: 2022  Patient name: Ingrid Rogel  : 1970  MRN: [id number]  Referring provider: ORLIN Galindo Che*  Dx:   Encounter Diagnosis     ICD-10-CM    1  Closed fracture of distal ends of left radius and ulna, initial encounter  S52 753A     S52 998Y                   Subjective: patient reports she is sore, she has been working a lot of hours  Objective: See treatment diary below      Assessment: Patient tolerated treatment well  Able to complete program as noted below with no reports of increased symptoms  Still challenged with cone stacks  She would benefit from continued PT  Plan: Continue per plan of care        Precautions:   HEP: wrist and forearm AAROM, towel bunches, TGE's    Manuals         Scar/retromassage 5 5 5 5 5        Wrist, FA digits AAROM 10 10 10 10 10                      15 15 15 15 15        Neuro Re-Ed             Grooved pegs 1 board NP           Pegs apprehension 3 min 3 min NP          Wrist widget    3 min 3 min                                                 8            Ther Ex             Towel bunches 2 min 2min 2 min 2 min 2 min         ball roll for wrist 3 min 3 min 3 min 3 min 3 min         pegs grasping 3 min 3 min  3 min NP NP        Wrist AROM 2x10 2x10 2x10 2x10 2x10        Cones rotation 2x10  2 stacks 20x no stacks today 2 stacks 2 stacks        fingerweb    yellow 20x yellow 20x                      10 15 15 15 15        Ther Activity                                       Gait Training                                       Modalities             MH 10 10 10 10 10        CP NP  10

## 2022-02-19 DIAGNOSIS — S52.502A CLOSED FRACTURE OF DISTAL ENDS OF LEFT RADIUS AND ULNA, INITIAL ENCOUNTER: Primary | ICD-10-CM

## 2022-02-19 DIAGNOSIS — S52.602A CLOSED FRACTURE OF DISTAL ENDS OF LEFT RADIUS AND ULNA, INITIAL ENCOUNTER: Primary | ICD-10-CM

## 2022-02-22 ENCOUNTER — OFFICE VISIT (OUTPATIENT)
Dept: PHYSICAL THERAPY | Facility: MEDICAL CENTER | Age: 52
End: 2022-02-22
Payer: MEDICARE

## 2022-02-22 DIAGNOSIS — S52.502A CLOSED FRACTURE OF DISTAL ENDS OF LEFT RADIUS AND ULNA, INITIAL ENCOUNTER: Primary | ICD-10-CM

## 2022-02-22 DIAGNOSIS — S52.602A CLOSED FRACTURE OF DISTAL ENDS OF LEFT RADIUS AND ULNA, INITIAL ENCOUNTER: Primary | ICD-10-CM

## 2022-02-22 PROCEDURE — 97140 MANUAL THERAPY 1/> REGIONS: CPT

## 2022-02-22 PROCEDURE — 97110 THERAPEUTIC EXERCISES: CPT

## 2022-02-22 NOTE — PROGRESS NOTES
Daily Note/Re-evaluation     Today's date: 2022  Patient name: Óscar Parra  : 1970  MRN: [id number]  Referring provider: ORLIN Ross*  Dx:   Encounter Diagnosis     ICD-10-CM    1  Closed fracture of distal ends of left radius and ulna, initial encounter  S52 502A     S52 602A                   Subjective: Pt reports she has been feeling really sore in her wrist, working register more  Has been getting more discomfort the more she wears her splint  Objective: See treatment diary below      Assessment: Tolerated treatment well  Patient exhibited good technique with therapeutic exercises and would benefit from continued PT   Offered to make pt a custom wrist splint but she may be weaning from it soon, anyway, pt plans on discussing with physician this week  AROM  Wrist ext/flex 40/38 PROM 50/50  Sup/pron 65/70  RD/UD 15/30  Wrist ext 4; flex 4+  RD/UD 4+/4+  Pt has increased joint stiffness at her 2nd MCP joint; no change in flexion with wrist position so may not be all attributed to extrinsic extensors tightness  Pt overall is making good progress in her ROM, still some soreness with activity even with support from brace  Will gradually add more strengthening each visit  Goals  STG (2-3 weeks)  1: pt independent in HEP-met  2: improve AROM 10 degrees- met  3: full comp flexion of digits- partially met  LTG (4-6 weeks)  1: Improve FOTO 10-15 pts  2: wrist AROM WFL- met  3: wrist PROM WNL- not met  4: pt able to lift greater than 10 lbs without limitation- not met  5:  strength within 20 lbs of uninvolved side- not met  6: Pinch strength within 5 lbs of uninvolved side- not met  7: wrist stabilizers 4+ to 5/5 throughout- not met    Plan: Continue per plan of care        Precautions:   HEP: wrist and forearm AAROM, towel bunches, TGE's    Manuals        Scar/retromassage 5 5 5 5 5 5       Wrist, FA digits AAROM 10 10 10 10 10 10                     15 15 15 15 15 15       Neuro Re-Ed             Grooved pegs 1 board NP           Pegs apprehension 3 min 3 min NP          Wrist widget    3 min 3 min                                                 8            Ther Ex             Towel bunches 2 min 2min 2 min 2 min 2 min 2 min        ball roll for wrist 3 min 3 min 3 min 3 min 3 min 3 min        pegs grasping 3 min 3 min  3 min NP NP        Wrist AROM 2x10 2x10 2x10 2x10 2x10 1# 2x10       Cones rotation 2x10  2 stacks 20x no stacks today 2 stacks 2 stacks 2 stacks       fingerweb    yellow 20x yellow 20x yellow 20x                     10 15 15 15 15 15       Ther Activity                                       Gait Training                                       Modalities             MH 10 10 10 10 10 10       CP NP  10

## 2022-02-24 ENCOUNTER — OFFICE VISIT (OUTPATIENT)
Dept: OBGYN CLINIC | Facility: HOSPITAL | Age: 52
End: 2022-02-24

## 2022-02-24 ENCOUNTER — HOSPITAL ENCOUNTER (OUTPATIENT)
Dept: RADIOLOGY | Facility: HOSPITAL | Age: 52
Discharge: HOME | End: 2022-02-24
Attending: ORTHOPAEDIC SURGERY
Payer: MEDICARE

## 2022-02-24 ENCOUNTER — OFFICE VISIT (OUTPATIENT)
Dept: PHYSICAL THERAPY | Facility: MEDICAL CENTER | Age: 52
End: 2022-02-24
Payer: MEDICARE

## 2022-02-24 VITALS
WEIGHT: 160 LBS | HEIGHT: 65 IN | HEART RATE: 72 BPM | DIASTOLIC BLOOD PRESSURE: 73 MMHG | SYSTOLIC BLOOD PRESSURE: 112 MMHG | BODY MASS INDEX: 26.66 KG/M2

## 2022-02-24 DIAGNOSIS — S52.602A CLOSED FRACTURE OF DISTAL ENDS OF LEFT RADIUS AND ULNA, INITIAL ENCOUNTER: ICD-10-CM

## 2022-02-24 DIAGNOSIS — S52.502A CLOSED FRACTURE OF DISTAL ENDS OF LEFT RADIUS AND ULNA, INITIAL ENCOUNTER: Primary | ICD-10-CM

## 2022-02-24 DIAGNOSIS — S52.502A CLOSED FRACTURE OF DISTAL ENDS OF LEFT RADIUS AND ULNA, INITIAL ENCOUNTER: ICD-10-CM

## 2022-02-24 DIAGNOSIS — S52.602A CLOSED FRACTURE OF DISTAL ENDS OF LEFT RADIUS AND ULNA, INITIAL ENCOUNTER: Primary | ICD-10-CM

## 2022-02-24 PROCEDURE — 73110 X-RAY EXAM OF WRIST: CPT

## 2022-02-24 PROCEDURE — 97140 MANUAL THERAPY 1/> REGIONS: CPT

## 2022-02-24 PROCEDURE — 97010 HOT OR COLD PACKS THERAPY: CPT

## 2022-02-24 PROCEDURE — 97110 THERAPEUTIC EXERCISES: CPT

## 2022-02-24 PROCEDURE — 99024 POSTOP FOLLOW-UP VISIT: CPT | Performed by: ORTHOPAEDIC SURGERY

## 2022-02-24 NOTE — PROGRESS NOTES
Daily Note     Today's date: 2022  Patient name: Almita Rivera  : 1970  MRN: [id number]  Referring provider: ORLIN Kamara*  Dx:   Encounter Diagnosis     ICD-10-CM    1  Closed fracture of distal ends of left radius and ulna, initial encounter  S52 502A     S52 602A                   Subjective: Pt reports she just saw the Dr, happy with progress  Ok to start weaning from splint and start strengthening  Distal ulna fracture healed very well  Objective: See treatment diary below      Assessment: Tolerated treatment well  Patient exhibited good technique with therapeutic exercises and would benefit from continued PT  Initiated light strengthening to wrist today  Fabricated custom wrist orthosis to Pt's comfort, pt happy with fit and feel  Pt reported feeling okay post session    Plan: Continue per plan of care        Precautions:   HEP: wrist and forearm AAROM, towel bunches, TGE's    Manuals       Scar/retromassage 5 5 5 5 5 5 5      Wrist, FA digits AAROM 10 10 10 10 10 10 10             Splint annie 10       15 15 15 15 15 15 25      Neuro Re-Ed             Grooved pegs 1 board NP           Pegs apprehension 3 min 3 min NP          Wrist widget    3 min 3 min                                                 8            Ther Ex             Towel bunches 2 min 2min 2 min 2 min 2 min 2 min 2 min       ball roll for wrist 3 min 3 min 3 min 3 min 3 min 3 min 3 min       pegs grasping 3 min 3 min  3 min NP NP        Wrist AROM 2x10 2x10 2x10 2x10 2x10 1# 2x10 1# 2x10      Cones rotation 2x10  2 stacks 20x no stacks today 2 stacks 2 stacks 2 stacks  2 stacks      fingerweb    yellow 20x yellow 20x yellow 20x Red 20x                    10 15 15 15 15 15 15      Ther Activity                                       Gait Training                                       Modalities             MH 10 10 10 10 10 10 10      CP NP  10

## 2022-02-24 NOTE — PROGRESS NOTES
Orthopaedics Office Visit - Post-op Patient Visit    ASSESSMENT/PLAN:    Assessment:   7 weeks s/p ORIF left distal radius   DOS 1/6/2022   Stiffness in wrist, residual pain       Plan:   - Begin weight bearing as tolerated left upper extremity   - d/c brace as tolerated   - Continue with therapy as directed   - Over the counter analgesics as needed / directed   - Ice / heat as directed   - Follow up 6 weeks with repeat XR       To Do Next Visit:  XR left wrist     _____________________________________________________  CHIEF COMPLAINT:  Chief Complaint   Patient presents with    Left Wrist - Post-op         SUBJECTIVE:  Camila Grimes is a 45 y o  female who presents to the office 7 weeks s/p ORIF left distal radius   DOS 1/6/2022   Patient states that her wrist is doing well overall  Patient states she has residual pain over the fracture site  Patient states she has been participating in physical therapy as directed no complaints  Patient states she has been maintaining a brace as directed no complaints  Patient denies any numbness tingling in her hand  Patient offers no other complaints at this time      SOCIAL HISTORY:  Social History     Tobacco Use    Smoking status: Never Smoker    Smokeless tobacco: Never Used   Vaping Use    Vaping Use: Never used   Substance Use Topics    Alcohol use: Not Currently    Drug use: Never     Comment: usues methadone for pain relief       MEDICATIONS:    Current Outpatient Medications:     ALPRAZolam (XANAX) 1 mg tablet, Take 1 tablet (1 mg total) by mouth daily at bedtime as needed for anxiety, Disp: 30 tablet, Rfl: 1    apixaban (ELIQUIS) 5 mg, Take 1 tablet (5 mg total) by mouth 2 (two) times a day, Disp: 180 tablet, Rfl: 1    cholecalciferol (VITAMIN D3) 1,000 units tablet, Take 2 tablets (2,000 Units total) by mouth daily, Disp: 30 tablet, Rfl: 0    Cyanocobalamin (B-12) 1000 MCG SUBL, Place under the tongue, Disp: , Rfl:     fluticasone (FLONASE) 50 mcg/act nasal spray, 1 spray into each nostril daily (Patient taking differently: 1 spray into each nostril as needed ), Disp: 1 Bottle, Rfl: 1    methadone (DOLOPHINE) 10 mg tablet, Take 3 tablets by mouth daily,, Disp: 90 tablet, Rfl: 0    naloxone (NARCAN) 4 mg/0 1 mL nasal spray, Administer 1 spray into a nostril  If no response after 2-3 minutes, give another dose in the other nostril using a new spray , Disp: 1 each, Rfl: 1    omeprazole (PriLOSEC) 20 mg delayed release capsule, Take 1 capsule (20 mg total) by mouth daily, Disp: 90 capsule, Rfl: 0    Pediatric Multiple Vit-C-FA (FRUITY CHEWABLES MULTIVITAMIN) CHEW, Chew 1 tablet daily , Disp: , Rfl:     senna (SENOKOT) 8 6 MG tablet, Take 1 tablet by mouth as needed  , Disp: , Rfl:     sertraline (ZOLOFT) 100 mg tablet, Take 1 tablet (100 mg total) by mouth 2 (two) times a day, Disp: 180 tablet, Rfl: 0    sucralfate (Carafate) 1 g/10 mL suspension, Take 10 mL (1 g total) by mouth 4 (four) times a day as needed (dyspepsia), Disp: 420 mL, Rfl: 2    topiramate (TOPAMAX) 100 mg tablet, Take 1 tablet (100 mg total) by mouth 2 (two) times a day, Disp: 180 tablet, Rfl: 1    venlafaxine (EFFEXOR-XR) 75 mg 24 hr capsule, Take 1 capsule (75 mg total) by mouth daily with breakfast, Disp: 90 capsule, Rfl: 1    zolpidem (AMBIEN) 10 mg tablet, Take 1 tablet (10 mg total) by mouth daily at bedtime, Disp: 30 tablet, Rfl: 0    REVIEW OF SYSTEMS:  MSK: left wrist pain   Neuro: WNL   Pertinent items are otherwise noted in HPI    A comprehensive review of systems was otherwise negative     _____________________________________________________  PHYSICAL EXAMINATION:  Vital signs: /73   Pulse 72   Ht 5' 5" (1 651 m)   Wt 72 6 kg (160 lb)   LMP 08/12/2020   BMI 26 63 kg/m²   General: No acute distress, awake and alert  Psychiatric: Mood and affect appear appropriate  HEENT: Trachea Midline, No torticollis, no apparent facial trauma  Cardiovascular: No audible murmurs; Extremities appear perfused  Pulmonary: No audible wheezing or stridor  Skin: No open lesions; see further details (if any) below      MUSCULOSKELETAL EXAMINATION:  Left wrist examination:  - Patient sitting comfortably in the office in no acute distress   - healed incision site but no surrounding erythema or ecchymosis present  Extremity appears well-perfused overall   -   Tenderness palpation noted over the distal radius  No other bony or soft tissue tenderness palpation noted at this time  -   30° extension with 20° of flexion of the wrist noted limited by pain   - NV intact      _____________________________________________________  STUDIES REVIEWED:  I personally reviewed the images and interpretation is as follows:  Left wrist XR 3 views:  Healed distal radius fracture in acceptable anatomic alignment with hardware intact  Healed distal ulna fracture in acceptable anatomic alignment  PROCEDURES PERFORMED:  No procedures were performed at this time  Tania Elder PA-C  - assisting    Jennie Mancilla MD                Portions of the record may have been created with voice recognition software  Occasional wrong word or "sound a like" substitutions may have occurred due to the inherent limitations of voice recognition software  Read the chart carefully and recognize, using context, where substitutions have occurred

## 2022-02-24 NOTE — PATIENT INSTRUCTIONS
- Begin weight bearing as tolerated left upper extremity   - d/c brace as tolerated   - Continue with therapy as directed   - Over the counter analgesics as needed / directed   - Ice / heat as directed   - Follow up 6 weeks with repeat XR

## 2022-03-01 ENCOUNTER — OFFICE VISIT (OUTPATIENT)
Dept: PHYSICAL THERAPY | Facility: MEDICAL CENTER | Age: 52
End: 2022-03-01
Payer: MEDICARE

## 2022-03-01 DIAGNOSIS — S52.502A CLOSED FRACTURE OF DISTAL ENDS OF LEFT RADIUS AND ULNA, INITIAL ENCOUNTER: Primary | ICD-10-CM

## 2022-03-01 DIAGNOSIS — S52.602A CLOSED FRACTURE OF DISTAL ENDS OF LEFT RADIUS AND ULNA, INITIAL ENCOUNTER: Primary | ICD-10-CM

## 2022-03-01 PROCEDURE — 97140 MANUAL THERAPY 1/> REGIONS: CPT

## 2022-03-01 PROCEDURE — 97010 HOT OR COLD PACKS THERAPY: CPT

## 2022-03-01 PROCEDURE — 97110 THERAPEUTIC EXERCISES: CPT

## 2022-03-01 NOTE — PROGRESS NOTES
Daily Note     Today's date: 3/1/2022  Patient name: Óscar Parra  : 1970  MRN: [id number]  Referring provider: ORLIN Ross*  Dx:   Encounter Diagnosis     ICD-10-CM    1  Closed fracture of distal ends of left radius and ulna, initial encounter  S52 502A     S52 312V                   Subjective: Pt reports she has been using her brace sparingly at work for extra support, working out well  Pt reported her thumb feels somewhat weak      Objective: See treatment diary below      Assessment: Tolerated treatment well  Patient exhibited good technique with therapeutic exercises and would benefit from continued PT   Mild limitation in wrist extension  Added clothespins to program, pt tolerated well  Declined CP post session    Plan: Continue per plan of care        Precautions:   HEP: wrist and forearm AAROM, towel bunches, TGE's    Manuals 2/1 2/4 2/8 2/11 2/18 2/22 2/24 3/1     Scar/retromassage 5 5 5 5 5 5 5 5     Wrist, FA digits AAROM 10 10 10 10 10 10 10 10            Splint annie 10       15 15 15 15 15 15 25 15     Neuro Re-Ed             Grooved pegs 1 board NP           Pegs apprehension 3 min 3 min NP          Wrist widget    3 min 3 min                                                 8            Ther Ex             Towel bunches 2 min 2min 2 min 2 min 2 min 2 min 2 min 2 min      ball roll for wrist 3 min 3 min 3 min 3 min 3 min 3 min 3 min 3 min      pegs grasping 3 min 3 min  3 min NP NP        Wrist AROM 2x10 2x10 2x10 2x10 2x10 1# 2x10 1# 2x10 1# 2x10     Cones rotation 2x10  2 stacks 20x no stacks today 2 stacks 2 stacks 2 stacks  2 stacks NP     fingerweb    yellow 20x yellow 20x yellow 20x Red 20x Red 20x     clothespins        Red and green 4 min     flexbar towel twist        Yellow 20x     flexbar sup/pron         yellow 20x      10 15 15 15 15 15 15 15     Ther Activity                                       Gait Training                                       Modalities MH 10 10 10 10 10 10 10 10     CP NP  10

## 2022-03-03 ENCOUNTER — APPOINTMENT (OUTPATIENT)
Dept: PHYSICAL THERAPY | Facility: MEDICAL CENTER | Age: 52
End: 2022-03-03
Payer: MEDICARE

## 2022-03-07 DIAGNOSIS — F32.A DEPRESSION, UNSPECIFIED DEPRESSION TYPE: ICD-10-CM

## 2022-03-07 RX ORDER — SERTRALINE HYDROCHLORIDE 100 MG/1
100 TABLET, FILM COATED ORAL 2 TIMES DAILY
Qty: 180 TABLET | Refills: 0 | Status: SHIPPED | OUTPATIENT
Start: 2022-03-07 | End: 2022-06-06

## 2022-03-09 ENCOUNTER — OFFICE VISIT (OUTPATIENT)
Dept: PHYSICAL THERAPY | Facility: MEDICAL CENTER | Age: 52
End: 2022-03-09
Payer: MEDICARE

## 2022-03-09 DIAGNOSIS — S52.502A CLOSED FRACTURE OF DISTAL ENDS OF LEFT RADIUS AND ULNA, INITIAL ENCOUNTER: Primary | ICD-10-CM

## 2022-03-09 DIAGNOSIS — S52.602A CLOSED FRACTURE OF DISTAL ENDS OF LEFT RADIUS AND ULNA, INITIAL ENCOUNTER: Primary | ICD-10-CM

## 2022-03-09 PROCEDURE — 97110 THERAPEUTIC EXERCISES: CPT

## 2022-03-09 PROCEDURE — 97140 MANUAL THERAPY 1/> REGIONS: CPT

## 2022-03-09 NOTE — PROGRESS NOTES
Daily Note     Today's date: 3/9/2022  Patient name: Camila Grimes  : 1970  MRN: [id number]  Referring provider: ORLIN Ortega*  Dx:   Encounter Diagnosis     ICD-10-CM    1  Closed fracture of distal ends of left radius and ulna, initial encounter  S52 502A     S52 607R                   Subjective: Pt reports she is sore from working  Objective: See treatment diary below  Pt arrived late and was limited on time as she had to be at work following tx, she was accommodated  Assessment: Tolerated treatment well  Patient exhibited good technique with therapeutic exercises and would benefit from continued PT   Continues to be limited in wrist extension  Mild distal scar sensitivity with massage  Pt notes most discomfort with clothespins, however is able to complete full 4 mins of exercise  No other adverse sx's with remainder of TE  Plan: Continue per plan of care        Precautions:   HEP: wrist and forearm AAROM, towel bunches, TGE's    Manuals 2/1 2/4 2/8 2/11 2/18 2/22 2/24 3/1 3/9    Scar/retromassage 5 5 5 5 5 5 5 5 5    Wrist, FA digits AAROM 10 10 10 10 10 10 10 10 8           Splint annie 10       15 15 15 15 15 15 25 15 13    Neuro Re-Ed             Grooved pegs 1 board NP           Pegs apprehension 3 min 3 min NP          Wrist widget    3 min 3 min                                                 8            Ther Ex             Towel bunches 2 min 2min 2 min 2 min 2 min 2 min 2 min 2 min 2 min     ball roll for wrist 3 min 3 min 3 min 3 min 3 min 3 min 3 min 3 min 3 min     pegs grasping 3 min 3 min  3 min NP NP        Wrist AROM 2x10 2x10 2x10 2x10 2x10 1# 2x10 1# 2x10 1# 2x10 nv    Cones rotation 2x10  2 stacks 20x no stacks today 2 stacks 2 stacks 2 stacks  2 stacks NP     fingerweb    yellow 20x yellow 20x yellow 20x Red 20x Red 20x Red   20x    clothespins        Red and green 4 min Red and green 4 min    flexbar towel twist        Yellow 20x yellow  20x    flexbar sup/pron yellow 20x yellow  20x     10 15 15 15 15 15 15 15     Ther Activity                                       Gait Training                                       Modalities             MH 10 10 10 10 10 10 10 10 6    CP NP  10

## 2022-03-11 ENCOUNTER — OFFICE VISIT (OUTPATIENT)
Dept: PHYSICAL THERAPY | Facility: MEDICAL CENTER | Age: 52
End: 2022-03-11
Payer: MEDICARE

## 2022-03-11 DIAGNOSIS — S52.502A CLOSED FRACTURE OF DISTAL ENDS OF LEFT RADIUS AND ULNA, INITIAL ENCOUNTER: Primary | ICD-10-CM

## 2022-03-11 DIAGNOSIS — S52.602A CLOSED FRACTURE OF DISTAL ENDS OF LEFT RADIUS AND ULNA, INITIAL ENCOUNTER: Primary | ICD-10-CM

## 2022-03-11 PROCEDURE — 97140 MANUAL THERAPY 1/> REGIONS: CPT

## 2022-03-11 PROCEDURE — 97010 HOT OR COLD PACKS THERAPY: CPT

## 2022-03-11 NOTE — PROGRESS NOTES
Daily Note     Today's date: 3/11/2022  Patient name: Chacha Martin  : 1970  MRN: [id number]  Referring provider: ORLIN Mas*  Dx:   Encounter Diagnosis     ICD-10-CM    1  Closed fracture of distal ends of left radius and ulna, initial encounter  S52 502A     S52 608I                   Subjective: Pt reports she is wearing the brace less  She is having some sensitivity along distal scar  No issues with lifting, more pain with moving into end ranges      Objective: See treatment diary below      Assessment: Tolerated treatment well  Patient exhibited good technique with therapeutic exercises and would benefit from continued PT  Kept program stable  ROM stable, no loss of motion  Discussed with Pt on possibly trying silicone cover for scar, pt willing to try  No charge for TE today      Plan: Continue per plan of care        Precautions:   HEP: wrist and forearm AAROM, towel bunches, TGE's    Manuals 2/1 2/4 2/8 2/11 2/18 2/22 2/24 3/1 3/9 3/11   Scar/retromassage 5 5 5 5 5 5 5 5 5 5   Wrist, FA digits AAROM 10 10 10 10 10 10 10 10 8 5          Splint annie 10       15 15 15 15 15 15 25 15 13 10   Neuro Re-Ed             Grooved pegs 1 board NP           Pegs apprehension 3 min 3 min NP          Wrist widget    3 min 3 min                                                 8            Ther Ex             Towel bunches 2 min 2min 2 min 2 min 2 min 2 min 2 min 2 min 2 min NP    ball roll for wrist 3 min 3 min 3 min 3 min 3 min 3 min 3 min 3 min 3 min 3 min    pegs grasping 3 min 3 min  3 min NP NP        Wrist AROM 2x10 2x10 2x10 2x10 2x10 1# 2x10 1# 2x10 1# 2x10 nv 1# 2x10   Cones rotation 2x10  2 stacks 20x no stacks today 2 stacks 2 stacks 2 stacks  2 stacks NP     fingerweb    yellow 20x yellow 20x yellow 20x Red 20x Red 20x Red   20x Red 20x   clothespins        Red and green 4 min Red and green 4 min Red and green 4 min   flexbar towel twist        Yellow 20x yellow  20x yellow 20x   flexbar sup/pron         yellow 20x yellow  20x  yellow 20x    10 15 15 15 15 15 15 15  15   Ther Activity                                       Gait Training                                       Modalities             MH extra towel 10 10 10 10 10 10 10 10 6 10   CP NP  10

## 2022-03-16 ENCOUNTER — APPOINTMENT (OUTPATIENT)
Dept: PHYSICAL THERAPY | Facility: MEDICAL CENTER | Age: 52
End: 2022-03-16
Payer: MEDICARE

## 2022-03-18 ENCOUNTER — OFFICE VISIT (OUTPATIENT)
Dept: PHYSICAL THERAPY | Facility: MEDICAL CENTER | Age: 52
End: 2022-03-18
Payer: MEDICARE

## 2022-03-18 DIAGNOSIS — M25.512 LEFT SHOULDER PAIN, UNSPECIFIED CHRONICITY: ICD-10-CM

## 2022-03-18 DIAGNOSIS — S52.602A CLOSED FRACTURE OF DISTAL ENDS OF LEFT RADIUS AND ULNA, INITIAL ENCOUNTER: Primary | ICD-10-CM

## 2022-03-18 DIAGNOSIS — F51.01 PRIMARY INSOMNIA: ICD-10-CM

## 2022-03-18 DIAGNOSIS — S52.502A CLOSED FRACTURE OF DISTAL ENDS OF LEFT RADIUS AND ULNA, INITIAL ENCOUNTER: Primary | ICD-10-CM

## 2022-03-18 PROCEDURE — 97110 THERAPEUTIC EXERCISES: CPT

## 2022-03-18 PROCEDURE — 97140 MANUAL THERAPY 1/> REGIONS: CPT

## 2022-03-18 RX ORDER — METHADONE HYDROCHLORIDE 10 MG/1
30 TABLET ORAL DAILY
Qty: 90 TABLET | Refills: 0 | Status: SHIPPED | OUTPATIENT
Start: 2022-03-18 | End: 2022-04-18 | Stop reason: SDUPTHER

## 2022-03-18 RX ORDER — ZOLPIDEM TARTRATE 10 MG/1
10 TABLET ORAL
Qty: 30 TABLET | Refills: 0 | Status: SHIPPED | OUTPATIENT
Start: 2022-03-18 | End: 2022-04-15 | Stop reason: SDUPTHER

## 2022-03-18 NOTE — PROGRESS NOTES
Daily Note     Today's date: 3/18/2022  Patient name: Chacha Martin  : 1970  MRN: [id number]  Referring provider: ORLIN Mas*  Dx:   Encounter Diagnosis     ICD-10-CM    1  Closed fracture of distal ends of left radius and ulna, initial encounter  S52 502A     S52 609F                   Subjective: Pt reports she has been having an easier time for the wrist especially with lifting  Still has some trouble with pushing wrist into full extension  Objective: See treatment diary below      Assessment: Tolerated treatment well  Patient exhibited good technique with therapeutic exercises and would benefit from continued PT  Pt arrived 10 min late and had to leave at 10:45 to make it to work  Pt had increased stiffness moving into each direction  demonstrated volar glides to prox row using TB to help promote better wrist extension ROM, pt able to demonstrate  Strength measures  Wrist ext/flex 4/4  RD/UD 4/   II R/L 65/30 lbs  Lp 17/11 lbs  3pt 15/9 lbs  Pt still has some weakness and stiffness in wrist  Will continue to address in remaining appointments    Plan: Continue per plan of care        Precautions:   HEP: wrist and forearm AAROM, towel bunches, TGE's    Manuals 3/18         3   Scar/retromassage          5   Wrist, FA digits AAROM 15         5    15                      10   Neuro Re-Ed             Grooved pegs             Pegs apprehension             Wrist widget                                                                 Ther Ex             Towel bunches          NP    ball roll for wrist 3 min         3 min    pegs grasping             Wrist AROM          1# 2x10   Cones rotation             fingerweb Red 30x         Red 20x   clothespins          Red and green 4 min   flexbar towel twist Red 20x         yellow 20x   flexbar sup/pron  red 20x          yellow 20x    10         15   Ther Activity                                       Gait Training Modalities             MH extra towel 10         10   CP

## 2022-03-25 ENCOUNTER — APPOINTMENT (OUTPATIENT)
Dept: PHYSICAL THERAPY | Facility: MEDICAL CENTER | Age: 52
End: 2022-03-25
Payer: MEDICARE

## 2022-04-04 DIAGNOSIS — K21.9 GERD WITHOUT ESOPHAGITIS: ICD-10-CM

## 2022-04-04 DIAGNOSIS — G43.909 MIGRAINE WITHOUT STATUS MIGRAINOSUS, NOT INTRACTABLE, UNSPECIFIED MIGRAINE TYPE: ICD-10-CM

## 2022-04-04 RX ORDER — OMEPRAZOLE 20 MG/1
20 CAPSULE, DELAYED RELEASE ORAL DAILY
Qty: 90 CAPSULE | Refills: 0 | Status: SHIPPED | OUTPATIENT
Start: 2022-04-04 | End: 2022-06-28 | Stop reason: SDUPTHER

## 2022-04-04 RX ORDER — TOPIRAMATE 100 MG/1
100 TABLET, FILM COATED ORAL 2 TIMES DAILY
Qty: 180 TABLET | Refills: 1 | Status: SHIPPED | OUTPATIENT
Start: 2022-04-04

## 2022-04-07 DIAGNOSIS — F41.9 ANXIETY: ICD-10-CM

## 2022-04-07 RX ORDER — ALPRAZOLAM 1 MG/1
1 TABLET ORAL
Qty: 30 TABLET | Refills: 1 | Status: CANCELLED | OUTPATIENT
Start: 2022-04-07

## 2022-04-09 DIAGNOSIS — S52.602A CLOSED FRACTURE OF DISTAL ENDS OF LEFT RADIUS AND ULNA, INITIAL ENCOUNTER: Primary | ICD-10-CM

## 2022-04-09 DIAGNOSIS — S52.502A CLOSED FRACTURE OF DISTAL ENDS OF LEFT RADIUS AND ULNA, INITIAL ENCOUNTER: Primary | ICD-10-CM

## 2022-04-11 DIAGNOSIS — F41.9 ANXIETY: ICD-10-CM

## 2022-04-11 RX ORDER — ALPRAZOLAM 1 MG/1
1 TABLET ORAL
Qty: 30 TABLET | Refills: 1 | Status: SHIPPED | OUTPATIENT
Start: 2022-04-11 | End: 2022-06-06

## 2022-04-15 DIAGNOSIS — F51.01 PRIMARY INSOMNIA: ICD-10-CM

## 2022-04-15 RX ORDER — ZOLPIDEM TARTRATE 10 MG/1
10 TABLET ORAL
Qty: 30 TABLET | Refills: 0 | Status: SHIPPED | OUTPATIENT
Start: 2022-04-15 | End: 2022-05-12 | Stop reason: SDUPTHER

## 2022-04-18 DIAGNOSIS — M25.512 LEFT SHOULDER PAIN, UNSPECIFIED CHRONICITY: ICD-10-CM

## 2022-04-18 RX ORDER — METHADONE HYDROCHLORIDE 10 MG/1
30 TABLET ORAL DAILY
Qty: 90 TABLET | Refills: 0 | Status: SHIPPED | OUTPATIENT
Start: 2022-04-18 | End: 2022-05-18 | Stop reason: SDUPTHER

## 2022-04-20 ENCOUNTER — OFFICE VISIT (OUTPATIENT)
Dept: INTERNAL MEDICINE CLINIC | Facility: CLINIC | Age: 52
End: 2022-04-20
Payer: MEDICARE

## 2022-04-20 VITALS
BODY MASS INDEX: 26.19 KG/M2 | HEART RATE: 74 BPM | HEIGHT: 65 IN | DIASTOLIC BLOOD PRESSURE: 82 MMHG | WEIGHT: 157.2 LBS | TEMPERATURE: 98.2 F | RESPIRATION RATE: 18 BRPM | OXYGEN SATURATION: 97 % | SYSTOLIC BLOOD PRESSURE: 140 MMHG

## 2022-04-20 DIAGNOSIS — S52.602D CLOSED FRACTURE OF DISTAL ENDS OF LEFT RADIUS AND ULNA WITH ROUTINE HEALING, SUBSEQUENT ENCOUNTER: ICD-10-CM

## 2022-04-20 DIAGNOSIS — S52.502D CLOSED FRACTURE OF DISTAL ENDS OF LEFT RADIUS AND ULNA WITH ROUTINE HEALING, SUBSEQUENT ENCOUNTER: ICD-10-CM

## 2022-04-20 DIAGNOSIS — K21.9 GERD WITHOUT ESOPHAGITIS: ICD-10-CM

## 2022-04-20 DIAGNOSIS — D50.8 OTHER IRON DEFICIENCY ANEMIA: ICD-10-CM

## 2022-04-20 DIAGNOSIS — Z12.11 SCREENING FOR COLON CANCER: Primary | ICD-10-CM

## 2022-04-20 DIAGNOSIS — K91.2 POSTGASTRECTOMY MALABSORPTION: ICD-10-CM

## 2022-04-20 DIAGNOSIS — Z90.3 POSTGASTRECTOMY MALABSORPTION: ICD-10-CM

## 2022-04-20 DIAGNOSIS — K56.609 SBO (SMALL BOWEL OBSTRUCTION) (HCC): ICD-10-CM

## 2022-04-20 DIAGNOSIS — G89.4 PAIN SYNDROME, CHRONIC: ICD-10-CM

## 2022-04-20 DIAGNOSIS — F32.A DEPRESSION, UNSPECIFIED DEPRESSION TYPE: ICD-10-CM

## 2022-04-20 DIAGNOSIS — D68.51 FACTOR 5 LEIDEN MUTATION, HETEROZYGOUS (HCC): ICD-10-CM

## 2022-04-20 DIAGNOSIS — D68.59 PROTEIN S DEFICIENCY (HCC): ICD-10-CM

## 2022-04-20 DIAGNOSIS — K43.9 VENTRAL HERNIA WITHOUT OBSTRUCTION OR GANGRENE: ICD-10-CM

## 2022-04-20 DIAGNOSIS — G43.909 MIGRAINE WITHOUT STATUS MIGRAINOSUS, NOT INTRACTABLE, UNSPECIFIED MIGRAINE TYPE: ICD-10-CM

## 2022-04-20 DIAGNOSIS — F41.9 ANXIETY: ICD-10-CM

## 2022-04-20 PROCEDURE — 99396 PREV VISIT EST AGE 40-64: CPT | Performed by: INTERNAL MEDICINE

## 2022-04-20 NOTE — PROGRESS NOTES
INTERNAL MEDICINE OFFICE VISIT  [facility] [place]    NAME: Ely Cantrell  AGE: 45 y o  SEX: female    DATE OF ENCOUNTER: 4/20/2022    Assessment and Plan     1  Screening for colon cancer  -she has not had recent screening for colorectal cancer  She is interested in Cologuard  - Cologuard    2  Closed fracture of distal ends of left radius and ulna with routine healing, subsequent encounter  -she is status post ORIF for left distal radius fracture  -seems to be satisfied with surgical outcome    3  Factor 5 Leiden mutation, heterozygous ([address] )  -history of factor 5 Leiden mutation, protein S deficiency, states she had 2 prior clotting episodes and with the 2nd episode the clots went to her lungs  -remains on indefinite anticoagulation with Eliquis    4  Protein S deficiency ([address] )    5  Migraine without status migrainosus, not intractable, unspecified migraine type  -well controlled  Maintained on Topamax    6  SBO (small bowel obstruction) ([facility])  -history of small-bowel obstruction in May of 2021, status post ex lap, lysis of adhesions, small-bowel resection May 2021    7  Postgastrectomy malabsorption  -history of gastric bypass approximately 14 years ago  She has been able to maintain her weight loss with some mild fluctuations in weight  -continue with vitamin-D, B12 supplementation  -she has gotten periodic iron infusions in the past   CBC and iron studies were requested by Dr Jessica Brand    8  GERD without esophagitis  -continue with omeprazole    9  Anxiety  -appears to be relatively stable  She is on sertraline, Effexor  -I think her Effexor may be providing limited benefit  She is tolerating fine, will continue for now  -takes alprazolam, ambien at bedtime for anxiety and insomnia    10  Depression, unspecified depression type  -as above    11  Pain syndrome, chronic  -she reportedly lost 200 lb after abdominal plasty, this was complicated by MRSA infection of the right hip    Her surgeon who address this apparently did massive nerve damage and she has had chronic pain issues ever since  -she has been evaluated by specialists for this previously  She states she saw a neurosurgeon and had an exploratory procedure in [place] - the damage was apparently so severe that nothing could be done surgically  -maintained on methadone 30 mg daily  -She had a 2nd abdominal plasty with Dr Neftaly Connors with satisfactory aesthetic outcome    12  Ventral hernia without obstruction or gangrene  -following with surgery for ventral hernia  This is reducible on exam, no concerns for incarceration presently  -this has become progressively bothersome for her, she is going to discuss repair with surgeon    13  Other iron deficiency anemia       Scheduled for upcoming mammogram, DEXA scan            Orders Placed This Encounter   Procedures    Cologuard       Chief Complaint     Chief Complaint   Patient presents with    Annual Exam       History of Present Illness     Ely comes in today for annual physical    She has a medical history of protein S deficiency, factor 5 Leiden mutation with chronic DVT on Eliquis, SBO in May of this year status post surgical intervention, chronic pain maintained on methadone, status post gastric bypass procedure with iron deficiency anemia    She was a nanny for 25 years    Currently employed as a  at [employer]        The following portions of the patient's history were reviewed and updated as appropriate: allergies, current medications, past family history, past medical history, past social history, past surgical history and problem list     Review of Systems     10 point ROS negative except per HPI    Active Problem List     Patient Active Problem List   Diagnosis    Allergic rhinitis    Anxiety    Depression    GERD without esophagitis    Insomnia    Migraine, unspecified, not intractable, without status migrainosus    Pain syndrome, chronic    Postgastrectomy malabsorption    Vitamin D deficiency    Ventral hernia without obstruction or gangrene    History of bariatric surgery    Iron deficiency anemia    ASCUS with positive high risk HPV cervical    Urinary tract infection with hematuria    Symptomatic varicose veins of both lower extremities    Chronic deep vein thrombosis (DVT) of right femoral vein (HCC)    Neutrophilic leukocytosis    Left shoulder pain    Adhesive capsulitis of left shoulder    Factor 5 Leiden mutation, heterozygous ([address] )    Protein S deficiency ([address] )    SBO (small bowel obstruction) (HCC)    Breast mass, right    Closed fracture of left distal radius and ulna       Objective     /82 (BP Location: Left arm, Patient Position: Sitting, Cuff Size: Standard)   Pulse 74   Temp 98 2 °F (36 8 °C)   Resp 18   Ht 5' 5" (1 651 m)   Wt 71 3 kg (157 lb 3 2 oz)   LMP 08/12/2020   SpO2 97%   BMI 26 16 kg/m²     Physical Exam  Constitutional:       Appearance: Normal appearance  She is not ill-appearing  HENT:      Head: Normocephalic and atraumatic  Eyes:      General: No scleral icterus  Right eye: No discharge  Left eye: No discharge  Cardiovascular:      Rate and Rhythm: Normal rate and regular rhythm  Heart sounds: No murmur heard  No friction rub  Pulmonary:      Effort: Pulmonary effort is normal       Breath sounds: Normal breath sounds  No wheezing or rales  Abdominal:      General: Abdomen is flat  There is no distension  Palpations: Abdomen is soft  Tenderness: There is no abdominal tenderness  Hernia: A hernia is present  Musculoskeletal:         General: No swelling or tenderness  Skin:     General: Skin is warm and dry  Findings: No erythema  Neurological:      Mental Status: She is alert  Mental status is at baseline  Motor: No weakness     Psychiatric:         Mood and Affect: Mood normal          Behavior: Behavior normal          Pertinent Laboratory/Diagnostic Studies:  XR wrist 3+ vw left    Result Date: 2/26/2022  Impression: In comparison to the most recent pertinent exam, there is an anticipated postoperative appearance and unchanged alignment of the left wrist status post ORIF Workstation performed: [id number]       Images and diagnostics reviewed     Current Medications     Current Outpatient Medications:     ALPRAZolam (XANAX) 1 mg tablet, Take 1 tablet (1 mg total) by mouth daily at bedtime as needed for anxiety, Disp: 30 tablet, Rfl: 1    apixaban (ELIQUIS) 5 mg, Take 1 tablet (5 mg total) by mouth 2 (two) times a day, Disp: 180 tablet, Rfl: 1    cholecalciferol (VITAMIN D3) 1,000 units tablet, Take 2 tablets (2,000 Units total) by mouth daily, Disp: 30 tablet, Rfl: 0    Cyanocobalamin (B-12) 1000 MCG SUBL, Place under the tongue, Disp: , Rfl:     methadone (DOLOPHINE) 10 mg tablet, Take 3 tablets by mouth daily,, Disp: 90 tablet, Rfl: 0    naloxone (NARCAN) 4 mg/0 1 mL nasal spray, Administer 1 spray into a nostril   If no response after 2-3 minutes, give another dose in the other nostril using a new spray , Disp: 1 each, Rfl: 1    omeprazole (PriLOSEC) 20 mg delayed release capsule, Take 1 capsule (20 mg total) by mouth daily, Disp: 90 capsule, Rfl: 0    Pediatric Multiple Vit-C-FA (FRUITY CHEWABLES MULTIVITAMIN) CHEW, Chew 1 tablet daily , Disp: , Rfl:     senna (SENOKOT) 8 6 MG tablet, Take 1 tablet by mouth as needed  , Disp: , Rfl:     sertraline (ZOLOFT) 100 mg tablet, Take 1 tablet (100 mg total) by mouth 2 (two) times a day, Disp: 180 tablet, Rfl: 0    sucralfate (Carafate) 1 g/10 mL suspension, Take 10 mL (1 g total) by mouth 4 (four) times a day as needed (dyspepsia), Disp: 420 mL, Rfl: 2    topiramate (TOPAMAX) 100 mg tablet, Take 1 tablet (100 mg total) by mouth 2 (two) times a day, Disp: 180 tablet, Rfl: 1    venlafaxine (EFFEXOR-XR) 75 mg 24 hr capsule, Take 1 capsule (75 mg total) by mouth daily with breakfast, Disp: 90 capsule, Rfl: 1    zolpidem (AMBIEN) 10 mg tablet, Take 1 tablet (10 mg total) by mouth daily at bedtime, Disp: 30 tablet, Rfl: 0    fluticasone (FLONASE) 50 mcg/act nasal spray, 1 spray into each nostril daily (Patient not taking: Reported on 4/20/2022 ), Disp: 1 Bottle, Rfl: 1    Health Maintenance     Health Maintenance   Topic Date Due    Osteoporosis Screening  Never done    OT PLAN OF CARE  07/25/2018    Colorectal Cancer Screening  04/21/2020    Breast Cancer Screening: Mammogram  05/27/2021    PT PLAN OF CARE  03/24/2022    COVID-19 Vaccine (1) 07/20/2022 (Originally 2/15/1975)    BMI: Followup Plan  10/25/2022    BMI: Adult  04/20/2023    Annual Physical  04/20/2023    Cervical Cancer Screening  03/12/2024    DTaP,Tdap,and Td Vaccines (2 - Td or Tdap) 10/22/2029    HIV Screening  Completed    Hepatitis C Screening  Completed    Influenza Vaccine  Completed    Pneumococcal Vaccine: Pediatrics (0 to 5 Years) and At-Risk Patients (6 to 59 Years)  Aged Out    HIB Vaccine  Aged Out    Hepatitis B Vaccine  Aged Out    IPV Vaccine  Aged Out    Hepatitis A Vaccine  Aged Out    Meningococcal ACWY Vaccine  Aged Out    HPV Vaccine  Aged [employer] History   Administered Date(s) Administered    INFLUENZA 10/08/2014, 11/25/2016, 10/11/2021    Influenza Quadrivalent Preservative Free 3 years and older IM 10/08/2014    Influenza Quadrivalent, 6-35 Months IM 11/25/2016, 10/12/2017    Influenza, recombinant, quadrivalent,injectable, preservative free 01/03/2019, 10/22/2019, 12/02/2020    Influenza, seasonal, injectable 10/21/2011, 12/05/2012, 10/22/2013, 10/30/2015    Tdap 10/22/2019    Tuberculin Skin Test-PPD Intradermal 06/06/2018       RO Modi  Internal Medicine [facility]  [address], [address]  [place], [address] [facility]  Office: [phone]

## 2022-05-12 DIAGNOSIS — F51.01 PRIMARY INSOMNIA: ICD-10-CM

## 2022-05-12 RX ORDER — ZOLPIDEM TARTRATE 10 MG/1
10 TABLET ORAL
Qty: 30 TABLET | Refills: 0 | Status: SHIPPED | OUTPATIENT
Start: 2022-05-12 | End: 2022-06-17 | Stop reason: SDUPTHER

## 2022-05-16 ENCOUNTER — HOSPITAL ENCOUNTER (OUTPATIENT)
Dept: BONE DENSITY | Facility: MEDICAL CENTER | Age: 52
Discharge: HOME | End: 2022-05-16
Payer: MEDICARE

## 2022-05-16 DIAGNOSIS — S52.502A CLOSED FRACTURE OF DISTAL ENDS OF LEFT RADIUS AND ULNA, INITIAL ENCOUNTER: ICD-10-CM

## 2022-05-16 DIAGNOSIS — S52.602A CLOSED FRACTURE OF DISTAL ENDS OF LEFT RADIUS AND ULNA, INITIAL ENCOUNTER: ICD-10-CM

## 2022-05-16 PROCEDURE — 77080 DXA BONE DENSITY AXIAL: CPT

## 2022-05-18 DIAGNOSIS — M25.512 LEFT SHOULDER PAIN, UNSPECIFIED CHRONICITY: ICD-10-CM

## 2022-05-18 RX ORDER — METHADONE HYDROCHLORIDE 10 MG/1
30 TABLET ORAL DAILY
Qty: 90 TABLET | Refills: 0 | Status: SHIPPED | OUTPATIENT
Start: 2022-05-18 | End: 2022-06-21 | Stop reason: SDUPTHER

## 2022-06-01 ENCOUNTER — CONSULT (OUTPATIENT)
Dept: INTERNAL MEDICINE CLINIC | Facility: CLINIC | Age: 52
End: 2022-06-01
Payer: MEDICARE

## 2022-06-01 ENCOUNTER — APPOINTMENT (OUTPATIENT)
Dept: LAB | Facility: MEDICAL CENTER | Age: 52
End: 2022-06-01
Payer: MEDICARE

## 2022-06-01 VITALS
TEMPERATURE: 97.4 F | HEART RATE: 69 BPM | BODY MASS INDEX: 26.73 KG/M2 | WEIGHT: 160.4 LBS | SYSTOLIC BLOOD PRESSURE: 124 MMHG | HEIGHT: 65 IN | DIASTOLIC BLOOD PRESSURE: 82 MMHG | OXYGEN SATURATION: 97 %

## 2022-06-01 DIAGNOSIS — D68.51 FACTOR 5 LEIDEN MUTATION, HETEROZYGOUS (HCC): ICD-10-CM

## 2022-06-01 DIAGNOSIS — Z90.3 POSTGASTRECTOMY MALABSORPTION: ICD-10-CM

## 2022-06-01 DIAGNOSIS — F41.9 ANXIETY: ICD-10-CM

## 2022-06-01 DIAGNOSIS — Z01.818 PREOP EXAMINATION: ICD-10-CM

## 2022-06-01 DIAGNOSIS — K91.2 POSTGASTRECTOMY MALABSORPTION: ICD-10-CM

## 2022-06-01 DIAGNOSIS — D68.59 PROTEIN S DEFICIENCY (HCC): ICD-10-CM

## 2022-06-01 DIAGNOSIS — K43.9 VENTRAL HERNIA WITHOUT OBSTRUCTION OR GANGRENE: Primary | ICD-10-CM

## 2022-06-01 DIAGNOSIS — G89.4 PAIN SYNDROME, CHRONIC: ICD-10-CM

## 2022-06-01 DIAGNOSIS — K43.9 VENTRAL HERNIA WITHOUT OBSTRUCTION OR GANGRENE: ICD-10-CM

## 2022-06-01 DIAGNOSIS — D50.8 OTHER IRON DEFICIENCY ANEMIA: ICD-10-CM

## 2022-06-01 LAB
BASOPHILS # BLD AUTO: 0.04 THOUSANDS/ΜL (ref 0–0.1)
BASOPHILS NFR BLD AUTO: 1 % (ref 0–1)
EOSINOPHIL # BLD AUTO: 0.22 THOUSAND/ΜL (ref 0–0.61)
EOSINOPHIL NFR BLD AUTO: 3 % (ref 0–6)
ERYTHROCYTE [DISTWIDTH] IN BLOOD BY AUTOMATED COUNT: 14.8 % (ref 11.6–15.1)
HCT VFR BLD AUTO: 38.8 % (ref 34.8–46.1)
HGB BLD-MCNC: 11.4 G/DL (ref 11.5–15.4)
IMM GRANULOCYTES # BLD AUTO: 0.01 THOUSAND/UL (ref 0–0.2)
IMM GRANULOCYTES NFR BLD AUTO: 0 % (ref 0–2)
LYMPHOCYTES # BLD AUTO: 3.36 THOUSANDS/ΜL (ref 0.6–4.47)
LYMPHOCYTES NFR BLD AUTO: 50 % (ref 14–44)
MCH RBC QN AUTO: 25.7 PG (ref 26.8–34.3)
MCHC RBC AUTO-ENTMCNC: 29.4 G/DL (ref 31.4–37.4)
MCV RBC AUTO: 87 FL (ref 82–98)
MONOCYTES # BLD AUTO: 0.57 THOUSAND/ΜL (ref 0.17–1.22)
MONOCYTES NFR BLD AUTO: 9 % (ref 4–12)
NEUTROPHILS # BLD AUTO: 2.46 THOUSANDS/ΜL (ref 1.85–7.62)
NEUTS SEG NFR BLD AUTO: 37 % (ref 43–75)
NRBC BLD AUTO-RTO: 0 /100 WBCS
PLATELET # BLD AUTO: 265 THOUSANDS/UL (ref 149–390)
PMV BLD AUTO: 10 FL (ref 8.9–12.7)
RBC # BLD AUTO: 4.44 MILLION/UL (ref 3.81–5.12)
WBC # BLD AUTO: 6.66 THOUSAND/UL (ref 4.31–10.16)

## 2022-06-01 PROCEDURE — 93000 ELECTROCARDIOGRAM COMPLETE: CPT | Performed by: INTERNAL MEDICINE

## 2022-06-01 PROCEDURE — 99243 OFF/OP CNSLTJ NEW/EST LOW 30: CPT | Performed by: INTERNAL MEDICINE

## 2022-06-01 PROCEDURE — 83540 ASSAY OF IRON: CPT

## 2022-06-01 PROCEDURE — 36415 COLL VENOUS BLD VENIPUNCTURE: CPT

## 2022-06-01 PROCEDURE — 82306 VITAMIN D 25 HYDROXY: CPT

## 2022-06-01 PROCEDURE — 83550 IRON BINDING TEST: CPT

## 2022-06-01 PROCEDURE — 80053 COMPREHEN METABOLIC PANEL: CPT

## 2022-06-01 PROCEDURE — 85025 COMPLETE CBC W/AUTO DIFF WBC: CPT

## 2022-06-01 NOTE — PROGRESS NOTES
INTERNAL MEDICINE OFFICE VISIT  [facility] [place]    NAME: Ely Cantrell  AGE: 45 y o  SEX: female    DATE OF ENCOUNTER: 6/1/2022    Assessment and Plan     1  Preop examination  - POCT ECG    2  Ventral hernia without obstruction or gangrene    3  Postgastrectomy malabsorption  - Vitamin D 25 hydroxy; Future    4  Factor 5 Leiden mutation, heterozygous ([address] )    5  Protein S deficiency ([address] )    6  Pain syndrome, chronic    7  Anxiety     History as noted below  She appears to be at a relatively low risk from a cardiovascular standpoint for this procedure  ECG completed today in the office shows normal sinus rhythm, no ectopy or ischemic changes apparent  She may proceed as scheduled without any further diagnostic testing or lab work from my standpoint    I advised her to take her last dose of Eliquis on 06/06 approximately 48 hours prior to her scheduled procedure  May be resumed postoperatively at the discretion of surgical team provided there are no significant bleeding complications  Will defer to surgical team for definitive adjustments to her perioperative medication regimen              Orders Placed This Encounter   Procedures    Vitamin D 25 hydroxy    POCT ECG       Chief Complaint     Chief Complaint   Patient presents with    Pre-op Exam     Hernia surgery on 6/9/2022 @ [facility] by Dr Katerin Mcdonald       History of Present Illness     Ely is seen today for preop consultation    She is scheduled for repair of ventral hernia on 06/09 with Dr Katerin Mcdonald at [address] [place] [address] [id number]    She has a medical history of protein S deficiency, factor 5 Leiden mutation with chronic DVT on Eliquis, SBO in May of this 2021 status post surgical intervention, chronic pain maintained on methadone, status post gastric bypass procedure with iron deficiency anemia    Denies any recent chest pain, shortness of breath, dyspnea on exertion, palpitations    No peripheral edema apparent on exam   Does not appear to have any relevant history of ischemic heart disease, valvular heart disease, TA/CVA, COPD, sleep apnea  No history of diabetes or renal dysfunction     The following portions of the patient's history were reviewed and updated as appropriate: allergies, current medications, past family history, past medical history, past social history, past surgical history and problem list     Review of Systems     10 point ROS negative except per HPI    Active Problem List     Patient Active Problem List   Diagnosis    Allergic rhinitis    Anxiety    Depression    GERD without esophagitis    Insomnia    Migraine, unspecified, not intractable, without status migrainosus    Pain syndrome, chronic    Postgastrectomy malabsorption    Vitamin D deficiency    Ventral hernia without obstruction or gangrene    History of bariatric surgery    Iron deficiency anemia    ASCUS with positive high risk HPV cervical    Urinary tract infection with hematuria    Symptomatic varicose veins of both lower extremities    Chronic deep vein thrombosis (DVT) of right femoral vein (HCC)    Neutrophilic leukocytosis    Left shoulder pain    Adhesive capsulitis of left shoulder    Factor 5 Leiden mutation, heterozygous ([address] )    Protein S deficiency ([address] )    SBO (small bowel obstruction) ([facility]    Breast mass, right    Closed fracture of left distal radius and ulna       Objective     /82 (BP Location: Left arm, Patient Position: Sitting, Cuff Size: Standard)   Pulse 69   Temp (!) 97 4 °F (36 3 °C)   Ht 5' 5" (1 651 m)   Wt 72 8 kg (160 lb 6 4 oz)   LMP 08/12/2020   SpO2 97%   BMI 26 69 kg/m²     Physical Exam  Constitutional:       Appearance: Normal appearance  She is not ill-appearing  HENT:      Head: Normocephalic and atraumatic  Eyes:      General: No scleral icterus  Right eye: No discharge  Left eye: No discharge  Cardiovascular:      Rate and Rhythm: Normal rate and regular rhythm        Heart sounds: No murmur heard     No friction rub  Pulmonary:      Effort: Pulmonary effort is normal       Breath sounds: Normal breath sounds  No wheezing or rales  Abdominal:      General: Abdomen is flat  There is no distension  Palpations: Abdomen is soft  Tenderness: There is no abdominal tenderness  Musculoskeletal:         General: No swelling or tenderness  Skin:     General: Skin is warm and dry  Findings: No erythema  Neurological:      Mental Status: She is alert  Mental status is at baseline  Motor: No weakness  Psychiatric:         Mood and Affect: Mood normal          Behavior: Behavior normal          Pertinent Laboratory/Diagnostic Studies:  DXA bone density spine hip and pelvis    Result Date: 5/18/2022  Impression: 1  Based on the [facility] classification, this study identifies a diagnosis of low bone mass, notable at the spine, total hip, and femoral neck areas and the patient is considered at low risk for fracture  2  A daily intake of calcium of at least 1200 mg and vitamin D, 800-1000 IU, as well as weight bearing and muscle strengthening exercise, fall prevention and avoidance of tobacco and excessive alcohol intake as basic preventive measures are recommended  3  Repeat DXA scan on the same equipment in 18-24 months as clinically indicated  The 10 year risk of hip fracture is 1 0%, with the 10 year risk of major osteoporotic fracture being 22%, as calculated by the [facility] fracture risk assessment tool (FRAX)  The current NOF guidelines recommend treating patients with FRAX 10 year risk score of >3% for hip fracture and >20% for major osteoporotic fracture  Total osteoporotic fracture risk exceeds treatment thresholds   WHO CLASSIFICATION: Normal (a T-score of -1 0 or higher) Low bone mineral density (a T-score of less than -1 0 but higher than -2 5) Osteoporosis (a T-score of -2 5 or less) Severe osteoporosis (a T-score of -2 5 or less with a fragility fracture) Thank you for allowing us the opportunity to participate in your patient care  The expanded DEXA report will no longer be arriving in your mail  If you desire to view the full report please contact [facility] or access the PACS system  Workstation performed: [id number]       Images and diagnostics reviewed     Current Medications     Current Outpatient Medications:     ALPRAZolam (XANAX) 1 mg tablet, Take 1 tablet (1 mg total) by mouth daily at bedtime as needed for anxiety, Disp: 30 tablet, Rfl: 1    apixaban (ELIQUIS) 5 mg, Take 1 tablet (5 mg total) by mouth 2 (two) times a day, Disp: 180 tablet, Rfl: 1    cholecalciferol (VITAMIN D3) 1,000 units tablet, Take 2 tablets (2,000 Units total) by mouth daily, Disp: 30 tablet, Rfl: 0    Cyanocobalamin (B-12) 1000 MCG SUBL, Place under the tongue, Disp: , Rfl:     methadone (DOLOPHINE) 10 mg tablet, Take 3 tablets by mouth daily,, Disp: 90 tablet, Rfl: 0    naloxone (NARCAN) 4 mg/0 1 mL nasal spray, Administer 1 spray into a nostril   If no response after 2-3 minutes, give another dose in the other nostril using a new spray , Disp: 1 each, Rfl: 1    omeprazole (PriLOSEC) 20 mg delayed release capsule, Take 1 capsule (20 mg total) by mouth daily, Disp: 90 capsule, Rfl: 0    Pediatric Multiple Vit-C-FA (FRUITY CHEWABLES MULTIVITAMIN) CHEW, Chew 1 tablet daily , Disp: , Rfl:     senna (SENOKOT) 8 6 MG tablet, Take 1 tablet by mouth as needed  , Disp: , Rfl:     sertraline (ZOLOFT) 100 mg tablet, Take 1 tablet (100 mg total) by mouth 2 (two) times a day, Disp: 180 tablet, Rfl: 0    sucralfate (Carafate) 1 g/10 mL suspension, Take 10 mL (1 g total) by mouth 4 (four) times a day as needed (dyspepsia), Disp: 420 mL, Rfl: 2    topiramate (TOPAMAX) 100 mg tablet, Take 1 tablet (100 mg total) by mouth 2 (two) times a day, Disp: 180 tablet, Rfl: 1    venlafaxine (EFFEXOR-XR) 75 mg 24 hr capsule, Take 1 capsule (75 mg total) by mouth daily with breakfast, Disp: 90 capsule, Rfl: 1    zolpidem (AMBIEN) 10 mg tablet, Take 1 tablet (10 mg total) by mouth daily at bedtime, Disp: 30 tablet, Rfl: 0    fluticasone (FLONASE) 50 mcg/act nasal spray, 1 spray into each nostril daily (Patient not taking: Reported on 6/1/2022), Disp: 1 Bottle, Rfl: 1    Health Maintenance     Health Maintenance   Topic Date Due    OT PLAN OF CARE  07/25/2018    Colorectal Cancer Screening  04/21/2020    Breast Cancer Screening: Mammogram  05/27/2021    PT PLAN OF CARE  03/24/2022    BMI: Followup Plan  10/25/2022    COVID-19 Vaccine (1) 07/20/2022 (Originally 2/15/1975)    Annual Physical  04/20/2023    BMI: Adult  06/01/2023    Cervical Cancer Screening  03/12/2024    DTaP,Tdap,and Td Vaccines (2 - Td or Tdap) 10/22/2029    HIV Screening  Completed    Hepatitis C Screening  Completed    Osteoporosis Screening  Completed    Influenza Vaccine  Completed    Pneumococcal Vaccine: Pediatrics (0 to 5 Years) and At-Risk Patients (6 to 59 Years)  Aged Out    HIB Vaccine  Aged Out    Hepatitis B Vaccine  Aged Out    IPV Vaccine  Aged Out    Hepatitis A Vaccine  Aged Out    Meningococcal ACWY Vaccine  Aged Out    HPV Vaccine  Aged [employer] History   Administered Date(s) Administered    INFLUENZA 10/08/2014, 11/25/2016, 10/11/2021    Influenza Quadrivalent Preservative Free 3 years and older IM 10/08/2014    Influenza Quadrivalent, 6-35 Months IM 11/25/2016, 10/12/2017    Influenza, recombinant, quadrivalent,injectable, preservative free 01/03/2019, 10/22/2019, 12/02/2020    Influenza, seasonal, injectable 10/21/2011, 12/05/2012, 10/22/2013, 10/30/2015    Tdap 10/22/2019    Tuberculin Skin Test-PPD Intradermal 06/06/2018       [facility]  [address], [facility] [address]  [place], [address]   Office: [phone]

## 2022-06-02 LAB
25(OH)D3 SERPL-MCNC: 44 NG/ML (ref 30–100)
ALBUMIN SERPL BCP-MCNC: 3.4 G/DL (ref 3.5–5)
ALP SERPL-CCNC: 112 U/L (ref 46–116)
ALT SERPL W P-5'-P-CCNC: 39 U/L (ref 12–78)
ANION GAP SERPL CALCULATED.3IONS-SCNC: 4 MMOL/L (ref 4–13)
AST SERPL W P-5'-P-CCNC: 38 U/L (ref 5–45)
BILIRUB SERPL-MCNC: 0.26 MG/DL (ref 0.2–1)
BUN SERPL-MCNC: 15 MG/DL (ref 5–25)
CALCIUM ALBUM COR SERPL-MCNC: 9.3 MG/DL (ref 8.3–10.1)
CALCIUM SERPL-MCNC: 8.8 MG/DL (ref 8.3–10.1)
CHLORIDE SERPL-SCNC: 111 MMOL/L (ref 100–108)
CO2 SERPL-SCNC: 24 MMOL/L (ref 21–32)
CREAT SERPL-MCNC: 0.78 MG/DL (ref 0.6–1.3)
GFR SERPL CREATININE-BSD FRML MDRD: 86 ML/MIN/1.73SQ M
GLUCOSE SERPL-MCNC: 111 MG/DL (ref 65–140)
IRON SATN MFR SERPL: 7 % (ref 15–50)
IRON SERPL-MCNC: 34 UG/DL (ref 50–170)
POTASSIUM SERPL-SCNC: 4.1 MMOL/L (ref 3.5–5.3)
PROT SERPL-MCNC: 7.4 G/DL (ref 6.4–8.2)
SODIUM SERPL-SCNC: 139 MMOL/L (ref 136–145)
TIBC SERPL-MCNC: 473 UG/DL (ref 250–450)

## 2022-06-03 DIAGNOSIS — Z12.31 SCREENING MAMMOGRAM FOR BREAST CANCER: Primary | ICD-10-CM

## 2022-06-03 DIAGNOSIS — F41.9 ANXIETY: ICD-10-CM

## 2022-06-03 DIAGNOSIS — F32.A DEPRESSION, UNSPECIFIED DEPRESSION TYPE: ICD-10-CM

## 2022-06-03 NOTE — TELEPHONE ENCOUNTER
Pt called she would like to know the results of her blood work she stated that she she use to do transfusions before because it was low she would like to know if you want he to do this again please advise

## 2022-06-06 DIAGNOSIS — D50.8 OTHER IRON DEFICIENCY ANEMIA: Primary | ICD-10-CM

## 2022-06-06 PROBLEM — D50.9 IRON (FE) DEFICIENCY ANEMIA: Status: ACTIVE | Noted: 2022-06-06

## 2022-06-06 RX ORDER — SERTRALINE HYDROCHLORIDE 100 MG/1
TABLET, FILM COATED ORAL
Qty: 180 TABLET | Refills: 0 | Status: SHIPPED | OUTPATIENT
Start: 2022-06-06

## 2022-06-06 RX ORDER — SODIUM CHLORIDE 9 MG/ML
20 INJECTION, SOLUTION INTRAVENOUS ONCE
OUTPATIENT
Start: 2022-06-06

## 2022-06-06 RX ORDER — ALPRAZOLAM 1 MG/1
TABLET ORAL
Qty: 30 TABLET | Refills: 1 | Status: SHIPPED | OUTPATIENT
Start: 2022-06-06 | End: 2022-08-01

## 2022-06-07 DIAGNOSIS — Z12.31 SCREENING MAMMOGRAM FOR BREAST CANCER: ICD-10-CM

## 2022-06-07 NOTE — TELEPHONE ENCOUNTER
Called pt left message to call back about going for  iron infusion 2 sets per Dr Nayely Lane referral is in pt stated that she is going for surgery on Thursday and she going to see if they can do the infusion when she is in the hospital

## 2022-06-17 DIAGNOSIS — F51.01 PRIMARY INSOMNIA: ICD-10-CM

## 2022-06-20 DIAGNOSIS — F51.01 PRIMARY INSOMNIA: ICD-10-CM

## 2022-06-20 RX ORDER — ZOLPIDEM TARTRATE 10 MG/1
10 TABLET ORAL
Qty: 30 TABLET | Refills: 0 | Status: SHIPPED | OUTPATIENT
Start: 2022-06-20 | End: 2022-07-15 | Stop reason: SDUPTHER

## 2022-06-20 RX ORDER — ZOLPIDEM TARTRATE 10 MG/1
10 TABLET ORAL
Qty: 30 TABLET | Refills: 0 | OUTPATIENT
Start: 2022-06-20

## 2022-06-21 DIAGNOSIS — M25.512 LEFT SHOULDER PAIN, UNSPECIFIED CHRONICITY: ICD-10-CM

## 2022-06-21 RX ORDER — METHADONE HYDROCHLORIDE 10 MG/1
30 TABLET ORAL DAILY
Qty: 90 TABLET | Refills: 0 | Status: SHIPPED | OUTPATIENT
Start: 2022-06-21 | End: 2022-07-18 | Stop reason: SDUPTHER

## 2022-06-28 DIAGNOSIS — K21.9 GERD WITHOUT ESOPHAGITIS: ICD-10-CM

## 2022-06-28 RX ORDER — OMEPRAZOLE 20 MG/1
20 CAPSULE, DELAYED RELEASE ORAL DAILY
Qty: 90 CAPSULE | Refills: 0 | Status: SHIPPED | OUTPATIENT
Start: 2022-06-28

## 2022-07-15 DIAGNOSIS — F51.01 PRIMARY INSOMNIA: ICD-10-CM

## 2022-07-16 RX ORDER — ZOLPIDEM TARTRATE 10 MG/1
10 TABLET ORAL
Qty: 30 TABLET | Refills: 0 | Status: SHIPPED | OUTPATIENT
Start: 2022-07-16 | End: 2022-08-12 | Stop reason: SDUPTHER

## 2022-07-18 DIAGNOSIS — M25.512 LEFT SHOULDER PAIN, UNSPECIFIED CHRONICITY: ICD-10-CM

## 2022-07-18 RX ORDER — METHADONE HYDROCHLORIDE 10 MG/1
30 TABLET ORAL DAILY
Qty: 90 TABLET | Refills: 0 | Status: SHIPPED | OUTPATIENT
Start: 2022-07-18 | End: 2022-08-18 | Stop reason: SDUPTHER

## 2022-07-26 DIAGNOSIS — I82.409 ACUTE DVT (DEEP VENOUS THROMBOSIS) (HCC): ICD-10-CM

## 2022-07-30 DIAGNOSIS — F41.9 ANXIETY: ICD-10-CM

## 2022-08-01 RX ORDER — ALPRAZOLAM 1 MG/1
TABLET ORAL
Qty: 30 TABLET | Refills: 0 | Status: SHIPPED | OUTPATIENT
Start: 2022-08-01 | End: 2022-08-30

## 2022-08-03 DIAGNOSIS — F32.A DEPRESSION, UNSPECIFIED DEPRESSION TYPE: ICD-10-CM

## 2022-08-03 RX ORDER — VENLAFAXINE HYDROCHLORIDE 75 MG/1
CAPSULE, EXTENDED RELEASE ORAL
Qty: 90 CAPSULE | Refills: 1 | Status: SHIPPED | OUTPATIENT
Start: 2022-08-03

## 2022-08-12 DIAGNOSIS — F51.01 PRIMARY INSOMNIA: ICD-10-CM

## 2022-08-12 RX ORDER — ZOLPIDEM TARTRATE 10 MG/1
10 TABLET ORAL
Qty: 30 TABLET | Refills: 0 | Status: SHIPPED | OUTPATIENT
Start: 2022-08-12 | End: 2022-09-12 | Stop reason: SDUPTHER

## 2022-08-18 DIAGNOSIS — M25.512 LEFT SHOULDER PAIN, UNSPECIFIED CHRONICITY: ICD-10-CM

## 2022-08-18 RX ORDER — METHADONE HYDROCHLORIDE 10 MG/1
30 TABLET ORAL DAILY
Qty: 90 TABLET | Refills: 0 | Status: SHIPPED | OUTPATIENT
Start: 2022-08-18 | End: 2022-09-16 | Stop reason: SDUPTHER

## 2022-08-22 ENCOUNTER — TELEPHONE (OUTPATIENT)
Dept: OTHER | Facility: OTHER | Age: 52
End: 2022-08-22

## 2022-08-22 NOTE — TELEPHONE ENCOUNTER
Patient is calling regarding cancelling an appointment      Date/Time:08/22/2022 @ 1045    Patient was rescheduled: YES [] NO [x]    Patient requesting call back to reschedule: YES [] NO [x]

## 2022-08-27 ENCOUNTER — NURSE TRIAGE (OUTPATIENT)
Dept: OTHER | Facility: OTHER | Age: 52
End: 2022-08-27

## 2022-08-27 ENCOUNTER — DOCUMENTATION (OUTPATIENT)
Dept: INTERNAL MEDICINE CLINIC | Facility: CLINIC | Age: 52
End: 2022-08-27

## 2022-08-27 DIAGNOSIS — U07.1 COVID-19: Primary | ICD-10-CM

## 2022-08-27 RX ORDER — NIRMATRELVIR AND RITONAVIR 300-100 MG
3 KIT ORAL 2 TIMES DAILY
Qty: 30 TABLET | Refills: 0 | Status: SHIPPED | OUTPATIENT
Start: 2022-08-27 | End: 2022-09-01

## 2022-08-27 NOTE — TELEPHONE ENCOUNTER
TT sent to on call as patient has BMI over 25 to inquire about paxlovid  Reason for Disposition   HIGH RISK for severe COVID complications (e g , weak immune system, age > 59 years, obesity with BMI > 22, pregnant, chronic lung disease or other chronic medical condition)  (Exception: Already seen by PCP and no new or worsening symptoms )    Answer Assessment - Initial Assessment Questions  1  COVID-19 DIAGNOSIS: "Who made your COVID-19 diagnosis?" "Was it confirmed by a positive lab test or self-test?" If not diagnosed by a doctor (or NP/PA), ask "Are there lots of cases (community spread) where you live?" Note: See [facility] health department website, if unsure  At home test positive   2  COVID-19 EXPOSURE: "Was there any known exposure to COVID before the symptoms began?" CDC Definition of close contact: within 6 feet (2 meters) for a total of 15 minutes or more over a 24-hour period  Nguyễn Luis work at [employer] and went to a concert Sat 3/06  3  ONSET: "When did the COVID-19 symptoms start?"       Mon 8/22  4  WORST SYMPTOM: "What is your worst symptom?" (e g , cough, fever, shortness of breath, muscle aches)      Headache   5  COUGH: "Do you have a cough?" If Yes, ask: "How bad is the cough?"        No cough   6  FEVER: "Do you have a fever?" If Yes, ask: "What is your temperature, how was it measured, and when did it start?"      100 9  7  RESPIRATORY STATUS: "Describe your breathing?" (e g , shortness of breath, wheezing, unable to speak)      Labored breathing several times while walking   8  BETTER-SAME-WORSE: "Are you getting better, staying the same or getting worse compared to yesterday?"  If getting worse, ask, "In what way?"      Worse fever started last night   9  HIGH RISK DISEASE: "Do you have any chronic medical problems?" (e g , asthma, heart or lung disease, weak immune system, obesity, etc )      no  10   VACCINE: "Have you had the COVID-19 vaccine?" If Yes, ask: "Which one, how many shots, when did you get it?"        No   11  BOOSTER: "Have you received your COVID-19 booster?" If Yes, ask: "Which one and when did you get it?"        No   13   OTHER SYMPTOMS: "Do you have any other symptoms?"  (e g , chills, fatigue, headache, loss of smell or taste, muscle pain, sore throat)      Headache, body aches, phlegm in back of throat, eyes watering, cold symptoms    Protocols used: CORONAVIRUS (COVID-19) DIAGNOSED OR SUSPECTED-ADULT-

## 2022-08-27 NOTE — TELEPHONE ENCOUNTER
Regarding: COVID-SLPG-Positive- symptom management  ----- Message from Susy Kirk sent at 8/27/2022  3:07 PM EDT -----  "I've been sick for the past few days and I just tested positive  I just wanted to get some advice on managing symptoms  "

## 2022-08-28 DIAGNOSIS — F32.A DEPRESSION, UNSPECIFIED DEPRESSION TYPE: ICD-10-CM

## 2022-08-28 NOTE — PROGRESS NOTES
COVID-19 Outpatient Progress Note    Assessment/Plan:    Very fatigued today  She is to continue with paxlovid as prescribed  She did notice 2 small red bumps on her face this morning  I advised her to let me know if she develops any sort of worsening rash  We discussed quarantine guidelines  She is to continue with supportive care  Advised her to let me know if her condition worsens      Problem List Items Addressed This Visit    None     Visit Diagnoses     COVID-19    -  Primary         Seen today for Virtual COVID visit  She has a medical history of protein S deficiency, factor 5 Leiden mutation with chronic DVT on Eliquis, SBO in May of this 2021 status post surgical intervention, chronic pain maintained on methadone, status post gastric bypass procedure with iron deficiency anemia    Symptom Onset:  8/22  Positive Test Date:  8/27  Most recent eGFR:  106 June 2022    Symptoms of headache, fatigue, sinus congestion, sore throat, myalgias  She has been using Advil, Tylenol sinus  Started Paxlovid over the weekend  Just took her 1st doses yesterday        COVID-19 Plan    Encounter provider: Ashanti Malloy DO     Provider located at: [address] [place] [id number]     Recent Visits  No visits were found meeting these conditions  Showing recent visits within past 7 days and meeting all other requirements  Today's Visits  Date Type Provider Dept   08/29/22 Telemedicine Solis Medina DO [facility] [place]   Showing today's visits and meeting all other requirements  Future Appointments  No visits were found meeting these conditions    Showing future appointments within next 150 days and meeting all other requirements     COVID-19 HPI  Lab Results   Component Value Date    SARSCOV2 Negative 11/22/2021    Ashlee Mac Not Detected 01/11/2021    [facility] Not Detected 06/05/2022     Past Medical History: Diagnosis Date    Anemia     Anxiety     Depression     Facial cellulitis     Factor 5 Leiden mutation, heterozygous ([address] )     Fracture, cuboid     LAST ASSESSED: 3/26/15    Gastrojejunal ulcer     ANASTOMOTIC    History of small bowel obstruction     Insomnia     Iron deficiency anemia     LAST ASSESSED: AND RESOLVED: 4/13/16     Past Surgical History:   Procedure Laterality Date    ABDOMINOPLASTY      x2    CHOLECYSTECTOMY      GASTRIC BYPASS      FOR MORBID OBESITY    INCISIONAL HERNIA REPAIR      ND OPEN REPAIR WRIST DISLOCATION Left 1/6/2022    Procedure: OPEN REDUCTION W/ INTERNAL FIXATION (ORIF) LEFT DISTAL RADIUS FRACTURE;  Surgeon: Jennie Mancilla MD;  [facility];  Service: Orthopedics    SMALL INTESTINE SURGERY       Current Outpatient Medications   Medication Sig Dispense Refill    apixaban (ELIQUIS) 5 mg Take 1 tablet (5 mg total) by mouth 2 (two) times a day 180 tablet 0    ALPRAZolam (XANAX) 1 mg tablet take 1 tablet by mouth at bedtime if needed for anxiety 30 tablet 0    cholecalciferol (VITAMIN D3) 1,000 units tablet Take 2 tablets (2,000 Units total) by mouth daily 30 tablet 0    Cyanocobalamin (B-12) 1000 MCG SUBL Place under the tongue      fluticasone (FLONASE) 50 mcg/act nasal spray 1 spray into each nostril daily (Patient not taking: Reported on 6/1/2022) 1 Bottle 1    methadone (DOLOPHINE) 10 mg tablet Take 3 tablets by mouth daily, 90 tablet 0    naloxone (NARCAN) 4 mg/0 1 mL nasal spray Administer 1 spray into a nostril  If no response after 2-3 minutes, give another dose in the other nostril using a new spray   1 each 1    nirmatrelvir & ritonavir (Paxlovid, 300/100,) tablet therapy pack Take 3 tablets by mouth 2 (two) times a day for 5 days 30 tablet 0    omeprazole (PriLOSEC) 20 mg delayed release capsule Take 1 capsule (20 mg total) by mouth daily 90 capsule 0    Pediatric Multiple Vit-C-FA (FRUITY CHEWABLES MULTIVITAMIN) CHEW Chew 1 tablet daily  senna (SENOKOT) 8 6 MG tablet Take 1 tablet by mouth as needed        sertraline (ZOLOFT) 100 mg tablet take 1 tablet by mouth twice a day 180 tablet 0    sucralfate (Carafate) 1 g/10 mL suspension Take 10 mL (1 g total) by mouth 4 (four) times a day as needed (dyspepsia) 420 mL 2    topiramate (TOPAMAX) 100 mg tablet Take 1 tablet (100 mg total) by mouth 2 (two) times a day 180 tablet 1    venlafaxine (EFFEXOR-XR) 75 mg 24 hr capsule take 1 capsule by mouth once daily with breakfast 90 capsule 1    zolpidem (AMBIEN) 10 mg tablet Take 1 tablet (10 mg total) by mouth daily at bedtime 30 tablet 0     No current facility-administered medications for this visit  Allergies   Allergen Reactions    Amoxicillin Hives and Shortness Of Breath    Aspirin Hives and Other (See Comments)     Short of breath    Butorphanol Anaphylaxis and Other (See Comments)     Other reaction(s): BUTORPHANOL TARTRATE (STADOL) (loss of breath)    Morphine Other (See Comments) and Hallucinations     takes vicodin at home    Duloxetine Other (See Comments)     Rapid heartbeat    Azithromycin Rash    Gabapentin Palpitations    Nitrofurantoin Hives and Rash    Sulfa Antibiotics Hives, Rash and Other (See Comments)       Review of Systems  Objective: There were no vitals filed for this visit      Physical Exam

## 2022-08-29 ENCOUNTER — NURSE TRIAGE (OUTPATIENT)
Dept: OTHER | Facility: OTHER | Age: 52
End: 2022-08-29

## 2022-08-29 ENCOUNTER — HOSPITAL ENCOUNTER (EMERGENCY)
Facility: HOSPITAL | Age: 52
Discharge: HOME | End: 2022-08-30
Attending: EMERGENCY MEDICINE
Payer: MEDICARE

## 2022-08-29 ENCOUNTER — TELEMEDICINE (OUTPATIENT)
Dept: INTERNAL MEDICINE CLINIC | Facility: CLINIC | Age: 52
End: 2022-08-29
Payer: MEDICARE

## 2022-08-29 DIAGNOSIS — R21 RASH: ICD-10-CM

## 2022-08-29 DIAGNOSIS — I82.409 DVT (DEEP VENOUS THROMBOSIS) (HCC): Primary | ICD-10-CM

## 2022-08-29 DIAGNOSIS — R23.3 PETECHIAE OR ECCHYMOSES: ICD-10-CM

## 2022-08-29 DIAGNOSIS — U07.1 COVID-19: Primary | ICD-10-CM

## 2022-08-29 PROCEDURE — 99283 EMERGENCY DEPT VISIT LOW MDM: CPT

## 2022-08-29 PROCEDURE — 99214 OFFICE O/P EST MOD 30 MIN: CPT | Performed by: INTERNAL MEDICINE

## 2022-08-29 PROCEDURE — 99284 EMERGENCY DEPT VISIT MOD MDM: CPT | Performed by: EMERGENCY MEDICINE

## 2022-08-29 NOTE — TELEPHONE ENCOUNTER
Patient calling in stating that she had a video visit this morning and after taking Paxlovid she has 2 small spots on her face  She states those spots have remained the same but she is calling in now stating that she developed a red pin point rash on her thigh which is warm to the touch and localized to one area  Patient states she does have history of blood clots and states this rash looks very similar to when she had her blood clot in the past but it is not as painful yet  Informed her that she needs to be seen at the ED to rule out a blood clot  Patient verbalized understanding and states she will go to the ED tonight

## 2022-08-29 NOTE — TELEPHONE ENCOUNTER
Reason for Disposition   [1] Localized purple or blood-colored spots or dots AND [2] not from injury or friction AND [3] no fever    Answer Assessment - Initial Assessment Questions  1  APPEARANCE of RASH: "Describe the rash "       Small bright red pin prick     2  LOCATION: "Where is the rash located?"       Font of left thigh     3  NUMBER: "How many spots are there?"       Small pin pricks blended together     4  SIZE: "How big are the spots?" (Inches, centimeters or compare to size of a coin)       5in long and 3in wide     5  ONSET: "When did the rash start?"       This afternoon     6  ITCHING: "Does the rash itch?" If Yes, ask: "How bad is the itch?"  (Scale 1-10; or mild, moderate, severe)      No     7  PAIN: "Does the rash hurt?" If Yes, ask: "How bad is the pain?"  (Scale 1-10; or mild, moderate, severe)      Denies pain     8   OTHER SYMPTOMS: "Do you have any other symptoms?" (e g , fever)      Denies    Protocols used: RASH OR REDNESS - LOCALIZED-ADULT-

## 2022-08-30 VITALS
SYSTOLIC BLOOD PRESSURE: 128 MMHG | RESPIRATION RATE: 18 BRPM | OXYGEN SATURATION: 96 % | DIASTOLIC BLOOD PRESSURE: 58 MMHG | TEMPERATURE: 99 F | HEART RATE: 67 BPM

## 2022-08-30 DIAGNOSIS — F41.9 ANXIETY: ICD-10-CM

## 2022-08-30 LAB
ALBUMIN SERPL BCP-MCNC: 3.8 G/DL (ref 3.5–5)
ALP SERPL-CCNC: 98 U/L (ref 34–104)
ALT SERPL W P-5'-P-CCNC: 33 U/L (ref 7–52)
ANION GAP SERPL CALCULATED.3IONS-SCNC: 6 MMOL/L (ref 4–13)
AST SERPL W P-5'-P-CCNC: 48 U/L (ref 13–39)
BASOPHILS # BLD AUTO: 0.01 THOUSANDS/ΜL (ref 0–0.1)
BASOPHILS NFR BLD AUTO: 0 % (ref 0–1)
BILIRUB SERPL-MCNC: 0.21 MG/DL (ref 0.2–1)
BUN SERPL-MCNC: 10 MG/DL (ref 5–25)
CALCIUM SERPL-MCNC: 8.1 MG/DL (ref 8.4–10.2)
CHLORIDE SERPL-SCNC: 106 MMOL/L (ref 96–108)
CO2 SERPL-SCNC: 25 MMOL/L (ref 21–32)
CREAT SERPL-MCNC: 0.62 MG/DL (ref 0.6–1.3)
D DIMER PPP FEU-MCNC: <0.27 UG/ML FEU
EOSINOPHIL # BLD AUTO: 0.05 THOUSAND/ΜL (ref 0–0.61)
EOSINOPHIL NFR BLD AUTO: 1 % (ref 0–6)
ERYTHROCYTE [DISTWIDTH] IN BLOOD BY AUTOMATED COUNT: 15 % (ref 11.6–15.1)
GFR SERPL CREATININE-BSD FRML MDRD: 102 ML/MIN/1.73SQ M
GLUCOSE SERPL-MCNC: 66 MG/DL (ref 65–140)
HCT VFR BLD AUTO: 41.6 % (ref 34.8–46.1)
HGB BLD-MCNC: 12.7 G/DL (ref 11.5–15.4)
IMM GRANULOCYTES # BLD AUTO: 0.01 THOUSAND/UL (ref 0–0.2)
IMM GRANULOCYTES NFR BLD AUTO: 0 % (ref 0–2)
LYMPHOCYTES # BLD AUTO: 2.25 THOUSANDS/ΜL (ref 0.6–4.47)
LYMPHOCYTES NFR BLD AUTO: 43 % (ref 14–44)
MCH RBC QN AUTO: 27.8 PG (ref 26.8–34.3)
MCHC RBC AUTO-ENTMCNC: 30.5 G/DL (ref 31.4–37.4)
MCV RBC AUTO: 91 FL (ref 82–98)
MONOCYTES # BLD AUTO: 0.53 THOUSAND/ΜL (ref 0.17–1.22)
MONOCYTES NFR BLD AUTO: 10 % (ref 4–12)
NEUTROPHILS # BLD AUTO: 2.43 THOUSANDS/ΜL (ref 1.85–7.62)
NEUTS SEG NFR BLD AUTO: 46 % (ref 43–75)
NRBC BLD AUTO-RTO: 0 /100 WBCS
PLATELET # BLD AUTO: 175 THOUSANDS/UL (ref 149–390)
PMV BLD AUTO: 10.2 FL (ref 8.9–12.7)
POTASSIUM SERPL-SCNC: 4.4 MMOL/L (ref 3.5–5.3)
PROT SERPL-MCNC: 6.9 G/DL (ref 6.4–8.4)
RBC # BLD AUTO: 4.57 MILLION/UL (ref 3.81–5.12)
SODIUM SERPL-SCNC: 137 MMOL/L (ref 135–147)
WBC # BLD AUTO: 5.28 THOUSAND/UL (ref 4.31–10.16)

## 2022-08-30 PROCEDURE — 80053 COMPREHEN METABOLIC PANEL: CPT

## 2022-08-30 PROCEDURE — 36415 COLL VENOUS BLD VENIPUNCTURE: CPT

## 2022-08-30 PROCEDURE — 85025 COMPLETE CBC W/AUTO DIFF WBC: CPT

## 2022-08-30 PROCEDURE — 85379 FIBRIN DEGRADATION QUANT: CPT

## 2022-08-30 RX ORDER — ALPRAZOLAM 1 MG/1
TABLET ORAL
Qty: 30 TABLET | Refills: 0 | Status: SHIPPED | OUTPATIENT
Start: 2022-08-30 | End: 2022-09-27

## 2022-08-30 NOTE — ED NOTES
Patient attempting to leave AMA at this time, provider notified  Provider now at bedside to speak with patient        Dominick Palomares RN  08/29/22 9700

## 2022-08-30 NOTE — ED PROVIDER NOTES
History  Chief Complaint   Patient presents with    Rash     Pt to ED with c/o rash on left leg, pt states she believes it may be allergic reaction to paxlovid treating for COVID     45year old female with a PMH of Factor V Leiden on Eliquis presented to the ED with a primary complaint of new onset rash on the left leg  She states she first noticed this rash today around 3 to 4 PM  She recently tested positive for COVID-19 and was started on Paxlovid yesterday  The rash is reported as warm to the touch and a 6/10 pain  No calf pain or tenderness  She believes she has increased varicose veins on both legs with new onset petechiae  She previously had a DVT of the right leg in   She denies SOB but endorsed some mild pressure-like chest pain that occurs only with deep breaths  Prior to Admission Medications   Prescriptions Last Dose Informant Patient Reported? Taking? ALPRAZolam (XANAX) 1 mg tablet   No No   Sig: take 1 tablet by mouth at bedtime if needed for anxiety   Cyanocobalamin (B-12) 1000 MCG SUBL   Yes No   Sig: Place under the tongue   Pediatric Multiple Vit-C-FA (FRUITY CHEWABLES MULTIVITAMIN) CHEW  Self Yes No   Sig: Chew 1 tablet daily    apixaban (ELIQUIS) 5 mg   No No   Sig: Take 1 tablet (5 mg total) by mouth 2 (two) times a day   cholecalciferol (VITAMIN D3) 1,000 units tablet   No No   Sig: Take 2 tablets (2,000 Units total) by mouth daily   fluticasone (FLONASE) 50 mcg/act nasal spray  Self No No   Si spray into each nostril daily   Patient not taking: Reported on 2022   methadone (DOLOPHINE) 10 mg tablet   No No   Sig: Take 3 tablets by mouth daily,   naloxone (NARCAN) 4 mg/0 1 mL nasal spray   No No   Sig: Administer 1 spray into a nostril  If no response after 2-3 minutes, give another dose in the other nostril using a new spray     nirmatrelvir & ritonavir (Paxlovid, 300/100,) tablet therapy pack   No No   Sig: Take 3 tablets by mouth 2 (two) times a day for 5 days omeprazole (PriLOSEC) 20 mg delayed release capsule   No No   Sig: Take 1 capsule (20 mg total) by mouth daily   senna (SENOKOT) 8 6 MG tablet   Yes No   Sig: Take 1 tablet by mouth as needed     sertraline (ZOLOFT) 100 mg tablet   No No   Sig: take 1 tablet by mouth twice a day   sucralfate (Carafate) 1 g/10 mL suspension  Self No No   Sig: Take 10 mL (1 g total) by mouth 4 (four) times a day as needed (dyspepsia)   topiramate (TOPAMAX) 100 mg tablet   No No   Sig: Take 1 tablet (100 mg total) by mouth 2 (two) times a day   venlafaxine (EFFEXOR-XR) 75 mg 24 hr capsule   No No   Sig: take 1 capsule by mouth once daily with breakfast   zolpidem (AMBIEN) 10 mg tablet   No No   Sig: Take 1 tablet (10 mg total) by mouth daily at bedtime      Facility-Administered Medications: None       Past Medical History:   Diagnosis Date    Anemia     Anxiety     Depression     Facial cellulitis     [facility]     Fracture, cuboid     LAST ASSESSED: 3/26/15    Gastrojejunal ulcer     ANASTOMOTIC    History of small bowel obstruction     Insomnia     Iron deficiency anemia     LAST ASSESSED: AND RESOLVED: 4/13/16       Past Surgical History:   Procedure Laterality Date    ABDOMINOPLASTY      x2    CHOLECYSTECTOMY      GASTRIC BYPASS      FOR MORBID OBESITY    INCISIONAL HERNIA REPAIR      IA OPEN REPAIR WRIST DISLOCATION Left 1/6/2022    Procedure: OPEN REDUCTION W/ INTERNAL FIXATION (ORIF) LEFT DISTAL RADIUS FRACTURE;  Surgeon: Donavon Cheng MD;  Location: [facility];  Service: Orthopedics    SMALL INTESTINE SURGERY         Family History   Problem Relation Age of Onset    Diabetes Family         MELLITUS    Cancer Family     Hypertension Family     Osteoporosis Family     Asthma Family     Stroke Family         SYNDROME    Breast cancer Mother     COPD Father     Asthma Child      I have reviewed and agree with the history as documented      E-Cigarette/Vaping    E-Cigarette Use Never User      E-Cigarette/Vaping Substances    Nicotine No     THC No     CBD No     Flavoring No     Other No     Unknown No      Social History     Tobacco Use    Smoking status: Never Smoker    Smokeless tobacco: Never Used   Vaping Use    Vaping Use: Never used   Substance Use Topics    Alcohol use: Not Currently    Drug use: Never     Comment: usues methadone for pain relief        Review of Systems   Constitutional: Positive for chills and fever  Respiratory: Negative for cough and shortness of breath  Cardiovascular: Negative for chest pain and palpitations  Gastrointestinal: Negative for abdominal pain, constipation, diarrhea, nausea and vomiting  Skin: Positive for rash  Neurological: Positive for headaches  Negative for dizziness and light-headedness  Physical Exam  ED Triage Vitals   Temperature Pulse Respirations Blood Pressure SpO2   08/29/22 2036 08/29/22 2036 08/29/22 2036 08/29/22 2036 08/29/22 2036   99 °F (37 2 °C) 73 18 128/63 97 %      Temp src Heart Rate Source Patient Position - Orthostatic VS BP Location FiO2 (%)   -- 08/30/22 0125 08/30/22 0125 08/30/22 0125 --    Monitor Sitting Left arm       Pain Score       --                    Orthostatic Vital Signs  Vitals:    08/29/22 2036 08/30/22 0125   BP: 128/63 128/58   Pulse: 73 67   Patient Position - Orthostatic VS:  Sitting       Physical Exam  Constitutional:       Appearance: Normal appearance  She is not ill-appearing  HENT:      Head: Normocephalic and atraumatic  Cardiovascular:      Rate and Rhythm: Normal rate and regular rhythm  Pulses: Normal pulses  Heart sounds: Normal heart sounds  No murmur heard  Pulmonary:      Effort: Pulmonary effort is normal  No respiratory distress  Breath sounds: Normal breath sounds  No wheezing, rhonchi or rales  Abdominal:      General: Abdomen is flat  Bowel sounds are normal  There is no distension  Palpations: Abdomen is soft  Tenderness: There is no abdominal tenderness  Comments: Midline surgical incision scar   Musculoskeletal:      Right lower leg: No tenderness  No edema  Left lower leg: Tenderness present  No edema  Comments: Area of bruising and ecchymosis noted on left proximal thigh, varicose veins of bilateral lower extremities   Skin:     General: Skin is warm and dry  Findings: Bruising and petechiae present  Comments: Petechiae on bilateral lower extremities   Neurological:      General: No focal deficit present  Mental Status: She is alert and oriented to person, place, and time  Psychiatric:         Behavior: Behavior normal          ED Medications  Medications - No data to display    Diagnostic Studies  Results Reviewed     Procedure Component Value Units Date/Time    D-dimer, quantitative [[id number]]  (Normal) Collected: 08/30/22 0038    Lab Status: Final result Specimen: Blood from Arm, Right Updated: 08/30/22 0126     D-Dimer, Quant <0 27 ug/ml FEU     Narrative: In the evaluation for possible pulmonary embolism, in the appropriate (Well's Score of 4 or less) patient, the age adjusted d-dimer cutoff for this patient can be calculated as:    Age x 0 01 (in ug/mL) for Age-adjusted D-dimer exclusion threshold for a patient over 50 years      Comprehensive metabolic panel [[id number]]  (Abnormal) Collected: 08/30/22 0038    Lab Status: Final result Specimen: Blood from Arm, Right Updated: 08/30/22 0114     Sodium 137 mmol/L      Potassium 4 4 mmol/L      Chloride 106 mmol/L      CO2 25 mmol/L      ANION GAP 6 mmol/L      BUN 10 mg/dL      Creatinine 0 62 mg/dL      Glucose 66 mg/dL      Calcium 8 1 mg/dL      AST 48 U/L      ALT 33 U/L      Alkaline Phosphatase 98 U/L      Total Protein 6 9 g/dL      Albumin 3 8 g/dL      Total Bilirubin 0 21 mg/dL      eGFR 102 ml/min/1 73sq m     Narrative:      [place] guidelines for Chronic Kidney Disease (CKD):    Stage 1 with normal or high GFR (GFR > 90 mL/min/1 73 square meters)    Stage 2 Mild CKD (GFR = 60-89 mL/min/1 73 square meters)    Stage 3A Moderate CKD (GFR = 45-59 mL/min/1 73 square meters)    Stage 3B Moderate CKD (GFR = 30-44 mL/min/1 73 square meters)    Stage 4 Severe CKD (GFR = 15-29 mL/min/1 73 square meters)    Stage 5 End Stage CKD (GFR <15 mL/min/1 73 square meters)  Note: GFR calculation is accurate only with a steady state creatinine    CBC and differential [[id number]]  (Abnormal) Collected: 08/30/22 0038    Lab Status: Final result Specimen: Blood from Arm, Right Updated: 08/30/22 0054     WBC 5 28 Thousand/uL      RBC 4 57 Million/uL      Hemoglobin 12 7 g/dL      Hematocrit 41 6 %      MCV 91 fL      MCH 27 8 pg      MCHC 30 5 g/dL      RDW 15 0 %      MPV 10 2 fL      Platelets 973 Thousands/uL      nRBC 0 /100 WBCs      Neutrophils Relative 46 %      Immat GRANS % 0 %      Lymphocytes Relative 43 %      Monocytes Relative 10 %      Eosinophils Relative 1 %      Basophils Relative 0 %      Neutrophils Absolute 2 43 Thousands/µL      Immature Grans Absolute 0 01 Thousand/uL      Lymphocytes Absolute 2 25 Thousands/µL      Monocytes Absolute 0 53 Thousand/µL      Eosinophils Absolute 0 05 Thousand/µL      Basophils Absolute 0 01 Thousands/µL                  VAS lower limb venous duplex study, unilateral/limited    (Results Pending)         Procedures  Procedures      ED Course         Wells' Criteria for DVT    Flowsheet Row Most Recent Value   Wells' Criteria for DVT    Active cancer Treatment or palliation within 6 months 0 Filed at: 08/29/2022 2300   Bedridden recently >3 days or major surgery within 12 weeks 1 Filed at: 08/29/2022 2300   Calf swelling >3 cm compared to the other leg 0 Filed at: 08/29/2022 2300   Entire leg swollen 0 Filed at: 08/29/2022 2300   Collateral (nonvaricose) superficial veins present 1 Filed at: 08/29/2022 2300   Localized tenderness along the deep venous system 1 Filed at: 08/29/2022 2300   Pitting edema, confined to symptomatic leg 0 Filed at: 08/29/2022 2300   Paralysis, paresis, or recent plaster immobilization of the lower extremity 0 Filed at: 08/29/2022 2300   Previously documented DVT 1 Filed at: 08/29/2022 2300   Alternative diagnosis to DVT as likely or more likely --   Wells DVT Critera Score 4 Filed at: 08/29/2022 2300          MDM  Number of Diagnoses or Management Options  DVT (deep venous thrombosis) ([facility] ): new and requires workup  Petechiae or ecchymoses: new and requires workup  Rash: new and requires workup  Diagnosis management comments: DDx including but not limited to: coagulopathy (DVT), varicose vein rupture, abrasion, laceration, anemia, immune thrombocytopenic purpura due to infectious etiology, allergic reaction; doubt traumatic injury, doubt necrotizing fasciitis      D-dimer was within normal limits, patient has history of recent COVID-19 infection, Factor V Leiden on chronic Eliquis, and prior DVT of right leg in 2020, due to report of sudden onset petechiae on the bilateral lower extremities ITP due to COVID-19 is a consideration, platelet count returned within normal limits    Vascular Doppler US of left lower extremity unable to be obtained during this encounter, instructed to obtain ultrasound in the AM         Amount and/or Complexity of Data Reviewed  Clinical lab tests: ordered    Risk of Complications, Morbidity, and/or Mortality  Presenting problems: moderate  Diagnostic procedures: moderate  Management options: moderate    Patient Progress  Patient progress: stable      Disposition  Final diagnoses:   DVT (deep venous thrombosis) ([facility] )   Petechiae or ecchymoses   Rash     Time reflects when diagnosis was documented in both MDM as applicable and the Disposition within this note     Time User Action Codes Description Comment    8/29/2022 11:52 PM Devon CONLEY Add [I82 409] DVT (deep venous thrombosis) ([facility] )     8/30/2022 12:02 AM Carmelina Londono Carlos CONLEY Add [R23 3] Petechiae or ecchymoses     8/30/2022 12:02 AM Paras CONLEY Add [R21] Rash       ED Disposition     ED Disposition   Discharge    Condition   Stable    Date/Time   Tue Aug 30, 2022  1:31 AM    Comment   Ely Cantrell discharge to home/self care  Follow-up Information     Follow up With Specialties Details Why Contact Info    Solis Roman DO Internal Medicine Call  If symptoms worsen [address] [facility]  [place] [address]  [phone]            Patient's Medications   Discharge Prescriptions    No medications on file     Outpatient Discharge Orders   VAS lower limb venous duplex study, unilateral/limited   Standing Status: Future Standing Exp  Date: 08/30/26       PDMP Review       Value Time User    PDMP Reviewed  Yes 8/18/2022 11:18 AM Solis Roman DO           ED Provider  Attending physically available and evaluated Treasure Dong  VINICIO managed the patient along with the ED Attending      Electronically Signed by         Leslie Littlejohn DO  08/30/22 [id number]

## 2022-08-30 NOTE — TELEPHONE ENCOUNTER
Regarding: suspected medication reaction/rash  ----- Message from [facility] ALEISHA KASPER sent at 8/29/2022 10:10 PM EDT -----  "Im calling because I left the emergency room, I did not even see a nurse yet  I wanted to speak with the triage nurse I spoke to earlier with your service and see if she recommends I go somewhere else? The ER told me there is only one doctor on and there are 46 rooms in the ER, they are all full and I wasn't seen  I dont think I have a blood clot, I think I had a reaction to the medication   I just want to speak with a nurse for further advice  "

## 2022-08-30 NOTE — ED ATTENDING ATTESTATION
8/29/2022  IJuan MD, saw and evaluated the patient  I have discussed the patient with the resident/non-physician practitioner and agree with the resident's/non-physician practitioner's findings, Plan of Care, and MDM as documented in the resident's/non-physician practitioner's note, except where noted  All available labs and Radiology studies were reviewed  I was present for key portions of any procedure(s) performed by the resident/non-physician practitioner and I was immediately available to provide assistance  At this point I agree with the current assessment done in the Emergency Department  I have conducted an independent evaluation of this patient a history and physical is as follows:    ED Course         Critical Care Time  Procedures    Patient is a pleasant 45 yof who presents with a rash  No f/c/s  No vomiting or diarrhea  No focal neuro deficits  This appears to be petechial with some bruising  No f/c/s  No shortness of breath or pleuritic pain  Patient presented to have US performed, however, no US at this time  Of note, labs performed given the petechia a to rule out platelet abnormality  D-dimer negative  Medical decision making:  Bruising of lower extremities, D-dimer negative, however, will still encourage patient to get outpatient ultrasound tomorrow to fully rule out DVT although doubt this

## 2022-08-30 NOTE — TELEPHONE ENCOUNTER
Reason for Disposition  Juan Denis Caller has already spoken with another triager or PCP AND has further questions AND triager able to answer questions      Protocols used: NO CONTACT OR DUPLICATE CONTACT CALL-ADULT-

## 2022-08-31 RX ORDER — SERTRALINE HYDROCHLORIDE 100 MG/1
TABLET, FILM COATED ORAL
Qty: 180 TABLET | Refills: 0 | Status: SHIPPED | OUTPATIENT
Start: 2022-08-31

## 2022-09-12 DIAGNOSIS — F51.01 PRIMARY INSOMNIA: ICD-10-CM

## 2022-09-12 RX ORDER — ZOLPIDEM TARTRATE 10 MG/1
10 TABLET ORAL
Qty: 30 TABLET | Refills: 0 | Status: SHIPPED | OUTPATIENT
Start: 2022-09-12 | End: 2022-10-11 | Stop reason: SDUPTHER

## 2022-09-13 NOTE — ASSESSMENT & PLAN NOTE
-history of gastric bypass approximately 14 years ago    She has been able to maintain her weight loss with some mild fluctuations in weight  -continue with vitamin-D, B12 supplementation  -she has gotten periodic iron infusions in the past  -recheck bariatric labs prior to next visit

## 2022-09-13 NOTE — ASSESSMENT & PLAN NOTE
-history of factor 5 Leiden mutation, protein S deficiency, states she had 2 prior clotting episodes and with the 2nd episode the clots went to her lungs  -remains on indefinite anticoagulation with Eliquis

## 2022-09-13 NOTE — ASSESSMENT & PLAN NOTE
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

## 2022-09-13 NOTE — PROGRESS NOTES
INTERNAL MEDICINE OFFICE VISIT  [facility] Internal Medicine- [place]    NAME: Ely Cantrell  AGE: 45 y o  SEX: female    DATE OF ENCOUNTER: 9/14/2022    Assessment and Plan     1  Factor 5 Leiden mutation, heterozygous [facility])  Assessment & Plan:  -history of factor 5 Leiden mutation, protein S deficiency, states she had 2 prior clotting episodes and with the 2nd episode the clots went to her lungs  -remains on indefinite anticoagulation with Eliquis      2  Migraine without status migrainosus, not intractable, unspecified migraine type  Assessment & Plan:  -Maintained on Topamax      3  Postgastrectomy malabsorption  Assessment & Plan:  -history of gastric bypass approximately 14 years ago  She has been able to maintain her weight loss with some mild fluctuations in weight  -continue with vitamin-D, B12 supplementation  -she has gotten periodic iron infusions in the past  -recheck bariatric labs prior to next visit    Orders:  -     CBC; Future  -     Comprehensive metabolic panel; Future  -     Folate; Future  -     Iron Panel (Includes Ferritin, Iron Sat%, Iron, and TIBC); Future  -     PTH, intact; Future  -     Vitamin A; Future  -     Vitamin B1, whole blood; Future  -     Vitamin B12; Future  -     Vitamin D 25 hydroxy; Future  -     Zinc; Future    4  GERD without esophagitis  Assessment & Plan:  -continue with omeprazole      5  Anxiety  Assessment & Plan:  -appears to be relatively stable  She is on sertraline, Effexor  -takes alprazolam, ambien at bedtime for anxiety and insomnia      6   Pain syndrome, chronic  Assessment & Plan:  -she reportedly lost 200 lb after abdominal plasty, this was complicated by MRSA infection of the right hip   she reports she sustained nerve damage from this procedure and has had chronic pain issues ever since  -previously saw a neurosurgeon and had an exploratory procedure in [place] - the damage was apparently so severe that nothing could be done surgically  -maintained on methadone 30 mg daily  -She had a 2nd abdominal plasty with Dr Levi Doty with satisfactory aesthetic outcome      7  Sinus congestion  -he was diagnosed with COVID in mid to late August   She has had persistent bothersome sinus pressure, cough productive of yellow-green mucus  She has tried various over-the-counter remedies - Mucinex DM, DayQuil/NyQuil, Zyrtec/Claritin, Tylenol cold and flu  Flonase made her feel jittery  -we are going to try a short course of antibiotic  She is not a good candidate for concurrent oral steroids given her gastric bypass history  -I also suggested she try sinus rinse products such as Neilmed or Neti pot  -could also consider short course of nasal decongestants such as Afrin if this is ineffective    -     levofloxacin (LEVAQUIN) 500 mg tablet; Take 1 tablet (500 mg total) by mouth every 24 hours for 7 days            Orders Placed This Encounter   Procedures    CBC    Comprehensive metabolic panel    Folate    PTH, intact    Vitamin A    Vitamin B1, whole blood    Vitamin B12    Vitamin D 25 hydroxy    Zinc       History of Present Illness     Here today for four-month follow-up   She has a medical history of protein S deficiency, factor 5 Leiden mutation with chronic DVT on Eliquis, SBO in May of this 2021 status post surgical intervention, chronic pain maintained on methadone, status post gastric bypass procedure with iron deficiency anemia    The following portions of the patient's history were reviewed and updated as appropriate: allergies, current medications, past family history, past medical history, past social history, past surgical history and problem list     Chief Complaint     Chief Complaint   Patient presents with    Follow-up     4 month        Review of Systems     10 point ROS negative except per HPI    Active Problem List     Patient Active Problem List   Diagnosis    Allergic rhinitis    Anxiety    Depression    GERD without esophagitis    Insomnia    Migraine, unspecified, not intractable, without status migrainosus    Pain syndrome, chronic    Postgastrectomy malabsorption    Vitamin D deficiency    Ventral hernia without obstruction or gangrene    History of bariatric surgery    Iron deficiency anemia    ASCUS with positive high risk HPV cervical    Urinary tract infection with hematuria    Symptomatic varicose veins of both lower extremities    Chronic deep vein thrombosis (DVT) of right femoral vein (HCC)    Neutrophilic leukocytosis    Left shoulder pain    Adhesive capsulitis of left shoulder    Factor 5 Leiden mutation, heterozygous (HCC)    Protein S deficiency ([address] )    SBO (small bowel obstruction) (HCC)    Breast mass, right    Closed fracture of left distal radius and ulna    Iron (Fe) deficiency anemia       Objective     /86 (BP Location: Left arm, Patient Position: Sitting, Cuff Size: Standard)   Pulse 80   Temp 97 8 °F (36 6 °C)   Resp 18   Ht 5' 5" (1 651 m)   Wt 69 3 kg (152 lb 12 8 oz)   LMP 08/12/2020   SpO2 99%   BMI 25 43 kg/m²     Physical Exam  Constitutional:       Appearance: Normal appearance  She is not ill-appearing  HENT:      Head: Normocephalic and atraumatic  Eyes:      General: No scleral icterus  Right eye: No discharge  Left eye: No discharge  Cardiovascular:      Rate and Rhythm: Normal rate and regular rhythm  Heart sounds: No murmur heard  No friction rub  Pulmonary:      Effort: Pulmonary effort is normal       Breath sounds: Normal breath sounds  No wheezing or rales  Abdominal:      General: Abdomen is flat  There is no distension  Palpations: Abdomen is soft  Tenderness: There is no abdominal tenderness  Musculoskeletal:         General: No swelling or tenderness  Skin:     General: Skin is warm and dry  Findings: No erythema  Neurological:      Mental Status: She is alert  Mental status is at baseline  Motor: No weakness  Psychiatric:         Mood and Affect: Mood normal          Behavior: Behavior normal          Pertinent Laboratory/Diagnostic Studies:  No results found  Images and diagnostics reviewed     Current Medications     Current Outpatient Medications:     ALPRAZolam (XANAX) 1 mg tablet, take 1 tablet by mouth at bedtime if needed for anxiety, Disp: 30 tablet, Rfl: 0    apixaban (ELIQUIS) 5 mg, Take 1 tablet (5 mg total) by mouth 2 (two) times a day, Disp: 180 tablet, Rfl: 0    cholecalciferol (VITAMIN D3) 1,000 units tablet, Take 2 tablets (2,000 Units total) by mouth daily, Disp: 30 tablet, Rfl: 0    Cyanocobalamin (B-12) 1000 MCG SUBL, Place under the tongue, Disp: , Rfl:     levofloxacin (LEVAQUIN) 500 mg tablet, Take 1 tablet (500 mg total) by mouth every 24 hours for 7 days, Disp: 7 tablet, Rfl: 0    methadone (DOLOPHINE) 10 mg tablet, Take 3 tablets by mouth daily,, Disp: 90 tablet, Rfl: 0    naloxone (NARCAN) 4 mg/0 1 mL nasal spray, Administer 1 spray into a nostril   If no response after 2-3 minutes, give another dose in the other nostril using a new spray , Disp: 1 each, Rfl: 1    omeprazole (PriLOSEC) 20 mg delayed release capsule, Take 1 capsule (20 mg total) by mouth daily, Disp: 90 capsule, Rfl: 0    Pediatric Multiple Vit-C-FA (FRUITY CHEWABLES MULTIVITAMIN) CHEW, Chew 1 tablet daily , Disp: , Rfl:     senna (SENOKOT) 8 6 MG tablet, Take 1 tablet by mouth as needed  , Disp: , Rfl:     sertraline (ZOLOFT) 100 mg tablet, take 1 tablet by mouth twice a day, Disp: 180 tablet, Rfl: 0    sucralfate (Carafate) 1 g/10 mL suspension, Take 10 mL (1 g total) by mouth 4 (four) times a day as needed (dyspepsia), Disp: 420 mL, Rfl: 2    topiramate (TOPAMAX) 100 mg tablet, Take 1 tablet (100 mg total) by mouth 2 (two) times a day, Disp: 180 tablet, Rfl: 1    venlafaxine (EFFEXOR-XR) 75 mg 24 hr capsule, take 1 capsule by mouth once daily with breakfast, Disp: 90 capsule, Rfl: 1   zolpidem (AMBIEN) 10 mg tablet, Take 1 tablet (10 mg total) by mouth daily at bedtime, Disp: 30 tablet, Rfl: 0    Health Maintenance     Health Maintenance   Topic Date Due    COVID-19 Vaccine (1) Never done    OT PLAN OF CARE  07/25/2018    Colorectal Cancer Screening  04/21/2020    PT PLAN OF CARE  03/24/2022    Influenza Vaccine (1) 09/01/2022    BMI: Followup Plan  10/25/2022    Annual Physical  04/20/2023    Breast Cancer Screening: Mammogram  06/06/2023    BMI: Adult  09/14/2023    Cervical Cancer Screening  03/12/2024    DTaP,Tdap,and Td Vaccines (2 - Td or Tdap) 10/22/2029    HIV Screening  Completed    Hepatitis C Screening  Completed    Osteoporosis Screening  Completed    Pneumococcal Vaccine: Pediatrics (0 to 5 Years) and At-Risk Patients (6 to 59 Years)  Aged Out    HIB Vaccine  Aged Out    Hepatitis B Vaccine  Aged Out    IPV Vaccine  Aged Out    Hepatitis A Vaccine  Aged Out    Meningococcal ACWY Vaccine  Aged Out    HPV Vaccine  Aged [employer] History   Administered Date(s) Administered    INFLUENZA 10/08/2014, 11/25/2016, 10/11/2021    Influenza Quadrivalent Preservative Free 3 years and older IM 10/08/2014    Influenza Quadrivalent, 6-35 Months IM 11/25/2016, 10/12/2017    Influenza, recombinant, quadrivalent,injectable, preservative free 01/03/2019, 10/22/2019, 12/02/2020    Influenza, seasonal, injectable 10/21/2011, 12/05/2012, 10/22/2013, 10/30/2015    Tdap 10/22/2019    Tuberculin Skin Test-PPD Intradermal 06/06/2018       RO Hartman  Internal Medicine [facility]  [address], [address]  [place], [address]   Office: [phone]  Fax: [phone]

## 2022-09-13 NOTE — ASSESSMENT & PLAN NOTE
-appears to be relatively stable    She is on sertraline, Effexor  -takes alprazolam, ambien at bedtime for anxiety and insomnia

## 2022-09-14 ENCOUNTER — OFFICE VISIT (OUTPATIENT)
Dept: INTERNAL MEDICINE CLINIC | Facility: CLINIC | Age: 52
End: 2022-09-14
Payer: MEDICARE

## 2022-09-14 VITALS
WEIGHT: 152.8 LBS | DIASTOLIC BLOOD PRESSURE: 86 MMHG | TEMPERATURE: 97.8 F | HEART RATE: 80 BPM | OXYGEN SATURATION: 99 % | RESPIRATION RATE: 18 BRPM | HEIGHT: 65 IN | SYSTOLIC BLOOD PRESSURE: 130 MMHG | BODY MASS INDEX: 25.46 KG/M2

## 2022-09-14 DIAGNOSIS — F41.9 ANXIETY: ICD-10-CM

## 2022-09-14 DIAGNOSIS — R09.81 SINUS CONGESTION: ICD-10-CM

## 2022-09-14 DIAGNOSIS — K91.2 POSTGASTRECTOMY MALABSORPTION: ICD-10-CM

## 2022-09-14 DIAGNOSIS — G89.4 PAIN SYNDROME, CHRONIC: ICD-10-CM

## 2022-09-14 DIAGNOSIS — G43.909 MIGRAINE WITHOUT STATUS MIGRAINOSUS, NOT INTRACTABLE, UNSPECIFIED MIGRAINE TYPE: ICD-10-CM

## 2022-09-14 DIAGNOSIS — D68.51 FACTOR 5 LEIDEN MUTATION, HETEROZYGOUS (HCC): Primary | ICD-10-CM

## 2022-09-14 DIAGNOSIS — Z90.3 POSTGASTRECTOMY MALABSORPTION: ICD-10-CM

## 2022-09-14 DIAGNOSIS — K21.9 GERD WITHOUT ESOPHAGITIS: ICD-10-CM

## 2022-09-14 PROCEDURE — 99214 OFFICE O/P EST MOD 30 MIN: CPT | Performed by: INTERNAL MEDICINE

## 2022-09-14 RX ORDER — LEVOFLOXACIN 500 MG/1
500 TABLET, FILM COATED ORAL EVERY 24 HOURS
Qty: 7 TABLET | Refills: 0 | Status: SHIPPED | OUTPATIENT
Start: 2022-09-14 | End: 2022-09-21

## 2022-09-14 NOTE — PATIENT INSTRUCTIONS

## 2022-09-16 DIAGNOSIS — M25.512 LEFT SHOULDER PAIN, UNSPECIFIED CHRONICITY: ICD-10-CM

## 2022-09-19 RX ORDER — METHADONE HYDROCHLORIDE 10 MG/1
30 TABLET ORAL DAILY
Qty: 90 TABLET | Refills: 0 | Status: SHIPPED | OUTPATIENT
Start: 2022-09-19 | End: 2022-10-19 | Stop reason: SDUPTHER

## 2022-09-26 DIAGNOSIS — K21.9 GERD WITHOUT ESOPHAGITIS: ICD-10-CM

## 2022-09-26 DIAGNOSIS — F41.9 ANXIETY: ICD-10-CM

## 2022-09-26 RX ORDER — OMEPRAZOLE 20 MG/1
CAPSULE, DELAYED RELEASE ORAL
Qty: 90 CAPSULE | Refills: 0 | Status: SHIPPED | OUTPATIENT
Start: 2022-09-26

## 2022-09-27 DIAGNOSIS — F41.9 ANXIETY: ICD-10-CM

## 2022-09-27 RX ORDER — ALPRAZOLAM 1 MG/1
TABLET ORAL
Qty: 30 TABLET | Refills: 0 | Status: SHIPPED | OUTPATIENT
Start: 2022-09-27 | End: 2022-10-25

## 2022-09-28 RX ORDER — ALPRAZOLAM 1 MG/1
1 TABLET ORAL
Qty: 30 TABLET | Refills: 0 | OUTPATIENT
Start: 2022-09-28

## 2022-10-11 DIAGNOSIS — F51.01 PRIMARY INSOMNIA: ICD-10-CM

## 2022-10-11 RX ORDER — ZOLPIDEM TARTRATE 10 MG/1
10 TABLET ORAL
Qty: 30 TABLET | Refills: 1 | Status: SHIPPED | OUTPATIENT
Start: 2022-10-11

## 2022-10-19 DIAGNOSIS — M25.512 LEFT SHOULDER PAIN, UNSPECIFIED CHRONICITY: ICD-10-CM

## 2022-10-19 RX ORDER — METHADONE HYDROCHLORIDE 10 MG/1
30 TABLET ORAL DAILY
Qty: 90 TABLET | Refills: 0 | Status: SHIPPED | OUTPATIENT
Start: 2022-10-19

## 2022-10-25 DIAGNOSIS — F41.9 ANXIETY: ICD-10-CM

## 2022-10-25 RX ORDER — ALPRAZOLAM 1 MG/1
TABLET ORAL
Qty: 30 TABLET | Refills: 1 | Status: SHIPPED | OUTPATIENT
Start: 2022-10-25

## 2022-10-26 DIAGNOSIS — I82.409 ACUTE DVT (DEEP VENOUS THROMBOSIS) (HCC): ICD-10-CM

## 2022-11-07 DIAGNOSIS — G43.909 MIGRAINE WITHOUT STATUS MIGRAINOSUS, NOT INTRACTABLE, UNSPECIFIED MIGRAINE TYPE: ICD-10-CM

## 2022-11-07 RX ORDER — TOPIRAMATE 100 MG/1
100 TABLET, FILM COATED ORAL 2 TIMES DAILY
Qty: 180 TABLET | Refills: 1 | Status: SHIPPED | OUTPATIENT
Start: 2022-11-07

## 2022-11-17 DIAGNOSIS — M25.512 LEFT SHOULDER PAIN, UNSPECIFIED CHRONICITY: ICD-10-CM

## 2022-11-17 RX ORDER — METHADONE HYDROCHLORIDE 10 MG/1
30 TABLET ORAL DAILY
Qty: 90 TABLET | Refills: 0 | Status: SHIPPED | OUTPATIENT
Start: 2022-11-17

## 2022-11-22 ENCOUNTER — TELEPHONE (OUTPATIENT)
Dept: INTERNAL MEDICINE CLINIC | Facility: CLINIC | Age: 52
End: 2022-11-22

## 2022-11-22 DIAGNOSIS — H02.849 SWELLING OF EYELID, UNSPECIFIED LATERALITY: Primary | ICD-10-CM

## 2022-11-22 RX ORDER — DIPHENHYDRAMINE HCL 25 MG
25 TABLET ORAL EVERY 8 HOURS PRN
Qty: 30 TABLET | Refills: 0 | Status: SHIPPED | OUTPATIENT
Start: 2022-11-22 | End: 2022-11-22

## 2022-11-22 RX ORDER — DIPHENHYDRAMINE HCL 25 MG
25 TABLET ORAL EVERY 8 HOURS PRN
Qty: 30 TABLET | Refills: 0 | Status: SHIPPED | OUTPATIENT
Start: 2022-11-22

## 2022-11-22 NOTE — TELEPHONE ENCOUNTER
Pt called she stated that her face is swollen and under her eyes are swollen too please advise     Per Dr Sandro Kelly he would like for to take benadryl every 8 hours as needed if not better please give us a call to be seen     Pt aware

## 2022-12-06 DIAGNOSIS — F32.A DEPRESSION, UNSPECIFIED DEPRESSION TYPE: ICD-10-CM

## 2022-12-06 RX ORDER — SERTRALINE HYDROCHLORIDE 100 MG/1
TABLET, FILM COATED ORAL
Qty: 180 TABLET | Refills: 0 | Status: SHIPPED | OUTPATIENT
Start: 2022-12-06

## 2022-12-08 DIAGNOSIS — F51.01 PRIMARY INSOMNIA: ICD-10-CM

## 2022-12-08 RX ORDER — ZOLPIDEM TARTRATE 10 MG/1
10 TABLET ORAL
Qty: 30 TABLET | Refills: 1 | Status: SHIPPED | OUTPATIENT
Start: 2022-12-08

## 2022-12-15 DIAGNOSIS — M25.512 LEFT SHOULDER PAIN, UNSPECIFIED CHRONICITY: ICD-10-CM

## 2022-12-15 RX ORDER — METHADONE HYDROCHLORIDE 10 MG/1
30 TABLET ORAL DAILY
Qty: 90 TABLET | Refills: 0 | Status: SHIPPED | OUTPATIENT
Start: 2022-12-15

## 2022-12-20 DIAGNOSIS — F41.9 ANXIETY: ICD-10-CM

## 2022-12-20 RX ORDER — ALPRAZOLAM 1 MG/1
TABLET ORAL
Qty: 30 TABLET | Refills: 0 | Status: SHIPPED | OUTPATIENT
Start: 2022-12-20

## 2023-01-05 DIAGNOSIS — K21.9 GERD WITHOUT ESOPHAGITIS: ICD-10-CM

## 2023-01-05 RX ORDER — OMEPRAZOLE 20 MG/1
CAPSULE, DELAYED RELEASE ORAL
Qty: 90 CAPSULE | Refills: 0 | Status: SHIPPED | OUTPATIENT
Start: 2023-01-05

## 2023-01-13 DIAGNOSIS — M25.512 LEFT SHOULDER PAIN, UNSPECIFIED CHRONICITY: ICD-10-CM

## 2023-01-16 RX ORDER — METHADONE HYDROCHLORIDE 10 MG/1
30 TABLET ORAL DAILY
Qty: 90 TABLET | Refills: 0 | Status: SHIPPED | OUTPATIENT
Start: 2023-01-16

## 2023-01-17 ENCOUNTER — OFFICE VISIT (OUTPATIENT)
Dept: INTERNAL MEDICINE CLINIC | Facility: CLINIC | Age: 53
End: 2023-01-17

## 2023-01-17 VITALS
OXYGEN SATURATION: 98 % | SYSTOLIC BLOOD PRESSURE: 122 MMHG | BODY MASS INDEX: 25.66 KG/M2 | HEART RATE: 92 BPM | HEIGHT: 65 IN | DIASTOLIC BLOOD PRESSURE: 78 MMHG | TEMPERATURE: 97.5 F | RESPIRATION RATE: 18 BRPM | WEIGHT: 154 LBS

## 2023-01-17 DIAGNOSIS — D68.51 FACTOR 5 LEIDEN MUTATION, HETEROZYGOUS (HCC): ICD-10-CM

## 2023-01-17 DIAGNOSIS — K91.2 POSTGASTRECTOMY MALABSORPTION: Primary | ICD-10-CM

## 2023-01-17 DIAGNOSIS — Z90.3 POSTGASTRECTOMY MALABSORPTION: Primary | ICD-10-CM

## 2023-01-17 DIAGNOSIS — F41.9 ANXIETY: ICD-10-CM

## 2023-01-17 DIAGNOSIS — G89.4 PAIN SYNDROME, CHRONIC: ICD-10-CM

## 2023-01-17 DIAGNOSIS — N64.4 BREAST PAIN, LEFT: ICD-10-CM

## 2023-01-17 DIAGNOSIS — F51.01 PRIMARY INSOMNIA: ICD-10-CM

## 2023-01-17 RX ORDER — ALPRAZOLAM 1 MG/1
1 TABLET ORAL
Qty: 30 TABLET | Refills: 0 | Status: SHIPPED | OUTPATIENT
Start: 2023-01-17 | End: 2023-01-25

## 2023-01-17 NOTE — ASSESSMENT & PLAN NOTE
-history of gastric bypass approximately 14 years ago  She has been able to maintain her weight loss with some mild fluctuations in weight  -continue with vitamin-D, B12 supplementation  -she has gotten periodic iron infusions in the past  -Due for repeat labs    Requested at prior visit

## 2023-01-17 NOTE — PROGRESS NOTES
Assessment/Plan     Problem List Items Addressed This Visit        Digestive    Postgastrectomy malabsorption - Primary     -history of gastric bypass approximately 14 years ago  She has been able to maintain her weight loss with some mild fluctuations in weight  -continue with vitamin-D, B12 supplementation  -she has gotten periodic iron infusions in the past  -Due for repeat labs  Requested at prior visit            Hematopoietic and Hemostatic    Factor 5 Leiden mutation, heterozygous ([address] )     -history of factor 5 Leiden mutation, protein S deficiency, states she had 2 prior clotting episodes and with the 2nd episode the clots went to her lungs  -remains on indefinite anticoagulation with Eliquis            Other    Anxiety     - Symptoms stable  -On Sertraline, Effexor  -takes alprazolam, ambien at bedtime for anxiety and insomnia  See plan for insomnia         Relevant Medications    ALPRAZolam (XANAX) 1 mg tablet    Primary insomnia     Patient reports being on Ambien for 12 years  She states she knows this is "not a good thing"  She has had issues with sleep walking in the past, has made phone calls to friends with no recollection of the calls  More recently, she has had sleepwalking episodes where she has turned the stove on, left the refrigerator open, placed water in the microwave which have been distressing for her  She believes this issue is related to Ambien  She has been weaning dose on her own and been taking Ambien 5 mg for the past 3 weeks or so  She has also been taking Xanax 1 mg at bedtime for insomnia and has been doing so for some time    We will try to taper off your Ambien  I will send a new prescription for 5 mg tablets to your pharmacy  At this time, begin taking Ambien 2 5 mg daily for 1 to 2 weeks  If you are able to tolerate this, transition to taking Ambien 2 5 mg every other day for 1 to 2 weeks  If you are able to tolerate this, you may then discontinue Ambien    At this time, we can consider transition from Xanax to another sleeping medication such as temazepam (Restoril)  -Per patient instructions above, will try to taper off Ambien and if successful will try to discontinue Xanax and transition to Restoril  If her sleep disturbances persist, would recommend evaluation with sleep specialist             Pain syndrome, chronic     -Chronic nerve pain issues related to MRSA infection of the right hip/abdominoplasty  -maintained on methadone 30 mg daily  Continues to tolerate without issue           Relevant Orders    Urine Drug Screen    Methylphenidate    Fentanyl with Confirmation    Buprenorphine w/ Naloxone    Naltrexone    Gabapentin    Pregabalin    Synthetic Stimulants    [facility] All Prescribed Medications    Breast pain, left     2 to 3-week history of intermittent "breast pain"  Tender to the touch  No obvious discrete mass on exam   Will evaluate further with diagnostic mammogram and breast ultrasound         Relevant Orders    Mammo diagnostic left w cad    US breast left limited (diagnostic)          Opiate risks  There are risks associated with opioid medications, including dependence, addiction and tolerance  The patient understands and agrees to use these medications only as prescribed  Potential side effects of the medications include, but are not limited to, constipation, drowsiness, addiction, impaired judgment and risk of fatal overdose if not taken as prescribed  The patient was warned against driving while taking sedation medications  Sharing medications is a felony  At this point in time, the patient is showing no signs of addiction, abuse, diversion or suicidal ideation  Pateint is taking concurrent benzodiazepines  Has been counseled on the risks of taking opioids and benzodiazepines including sedation, increased fall risk, dizziness, addictive potential and death  Patient is taking concurrent muscle relaxers    Has been counseled on the risks of taking opioids and muscle relaxers including sedation, increased fall risk, dizziness, addictive potential and death  Patient has a high risk condition (age > 72, SANDOR, renal or hepatic impairment, mental health condition, use of alcohol or other substances)  Has been counseled on the specific risks of taking opioids with these conditions and the increased risks including including sedation, increased fall risk, dizziness, addictive potential and death  Opioid agreement  Pain management agreement was reviewed with patient and signed/updated during visit      Drug screen  Drug screen collected during today's visit      PDMP review  [place] or [place]  reviewed  No red flags were identified; safe to proceed with prescription      BMI Counseling: Body mass index is 25 63 kg/m²  The BMI is above normal  Nutrition recommendations include decreasing portion sizes, encouraging healthy choices of fruits and vegetables, moderation in carbohydrate intake and increasing intake of lean protein  Exercise recommendations include moderate physical activity 150 minutes/week  Rationale for BMI follow-up plan is due to patient being overweight or obese          Subjective     Opioid Management:   Type of visit: Follow-up    Aberrant behavior?: No      Adverse effects from medication?: No      Screening Tools/Assessments:    PHQ-2/9:       Opioid agreement:  Active Opioid agreement on file?: No    Opioid agreement signed date: 12/28/2021  Opioid agreement expiration date: 12/28/2022    Naloxone:  Currently prescribed Naloxone (Narcan): Yes      HPI  Pain Medications             methadone (DOLOPHINE) 10 mg tablet Take 3 tablets by mouth daily,    sertraline (ZOLOFT) 100 mg tablet take 1 tablet by mouth twice a day    topiramate (TOPAMAX) 100 mg tablet Take 1 tablet (100 mg total) by mouth 2 (two) times a day    venlafaxine (EFFEXOR-XR) 75 mg 24 hr capsule take 1 capsule by mouth once daily with breakfast         Outpatient Morphine Milligram Equivalents Per Day     1/17/23 and after 120-360 MME/Day    Order Name Dose Route Frequency Maximum MME/Day     methadone (DOLOPHINE) 10 mg tablet 30 mg Oral Daily 120-360 MME/Day    Total Potential Morphine Milligram Equivalents Per Day 120-360 MME/Day    Calculation Information        methadone (DOLOPHINE) 10 mg tablet    methadone 10 mg Tabs: maximum daily dose of 30 mg * morphine equivalence factor of 4-12 = 120-360 MME/Day                         PDMP Review       Value Time User    PDMP Reviewed  Yes 1/17/2023  3:17 PM Solis Clemens DO         Review of Systems  Objective     /78 (BP Location: Left arm, Patient Position: Sitting, Cuff Size: Standard)   Pulse 92   Temp 97 5 °F (36 4 °C)   Resp 18   Ht 5' 5" (1 651 m)   Wt 69 9 kg (154 lb)   LMP 08/12/2020   SpO2 98%   BMI 25 63 kg/m²     Physical Exam  Constitutional:       Appearance: Normal appearance  She is not ill-appearing  HENT:      Head: Normocephalic and atraumatic  Eyes:      General: No scleral icterus  Right eye: No discharge  Left eye: No discharge  Cardiovascular:      Rate and Rhythm: Normal rate and regular rhythm  Heart sounds: No murmur heard  No friction rub  Pulmonary:      Effort: Pulmonary effort is normal       Breath sounds: Normal breath sounds  No wheezing or rales  Abdominal:      General: Abdomen is flat  There is no distension  Palpations: Abdomen is soft  Tenderness: There is no abdominal tenderness  Musculoskeletal:         General: No swelling, tenderness or deformity  Skin:     General: Skin is warm and dry  Findings: No erythema  Neurological:      Mental Status: She is alert and oriented to person, place, and time  Mental status is at baseline  Motor: No weakness     Psychiatric:         Mood and Affect: Mood normal          Behavior: Behavior normal          Solis Clemens DO

## 2023-01-17 NOTE — ASSESSMENT & PLAN NOTE
2 to 3-week history of intermittent "breast pain"  Tender to the touch    No obvious discrete mass on exam   Will evaluate further with diagnostic mammogram and breast ultrasound

## 2023-01-17 NOTE — ASSESSMENT & PLAN NOTE
Patient reports being on Ambien for 12 years  She states she knows this is "not a good thing"  She has had issues with sleep walking in the past, has made phone calls to friends with no recollection of the calls  More recently, she has had sleepwalking episodes where she has turned the stove on, left the refrigerator open, placed water in the microwave which have been distressing for her  She believes this issue is related to Ambien  She has been weaning dose on her own and been taking Ambien 5 mg for the past 3 weeks or so  She has also been taking Xanax 1 mg at bedtime for insomnia and has been doing so for some time    We will try to taper off your Ambien  I will send a new prescription for 5 mg tablets to your pharmacy  At this time, begin taking Ambien 2 5 mg daily for 1 to 2 weeks  If you are able to tolerate this, transition to taking Ambien 2 5 mg every other day for 1 to 2 weeks  If you are able to tolerate this, you may then discontinue Ambien  At this time, we can consider transition from Xanax to another sleeping medication such as temazepam (Restoril)  -Per patient instructions above, will try to taper off Ambien and if successful will try to discontinue Xanax and transition to Restoril    If her sleep disturbances persist, would recommend evaluation with sleep specialist

## 2023-01-17 NOTE — ASSESSMENT & PLAN NOTE
-Chronic nerve pain issues related to MRSA infection of the right hip/abdominoplasty  -maintained on methadone 30 mg daily    Continues to tolerate without issue

## 2023-01-17 NOTE — ASSESSMENT & PLAN NOTE
- Symptoms stable  -On Sertraline, Effexor  -takes alprazolam, ambien at bedtime for anxiety and insomnia    See plan for insomnia

## 2023-01-17 NOTE — PATIENT INSTRUCTIONS
We will try to taper off your Ambien  I will send a new prescription for 5 mg tablets to your pharmacy  At this time, begin taking Ambien 2 5 mg daily for 1 to 2 weeks  If you are able to tolerate this, transition to taking Ambien 2 5 mg every other day for 1 to 2 weeks  If you are able to tolerate this, you may then discontinue Ambien  At this time, we can consider transition from Xanax to another sleeping medication such as temazepam (Restoril)  Goals of care:  Maximize your health and quality of life by:   · Increasing your level of function and activity  · Decreasing the negative effects of pain on your life  · Minimizing the risks and side effects of medications and ensuring safe use of opioid medication     Ways for you to help meet your goals:  Maintain a healthy lifestyle  This includes proper nutrition, regular physical activity as able, try for 8 hours of sleep per night, use stress reduction strategies, avoid triggers  Risks and side effects of opioid use:  Prescription opioids carry serious risks of addiction and  overdose, especially with prolonged use  An opioid overdose,  often marked by slowed breathing, can cause sudden death  The  use of prescription opioids can have a number of side effects as  well, even when taken as directed:  · Tolerance--meaning you might need to take more of a medication for the same pain relief  · Physical dependence--meaning you have symptoms of withdrawal when a medication is stopped  · Increased sensitivity to pain  · Constipation  · Nausea, vomiting, dry mouth  · Sleepiness and dizziness   · Confusion  · Depression  · Low levels of testosterone that can result in lower sex drive, energy, and strength  · Itching and sweating    If you are prescribed opioids for pain:  · Never take opioids in greater amounts or more often than prescribed  · Help prevent misuse and abuse          - Never sell or share prescription opioids         - Never use another person’s prescription opioids  · ‡Store prescription opioids in a secure place and out of reach of others (this may include visitors, children, friends, and family)  · Safely dispose of unused prescription opioids: Find your community drug take-back program or your pharmacy mail-back program, or flush them down the toilet, following guidance from the Food and Drug Administration (www fda gov/Drugs/ResourcesForYou)  · ‡Visit www cdc gov/drugoverdose to learn about the risks of opioid abuse and overdose  · If you believe you may be struggling with addiction, tell your health care provider and ask for guidance or call [facility]’s National Helpline at 6-630-546-UYDM

## 2023-01-19 DIAGNOSIS — I82.409 ACUTE DVT (DEEP VENOUS THROMBOSIS) (HCC): ICD-10-CM

## 2023-01-19 RX ORDER — APIXABAN 5 MG/1
TABLET, FILM COATED ORAL
Qty: 180 TABLET | Refills: 1 | Status: SHIPPED | OUTPATIENT
Start: 2023-01-19

## 2023-01-25 ENCOUNTER — TELEPHONE (OUTPATIENT)
Dept: INTERNAL MEDICINE CLINIC | Facility: CLINIC | Age: 53
End: 2023-01-25

## 2023-01-25 DIAGNOSIS — F51.01 PRIMARY INSOMNIA: Primary | ICD-10-CM

## 2023-01-25 RX ORDER — TEMAZEPAM 7.5 MG/1
7.5 CAPSULE ORAL
Qty: 30 CAPSULE | Refills: 0 | Status: SHIPPED | OUTPATIENT
Start: 2023-01-25 | End: 2023-01-31

## 2023-01-25 NOTE — TELEPHONE ENCOUNTER
Patient is calling to state she has completely stop the Ambien and would like a script called in for the Restoril to the [facility] in [facility]

## 2023-01-30 ENCOUNTER — TELEPHONE (OUTPATIENT)
Dept: INTERNAL MEDICINE CLINIC | Facility: CLINIC | Age: 53
End: 2023-01-30

## 2023-01-30 NOTE — TELEPHONE ENCOUNTER
Patient called to state the new sleep medication is not helping she did increase and took 2 pills  She also stated she took the Xanax to help her sleep  Per Gabbi Harrell she should continue with the Xanax and he will review her chart and prescribe a new sleep medication

## 2023-01-31 ENCOUNTER — TELEPHONE (OUTPATIENT)
Dept: INTERNAL MEDICINE CLINIC | Facility: CLINIC | Age: 53
End: 2023-01-31

## 2023-01-31 DIAGNOSIS — F51.01 PRIMARY INSOMNIA: Primary | ICD-10-CM

## 2023-01-31 NOTE — TELEPHONE ENCOUNTER
Patient would like a sleep medication called in as soon as possible she is still not able to sleep  It is starting to effect her day to day  Patient was called and given the message regarding the new Rx sent to her pharmacy

## 2023-02-06 DIAGNOSIS — G89.4 PAIN SYNDROME, CHRONIC: Primary | ICD-10-CM

## 2023-02-09 LAB
1OH-MIDAZOLAM UR-MCNC: NEGATIVE NG/ML
6-BETA NALTREXOL UR CFM-MCNC: NEGATIVE NG/ML
6MAM UR QL: NEGATIVE NG/ML
A-OH ALPRAZ UR-MCNC: 336 NG/ML
A-OH-TRIAZOLAM UR-MCNC: NEGATIVE NG/ML
AMPHETAMINES UR QL: NEGATIVE NG/ML
BARBITURATES UR QL: NEGATIVE NG/ML
BENZODIAZ UR QL: POSITIVE NG/ML
BUPRENORPHINE UR QL: NEGATIVE NG/ML
BUTYLONE: NEGATIVE NG/ML
BZE UR QL: NEGATIVE NG/ML
CREAT UR-MCNC: 95.4 MG/DL
EDDP UR-MCNC: 3771 NG/ML
ETHANOL UR QL: NEGATIVE NG/ML
FENTANYL: NEGATIVE NG/ML
GABAPENTIN UR-MCNC: NEGATIVE NG/ML
LORAZEPAM UR-MCNC: NEGATIVE NG/ML
MDPV: NEGATIVE NG/ML
MEPHEDRONE: NEGATIVE NG/ML
METHADONE UR QL: POSITIVE NG/ML
METHADONE UR-MCNC: 1895 NG/ML
METHYLONE: NEGATIVE NG/ML
NALTREXONE UR-MCNC: NEGATIVE NG/ML
NORDIAZEPAM UR-MCNC: NEGATIVE NG/ML
OPIATES UR QL: NEGATIVE NG/ML
OXAZEPAM UR-MCNC: NEGATIVE NG/ML
OXIDANTS UR QL: NEGATIVE MCG/ML
OXYCODONE UR QL: NEGATIVE NG/ML
PCP UR QL: NEGATIVE NG/ML
PH UR: 7 [PH] (ref 4.5–9)
SL AMB AMINOCLONAZEPAM: NEGATIVE NG/ML
SL AMB HYDROXYETHYLFLURAZEPAM: NEGATIVE NG/ML
SL AMB MEDMATCH AOH ALPRAZOLAM: ABNORMAL
SL AMB MEDMATCH EDDP: ABNORMAL
SL AMB MEDMATCH METHADONE: ABNORMAL
SL AMB PREGABALIN: NEGATIVE NG/ML
SL AMB RITALINIC ACID: NEGATIVE NG/ML
TEMAZEPAM UR-MCNC: NEGATIVE NG/ML
THC UR QL: NEGATIVE NG/ML

## 2023-02-10 DIAGNOSIS — F51.01 PRIMARY INSOMNIA: Primary | ICD-10-CM

## 2023-02-10 RX ORDER — ALPRAZOLAM 0.25 MG/1
TABLET ORAL
Qty: 30 TABLET | Refills: 0 | Status: SHIPPED | OUTPATIENT
Start: 2023-02-10 | End: 2023-02-17

## 2023-02-14 DIAGNOSIS — M25.512 LEFT SHOULDER PAIN, UNSPECIFIED CHRONICITY: ICD-10-CM

## 2023-02-14 RX ORDER — METHADONE HYDROCHLORIDE 10 MG/1
30 TABLET ORAL DAILY
Qty: 90 TABLET | Refills: 0 | Status: SHIPPED | OUTPATIENT
Start: 2023-02-14

## 2023-02-15 ENCOUNTER — APPOINTMENT (OUTPATIENT)
Dept: LAB | Facility: MEDICAL CENTER | Age: 53
End: 2023-02-15

## 2023-02-15 DIAGNOSIS — Z90.3 POSTGASTRECTOMY MALABSORPTION: ICD-10-CM

## 2023-02-15 DIAGNOSIS — K91.2 POSTGASTRECTOMY MALABSORPTION: ICD-10-CM

## 2023-02-15 LAB
25(OH)D3 SERPL-MCNC: 23.3 NG/ML (ref 30–100)
ALBUMIN SERPL BCP-MCNC: 3.7 G/DL (ref 3.5–5)
ALP SERPL-CCNC: 138 U/L (ref 46–116)
ALT SERPL W P-5'-P-CCNC: 51 U/L (ref 12–78)
ANION GAP SERPL CALCULATED.3IONS-SCNC: 3 MMOL/L (ref 4–13)
AST SERPL W P-5'-P-CCNC: 43 U/L (ref 5–45)
BILIRUB SERPL-MCNC: 0.25 MG/DL (ref 0.2–1)
BUN SERPL-MCNC: 19 MG/DL (ref 5–25)
CALCIUM SERPL-MCNC: 8.7 MG/DL (ref 8.3–10.1)
CHLORIDE SERPL-SCNC: 108 MMOL/L (ref 96–108)
CO2 SERPL-SCNC: 26 MMOL/L (ref 21–32)
CREAT SERPL-MCNC: 0.68 MG/DL (ref 0.6–1.3)
ERYTHROCYTE [DISTWIDTH] IN BLOOD BY AUTOMATED COUNT: 13.2 % (ref 11.6–15.1)
FERRITIN SERPL-MCNC: 17 NG/ML (ref 8–388)
FOLATE SERPL-MCNC: 15.4 NG/ML (ref 3.1–17.5)
GFR SERPL CREATININE-BSD FRML MDRD: 98 ML/MIN/1.73SQ M
GLUCOSE SERPL-MCNC: 77 MG/DL (ref 65–140)
HCT VFR BLD AUTO: 40.6 % (ref 34.8–46.1)
HGB BLD-MCNC: 12.6 G/DL (ref 11.5–15.4)
IRON SATN MFR SERPL: 12 % (ref 15–50)
IRON SERPL-MCNC: 47 UG/DL (ref 50–170)
MCH RBC QN AUTO: 28.7 PG (ref 26.8–34.3)
MCHC RBC AUTO-ENTMCNC: 31 G/DL (ref 31.4–37.4)
MCV RBC AUTO: 93 FL (ref 82–98)
PLATELET # BLD AUTO: 278 THOUSANDS/UL (ref 149–390)
PMV BLD AUTO: 10.1 FL (ref 8.9–12.7)
POTASSIUM SERPL-SCNC: 4 MMOL/L (ref 3.5–5.3)
PROT SERPL-MCNC: 7.5 G/DL (ref 6.4–8.4)
PTH-INTACT SERPL-MCNC: 86.1 PG/ML (ref 18.4–80.1)
RBC # BLD AUTO: 4.39 MILLION/UL (ref 3.81–5.12)
SODIUM SERPL-SCNC: 137 MMOL/L (ref 135–147)
TIBC SERPL-MCNC: 389 UG/DL (ref 250–450)
VIT B12 SERPL-MCNC: 534 PG/ML (ref 100–900)
WBC # BLD AUTO: 6.21 THOUSAND/UL (ref 4.31–10.16)

## 2023-02-16 DIAGNOSIS — F51.01 PRIMARY INSOMNIA: ICD-10-CM

## 2023-02-16 RX ORDER — ALPRAZOLAM 0.25 MG/1
TABLET ORAL
Qty: 30 TABLET | Refills: 0 | OUTPATIENT
Start: 2023-02-16

## 2023-02-17 DIAGNOSIS — F32.A DEPRESSION, UNSPECIFIED DEPRESSION TYPE: ICD-10-CM

## 2023-02-17 DIAGNOSIS — F41.9 ANXIETY: Primary | ICD-10-CM

## 2023-02-17 DIAGNOSIS — F51.01 PRIMARY INSOMNIA: ICD-10-CM

## 2023-02-17 DIAGNOSIS — F41.9 ACUTE ANXIETY: ICD-10-CM

## 2023-02-17 LAB — ZINC SERPL-MCNC: 69 UG/DL (ref 44–115)

## 2023-02-17 RX ORDER — MIRTAZAPINE 7.5 MG/1
7.5 TABLET, FILM COATED ORAL
Qty: 30 TABLET | Refills: 5 | Status: SHIPPED | OUTPATIENT
Start: 2023-02-17 | End: 2023-02-20 | Stop reason: ALTCHOICE

## 2023-02-17 RX ORDER — ALPRAZOLAM 0.25 MG/1
0.25 TABLET ORAL
Qty: 10 TABLET | Refills: 0 | Status: SHIPPED | OUTPATIENT
Start: 2023-02-17 | End: 2023-02-20 | Stop reason: ALTCHOICE

## 2023-02-17 NOTE — PROGRESS NOTES
Message received from Office Staff regarding patient's message about Xanax refill  Chart review shows that she was previously on Xanax 1mg HS PRN, however on 1/17 Dr Ama Carmona documented an attempt to transition her off Xanax to Temazapam 7 5mg   Additionally, Remonia Cellar was prescribed and denied by her insurance  She reports suboptimal response to Temazepam 7 5mg hence restarted taking 4 pills of Xanax 0 25mg at bedtime to be able to sleep  We discussed at length the multiple issues this arises including the fact she is using a controlled substance in a manner different that recommended by her PCP  We also discussed different alternative medications including trazodone which she reports she tried in the past but had to stop due to persistent headaches  She is amenable to a trial of Remeron 7 5mg  We have agreed on temporarily sending a Rx for Xanax 0 25mg at bedtime PRN for 10 doses while she schedules a visit with Dr Ama Carmona to further discuss her medication regime

## 2023-02-18 LAB — VIT B1 BLD-SCNC: 145.1 NMOL/L (ref 66.5–200)

## 2023-02-20 DIAGNOSIS — F51.01 PRIMARY INSOMNIA: Primary | ICD-10-CM

## 2023-02-20 RX ORDER — TEMAZEPAM 15 MG/1
15 CAPSULE ORAL
Qty: 30 CAPSULE | Refills: 0 | Status: SHIPPED | OUTPATIENT
Start: 2023-02-20

## 2023-02-22 ENCOUNTER — OFFICE VISIT (OUTPATIENT)
Dept: INTERNAL MEDICINE CLINIC | Facility: CLINIC | Age: 53
End: 2023-02-22

## 2023-02-22 VITALS
DIASTOLIC BLOOD PRESSURE: 74 MMHG | HEIGHT: 65 IN | RESPIRATION RATE: 13 BRPM | BODY MASS INDEX: 26.33 KG/M2 | HEART RATE: 80 BPM | SYSTOLIC BLOOD PRESSURE: 116 MMHG | TEMPERATURE: 98.2 F | WEIGHT: 158 LBS

## 2023-02-22 DIAGNOSIS — E55.9 VITAMIN D DEFICIENCY: ICD-10-CM

## 2023-02-22 DIAGNOSIS — D50.8 OTHER IRON DEFICIENCY ANEMIA: ICD-10-CM

## 2023-02-22 DIAGNOSIS — F51.01 PRIMARY INSOMNIA: Primary | ICD-10-CM

## 2023-02-22 RX ORDER — SODIUM CHLORIDE 9 MG/ML
20 INJECTION, SOLUTION INTRAVENOUS ONCE
Status: CANCELLED | OUTPATIENT
Start: 2023-03-22

## 2023-02-22 RX ORDER — MELATONIN
2000 DAILY
Qty: 180 TABLET | Refills: 0 | Status: SHIPPED | OUTPATIENT
Start: 2023-02-22

## 2023-02-22 NOTE — PROGRESS NOTES
INTERNAL MEDICINE OFFICE VISIT  [facility]- [place]    NAME: Ely Cantrell  AGE: 52 y o  SEX: female    DATE OF ENCOUNTER: 2/23/2023    Assessment and Plan/History of Present Illness     She has a medical history of protein S deficiency, factor 5 Leiden mutation with chronic DVT on Eliquis, SBO in May of this 2021 status post surgical intervention, chronic pain maintained on methadone, status post gastric bypass procedure with iron deficiency anemia    1  Primary insomnia  Assessment & Plan:  Previously weaned off Ambien  She had been on a combination of Ambien and Xanax at bedtime for some time  Previous inadequate response to temazepam 7 5 mg though she may have only taken this for a few days  She was prescribed Remeron most recently which she took for 4 nights and states this has not helped  Previous medications: Ambien, trazodone, Lunesta, nortriptyline    We will prescribe temazepam 15 mg at bedtime  I advised her to give the medication some time to take effect for a few weeks before we consider this a failure  -We will also refer to sleep medicine for further recommendations           Orders:  -     Ambulatory Referral to Sleep Medicine; Future    2  Vitamin D deficiency  -     cholecalciferol (VITAMIN D3) 1,000 units tablet; Take 2 tablets (2,000 Units total) by mouth daily    3  Other iron deficiency anemia  Assessment & Plan:  Most recent hemoglobin within normal limits  Iron levels remain on the low side saturation of 12%, ferritin of 17  She has history of iron deficiency anemia in the setting of Noah-en-Y gastric bypass surgery and has received iron infusions previously    We will again refer for Feraheme x2  Recheck CBC and iron panel approximately 1 month after completion of infusions    Orders:  -     CBC and differential; Future  -     Iron Panel (Includes Ferritin, Iron Sat%, Iron, and TIBC);  Future                 Orders Placed This Encounter   Procedures   • CBC and differential   • Ambulatory Referral to Sleep Medicine       Chief Complaint     Chief Complaint   Patient presents with   • Follow-up     Review medications  Meds not verified  Review of Systems     10 point ROS negative except per HPI    The following portions of the patient's history were reviewed and updated as appropriate: allergies, current medications, past family history, past medical history, past social history, past surgical history and problem list     Active Problem List     Patient Active Problem List   Diagnosis   • Anxiety   • Depression   • GERD without esophagitis   • Primary insomnia   • Migraine, unspecified, not intractable, without status migrainosus   • Pain syndrome, chronic   • Postgastrectomy malabsorption   • Vitamin D deficiency   • Ventral hernia without obstruction or gangrene   • History of bariatric surgery   • ASCUS with positive high risk HPV cervical   • Urinary tract infection with hematuria   • Symptomatic varicose veins of both lower extremities   • Chronic deep vein thrombosis (DVT) of right femoral vein (HCC)   • Neutrophilic leukocytosis   • Adhesive capsulitis of left shoulder   • Factor 5 Leiden mutation, heterozygous ([facility] [address] )   • Protein S deficiency ([facility] [address] )   • SBO (small bowel obstruction) ([facility] [address] [address] )   • Breast mass, right   • Closed fracture of left distal radius and ulna   • Iron (Fe) deficiency anemia   • Breast pain, left       Objective     /74 (BP Location: Left arm, Patient Position: Sitting, Cuff Size: Standard)   Pulse 80   Temp 98 2 °F (36 8 °C)   Resp 13   Ht 5' 5" (1 651 m)   Wt 71 7 kg (158 lb)   LMP 08/12/2020   BMI 26 29 kg/m²     Physical Exam  Constitutional:       Appearance: Normal appearance  She is not ill-appearing  HENT:      Head: Normocephalic and atraumatic  Eyes:      General: No scleral icterus  Right eye: No discharge  Left eye: No discharge     Cardiovascular:      Rate and Rhythm: Normal rate and regular rhythm  Heart sounds: No murmur heard  No friction rub  Pulmonary:      Effort: Pulmonary effort is normal       Breath sounds: Normal breath sounds  No wheezing or rales  Abdominal:      General: Abdomen is flat  There is no distension  Palpations: Abdomen is soft  Tenderness: There is no abdominal tenderness  Musculoskeletal:         General: No swelling or tenderness  Skin:     General: Skin is warm and dry  Findings: No erythema  Neurological:      Mental Status: She is alert  Mental status is at baseline  Motor: No weakness  Psychiatric:         Mood and Affect: Mood normal          Behavior: Behavior normal          Pertinent Laboratory/Diagnostic Studies:  No results found  Images and diagnostics reviewed     Current Medications     Current Outpatient Medications:   •  cholecalciferol (VITAMIN D3) 1,000 units tablet, Take 2 tablets (2,000 Units total) by mouth daily, Disp: 180 tablet, Rfl: 0  •  Cyanocobalamin (B-12) 1000 MCG SUBL, Place under the tongue, Disp: , Rfl:   •  Eliquis 5 MG, take 1 tablet by mouth twice a day, Disp: 180 tablet, Rfl: 1  •  methadone (DOLOPHINE) 10 mg tablet, Take 3 tablets by mouth daily,, Disp: 90 tablet, Rfl: 0  •  sertraline (ZOLOFT) 100 mg tablet, take 1 tablet by mouth twice a day, Disp: 180 tablet, Rfl: 0  •  temazepam (RESTORIL) 15 mg capsule, Take 1 capsule (15 mg total) by mouth daily at bedtime as needed for sleep, Disp: 30 capsule, Rfl: 0  •  topiramate (TOPAMAX) 100 mg tablet, Take 1 tablet (100 mg total) by mouth 2 (two) times a day, Disp: 180 tablet, Rfl: 1  •  venlafaxine (EFFEXOR-XR) 75 mg 24 hr capsule, take 1 capsule by mouth once daily with breakfast, Disp: 90 capsule, Rfl: 1  •  naloxone (NARCAN) 4 mg/0 1 mL nasal spray, Administer 1 spray into a nostril   If no response after 2-3 minutes, give another dose in the other nostril using a new spray , Disp: 1 each, Rfl: 1  •  omeprazole (PriLOSEC) 20 mg delayed release capsule, take 1 capsule by mouth once daily, Disp: 90 capsule, Rfl: 0  •  Pediatric Multiple Vit-C-FA (FRUITY CHEWABLES MULTIVITAMIN) CHEW, Chew 1 tablet daily , Disp: , Rfl:   •  senna (SENOKOT) 8 6 MG tablet, Take 1 tablet by mouth as needed  , Disp: , Rfl:   •  sucralfate (Carafate) 1 g/10 mL suspension, Take 10 mL (1 g total) by mouth 4 (four) times a day as needed (dyspepsia), Disp: 420 mL, Rfl: 2    Health Maintenance     Health Maintenance   Topic Date Due   • COVID-19 Vaccine (1) Never done   • OT PLAN OF CARE  07/25/2018   • Colorectal Cancer Screening  04/21/2020   • Breast Cancer Screening: Mammogram  05/27/2021   • PT PLAN OF CARE  03/24/2022   • Annual Physical  04/20/2023   • BMI: Followup Plan  01/17/2024   • BMI: Adult  02/22/2024   • Cervical Cancer Screening  03/12/2024   • DTaP,Tdap,and Td Vaccines (2 - Td or Tdap) 10/22/2029   • HIV Screening  Completed   • Hepatitis C Screening  Completed   • Osteoporosis Screening  Completed   • Influenza Vaccine  Completed   • Pneumococcal Vaccine: Pediatrics (0 to 5 Years) and At-Risk Patients (6 to 59 Years)  Aged Out   • HIB Vaccine  Aged Out   • IPV Vaccine  Aged Out   • Hepatitis A Vaccine  Aged Out   • Meningococcal ACWY Vaccine  Aged Out   • HPV Vaccine  Aged [employer] History   Administered Date(s) Administered   • INFLUENZA 10/08/2014, 11/25/2016, 10/11/2021, 11/23/2022   • Influenza Quadrivalent Preservative Free 3 years and older IM 10/08/2014   • Influenza Quadrivalent, 6-35 Months IM 11/25/2016, 10/12/2017   • Influenza, recombinant, quadrivalent,injectable, preservative free 01/03/2019, 10/22/2019, 12/02/2020   • Influenza, seasonal, injectable 10/21/2011, 12/05/2012, 10/22/2013, 10/30/2015   • Tdap 10/22/2019   • Tuberculin Skin Test-PPD Intradermal 06/06/2018       RO Bryant  Internal Medicine [address], [address]  [place], [address]  Office: [phone]  Fax: [phone]

## 2023-02-23 LAB — VIT A SERPL-MCNC: 35 UG/DL (ref 20.1–62)

## 2023-02-23 NOTE — ASSESSMENT & PLAN NOTE
Most recent hemoglobin within normal limits  Iron levels remain on the low side saturation of 12%, ferritin of 17  She has history of iron deficiency anemia in the setting of Noah-en-Y gastric bypass surgery and has received iron infusions previously    We will again refer for Feraheme x2    Recheck CBC and iron panel approximately 1 month after completion of infusions

## 2023-02-23 NOTE — ASSESSMENT & PLAN NOTE
Previously weaned off Ambien  She had been on a combination of Ambien and Xanax at bedtime for some time  Previous inadequate response to temazepam 7 5 mg though she may have only taken this for a few days  She was prescribed Remeron most recently which she took for 4 nights and states this has not helped  Previous medications: Ambien, trazodone, Lunesta, nortriptyline    We will prescribe temazepam 15 mg at bedtime    I advised her to give the medication some time to take effect for a few weeks before we consider this a failure  -We will also refer to sleep medicine for further recommendations

## 2023-02-27 DIAGNOSIS — D50.8 OTHER IRON DEFICIENCY ANEMIA: Primary | ICD-10-CM

## 2023-02-27 RX ORDER — SODIUM CHLORIDE 9 MG/ML
20 INJECTION, SOLUTION INTRAVENOUS ONCE
Status: CANCELLED | OUTPATIENT
Start: 2023-03-01

## 2023-02-28 RX ORDER — SODIUM CHLORIDE 9 MG/ML
20 INJECTION, SOLUTION INTRAVENOUS ONCE
Status: CANCELLED | OUTPATIENT
Start: 2023-03-01

## 2023-03-01 ENCOUNTER — HOSPITAL ENCOUNTER (OUTPATIENT)
Dept: INFUSION CENTER | Facility: CLINIC | Age: 53
Discharge: HOME | End: 2023-03-01

## 2023-03-01 VITALS
RESPIRATION RATE: 16 BRPM | HEART RATE: 71 BPM | SYSTOLIC BLOOD PRESSURE: 119 MMHG | DIASTOLIC BLOOD PRESSURE: 81 MMHG | TEMPERATURE: 98.8 F

## 2023-03-01 DIAGNOSIS — D50.8 OTHER IRON DEFICIENCY ANEMIA: Primary | ICD-10-CM

## 2023-03-01 RX ORDER — SODIUM CHLORIDE 9 MG/ML
20 INJECTION, SOLUTION INTRAVENOUS ONCE
Status: COMPLETED | OUTPATIENT
Start: 2023-03-01 | End: 2023-03-01

## 2023-03-01 RX ORDER — SODIUM CHLORIDE 9 MG/ML
20 INJECTION, SOLUTION INTRAVENOUS ONCE
Status: CANCELLED | OUTPATIENT
Start: 2023-03-08

## 2023-03-01 RX ADMIN — SODIUM CHLORIDE 200 MG: 9 INJECTION, SOLUTION INTRAVENOUS at 14:42

## 2023-03-01 RX ADMIN — SODIUM CHLORIDE 20 ML/HR: 0.9 INJECTION, SOLUTION INTRAVENOUS at 14:42

## 2023-03-08 ENCOUNTER — HOSPITAL ENCOUNTER (OUTPATIENT)
Dept: INFUSION CENTER | Facility: CLINIC | Age: 53
Discharge: HOME | End: 2023-03-08

## 2023-03-08 VITALS
RESPIRATION RATE: 16 BRPM | SYSTOLIC BLOOD PRESSURE: 109 MMHG | TEMPERATURE: 98.4 F | HEART RATE: 73 BPM | DIASTOLIC BLOOD PRESSURE: 74 MMHG

## 2023-03-08 DIAGNOSIS — D50.8 OTHER IRON DEFICIENCY ANEMIA: Primary | ICD-10-CM

## 2023-03-08 RX ORDER — SODIUM CHLORIDE 9 MG/ML
20 INJECTION, SOLUTION INTRAVENOUS ONCE
Status: COMPLETED | OUTPATIENT
Start: 2023-03-08 | End: 2023-03-08

## 2023-03-08 RX ORDER — SODIUM CHLORIDE 9 MG/ML
20 INJECTION, SOLUTION INTRAVENOUS ONCE
Status: CANCELLED | OUTPATIENT
Start: 2023-03-15

## 2023-03-08 RX ADMIN — IRON SUCROSE 200 MG: 20 INJECTION, SOLUTION INTRAVENOUS at 12:10

## 2023-03-08 RX ADMIN — SODIUM CHLORIDE 20 ML/HR: 0.9 INJECTION, SOLUTION INTRAVENOUS at 12:09

## 2023-03-08 NOTE — PROGRESS NOTES
Patient arrived to the unit and denied complications with previous infusions  Tolerated venofer infusion well without adverse affects  Patient has a new job and will call to make future appointments

## 2023-03-09 ENCOUNTER — TELEPHONE (OUTPATIENT)
Dept: INTERNAL MEDICINE CLINIC | Facility: CLINIC | Age: 53
End: 2023-03-09

## 2023-03-09 DIAGNOSIS — F51.01 PRIMARY INSOMNIA: Primary | ICD-10-CM

## 2023-03-09 RX ORDER — QUETIAPINE FUMARATE 25 MG/1
25 TABLET, FILM COATED ORAL
Qty: 30 TABLET | Refills: 0 | Status: SHIPPED | OUTPATIENT
Start: 2023-03-09 | End: 2023-03-14

## 2023-03-09 NOTE — TELEPHONE ENCOUNTER
Patient called to state she has tried the 30 mg of the Temazepam and she has gotten about 3 hours of sleep  She would like to know what the next step would be

## 2023-03-10 NOTE — TELEPHONE ENCOUNTER
Patient called, I explained the recommendation, she verbalized understanding  Will  medication and has not scheduled with sleep medicine

## 2023-03-10 NOTE — TELEPHONE ENCOUNTER
Recommend trial of low dose of Seroquel  I will send prescription to pharmacy    Side effects may include constipation, dizziness, dry mouth    Another alternative to consider would be amitriptyline    I do recommend she schedule consultation with sleep medicine as we discussed at our prior appointment

## 2023-03-14 ENCOUNTER — TELEPHONE (OUTPATIENT)
Dept: INTERNAL MEDICINE CLINIC | Facility: CLINIC | Age: 53
End: 2023-03-14

## 2023-03-14 DIAGNOSIS — F51.01 PRIMARY INSOMNIA: Primary | ICD-10-CM

## 2023-03-14 DIAGNOSIS — F41.9 ANXIETY: ICD-10-CM

## 2023-03-14 RX ORDER — ALPRAZOLAM 1 MG/1
1 TABLET ORAL
Qty: 30 TABLET | Refills: 0 | Status: SHIPPED | OUTPATIENT
Start: 2023-03-14

## 2023-03-14 RX ORDER — ESZOPICLONE 1 MG/1
1 TABLET, FILM COATED ORAL
Qty: 30 TABLET | Refills: 0 | Status: SHIPPED | OUTPATIENT
Start: 2023-03-14

## 2023-03-14 NOTE — LETTER
March 14, 2023     Patient: Amalia Patel  YOB: 1970  Date of Visit: 3/14/2023      To Whom it May Concern:    Collette Roca is under my professional care  I discussed plan of care with Ely over the phone in regards to her anxiety and insomnia  We are going to continue with alprazolam 1 mg daily at bedtime and cautiously initiate Lunesta 1 mg daily at bedtime  Patient aware of increased risk of adverse effects including grogginess, sedation, cognitive dysfunction, increased fall risk  Patient has discontinued temazepam and this is not to be utilized moving forward  Seroquel has also been discontinued  She has a consultation scheduled with sleep medicine later this month for further treatment guidance  If you have any questions or concerns, please don't hesitate to call           Sincerely,          Solis Alejandra,         CC: No Recipients

## 2023-03-14 NOTE — TELEPHONE ENCOUNTER
[facility] called to state they will need a written consent in order to fill the patients  Lunesta  The patient still has 11 days left for her Temazepam  Please fax to [phone]   Any questions please call the pharmacy at [phone]

## 2023-03-14 NOTE — TELEPHONE ENCOUNTER
Patient calling this am and stated, " I need Dr Liz Moore to please give me a call back today  I work from 12 to 6 pm today    I am not getting any sleep at all on the new medicine prescribed for me  "    # is [phone]

## 2023-03-20 DIAGNOSIS — M25.512 LEFT SHOULDER PAIN, UNSPECIFIED CHRONICITY: ICD-10-CM

## 2023-03-20 RX ORDER — METHADONE HYDROCHLORIDE 10 MG/1
30 TABLET ORAL DAILY
Qty: 90 TABLET | Refills: 0 | Status: SHIPPED | OUTPATIENT
Start: 2023-03-20

## 2023-03-21 ENCOUNTER — HOSPITAL ENCOUNTER (OUTPATIENT)
Dept: INFUSION CENTER | Facility: CLINIC | Age: 53
Discharge: HOME | End: 2023-03-21

## 2023-03-21 VITALS
SYSTOLIC BLOOD PRESSURE: 123 MMHG | DIASTOLIC BLOOD PRESSURE: 80 MMHG | HEART RATE: 80 BPM | TEMPERATURE: 97.1 F | RESPIRATION RATE: 18 BRPM

## 2023-03-21 DIAGNOSIS — D50.8 OTHER IRON DEFICIENCY ANEMIA: Primary | ICD-10-CM

## 2023-03-21 RX ORDER — SODIUM CHLORIDE 9 MG/ML
20 INJECTION, SOLUTION INTRAVENOUS ONCE
Status: CANCELLED | OUTPATIENT
Start: 2023-03-22

## 2023-03-21 RX ORDER — SODIUM CHLORIDE 9 MG/ML
20 INJECTION, SOLUTION INTRAVENOUS ONCE
Status: COMPLETED | OUTPATIENT
Start: 2023-03-21 | End: 2023-03-21

## 2023-03-21 RX ADMIN — SODIUM CHLORIDE 20 ML/HR: 0.9 INJECTION, SOLUTION INTRAVENOUS at 14:48

## 2023-03-21 RX ADMIN — IRON SUCROSE 200 MG: 20 INJECTION, SOLUTION INTRAVENOUS at 14:48

## 2023-03-21 NOTE — PROGRESS NOTES
Patient tolerated Venofer without incident  No further infusion appointments scheduled at this time   Declined AVS

## 2023-03-27 ENCOUNTER — OFFICE VISIT (OUTPATIENT)
Dept: SLEEP CENTER | Facility: CLINIC | Age: 53
End: 2023-03-27

## 2023-03-27 VITALS
OXYGEN SATURATION: 93 % | BODY MASS INDEX: 26.29 KG/M2 | SYSTOLIC BLOOD PRESSURE: 121 MMHG | WEIGHT: 157.8 LBS | HEART RATE: 69 BPM | HEIGHT: 65 IN | DIASTOLIC BLOOD PRESSURE: 64 MMHG

## 2023-03-27 DIAGNOSIS — F51.04 CHRONIC INSOMNIA: ICD-10-CM

## 2023-03-27 DIAGNOSIS — G47.01 INSOMNIA SECONDARY TO CHRONIC PAIN: ICD-10-CM

## 2023-03-27 DIAGNOSIS — G89.29 INSOMNIA SECONDARY TO CHRONIC PAIN: ICD-10-CM

## 2023-03-27 DIAGNOSIS — G47.59 OTHER PARASOMNIA: Primary | ICD-10-CM

## 2023-03-27 DIAGNOSIS — F51.01 PRIMARY INSOMNIA: ICD-10-CM

## 2023-03-27 NOTE — PROGRESS NOTES
"Assessment/Plan:    1  Other parasomnia  -     RBD Diagnostic Sleep Study; Future; Expected date: 03/28/2023    2  Primary insomnia  -     Ambulatory Referral to Sleep Medicine    3  Insomnia secondary to chronic pain    4  Chronic insomnia  Ms Vista Barthel has a complicated history which we reviewed in detail today  She has chronic insomnia which is in part due to pain but there also may be some psychophysiologic factors contributing as well  Her treatment has been complicated as she is required many medications to help sleep  She describes abnormal behaviors and sleep with use of zolpidem  For the most part these are much less frequent and severe with use of Lunesta but they still occur to some degree  This presents a challenge as dose increases and this medication would therefore not be ideal, in general it may be best to avoid this class of medication if possible  She has not tried medication such as Riaz Sarah, or Corrina [place]  Potentially these could be options  We discussed that the risk of abnormal behaviors and sleep is increased with polypharmacy, as she also uses alprazolam at night and is on other sedating medications, that risk would potentially be there with other hypnotics outside of the \"Z drugs\"  Overall, it would be best to explore cognitive behavioral therapy for insomnia  If this allows for better sleep at night, that would lead to lessen the need for medication which would be ideal   I will therefore make a referral for this today  We discussed that there can be a wait for this appointment, I will see if this could be potentially expedited but I am not certain  Due to her history of parasomnias, I would like her to have a polysomnogram, I will use a parasomnia montage  I have a relatively low suspicion that she has REM sleep events but I would like to formally assess for this  I do not suspect obstructive sleep apnea    In theory there is some risk for central sleep apnea as " "she uses methadone on a daily basis for her chronic pain  Subjective:      Patient ID: Maci Isaac is a 52 y o  female  HPI    This is a 47-year-old female who presents with a chief complaint of insomnia \"cannot sleep without being heavily medicated\"  She has not had a sleep study in the past   She works as a , part-time  In general she works 4-5 days a week, 6-8 hours at a time  She feels her poor sleep affects her performance on her job  She notes her sleep worsened after bariatric surgery 13 years ago  She had an abdominoplasty a year after and has chronic pain and other problems thereafter  That is when her insomnia symptoms began  Prior to this, she would sleep throughout the night without awakenings, insomnia was not a prominent problem for her  She notes chronic pain which is a major factor still in her sleep  She notes it is hard to fall and stay asleep  She notes an urge to move her legs, can't keep her legs still  As she thinks about it, may be worse with low iron levels  This does not affect her sleep too much most of the time  Presently she takes Lunesta 1 mg (for a few weeks) and alprazolam 1 mg  She feels the last few months have been very challenging  She had been on zolpidem previously  For about 8 years  On zolpidem she behaviors in sleep that concerned her  Weened off of zolpidem and has been off it since February 2023  As she weened off of zolpidem she also was taken off alprazolam and did not sleep well  Tried temazepam which did not help \"zero success\"  Tried quetiapine which also was not effective  Resumed alprazolam 1 mg about 2 weeks ago  She notes her primary care physician tried to prescribe Belsomra but was not approved by her medical insurance  On Lunesta, may use her phone in sleep but does not get out of bed    When on zolpidem, she had turned on the stove in her sleep, had one to put keys in a cookie jar, had put water in the " microwave in her sleep without knowing it, and then left the refrigerator/freezer open during the night  These behaviors all concerned her which led to her decision to try to taper off of that medication  She typically gets in bed at 11 PM, is asleep by 1130  She has 4-5 awakenings during the night and ultimately is awake at 9 AM   When not working she goes to bed at midnight and is out of bed at 10-11 AM   She notes sleep is interrupted by her cat, pain, nightmares, and the alarm  Looks at the time at night  Has some frustration when she cannot sleep  Stays in bed when she cannot sleep, uses white noise  May sleep better away from home, attributes this to her cat  She is sometimes sleepy during the day  She does not intentionally take naps  [place] Sleepiness Scale score is 6  He does not describe snoring, witnessed apneas, or gasping  Wakes 8 ounces of coffee a day, sometimes at work  Also drinks tea  - 16 oz iced tea at work  She drinks alcohol socially  She does not use drugs  [place] Sleepiness Scale  Sitting and reading: Slight chance of dozing  Watching TV: Slight chance of dozing  Sitting, inactive in a public place (e g  a theatre or a meeting): Moderate chance of dozing  As a passenger in a car for an hour without a break: Slight chance of dozing  Lying down to rest in the afternoon when circumstances permit: Slight chance of dozing  Sitting and talking to someone: Would never doze  Sitting quietly after a lunch without alcohol: Would never doze  In a car, while stopped for a few minutes in traffic: Would never doze  Total score: 6      Review of Systems   Constitutional: Positive for fatigue  HENT: Negative for nosebleeds  Respiratory: Negative for shortness of breath and wheezing  Cardiovascular: Negative for palpitations and leg swelling  Gastrointestinal:        No gerd   Allergic/Immunologic: Positive for environmental allergies     Neurological: Negative for "headaches (mostly controlled)  Psychiatric/Behavioral: Positive for dysphoric mood (comes and goes, on medicaiton)  Negative for decreased concentration  The patient is nervous/anxious (comes and goes)  Objective:      /64 (BP Location: Left arm, Patient Position: Sitting, Cuff Size: Standard)   Pulse 69   Ht 5' 5\" (1 651 m)   Wt 71 6 kg (157 lb 12 8 oz)   LMP 08/12/2020   SpO2 93%   BMI 26 26 kg/m²          Physical Exam  Constitutional:       Appearance: Normal appearance  HENT:      Head: Normocephalic and atraumatic  Mouth/Throat:      Mouth: Mucous membranes are moist    Eyes:      Extraocular Movements: Extraocular movements intact  Pupils: Pupils are equal, round, and reactive to light  Cardiovascular:      Rate and Rhythm: Normal rate  Pulses: Normal pulses  Heart sounds: Normal heart sounds  Pulmonary:      Effort: Pulmonary effort is normal       Breath sounds: Normal breath sounds  Musculoskeletal:      Right lower leg: No edema  Left lower leg: No edema  Neurological:      Mental Status: She is alert  Psychiatric:         Mood and Affect: Mood normal          Behavior: Behavior normal          Thought Content: Thought content normal          Judgment: Judgment normal          I have spent a total time of 50 minutes on 03/31/23 in caring for this patient including Diagnostic results, Prognosis, Risks and benefits of tx options, Instructions for management, Patient and family education, Importance of tx compliance, Impressions, Counseling / Coordination of care, Documenting in the medical record, Reviewing / ordering tests, medicine, procedures   and Obtaining or reviewing history          "

## 2023-03-27 NOTE — PATIENT INSTRUCTIONS
It is very important to avoid driving while drowsy, this can be very dangerous or even cause serious injury or death  If sleepy, it is not safe to get behind the wheel  If you are driving and feels sleepy, it is very important to pull over right away  Even losing control of the car for a split second can be deadly  If you feel you cannot control when sleepiness occurs and cannot prevented, it is important to not drive at all until this improves  Please let me know if you experience this as it is very important  Nursing Support:  When: Monday through Friday 7A-5PM except holidays  Where: Our direct line is [phone]  If you are having a true emergency please call 911  In the event that the line is busy or it is after hours please leave a voice message and we will return your call  Please speak clearly, leaving your full name, birth date, best number to reach you and the reason for your call  Medication refills: We will need the name of the medication, the dosage, the ordering provider, whether you get a 30 or 90 day refill, and the pharmacy name and address  Medications will be ordered by the provider only  Nurses cannot call in prescriptions  Please allow 7 days for medication refills  Physician requested updates: If your provider requested that you call with an update after starting medication, please be ready to provide us the medication and dosage, what time you take your medication, the time you attempt to fall asleep, time you fall asleep, when you wake up, and what time you get out of bed  Sleep Study Results: We will contact you with sleep study results and/or next steps after the physician has reviewed your testing

## 2023-03-28 ENCOUNTER — TELEPHONE (OUTPATIENT)
Dept: PSYCHIATRY | Facility: CLINIC | Age: 53
End: 2023-03-28

## 2023-03-28 NOTE — TELEPHONE ENCOUNTER
Attempted to contact patient regarding referral for CBT-I therapy after conferring with   LVM for patient to contact the office back at her earliest convenience to assist with scheduling  Please inform writer when patient calls back to aid with scheduling specifications  Thank you

## 2023-04-03 DIAGNOSIS — N64.4 BREAST PAIN, LEFT: ICD-10-CM

## 2023-04-04 DIAGNOSIS — G43.909 MIGRAINE WITHOUT STATUS MIGRAINOSUS, NOT INTRACTABLE, UNSPECIFIED MIGRAINE TYPE: ICD-10-CM

## 2023-04-04 DIAGNOSIS — F32.A DEPRESSION, UNSPECIFIED DEPRESSION TYPE: ICD-10-CM

## 2023-04-04 RX ORDER — TOPIRAMATE 100 MG/1
100 TABLET, FILM COATED ORAL 2 TIMES DAILY
Qty: 180 TABLET | Refills: 1 | Status: SHIPPED | OUTPATIENT
Start: 2023-04-04

## 2023-04-04 RX ORDER — VENLAFAXINE HYDROCHLORIDE 75 MG/1
75 CAPSULE, EXTENDED RELEASE ORAL DAILY
Qty: 90 CAPSULE | Refills: 1 | Status: SHIPPED | OUTPATIENT
Start: 2023-04-04

## 2023-04-11 PROBLEM — M25.522 PAIN IN LEFT ELBOW: Status: ACTIVE | Noted: 2023-04-11

## 2023-04-13 DIAGNOSIS — S52.502A CLOSED FRACTURE OF DISTAL ENDS OF LEFT RADIUS AND ULNA, INITIAL ENCOUNTER: Primary | ICD-10-CM

## 2023-04-13 DIAGNOSIS — S52.602A CLOSED FRACTURE OF DISTAL ENDS OF LEFT RADIUS AND ULNA, INITIAL ENCOUNTER: Primary | ICD-10-CM

## 2023-04-13 DIAGNOSIS — S52.501A CLOSED FRACTURE OF DISTAL END OF RIGHT RADIUS, UNSPECIFIED FRACTURE MORPHOLOGY, INITIAL ENCOUNTER: ICD-10-CM

## 2023-04-21 DIAGNOSIS — M25.512 LEFT SHOULDER PAIN, UNSPECIFIED CHRONICITY: ICD-10-CM

## 2023-04-21 RX ORDER — METHADONE HYDROCHLORIDE 10 MG/1
30 TABLET ORAL DAILY
Qty: 90 TABLET | Refills: 0 | Status: SHIPPED | OUTPATIENT
Start: 2023-04-21 | End: 2023-04-21

## 2023-04-21 RX ORDER — METHADONE HYDROCHLORIDE 10 MG/1
30 TABLET ORAL DAILY
Qty: 90 TABLET | Refills: 0 | Status: SHIPPED | OUTPATIENT
Start: 2023-04-21 | End: 2023-05-23 | Stop reason: SDUPTHER

## 2023-04-25 ENCOUNTER — APPOINTMENT (OUTPATIENT)
Dept: LAB | Facility: CLINIC | Age: 53
End: 2023-04-25

## 2023-04-25 ENCOUNTER — OFFICE VISIT (OUTPATIENT)
Dept: OBGYN CLINIC | Facility: HOSPITAL | Age: 53
End: 2023-04-25

## 2023-04-25 ENCOUNTER — HOSPITAL ENCOUNTER (OUTPATIENT)
Dept: RADIOLOGY | Facility: HOSPITAL | Age: 53
Discharge: HOME | End: 2023-04-25
Attending: ORTHOPAEDIC SURGERY

## 2023-04-25 VITALS
SYSTOLIC BLOOD PRESSURE: 105 MMHG | WEIGHT: 159 LBS | HEIGHT: 65 IN | HEART RATE: 69 BPM | DIASTOLIC BLOOD PRESSURE: 69 MMHG | BODY MASS INDEX: 26.49 KG/M2

## 2023-04-25 DIAGNOSIS — N30.01 ACUTE CYSTITIS WITH HEMATURIA: ICD-10-CM

## 2023-04-25 DIAGNOSIS — S52.501D CLOSED FRACTURE OF DISTAL END OF RIGHT RADIUS WITH ROUTINE HEALING, UNSPECIFIED FRACTURE MORPHOLOGY, SUBSEQUENT ENCOUNTER: Primary | ICD-10-CM

## 2023-04-25 DIAGNOSIS — S52.501A CLOSED FRACTURE OF DISTAL END OF RIGHT RADIUS, UNSPECIFIED FRACTURE MORPHOLOGY, INITIAL ENCOUNTER: ICD-10-CM

## 2023-04-25 DIAGNOSIS — D50.8 OTHER IRON DEFICIENCY ANEMIA: ICD-10-CM

## 2023-04-25 LAB
BASOPHILS # BLD AUTO: 0.03 THOUSANDS/ΜL (ref 0–0.1)
BASOPHILS NFR BLD AUTO: 1 % (ref 0–1)
EOSINOPHIL # BLD AUTO: 0.38 THOUSAND/ΜL (ref 0–0.61)
EOSINOPHIL NFR BLD AUTO: 7 % (ref 0–6)
ERYTHROCYTE [DISTWIDTH] IN BLOOD BY AUTOMATED COUNT: 14 % (ref 11.6–15.1)
FERRITIN SERPL-MCNC: 117 NG/ML (ref 8–388)
HCT VFR BLD AUTO: 41.5 % (ref 34.8–46.1)
HGB BLD-MCNC: 12.5 G/DL (ref 11.5–15.4)
IMM GRANULOCYTES # BLD AUTO: 0.01 THOUSAND/UL (ref 0–0.2)
IMM GRANULOCYTES NFR BLD AUTO: 0 % (ref 0–2)
IRON SATN MFR SERPL: 15 % (ref 15–50)
IRON SERPL-MCNC: 43 UG/DL (ref 50–170)
LYMPHOCYTES # BLD AUTO: 2.22 THOUSANDS/ΜL (ref 0.6–4.47)
LYMPHOCYTES NFR BLD AUTO: 40 % (ref 14–44)
MCH RBC QN AUTO: 28.7 PG (ref 26.8–34.3)
MCHC RBC AUTO-ENTMCNC: 30.1 G/DL (ref 31.4–37.4)
MCV RBC AUTO: 95 FL (ref 82–98)
MONOCYTES # BLD AUTO: 0.56 THOUSAND/ΜL (ref 0.17–1.22)
MONOCYTES NFR BLD AUTO: 10 % (ref 4–12)
NEUTROPHILS # BLD AUTO: 2.32 THOUSANDS/ΜL (ref 1.85–7.62)
NEUTS SEG NFR BLD AUTO: 42 % (ref 43–75)
NRBC BLD AUTO-RTO: 0 /100 WBCS
PLATELET # BLD AUTO: 254 THOUSANDS/UL (ref 149–390)
PMV BLD AUTO: 9.6 FL (ref 8.9–12.7)
RBC # BLD AUTO: 4.36 MILLION/UL (ref 3.81–5.12)
TIBC SERPL-MCNC: 291 UG/DL (ref 250–450)
WBC # BLD AUTO: 5.52 THOUSAND/UL (ref 4.31–10.16)

## 2023-04-25 NOTE — LETTER
April 25, 2023      Patient:            Paola Lee  YOB: 1970  Date of Visit:    4/25/2023        To Whom it May Concern:     Renetta Swift is under my professional care  Ely was seen in my office on 4/11/2023  Ely may return to work on 4/25/2023    Please allow her to do light duty work until her next visit      If you have any questions or concerns, please don't hesitate to call            Sincerely,            Marti Garcia MD

## 2023-04-25 NOTE — PROGRESS NOTES
Assessment:   Diagnosis ICD-10-CM Associated Orders   1  Closed fracture of distal end of right radius with routine healing, unspecified fracture morphology, subsequent encounter  S52 501D           Plan:  · A discussion was had with the patient that her imaging looks very good at today's visit  · She was given a new brace at today's visit because the brace she had was digging into her fracture site and causing her significant pain  She was also given a sleeve to put under the brace to prevent a rash  She was instructed to wear it most times of the day and night except for hygiene and showering purposes and to ice the wrist     · While her fracture is stable, if she falls again, the fracture may displace  She should exercise caution while her fracture heals  She may contact us if she has any issues or new injuries before her next visit  · The physician was consulted and was instrumental in the formulation of the plan  To do next visit:  Follow-up in 4 weeks for further imaging and to assess post-op pain and function  The above stated was discussed in layman's terms and the patient expressed understanding  All questions were answered to the patient's satisfaction  Subjective:   Maci Isaac is a 52 y o  female who presents to the office today 2 weeks out from her right distal radius fracture sustained on 4/10/2023  Sh has been treated non-operatively  She states that the brace she was given last week presses on  the fracture site and frequently causes her significant amount of pain  Recently she has been taking the brace off at night and just wrapping her wrist in ace bandages  She also says the brace has been giving her a rash  She takes methadone at baseline for chronic pain, and recently has been taking Robaxin, as well          Review of systems negative unless otherwise specified in HPI    Past Medical History:   Diagnosis Date   • Anemia    • Anxiety    • Depression    • Facial cellulitis    • Factor 5 Leiden mutation, heterozygous [facility])    • Fracture, cuboid     LAST ASSESSED: 3/26/15   • Gastrojejunal ulcer     ANASTOMOTIC   • History of small bowel obstruction    • Hypertension    • Iron deficiency anemia     LAST ASSESSED: AND RESOLVED: 4/13/16       Past Surgical History:   Procedure Laterality Date   • ABDOMINOPLASTY      x2   • CHOLECYSTECTOMY     • GASTRIC BYPASS      FOR MORBID OBESITY   • INCISIONAL HERNIA REPAIR     • [place] OPEN TX RADIOCARPAL/INTERCARPAL [address] 1/> BONES Left 1/6/2022    Procedure: OPEN REDUCTION W/ INTERNAL FIXATION (ORIF) LEFT DISTAL RADIUS FRACTURE;  Surgeon: Shan Martinez MD;  [facility];  Service: Orthopedics   • SMALL INTESTINE SURGERY         Family History   Problem Relation Age of Onset   • Diabetes Family         MELLITUS   • Cancer Family    • Hypertension Family    • Osteoporosis Family    • Asthma Family    • Stroke Family         SYNDROME   • Breast cancer Mother    • Depression Mother    • COPD Father    • Asthma Child        Social History     Occupational History   • Not on file   Tobacco Use   • Smoking status: Never     Passive exposure: Never   • Smokeless tobacco: Never   Vaping Use   • Vaping Use: Never used   Substance and Sexual Activity   • Alcohol use: Not Currently   • Drug use: Never     Comment: usues methadone for pain relief   • Sexual activity: Not Currently     Partners: Male         Current Outpatient Medications:   •  ALPRAZolam (XANAX) 1 mg tablet, take 1 tablet by mouth daily AS NEEDED FOR ANXIETY OR SLEEP, Disp: 30 tablet, Rfl: 0  •  cholecalciferol (VITAMIN D3) 1,000 units tablet, Take 2 tablets (2,000 Units total) by mouth daily, Disp: 180 tablet, Rfl: 0  •  Cyanocobalamin (B-12) 1000 MCG SUBL, Place under the tongue, Disp: , Rfl:   •  Eliquis 5 MG, take 1 tablet by mouth twice a day, Disp: 180 tablet, Rfl: 1  •  eszopiclone (LUNESTA) 1 mg tablet, Take 1 tablet (1 mg total) by mouth daily at bedtime as needed for sleep Take immediately before bedtime, Disp: 30 tablet, Rfl: 0  •  methadone (DOLOPHINE) 10 mg tablet, Take 3 tablets by mouth daily,, Disp: 90 tablet, Rfl: 0  •  methocarbamol (ROBAXIN) 500 mg tablet, Take 1 tablet (500 mg total) by mouth 4 (four) times a day for 14 days, Disp: 56 tablet, Rfl: 0  •  naloxone (NARCAN) 4 mg/0 1 mL nasal spray, Administer 1 spray into a nostril   If no response after 2-3 minutes, give another dose in the other nostril using a new spray , Disp: 1 each, Rfl: 1  •  omeprazole (PriLOSEC) 20 mg delayed release capsule, take 1 capsule by mouth once daily, Disp: 90 capsule, Rfl: 0  •  Pediatric Multiple Vit-C-FA (FRUITY CHEWABLES MULTIVITAMIN) CHEW, Chew 1 tablet daily , Disp: , Rfl:   •  senna (SENOKOT) 8 6 MG tablet, Take 1 tablet by mouth as needed  , Disp: , Rfl:   •  sertraline (ZOLOFT) 100 mg tablet, take 1 tablet by mouth twice a day, Disp: 180 tablet, Rfl: 1  •  sucralfate (Carafate) 1 g/10 mL suspension, Take 10 mL (1 g total) by mouth 4 (four) times a day as needed (dyspepsia), Disp: 420 mL, Rfl: 2  •  topiramate (TOPAMAX) 100 mg tablet, Take 1 tablet (100 mg total) by mouth 2 (two) times a day, Disp: 180 tablet, Rfl: 1  •  venlafaxine (EFFEXOR-XR) 75 mg 24 hr capsule, Take 1 capsule (75 mg total) by mouth daily, Disp: 90 capsule, Rfl: 1    Allergies   Allergen Reactions   • Amoxicillin Hives and Shortness Of Breath   • Aspirin Hives and Other (See Comments)     Short of breath   • Butorphanol Anaphylaxis and Other (See Comments)     Other reaction(s): BUTORPHANOL TARTRATE (STADOL) (loss of breath)   • Morphine Other (See Comments) and Hallucinations     takes vicodin at home   • Duloxetine Other (See Comments)     Rapid heartbeat   • Azithromycin Rash   • Gabapentin Palpitations   • Nitrofurantoin Hives and Rash   • Sulfa Antibiotics Hives, Rash and Other (See Comments)            Vitals:    04/25/23 1005   BP: 105/69   Pulse: 69       Objective:    General:  Patient is WDWN, "alert and oriented, appears stated age, and is in no acute distress  Musculoskeletal:    Right Wrist:    Inspection:  Mild wrist edema noted  No significant ecchymosis  No significant erythema  The rash has subsided  Range of Motion:  She is able to move all fingers without difficulty  Sensation:  SILT over the fingers  Other:  Fingers WWP  Diagnostics, reviewed and taken today if performed as documented: The attending physician has personally reviewed the pertinent films in PACS and interpretation is as follows:  Right Wrist X-rays:  Nondisplaced fracture at the distal aspect of the right distal radius  Procedures, if performed today:    None performed      Portions of the record may have been created with voice recognition software  Occasional wrong word or \"sound a like\" substitutions may have occurred due to the inherent limitations of voice recognition software  Read the chart carefully and recognize, using context, where substitutions have occurred    "

## 2023-04-26 LAB — BACTERIA UR CULT: NORMAL

## 2023-04-28 ENCOUNTER — TELEPHONE (OUTPATIENT)
Dept: OBGYN CLINIC | Facility: HOSPITAL | Age: 53
End: 2023-04-28

## 2023-04-28 NOTE — TELEPHONE ENCOUNTER
[facility] radiology    Doctor/Office : [facility]    Radiology CB# : [phone]    [address] [place] [address] Radiology called to report significant findings on right wrist xray from 4/25/23

## 2023-05-02 ENCOUNTER — HOSPITAL ENCOUNTER (OUTPATIENT)
Dept: SLEEP CENTER | Facility: CLINIC | Age: 53
Discharge: HOME | End: 2023-05-02

## 2023-05-02 DIAGNOSIS — G47.59 OTHER PARASOMNIA: ICD-10-CM

## 2023-05-03 NOTE — PROGRESS NOTES
Sleep Study Documentation    Pre-Sleep Study       Sleep testing procedure explained to patient:YES    Patient napped prior to study:NO    Caffeine:Dayshift worker after 12PM   Caffeine use:NO    Alcohol:Dayshift workers after 5PM: Alcohol use:NO    Typical day for patient:YES       Study Documentation    Sleep Study Indications: G47 59    Sleep Study: Diagnostic   Snore:None  Supplemental O2: no    O2 flow rate (L/min) range   O2 flow rate (L/min) final   Minimum SaO2 90  Baseline SaO2 95        Mode of Therapy:    EKG abnormalities: no     EEG abnormalities: no    Sleep Study Recorded < 2 hours: N/A    Sleep Study Recorded > 2 hours but incomplete study: N/A    Sleep Study Recorded 6 hours but no sleep obtained: NO    Patient classification: employed       Post-Sleep Study    Medication used at bedtime or during sleep study:YES prescription sleep aid and other prescription medications    Patient reports time it took to fall asleep:20 to 30 minutes    Patient reports waking up during study:3 or more times  Patient reports returning to sleep in 10 to 30 minutes  Patient reports sleeping 4 to 6 hours without dreaming  Patient reports sleep during study:typical    Patient rated sleepiness: Somewhat sleepy or tired    PAP treatment:no

## 2023-05-05 DIAGNOSIS — S52.501D CLOSED FRACTURE OF DISTAL END OF RIGHT RADIUS WITH ROUTINE HEALING, UNSPECIFIED FRACTURE MORPHOLOGY, SUBSEQUENT ENCOUNTER: Primary | ICD-10-CM

## 2023-05-08 ENCOUNTER — TELEPHONE (OUTPATIENT)
Dept: OBGYN CLINIC | Facility: HOSPITAL | Age: 53
End: 2023-05-08

## 2023-05-08 DIAGNOSIS — F51.01 PRIMARY INSOMNIA: ICD-10-CM

## 2023-05-08 DIAGNOSIS — F41.9 ANXIETY: ICD-10-CM

## 2023-05-08 RX ORDER — ALPRAZOLAM 1 MG/1
1 TABLET ORAL
Qty: 30 TABLET | Refills: 0 | Status: SHIPPED | OUTPATIENT
Start: 2023-05-08

## 2023-05-08 RX ORDER — ESZOPICLONE 1 MG/1
1 TABLET, FILM COATED ORAL
Qty: 30 TABLET | Refills: 0 | Status: SHIPPED | OUTPATIENT
Start: 2023-05-08

## 2023-05-08 NOTE — TELEPHONE ENCOUNTER
Caller: Self    Doctor: Bartolo Rodriguez    Reason for call: Patient following up on previous message  Brace increases pain and would like guidance on what she can do    for sooner appt tomorrow    Call back#: [phone]

## 2023-05-08 NOTE — TELEPHONE ENCOUNTER
Caller: patient    Doctor: Jaspal Cortes    Reason for call: pt called stating she is still having a lot of pain in her right wrist especially when wearing the brace    She would like to know is this normal   Next appt is on 5/22    Call back#: [phone]

## 2023-05-09 ENCOUNTER — OFFICE VISIT (OUTPATIENT)
Dept: OBGYN CLINIC | Facility: HOSPITAL | Age: 53
End: 2023-05-09

## 2023-05-09 ENCOUNTER — HOSPITAL ENCOUNTER (OUTPATIENT)
Dept: RADIOLOGY | Facility: HOSPITAL | Age: 53
Discharge: HOME | End: 2023-05-09

## 2023-05-09 VITALS
HEART RATE: 66 BPM | BODY MASS INDEX: 25.66 KG/M2 | DIASTOLIC BLOOD PRESSURE: 78 MMHG | HEIGHT: 65 IN | WEIGHT: 154 LBS | SYSTOLIC BLOOD PRESSURE: 114 MMHG

## 2023-05-09 DIAGNOSIS — S52.501D CLOSED FRACTURE OF DISTAL END OF RIGHT RADIUS WITH ROUTINE HEALING, UNSPECIFIED FRACTURE MORPHOLOGY, SUBSEQUENT ENCOUNTER: ICD-10-CM

## 2023-05-09 DIAGNOSIS — S52.501D CLOSED FRACTURE OF DISTAL END OF RIGHT RADIUS WITH ROUTINE HEALING, UNSPECIFIED FRACTURE MORPHOLOGY, SUBSEQUENT ENCOUNTER: Primary | ICD-10-CM

## 2023-05-09 NOTE — PROGRESS NOTES
Assessment:   Diagnosis ICD-10-CM Associated Orders   1  Closed fracture of distal end of right radius with routine healing, unspecified fracture morphology, subsequent encounter  S52 501D XR wrist 3+ vw right          Plan:  · A discussion was had with the patient that her imaging looks very good at today's visit  · The patient was given a comfort cool wrist brace  · We will see her back in 2 weeks  To do next visit:  Follow-up in 2 weeks for further imaging and to assess wrist pain and function  The above stated was discussed in layman's terms and the patient expressed understanding  All questions were answered to the patient's satisfaction  Scribe Attestation    I,:  Willian Mckeon PA-C am acting as a scribe while in the presence of the attending physician :       I,:  Gisselle Mendoza MD personally performed the services described in this documentation    as scribed in my presence :           Subjective:   Juany Lancaster is a 52 y o  female who presents who presents to the office today 4 weeks out from her right distal radius fracture sustained on 4/10/2023  She is having significant trouble with her brace  Is it very painful for her  She works as a  and whenever the brace is on, it hurts significantly  This is her second brace  She has dorsal and ulnar pain from the brace        Review of systems negative unless otherwise specified in HPI    Past Medical History:   Diagnosis Date   • Anemia    • Anxiety    • Depression    • Facial cellulitis    • [facility]    • Fracture, cuboid     LAST ASSESSED: 3/26/15   • Gastrojejunal ulcer     ANASTOMOTIC   • History of small bowel obstruction    • Hypertension    • Iron deficiency anemia     LAST ASSESSED: AND RESOLVED: 4/13/16   • Obstructive sleep apnea (adult) (pediatric)        Past Surgical History:   Procedure Laterality Date   • ABDOMINOPLASTY      x2   • CHOLECYSTECTOMY     • GASTRIC BYPASS FOR MORBID OBESITY   • INCISIONAL HERNIA REPAIR     • [place] OPEN TX RADIOCARPAL/INTERCARPAL [address] 1/> BONES Left 1/6/2022    Procedure: OPEN REDUCTION W/ INTERNAL FIXATION (ORIF) LEFT DISTAL RADIUS FRACTURE;  Surgeon: Leanne Baeza MD;  [facility];  Service: Orthopedics   • SMALL INTESTINE SURGERY         Family History   Problem Relation Age of Onset   • Diabetes Family         MELLITUS   • Cancer Family    • Hypertension Family    • Osteoporosis Family    • Asthma Family    • Stroke Family         SYNDROME   • Breast cancer Mother    • Depression Mother    • COPD Father    • Asthma Child        Social History     Occupational History   • Not on file   Tobacco Use   • Smoking status: Never     Passive exposure: Never   • Smokeless tobacco: Never   Vaping Use   • Vaping Use: Never used   Substance and Sexual Activity   • Alcohol use: Not Currently   • Drug use: Never     Comment: usues methadone for pain relief   • Sexual activity: Not Currently     Partners: Male         Current Outpatient Medications:   •  ALPRAZolam (XANAX) 1 mg tablet, Take 1 tablet (1 mg total) by mouth daily at bedtime as needed for anxiety or sleep, Disp: 30 tablet, Rfl: 0  •  cholecalciferol (VITAMIN D3) 1,000 units tablet, Take 2 tablets (2,000 Units total) by mouth daily, Disp: 180 tablet, Rfl: 0  •  Cyanocobalamin (B-12) 1000 MCG SUBL, Place under the tongue, Disp: , Rfl:   •  Eliquis 5 MG, take 1 tablet by mouth twice a day, Disp: 180 tablet, Rfl: 1  •  eszopiclone (LUNESTA) 1 mg tablet, Take 1 tablet (1 mg total) by mouth daily at bedtime as needed for sleep Take immediately before bedtime, Disp: 30 tablet, Rfl: 0  •  methadone (DOLOPHINE) 10 mg tablet, Take 3 tablets by mouth daily,, Disp: 90 tablet, Rfl: 0  •  naloxone (NARCAN) 4 mg/0 1 mL nasal spray, Administer 1 spray into a nostril   If no response after 2-3 minutes, give another dose in the other nostril using a new spray , Disp: 1 each, Rfl: 1  •  omeprazole (PriLOSEC) 20 mg delayed release capsule, take 1 capsule by mouth once daily, Disp: 90 capsule, Rfl: 0  •  Pediatric Multiple Vit-C-FA (FRUITY CHEWABLES MULTIVITAMIN) CHEW, Chew 1 tablet daily , Disp: , Rfl:   •  senna (SENOKOT) 8 6 MG tablet, Take 1 tablet by mouth as needed  , Disp: , Rfl:   •  sertraline (ZOLOFT) 100 mg tablet, take 1 tablet by mouth twice a day, Disp: 180 tablet, Rfl: 1  •  sucralfate (Carafate) 1 g/10 mL suspension, Take 10 mL (1 g total) by mouth 4 (four) times a day as needed (dyspepsia), Disp: 420 mL, Rfl: 2  •  topiramate (TOPAMAX) 100 mg tablet, Take 1 tablet (100 mg total) by mouth 2 (two) times a day, Disp: 180 tablet, Rfl: 1  •  venlafaxine (EFFEXOR-XR) 75 mg 24 hr capsule, Take 1 capsule (75 mg total) by mouth daily, Disp: 90 capsule, Rfl: 1  •  methocarbamol (ROBAXIN) 500 mg tablet, Take 1 tablet (500 mg total) by mouth 4 (four) times a day for 14 days, Disp: 56 tablet, Rfl: 0    Allergies   Allergen Reactions   • Amoxicillin Hives and Shortness Of Breath   • Aspirin Hives and Other (See Comments)     Short of breath   • Butorphanol Anaphylaxis and Other (See Comments)     Other reaction(s): BUTORPHANOL TARTRATE (STADOL) (loss of breath)   • Morphine Other (See Comments) and Hallucinations     takes vicodin at home   • Duloxetine Other (See Comments)     Rapid heartbeat   • Azithromycin Rash   • Gabapentin Palpitations   • Nitrofurantoin Hives and Rash   • Sulfa Antibiotics Hives, Rash and Other (See Comments)            Vitals:    05/09/23 1017   BP: 114/78   Pulse: 66       Objective:     General:  Patient is WDWN, alert and oriented, appears stated age, and is in no acute distress      Musculoskeletal:     Right Wrist:     Inspection:  Mild wrist edema noted  No ecchymosis  No erythema      Range of Motion:  She is able to move all fingers without difficulty      Sensation:  SILT over the fingers      Other:  Fingers WWP          Diagnostics, reviewed and taken today if performed "as documented: The attending physician has personally reviewed the pertinent films in PACS and interpretation is as follows:  Right Wrist X-rays:  Nondisplaced fracture at the distal aspect of the right distal radius  Procedures, if performed today:    None performed      Portions of the record may have been created with voice recognition software  Occasional wrong word or \"sound a like\" substitutions may have occurred due to the inherent limitations of voice recognition software  Read the chart carefully and recognize, using context, where substitutions have occurred    "

## 2023-05-11 DIAGNOSIS — S52.501D CLOSED FRACTURE OF DISTAL END OF RIGHT RADIUS WITH ROUTINE HEALING, UNSPECIFIED FRACTURE MORPHOLOGY, SUBSEQUENT ENCOUNTER: Primary | ICD-10-CM

## 2023-05-22 ENCOUNTER — OFFICE VISIT (OUTPATIENT)
Dept: OBGYN CLINIC | Facility: HOSPITAL | Age: 53
End: 2023-05-22

## 2023-05-22 ENCOUNTER — NURSE TRIAGE (OUTPATIENT)
Dept: OTHER | Facility: OTHER | Age: 53
End: 2023-05-22

## 2023-05-22 ENCOUNTER — HOSPITAL ENCOUNTER (OUTPATIENT)
Dept: RADIOLOGY | Facility: HOSPITAL | Age: 53
Discharge: HOME | End: 2023-05-22
Attending: ORTHOPAEDIC SURGERY

## 2023-05-22 VITALS
SYSTOLIC BLOOD PRESSURE: 125 MMHG | DIASTOLIC BLOOD PRESSURE: 80 MMHG | WEIGHT: 154 LBS | BODY MASS INDEX: 25.66 KG/M2 | HEART RATE: 80 BPM | HEIGHT: 65 IN

## 2023-05-22 DIAGNOSIS — S52.501D CLOSED FRACTURE OF DISTAL END OF RIGHT RADIUS WITH ROUTINE HEALING, UNSPECIFIED FRACTURE MORPHOLOGY, SUBSEQUENT ENCOUNTER: Primary | ICD-10-CM

## 2023-05-22 DIAGNOSIS — S52.501D CLOSED FRACTURE OF DISTAL END OF RIGHT RADIUS WITH ROUTINE HEALING, UNSPECIFIED FRACTURE MORPHOLOGY, SUBSEQUENT ENCOUNTER: ICD-10-CM

## 2023-05-22 NOTE — PROGRESS NOTES
Assessment:   Diagnosis ICD-10-CM Associated Orders   1  Closed fracture of distal end of right radius with routine healing, unspecified fracture morphology, subsequent encounter  S52 501D Ambulatory referral to PT/OT hand therapy          Plan:    52year old female 6 weeks status post right distal radius fracture, DOI 4/10/2023  · X-ray obtained today reviewed with the patient, demonstrates a well healed fracture in maintained alignment  · Referral placed today for patient to initiate outpatient Occupational Therapy for range of motion and strengthening exercises for her right wrist  · May discontinue use of wrist brace as desired  · May take Tylenol/NSAIDs as needed for pain relief  · Follow up in 6 weeks for re-evaluation with x-rays of right wrist, may cancel if feeling well      Diagnostics reviewed and physical exam performed  Diagnosis, treatment options and associated risks were discussed with the patient including no treatment, nonsurgical treatment and potential for surgical intervention  The patient was given the opportunity to ask questions regarding each  To do next visit:  Return in about 6 weeks (around 7/3/2023) for re-evaluation with x-rays, may cancel if feeling well  The above stated was discussed in layman's terms and the patient expressed understanding  All questions were answered to the patient's satisfaction  Scribe Attestation    I,:  Sheba Araiza am acting as a scribe while in the presence of the attending physician :       I,:  Ronda Trejo MD personally performed the services described in this documentation    as scribed in my presence :             Subjective:   Fiordaliza Apple is a 52 y o  female who presents today 6 weeks status post right distal radius fracture sustained 4/10/2023  Patient notes that her wrist is improving overall  She has not worn her brace in 4 days and has not noticed any increase in pain, swelling, or dysfunction   She takes methadone at baseline for chronic pain, and recently has been taking Robaxin, as well  Review of Systems  Pertinent items are noted in HPI  All other systems were reviewed and are negative      Past Medical History:   Diagnosis Date   • Anemia    • Anxiety    • Depression    • Facial cellulitis    • Factor 5 Leiden mutation, heterozygous ([address] )    • Fracture, cuboid     LAST ASSESSED: 3/26/15   • Gastrojejunal ulcer     ANASTOMOTIC   • History of small bowel obstruction    • Hypertension    • Iron deficiency anemia     LAST ASSESSED: AND RESOLVED: 4/13/16   • Obstructive sleep apnea (adult) (pediatric)        Past Surgical History:   Procedure Laterality Date   • ABDOMINOPLASTY      x2   • CHOLECYSTECTOMY     • GASTRIC BYPASS      FOR MORBID OBESITY   • INCISIONAL HERNIA REPAIR     • [place] OPEN TX RADIOCARPAL/INTERCARPAL [address] 1/> BONES Left 1/6/2022    Procedure: OPEN REDUCTION W/ INTERNAL FIXATION (ORIF) LEFT DISTAL RADIUS FRACTURE;  Surgeon: Jason Hurtado MD;  [facility];  Service: Orthopedics   • SMALL INTESTINE SURGERY         Family History   Problem Relation Age of Onset   • Diabetes Family         MELLITUS   • Cancer Family    • Hypertension Family    • Osteoporosis Family    • Asthma Family    • Stroke Family         SYNDROME   • Breast cancer Mother    • Depression Mother    • COPD Father    • Asthma Child        Social History     Occupational History   • Not on file   Tobacco Use   • Smoking status: Never     Passive exposure: Never   • Smokeless tobacco: Never   Vaping Use   • Vaping Use: Never used   Substance and Sexual Activity   • Alcohol use: Not Currently   • Drug use: Never     Comment: usues methadone for pain relief   • Sexual activity: Not Currently     Partners: Male         Current Outpatient Medications:   •  ALPRAZolam (XANAX) 1 mg tablet, Take 1 tablet (1 mg total) by mouth daily at bedtime as needed for anxiety or sleep, Disp: 30 tablet, Rfl: 0  •  cholecalciferol (VITAMIN D3) 1,000 units tablet, Take 2 tablets (2,000 Units total) by mouth daily, Disp: 180 tablet, Rfl: 0  •  Cyanocobalamin (B-12) 1000 MCG SUBL, Place under the tongue, Disp: , Rfl:   •  Eliquis 5 MG, take 1 tablet by mouth twice a day, Disp: 180 tablet, Rfl: 1  •  eszopiclone (LUNESTA) 1 mg tablet, Take 1 tablet (1 mg total) by mouth daily at bedtime as needed for sleep Take immediately before bedtime, Disp: 30 tablet, Rfl: 0  •  methadone (DOLOPHINE) 10 mg tablet, Take 3 tablets by mouth daily,, Disp: 90 tablet, Rfl: 0  •  naloxone (NARCAN) 4 mg/0 1 mL nasal spray, Administer 1 spray into a nostril   If no response after 2-3 minutes, give another dose in the other nostril using a new spray , Disp: 1 each, Rfl: 1  •  omeprazole (PriLOSEC) 20 mg delayed release capsule, take 1 capsule by mouth once daily, Disp: 90 capsule, Rfl: 0  •  Pediatric Multiple Vit-C-FA (FRUITY CHEWABLES MULTIVITAMIN) CHEW, Chew 1 tablet daily , Disp: , Rfl:   •  senna (SENOKOT) 8 6 MG tablet, Take 1 tablet by mouth as needed  , Disp: , Rfl:   •  sertraline (ZOLOFT) 100 mg tablet, take 1 tablet by mouth twice a day, Disp: 180 tablet, Rfl: 1  •  sucralfate (Carafate) 1 g/10 mL suspension, Take 10 mL (1 g total) by mouth 4 (four) times a day as needed (dyspepsia), Disp: 420 mL, Rfl: 2  •  topiramate (TOPAMAX) 100 mg tablet, Take 1 tablet (100 mg total) by mouth 2 (two) times a day, Disp: 180 tablet, Rfl: 1  •  venlafaxine (EFFEXOR-XR) 75 mg 24 hr capsule, Take 1 capsule (75 mg total) by mouth daily, Disp: 90 capsule, Rfl: 1  •  methocarbamol (ROBAXIN) 500 mg tablet, Take 1 tablet (500 mg total) by mouth 4 (four) times a day for 14 days, Disp: 56 tablet, Rfl: 0    Allergies   Allergen Reactions   • Amoxicillin Hives and Shortness Of Breath   • Aspirin Hives and Other (See Comments)     Short of breath   • Butorphanol Anaphylaxis and Other (See Comments)     Other reaction(s): BUTORPHANOL TARTRATE (STADOL) (loss of breath)   • Morphine Other (See Comments) and "Hallucinations     takes vicodin at home   • Duloxetine Other (See Comments)     Rapid heartbeat   • Azithromycin Rash   • Gabapentin Palpitations   • Nitrofurantoin Hives and Rash   • Sulfa Antibiotics Hives, Rash and Other (See Comments)       Physical Exam  /80   Pulse 80   Ht 5' 5\" (1 651 m)   Wt 69 9 kg (154 lb)   LMP 08/12/2020   BMI 25 63 kg/m²   Cons: Appears well  No apparent distress  Psych: Alert  Oriented x3  Mood and affect normal   Eyes: PERRLA, EOMI  Resp: Normal effort  No audible wheezing or stridor  CV: Palpable pulse  No discernable arrhythmia  No LE edema  Lymph:  No palpable cervical, axillary, or inguinal lymphadenopathy  Skin: Warm  No palpable masses  No visible lesions  Neuro: Normal muscle tone  Normal and symmetric DTR's  Objective:    Right Hand & Wrist Exam  Alignment:  Normal resting hand posture  Inspection:  No swelling  No edema  No erythema  No ecchymosis  No muscle atrophy  No deformity  Palpation:  No tenderness  No effusion  No warmth  No crepitus  No clicking, catching, or snapping  ROM:  limited due to stiffness  Strength:  5/5 wrist flexors and wrist extensors  5/5 pronation and supination  5/5  and pinch  Stability:  No objective hand or wrist instability  Tests:  No pertinent positive or negative tests  Neurovascular:  Sensation intact in Ax/R/M/U nerve distributions  2+ radial pulse  Brisk capillary refill in all fingertips  Diagnostics, reviewed and taken today if performed as documented: The attending physician has personally reviewed the pertinent films in PACS and interpretation is as follows:  X-rays of right wrist obtained today reviewed and demonstrate: distal radius fracture well healed in maintained alignment when compared to previously obtained images      Procedures, if performed today:    None performed      Portions of the record may have been created with voice recognition software    Occasional wrong word or \"sound " "a like\" substitutions may have occurred due to the inherent limitations of voice recognition software  Read the chart carefully and recognize, using context, where substitutions have occurred    "

## 2023-05-23 DIAGNOSIS — M25.512 LEFT SHOULDER PAIN, UNSPECIFIED CHRONICITY: ICD-10-CM

## 2023-05-23 RX ORDER — METHADONE HYDROCHLORIDE 10 MG/1
30 TABLET ORAL DAILY
Qty: 90 TABLET | Refills: 0 | Status: SHIPPED | OUTPATIENT
Start: 2023-05-23

## 2023-05-23 NOTE — TELEPHONE ENCOUNTER
"  Reason for Disposition  • Caller requesting a CONTROLLED substance prescription refill (e g , narcotics, ADHD medicines)    Answer Assessment - Initial Assessment Questions  1  DRUG NAME: \"What medicine do you need to have refilled? \"      Methadone  2  REFILLS REMAINING: \"How many refills are remaining? \" (Note: The label on the medicine or pill bottle will show how many refills are remaining  If there are no refills remaining, then a renewal may be needed )      0  4  PRESCRIBING HCP: \"Who prescribed it? \" Reason: If prescribed by specialist, call should be referred to that group  Dr Etta Morales  5  SYMPTOMS: \"Do you have any symptoms? \"      pain    Protocols used: MEDICATION REFILL AND RENEWAL CALL-ADULT-    "

## 2023-05-23 NOTE — TELEPHONE ENCOUNTER
Medication Refill Request     Name Methadone  Dose/Frequency 10mg three times a day  Quantity 90  Verified pharmacy   [x]  Verified ordering Provider   [x]  Does patient have enough for the next 3 days? Yes [] No [x]    Patient ran out of medication Sunday  Requesting refill be sent ASAP  Thank you

## 2023-05-23 NOTE — TELEPHONE ENCOUNTER
"Regarding: medication refill (methadone, refill date passed)  ----- Message from Desean Smart sent at 5/22/2023  7:12 PM EDT -----  \" I was supposed to have my methadone called in to the pharmacy, it was due for refill yesterday and when I spoke with the office last week they informed me they would call it into the pharmacy on time, but its not  Can it  please be refill tonight   \"    "

## 2023-05-31 ENCOUNTER — TELEPHONE (OUTPATIENT)
Dept: SLEEP CENTER | Facility: CLINIC | Age: 53
End: 2023-05-31

## 2023-05-31 NOTE — TELEPHONE ENCOUNTER
Sleep study shows mild sleep apnea and patient is scheduled to meet with Dr Clay Reese on 7/18/2023    Call back message left with appointment details

## 2023-06-05 DIAGNOSIS — F51.01 PRIMARY INSOMNIA: ICD-10-CM

## 2023-06-05 DIAGNOSIS — F41.9 ANXIETY: ICD-10-CM

## 2023-06-05 RX ORDER — ESZOPICLONE 1 MG/1
1 TABLET, FILM COATED ORAL
Qty: 30 TABLET | Refills: 0 | Status: SHIPPED | OUTPATIENT
Start: 2023-06-05

## 2023-06-05 RX ORDER — ALPRAZOLAM 1 MG/1
1 TABLET ORAL
Qty: 30 TABLET | Refills: 0 | Status: SHIPPED | OUTPATIENT
Start: 2023-06-05

## 2023-06-11 ENCOUNTER — OFFICE VISIT (OUTPATIENT)
Dept: URGENT CARE | Age: 53
End: 2023-06-11
Payer: MEDICARE

## 2023-06-11 VITALS — TEMPERATURE: 97.9 F | OXYGEN SATURATION: 99 % | HEART RATE: 74 BPM | RESPIRATION RATE: 18 BRPM

## 2023-06-11 DIAGNOSIS — R21 RASH: Primary | ICD-10-CM

## 2023-06-11 PROCEDURE — 99213 OFFICE O/P EST LOW 20 MIN: CPT

## 2023-06-11 PROCEDURE — 96372 THER/PROPH/DIAG INJ SC/IM: CPT

## 2023-06-11 RX ORDER — METHYLPREDNISOLONE SODIUM SUCCINATE 125 MG/2ML
62.5 INJECTION, POWDER, LYOPHILIZED, FOR SOLUTION INTRAMUSCULAR; INTRAVENOUS ONCE
Status: COMPLETED | OUTPATIENT
Start: 2023-06-11 | End: 2023-06-11

## 2023-06-11 RX ORDER — PREDNISONE 20 MG/1
20 TABLET ORAL DAILY
Qty: 5 TABLET | Refills: 0 | Status: SHIPPED | OUTPATIENT
Start: 2023-06-11 | End: 2023-06-16

## 2023-06-11 RX ORDER — PREDNISONE 50 MG/1
50 TABLET ORAL DAILY
Qty: 5 TABLET | Refills: 0 | Status: SHIPPED | OUTPATIENT
Start: 2023-06-11 | End: 2023-06-11

## 2023-06-11 RX ORDER — NYSTATIN 100000 U/G
OINTMENT TOPICAL 2 TIMES DAILY
Qty: 30 G | Refills: 0 | Status: SHIPPED | OUTPATIENT
Start: 2023-06-11 | End: 2023-06-16

## 2023-06-11 RX ADMIN — METHYLPREDNISOLONE SODIUM SUCCINATE 62.5 MG: 125 INJECTION, POWDER, LYOPHILIZED, FOR SOLUTION INTRAMUSCULAR; INTRAVENOUS at 19:27

## 2023-06-11 NOTE — PROGRESS NOTES
[facility]        NAME: Lulu Costello is a 52 y o  female  : 1970    MRN: [id number]  DATE: 2023  TIME: 7:34 PM    Assessment and Plan   Rash [R21]  1  Rash  methylPREDNISolone sodium succinate (Solu-MEDROL) injection 62 5 mg    nystatin (MYCOSTATIN) ointment    predniSONE 20 mg tablet    DISCONTINUED: predniSONE 50 mg tablet            Patient Instructions       Apply nystatin cream to groin, buttock, under breast   Take prednisone daily for possible poison ivy dermatitis  Follow-up with PCP for evaluation of pruritic rash  If symptoms worsen, or if you develop new or concerning symptoms, please go to the ER  Chief Complaint     Chief Complaint   Patient presents with   • Poison Ivy         History of Present Illness       Patient presenting for evaluation of acute rash  Patient states that over the past 3 days she has been having a worsening rash  She states that the rash started after she pulled weeds 4 days ago  States the rash started on her neck and spread to her face and arms  She states that she has been using Epsom salt baths as well as Caladryl and Benadryl with minimal relief  Patient also notes a rash in her groin, under her breast and in her buttock  Denies any bleeding or drainage from these rashes  She denies any fevers, chills, chest pain or shortness of breath  Patient states that she has been using a new facial moisturizer, is unsure if this has caused the rash  She denies any other new detergents, soaps, medications or foods  Review of Systems   Review of Systems   Constitutional: Negative for chills and fever  Respiratory: Negative for shortness of breath  Cardiovascular: Negative for chest pain  Skin: Positive for rash  All other systems reviewed and are negative          Current Medications       Current Outpatient Medications:   •  nystatin (MYCOSTATIN) ointment, Apply topically 2 (two) times a day for 7 days, Disp: 30 g, Rfl: 0  • predniSONE 20 mg tablet, Take 1 tablet (20 mg total) by mouth daily for 5 days, Disp: 5 tablet, Rfl: 0  •  ALPRAZolam (XANAX) 1 mg tablet, Take 1 tablet (1 mg total) by mouth daily at bedtime as needed for anxiety or sleep, Disp: 30 tablet, Rfl: 0  •  cholecalciferol (VITAMIN D3) 1,000 units tablet, Take 2 tablets (2,000 Units total) by mouth daily, Disp: 180 tablet, Rfl: 0  •  Cyanocobalamin (B-12) 1000 MCG SUBL, Place under the tongue, Disp: , Rfl:   •  Eliquis 5 MG, take 1 tablet by mouth twice a day, Disp: 180 tablet, Rfl: 1  •  eszopiclone (LUNESTA) 1 mg tablet, Take 1 tablet (1 mg total) by mouth daily at bedtime as needed for sleep Take immediately before bedtime, Disp: 30 tablet, Rfl: 0  •  methadone (DOLOPHINE) 10 mg tablet, Take 3 tablets by mouth daily,, Disp: 90 tablet, Rfl: 0  •  methocarbamol (ROBAXIN) 500 mg tablet, Take 1 tablet (500 mg total) by mouth 4 (four) times a day for 14 days, Disp: 56 tablet, Rfl: 0  •  naloxone (NARCAN) 4 mg/0 1 mL nasal spray, Administer 1 spray into a nostril   If no response after 2-3 minutes, give another dose in the other nostril using a new spray , Disp: 1 each, Rfl: 1  •  omeprazole (PriLOSEC) 20 mg delayed release capsule, take 1 capsule by mouth once daily, Disp: 90 capsule, Rfl: 0  •  Pediatric Multiple Vit-C-FA (FRUITY CHEWABLES MULTIVITAMIN) CHEW, Chew 1 tablet daily , Disp: , Rfl:   •  senna (SENOKOT) 8 6 MG tablet, Take 1 tablet by mouth as needed  , Disp: , Rfl:   •  sertraline (ZOLOFT) 100 mg tablet, take 1 tablet by mouth twice a day, Disp: 180 tablet, Rfl: 1  •  sucralfate (Carafate) 1 g/10 mL suspension, Take 10 mL (1 g total) by mouth 4 (four) times a day as needed (dyspepsia), Disp: 420 mL, Rfl: 2  •  topiramate (TOPAMAX) 100 mg tablet, Take 1 tablet (100 mg total) by mouth 2 (two) times a day, Disp: 180 tablet, Rfl: 1  •  venlafaxine (EFFEXOR-XR) 75 mg 24 hr capsule, Take 1 capsule (75 mg total) by mouth daily, Disp: 90 capsule, Rfl: 1  No current facility-administered medications for this visit      Current Allergies     Allergies as of 06/11/2023 - Reviewed 06/11/2023   Allergen Reaction Noted   • Amoxicillin Hives and Shortness Of Breath 11/16/2015   • Aspirin Hives and Other (See Comments) 08/23/2009   • Butorphanol Anaphylaxis and Other (See Comments) 08/23/2009   • Morphine Other (See Comments) and Hallucinations 08/23/2009   • Duloxetine Other (See Comments) 07/30/2013   • Azithromycin Rash 05/14/2020   • Gabapentin Palpitations 02/15/2016   • Nitrofurantoin Hives and Rash 06/14/2012   • Sulfa antibiotics Hives, Rash, and Other (See Comments) 08/23/2009            The following portions of the patient's history were reviewed and updated as appropriate: allergies, current medications, past family history, past medical history, past social history, past surgical history and problem list      Past Medical History:   Diagnosis Date   • Anemia    • Anxiety    • Depression    • Facial cellulitis    • Factor 5 Leiden mutation, heterozygous ([facility] [address] )    • Fracture, cuboid     LAST ASSESSED: 3/26/15   • Gastrojejunal ulcer     ANASTOMOTIC   • History of small bowel obstruction    • Hypertension    • Iron deficiency anemia     LAST ASSESSED: AND RESOLVED: 4/13/16   • Obstructive sleep apnea (adult) (pediatric)        Past Surgical History:   Procedure Laterality Date   • ABDOMINOPLASTY      x2   • CHOLECYSTECTOMY     • GASTRIC BYPASS      FOR MORBID OBESITY   • INCISIONAL HERNIA REPAIR     • GA OPEN TX RADIOCARPAL/INTERCARPAL [address]/> BONES Left 1/6/2022    Procedure: OPEN REDUCTION W/ INTERNAL FIXATION (ORIF) LEFT DISTAL RADIUS FRACTURE;  Surgeon: Katie Guillen MD;  Location: [facility];  Service: Orthopedics   • SMALL INTESTINE SURGERY         Family History   Problem Relation Age of Onset   • Diabetes Family         MELLITUS   • Cancer Family    • Hypertension Family    • Osteoporosis Family    • Asthma Family    • Stroke Family         SYNDROME   • Breast cancer Mother    • Depression Mother    • COPD Father    • Asthma Child          Medications have been verified  Objective   Pulse 74   Temp 97 9 °F (36 6 °C)   Resp 18   LMP 08/12/2020   SpO2 99%        Physical Exam     Physical Exam  Vitals and nursing note reviewed  Constitutional:       General: She is not in acute distress  Appearance: Normal appearance  She is normal weight  She is not ill-appearing, toxic-appearing or diaphoretic  HENT:      Head: Normocephalic and atraumatic  Eyes:      General:         Right eye: No discharge  Left eye: No discharge  Cardiovascular:      Rate and Rhythm: Normal rate  Pulses: Normal pulses  Pulmonary:      Effort: Pulmonary effort is normal    Skin:     Findings: Rash present  Rash is macular, papular, purpuric and urticarial              Comments: Rash type 1 generalized to left lower extremity   Rash type 2 noted to neck, face and b/l arms   Rash type 3 noted to groin, under breast and buttock   Neurological:      Mental Status: She is alert

## 2023-06-11 NOTE — PATIENT INSTRUCTIONS
Apply nystatin cream to groin, buttock, under breast   Take prednisone daily for possible poison ivy dermatitis  Follow-up with PCP for evaluation of pruritic rash  If symptoms worsen, or if you develop new or concerning symptoms, please go to the ER

## 2023-06-11 NOTE — LETTER
June 11, 2023     Patient: Chriss Jones   YOB: 1970   Date of Visit: 6/11/2023       To Whom it May Concern:    Dustin Ervin was seen in my clinic on 6/11/2023  She may return to work on 6/12/2023   If you have any questions or concerns, please don't hesitate to call           Sincerely,          JASE Boyd        CC: No Recipients

## 2023-06-13 ENCOUNTER — OFFICE VISIT (OUTPATIENT)
Dept: INTERNAL MEDICINE CLINIC | Facility: CLINIC | Age: 53
End: 2023-06-13
Payer: MEDICARE

## 2023-06-13 VITALS
DIASTOLIC BLOOD PRESSURE: 86 MMHG | HEIGHT: 65 IN | WEIGHT: 154 LBS | HEART RATE: 73 BPM | TEMPERATURE: 97.2 F | OXYGEN SATURATION: 98 % | SYSTOLIC BLOOD PRESSURE: 136 MMHG | BODY MASS INDEX: 25.66 KG/M2 | RESPIRATION RATE: 18 BRPM

## 2023-06-13 DIAGNOSIS — F41.9 ANXIETY: ICD-10-CM

## 2023-06-13 DIAGNOSIS — R21 RASH: ICD-10-CM

## 2023-06-13 DIAGNOSIS — D68.51 FACTOR 5 LEIDEN MUTATION, HETEROZYGOUS (HCC): ICD-10-CM

## 2023-06-13 DIAGNOSIS — F51.01 PRIMARY INSOMNIA: Primary | ICD-10-CM

## 2023-06-13 PROCEDURE — 99214 OFFICE O/P EST MOD 30 MIN: CPT | Performed by: INTERNAL MEDICINE

## 2023-06-13 NOTE — ASSESSMENT & PLAN NOTE
Overall sleep is doing better with Lunesta  She is also taking Xanax at bedtime  Would continue with current regimen for now she recently completed sleep study which was suggestive of mild sleep apnea  Majority of events recorded were central apneas    She has follow-up arranged with sleep medicine to discuss      Previous medications: Ambien, trazodone, Lunesta, nortriptyline, temazepam

## 2023-06-13 NOTE — PROGRESS NOTES
INTERNAL MEDICINE OFFICE VISIT  [facility] Internal Medicine- [place]    NAME: Ely Cantrell  AGE: 52 y o  SEX: female    DATE OF ENCOUNTER: 6/13/2023    Assessment and Plan/History of Present Illness     Here today for follow-up  She has a medical history of protein S deficiency, factor 5 Leiden mutation with chronic DVT on Eliquis, SBO in May of 2021 status post surgical intervention, chronic pain maintained on methadone, status post gastric bypass procedure with iron deficiency anemia     1  Primary insomnia  Assessment & Plan:  Overall sleep is doing better with Lunesta  She is also taking Xanax at bedtime  Would continue with current regimen for now she recently completed sleep study which was suggestive of mild sleep apnea  Majority of events recorded were central apneas  She has follow-up arranged with sleep medicine to discuss      Previous medications: Ambien, trazodone, Lunesta, nortriptyline, temazepam              2  Factor 5 Leiden mutation, heterozygous [facility])  Assessment & Plan:  -history of factor 5 Leiden mutation, protein S deficiency, states she had 2 prior clotting episodes and with the 2nd episode the clots went to her lungs  -remains on indefinite anticoagulation with Eliquis      3  Anxiety  Assessment & Plan:  - Symptoms stable  -On Sertraline, Effexor  -takes alprazolam at bedtime for anxiety and insomnia  4  Rash  Assessment & Plan:  1 month history of scattered, petechial appearing rash on the legs and arms  This prior Friday she developed erythematous scattered rash of the face which has since spread to involve the bilateral arms and chest   She removed a weed off a bush 2 days prior to appearance of rash  Around that time she has also had onset of beefy red rash in the gluteal cleft, groin region, and under her breasts    Went to urgent care and was administered methylprednisolone injection, given prednisone for 5 days, nystatin ointment for poison ivy dermatitis and suspected intertrigo she has been using Benadryl for relief of itch  Advised her to continue with prednisone, Benadryl nystatin for now  May continue with pramoxine-calamine as well  She did recently use a new moisturizer with sweet almond oil on the face/arms/legs which she has discontinued                No orders of the defined types were placed in this encounter        Chief Complaint     Chief Complaint   Patient presents with   • Follow-up     4 months, rash everywhere, worried that they may be different rashes, small red dots are not itchy, was working in garden, started last Friday        Review of Systems     10 point ROS negative except per HPI    The following portions of the patient's history were reviewed and updated as appropriate: allergies, current medications, past family history, past medical history, past social history, past surgical history and problem list     Active Problem List     Patient Active Problem List   Diagnosis   • Anxiety   • Depression   • GERD without esophagitis   • Primary insomnia   • Migraine, unspecified, not intractable, without status migrainosus   • Pain syndrome, chronic   • Postgastrectomy malabsorption   • Vitamin D deficiency   • Ventral hernia without obstruction or gangrene   • History of bariatric surgery   • ASCUS with positive high risk HPV cervical   • Urinary tract infection with hematuria   • Symptomatic varicose veins of both lower extremities   • Chronic deep vein thrombosis (DVT) of right femoral vein (HCC)   • Neutrophilic leukocytosis   • Adhesive capsulitis of left shoulder   • Factor 5 Leiden mutation, heterozygous ([facility] [address] )   • Protein S deficiency ([facility] [address] )   • SBO (small bowel obstruction) ([facility] [address] )   • Breast mass, right   • Closed fracture of left distal radius and ulna   • Iron (Fe) deficiency anemia   • Breast pain, left   • Pain in left elbow   • Obstructive sleep apnea (adult) (pediatric)   • Central sleep apnea   • Rash       Objective     /86 "(BP Location: Left arm, Patient Position: Sitting, Cuff Size: Standard)   Pulse 73   Temp (!) 97 2 °F (36 2 °C)   Resp 18   Ht 5' 5\" (1 651 m)   Wt 69 9 kg (154 lb)   LMP 08/12/2020   SpO2 98%   BMI 25 63 kg/m²     Physical Exam  Skin:     Findings: Rash present  Pertinent Laboratory/Diagnostic Studies:  XR wrist 3+ vw right    Result Date: 5/28/2023  Impression: Stable alignment of a healing distal radius fracture  Workstation performed: [id number]       Images and diagnostics reviewed     Current Medications     Current Outpatient Medications:   •  ALPRAZolam (XANAX) 1 mg tablet, Take 1 tablet (1 mg total) by mouth daily at bedtime as needed for anxiety or sleep, Disp: 30 tablet, Rfl: 0  •  cholecalciferol (VITAMIN D3) 1,000 units tablet, Take 2 tablets (2,000 Units total) by mouth daily, Disp: 180 tablet, Rfl: 0  •  Eliquis 5 MG, take 1 tablet by mouth twice a day, Disp: 180 tablet, Rfl: 1  •  eszopiclone (LUNESTA) 1 mg tablet, Take 1 tablet (1 mg total) by mouth daily at bedtime as needed for sleep Take immediately before bedtime, Disp: 30 tablet, Rfl: 0  •  methadone (DOLOPHINE) 10 mg tablet, Take 3 tablets by mouth daily,, Disp: 90 tablet, Rfl: 0  •  methocarbamol (ROBAXIN) 500 mg tablet, Take 1 tablet (500 mg total) by mouth 4 (four) times a day for 14 days, Disp: 56 tablet, Rfl: 0  •  naloxone (NARCAN) 4 mg/0 1 mL nasal spray, Administer 1 spray into a nostril   If no response after 2-3 minutes, give another dose in the other nostril using a new spray , Disp: 1 each, Rfl: 1  •  nystatin (MYCOSTATIN) ointment, Apply topically 2 (two) times a day for 7 days, Disp: 30 g, Rfl: 0  •  omeprazole (PriLOSEC) 20 mg delayed release capsule, take 1 capsule by mouth once daily, Disp: 90 capsule, Rfl: 0  •  Pediatric Multiple Vit-C-FA (FRUITY CHEWABLES MULTIVITAMIN) CHEW, Chew 1 tablet daily , Disp: , Rfl:   •  predniSONE 20 mg tablet, Take 1 tablet (20 mg total) by mouth daily for 5 days, Disp: 5 tablet, " Rfl: 0  •  senna (SENOKOT) 8 6 MG tablet, Take 1 tablet by mouth as needed  , Disp: , Rfl:   •  sertraline (ZOLOFT) 100 mg tablet, take 1 tablet by mouth twice a day, Disp: 180 tablet, Rfl: 1  •  sucralfate (Carafate) 1 g/10 mL suspension, Take 10 mL (1 g total) by mouth 4 (four) times a day as needed (dyspepsia), Disp: 420 mL, Rfl: 2  •  topiramate (TOPAMAX) 100 mg tablet, Take 1 tablet (100 mg total) by mouth 2 (two) times a day, Disp: 180 tablet, Rfl: 1  •  venlafaxine (EFFEXOR-XR) 75 mg 24 hr capsule, Take 1 capsule (75 mg total) by mouth daily, Disp: 90 capsule, Rfl: 1  •  Cyanocobalamin (B-12) 1000 MCG SUBL, Place under the tongue (Patient not taking: Reported on 6/13/2023), Disp: , Rfl:     Health Maintenance     Health Maintenance   Topic Date Due   • COVID-19 Vaccine (1) Never done   • OT PLAN OF CARE  07/25/2018   • Colorectal Cancer Screening  04/21/2020   • PT PLAN OF CARE  03/24/2022   • Annual Physical  04/20/2023   • BMI: Followup Plan  01/17/2024   • Cervical Cancer Screening  03/12/2024   • Breast Cancer Screening: Mammogram  03/30/2024   • BMI: Adult  06/13/2024   • DTaP,Tdap,and Td Vaccines (2 - Td or Tdap) 10/22/2029   • HIV Screening  Completed   • Hepatitis C Screening  Completed   • Osteoporosis Screening  Completed   • Influenza Vaccine  Completed   • Pneumococcal Vaccine: Pediatrics (0 to 5 Years) and At-Risk Patients (6 to 59 Years)  Aged Out   • HIB Vaccine  Aged Out   • IPV Vaccine  Aged Out   • Hepatitis A Vaccine  Aged Out   • Meningococcal ACWY Vaccine  Aged Out   • HPV Vaccine  Aged [employer] History   Administered Date(s) Administered   • INFLUENZA 10/08/2014, 11/25/2016, 10/11/2021, 11/23/2022   • Influenza Quadrivalent Preservative Free 3 years and older IM 10/08/2014   • Influenza Quadrivalent, 6-35 Months IM 11/25/2016, 10/12/2017   • Influenza, recombinant, quadrivalent,injectable, preservative free 01/03/2019, 10/22/2019, 12/02/2020   • Influenza, seasonal, injectable 10/21/2011, 12/05/2012, 10/22/2013, 10/30/2015   • Tdap 10/22/2019   • Tuberculin Skin Test-PPD Intradermal 06/06/2018       RO Badillo  Internal [facility] [address], [facility] [address]  [place], [address]   Office: [phone]  Fax: [phone]

## 2023-06-13 NOTE — ASSESSMENT & PLAN NOTE
1 month history of scattered, petechial appearing rash on the legs and arms  This prior Friday she developed erythematous scattered rash of the face which has since spread to involve the bilateral arms and chest   She removed a weed off a bush 2 days prior to appearance of rash  Around that time she has also had onset of beefy red rash in the gluteal cleft, groin region, and under her breasts  Went to urgent care and was administered methylprednisolone injection, given prednisone for 5 days, nystatin ointment for poison ivy dermatitis and suspected intertrigo she has been using Benadryl for relief of itch  Advised her to continue with prednisone, Benadryl nystatin for now  May continue with pramoxine-calamine as well    She did recently use a new moisturizer with sweet almond oil on the face/arms/legs which she has discontinued

## 2023-06-16 ENCOUNTER — TELEPHONE (OUTPATIENT)
Dept: INTERNAL MEDICINE CLINIC | Facility: CLINIC | Age: 53
End: 2023-06-16

## 2023-06-16 DIAGNOSIS — L30.4 INTERTRIGO: Primary | ICD-10-CM

## 2023-06-16 DIAGNOSIS — R21 RASH: Primary | ICD-10-CM

## 2023-06-16 RX ORDER — CLOTRIMAZOLE 1 %
CREAM (GRAM) TOPICAL 2 TIMES DAILY
Qty: 30 G | Refills: 0 | Status: SHIPPED | OUTPATIENT
Start: 2023-06-16 | End: 2023-06-19 | Stop reason: SDUPTHER

## 2023-06-16 RX ORDER — FAMOTIDINE 20 MG/1
20 TABLET, FILM COATED ORAL 2 TIMES DAILY
Qty: 60 TABLET | Refills: 0 | Status: SHIPPED | OUTPATIENT
Start: 2023-06-16 | End: 2024-06-10

## 2023-06-16 RX ORDER — PREDNISONE 10 MG/1
TABLET ORAL
Qty: 20 TABLET | Refills: 0 | Status: SHIPPED | OUTPATIENT
Start: 2023-06-16 | End: 2023-06-24

## 2023-06-16 NOTE — TELEPHONE ENCOUNTER
Recommend starting prednisone taper at higher dose - 40 mg daily, can reduce dose by 10 mg every 2 days  She can also begin taking Pepcid twice daily which should help prevent stomach upset and may help with allergic reaction as well    She is okay to continue with Benadryl in addition    Can use calamine lotion or Burow's solution for breakthrough itch

## 2023-06-16 NOTE — TELEPHONE ENCOUNTER
Patient is calling to state her poison ivy is now spreading she would like to know what the next step would be

## 2023-06-19 DIAGNOSIS — L30.4 INTERTRIGO: ICD-10-CM

## 2023-06-19 RX ORDER — CLOTRIMAZOLE 1 %
CREAM (GRAM) TOPICAL 2 TIMES DAILY
Qty: 30 G | Refills: 0 | Status: SHIPPED | OUTPATIENT
Start: 2023-06-19

## 2023-06-22 DIAGNOSIS — S52.501D CLOSED FRACTURE OF DISTAL END OF RIGHT RADIUS WITH ROUTINE HEALING, UNSPECIFIED FRACTURE MORPHOLOGY, SUBSEQUENT ENCOUNTER: Primary | ICD-10-CM

## 2023-06-22 DIAGNOSIS — M25.512 LEFT SHOULDER PAIN, UNSPECIFIED CHRONICITY: ICD-10-CM

## 2023-06-22 RX ORDER — METHADONE HYDROCHLORIDE 10 MG/1
30 TABLET ORAL DAILY
Qty: 90 TABLET | Refills: 0 | Status: SHIPPED | OUTPATIENT
Start: 2023-06-22

## 2023-06-25 ENCOUNTER — APPOINTMENT (OUTPATIENT)
Dept: URGENT CARE | Age: 53
End: 2023-06-25
Payer: MEDICARE

## 2023-06-25 ENCOUNTER — OFFICE VISIT (OUTPATIENT)
Dept: URGENT CARE | Age: 53
End: 2023-06-25
Payer: MEDICARE

## 2023-06-25 VITALS
DIASTOLIC BLOOD PRESSURE: 72 MMHG | HEART RATE: 70 BPM | TEMPERATURE: 98.1 F | RESPIRATION RATE: 12 BRPM | OXYGEN SATURATION: 98 % | SYSTOLIC BLOOD PRESSURE: 118 MMHG

## 2023-06-25 DIAGNOSIS — J01.90 ACUTE NON-RECURRENT SINUSITIS, UNSPECIFIED LOCATION: Primary | ICD-10-CM

## 2023-06-25 PROCEDURE — 99213 OFFICE O/P EST LOW 20 MIN: CPT | Performed by: PHYSICIAN ASSISTANT

## 2023-06-25 RX ORDER — FLUTICASONE PROPIONATE 50 MCG
2 SPRAY, SUSPENSION (ML) NASAL DAILY
Qty: 16 G | Refills: 0 | Status: SHIPPED | OUTPATIENT
Start: 2023-06-25

## 2023-06-25 RX ORDER — DOXYCYCLINE 100 MG/1
100 CAPSULE ORAL 2 TIMES DAILY
Qty: 14 CAPSULE | Refills: 0 | Status: SHIPPED | OUTPATIENT
Start: 2023-06-25 | End: 2023-07-02

## 2023-06-25 NOTE — PROGRESS NOTES
[facility]        NAME: Jesu Greenberg is a 52 y o  female  : 1970    MRN: [id number]  DATE: 2023  TIME: 10:36 AM    Assessment and Plan   Acute non-recurrent sinusitis, unspecified location [J01 90]  1  Acute non-recurrent sinusitis, unspecified location  fluticasone (FLONASE) 50 mcg/act nasal spray    doxycycline monohydrate (MONODOX) 100 mg capsule            Patient Instructions     Discussed condition with pt  He/she has acute sinusitis which I will treat with an oral abx and Flonase and rec hydration, rest, discussed OTC cough/cold meds, and observation  Follow up with PCP in 3-5 days  Proceed to  ER if symptoms worsen  Chief Complaint     Chief Complaint   Patient presents with   • Headache     For a few days   • Earache     Bilateral pain and pressure; clogged feeling as well   • Dizziness         History of Present Illness       Patient presents with several day history of headache, dizziness, bilateral ear pain and pressure with clogged sensation  She denies sore throat, cough, fever, chills, NVD, recent COVID exposure  Review of Systems   Review of Systems   Constitutional: Negative  HENT: Positive for ear pain  Negative for sore throat  Respiratory: Negative  Cardiovascular: Negative  Gastrointestinal: Negative  Genitourinary: Negative  Neurological: Positive for dizziness and headaches  Current Medications       Current Outpatient Medications:   •  doxycycline monohydrate (MONODOX) 100 mg capsule, Take 1 capsule (100 mg total) by mouth 2 (two) times a day for 7 days Take with food (non-dairy)  , Disp: 14 capsule, Rfl: 0  •  fluticasone (FLONASE) 50 mcg/act nasal spray, 2 sprays into each nostril daily, Disp: 16 g, Rfl: 0  •  ALPRAZolam (XANAX) 1 mg tablet, Take 1 tablet (1 mg total) by mouth daily at bedtime as needed for anxiety or sleep, Disp: 30 tablet, Rfl: 0  •  cholecalciferol (VITAMIN D3) 1,000 units tablet, Take 2 tablets (2,000 Units total) by mouth daily, Disp: 180 tablet, Rfl: 0  •  clotrimazole (LOTRIMIN) 1 % cream, Apply topically 2 (two) times a day, Disp: 30 g, Rfl: 0  •  Cyanocobalamin (B-12) 1000 MCG SUBL, Place under the tongue (Patient not taking: Reported on 6/13/2023), Disp: , Rfl:   •  Eliquis 5 MG, take 1 tablet by mouth twice a day, Disp: 180 tablet, Rfl: 1  •  eszopiclone (LUNESTA) 1 mg tablet, Take 1 tablet (1 mg total) by mouth daily at bedtime as needed for sleep Take immediately before bedtime, Disp: 30 tablet, Rfl: 0  •  famotidine (PEPCID) 20 mg tablet, Take 1 tablet (20 mg total) by mouth 2 (two) times a day, Disp: 60 tablet, Rfl: 0  •  methadone (DOLOPHINE) 10 mg tablet, Take 3 tablets by mouth daily,, Disp: 90 tablet, Rfl: 0  •  methocarbamol (ROBAXIN) 500 mg tablet, Take 1 tablet (500 mg total) by mouth 4 (four) times a day for 14 days, Disp: 56 tablet, Rfl: 0  •  naloxone (NARCAN) 4 mg/0 1 mL nasal spray, Administer 1 spray into a nostril   If no response after 2-3 minutes, give another dose in the other nostril using a new spray , Disp: 1 each, Rfl: 1  •  omeprazole (PriLOSEC) 20 mg delayed release capsule, take 1 capsule by mouth once daily, Disp: 90 capsule, Rfl: 0  •  Pediatric Multiple Vit-C-FA (FRUITY CHEWABLES MULTIVITAMIN) CHEW, Chew 1 tablet daily , Disp: , Rfl:   •  senna (SENOKOT) 8 6 MG tablet, Take 1 tablet by mouth as needed  , Disp: , Rfl:   •  sertraline (ZOLOFT) 100 mg tablet, take 1 tablet by mouth twice a day, Disp: 180 tablet, Rfl: 1  •  sucralfate (Carafate) 1 g/10 mL suspension, Take 10 mL (1 g total) by mouth 4 (four) times a day as needed (dyspepsia), Disp: 420 mL, Rfl: 2  •  topiramate (TOPAMAX) 100 mg tablet, Take 1 tablet (100 mg total) by mouth 2 (two) times a day, Disp: 180 tablet, Rfl: 1  •  venlafaxine (EFFEXOR-XR) 75 mg 24 hr capsule, Take 1 capsule (75 mg total) by mouth daily, Disp: 90 capsule, Rfl: 1    Current Allergies     Allergies as of 06/25/2023 - Reviewed 06/25/2023 Allergen Reaction Noted   • Amoxicillin Hives and Shortness Of Breath 11/16/2015   • Aspirin Hives and Other (See Comments) 08/23/2009   • Butorphanol Anaphylaxis and Other (See Comments) 08/23/2009   • Morphine Other (See Comments) and Hallucinations 08/23/2009   • Duloxetine Other (See Comments) 07/30/2013   • Azithromycin Rash 05/14/2020   • Gabapentin Palpitations 02/15/2016   • Nitrofurantoin Hives and Rash 06/14/2012   • Sulfa antibiotics Hives, Rash, and Other (See Comments) 08/23/2009            The following portions of the patient's history were reviewed and updated as appropriate: allergies, current medications, past family history, past medical history, past social history, past surgical history and problem list      Past Medical History:   Diagnosis Date   • Anemia    • Anxiety    • Depression    • Facial cellulitis    • Factor 5 Leiden mutation, heterozygous ([facility] [address] )    • Fracture, cuboid     LAST ASSESSED: 3/26/15   • Gastrojejunal ulcer     ANASTOMOTIC   • History of small bowel obstruction    • Hypertension    • Iron deficiency anemia     LAST ASSESSED: AND RESOLVED: 4/13/16   • Obstructive sleep apnea (adult) (pediatric)        Past Surgical History:   Procedure Laterality Date   • ABDOMINOPLASTY      x2   • CHOLECYSTECTOMY     • GASTRIC BYPASS      FOR MORBID OBESITY   • INCISIONAL HERNIA REPAIR     • ME OPEN TX RADIOCARPAL/INTERCARPAL [address] 1/> BONES Left 1/6/2022    Procedure: OPEN REDUCTION W/ INTERNAL FIXATION (ORIF) LEFT DISTAL RADIUS FRACTURE;  Surgeon: Lonie Lanes, MD;  [facility];  Service: Orthopedics   • SMALL INTESTINE SURGERY         Family History   Problem Relation Age of Onset   • Diabetes Family         MELLITUS   • Cancer Family    • Hypertension Family    • Osteoporosis Family    • Asthma Family    • Stroke Family         SYNDROME   • Breast cancer Mother    • Depression Mother    • COPD Father    • Asthma Child          Medications have been verified  Objective   /72 (BP Location: Left arm, Patient Position: Sitting, Cuff Size: Large)   Pulse 70   Temp 98 1 °F (36 7 °C) (Temporal)   Resp 12   LMP 08/12/2020   SpO2 98%   Patient's last menstrual period was 08/12/2020  Physical Exam     Physical Exam  Vitals reviewed  Constitutional:       General: She is not in acute distress  Appearance: She is well-developed  HENT:      Right Ear: Ear canal and external ear normal       Left Ear: Ear canal and external ear normal       Ears:      Comments: Bilateral dull TMs     Nose: Mucosal edema (Bilateral boggy turbinates) and congestion present  Mouth/Throat:      Mouth: Mucous membranes are moist       Pharynx: Oropharynx is clear  Cardiovascular:      Rate and Rhythm: Normal rate and regular rhythm  Pulses: Normal pulses  Heart sounds: Normal heart sounds  No murmur heard  Pulmonary:      Effort: Pulmonary effort is normal  No respiratory distress  Breath sounds: Normal breath sounds  Musculoskeletal:      Cervical back: Neck supple  Lymphadenopathy:      Cervical: No cervical adenopathy  Neurological:      Mental Status: She is alert and oriented to person, place, and time

## 2023-06-25 NOTE — PATIENT INSTRUCTIONS
Sinusitis   WHAT YOU NEED TO KNOW:   Sinusitis is inflammation or infection of your sinuses  Sinusitis is most often caused by a virus  Acute sinusitis may last up to 12 weeks  Chronic sinusitis lasts longer than 12 weeks  Recurrent sinusitis means you have 4 or more infections in 1 year  DISCHARGE INSTRUCTIONS:   Return to the emergency department if:   You have trouble breathing or wheezing that is getting worse  You have a stiff neck, a fever, or a bad headache  You cannot open your eye  Your eyeball bulges out or you cannot move your eye  You are more sleepy than normal, or you notice changes in your ability to think, move, or talk  You have swelling of your forehead or scalp  Call your doctor if:   You have vision changes, such as double vision  Your eye and eyelid are red, swollen, and painful  Your symptoms do not improve or go away after 10 days  You have nausea and are vomiting  Your nose is bleeding  You have questions or concerns about your condition or care  Medicines: Your symptoms may go away on their own  Your healthcare provider may recommend watchful waiting for up to 10 days before starting antibiotics  You may need any of the following:  Acetaminophen  decreases pain and fever  It is available without a doctor's order  Ask how much to take and how often to take it  Follow directions  Read the labels of all other medicines you are using to see if they also contain acetaminophen, or ask your doctor or pharmacist  Acetaminophen can cause liver damage if not taken correctly  NSAIDs , such as ibuprofen, help decrease swelling, pain, and fever  This medicine is available with or without a doctor's order  NSAIDs can cause stomach bleeding or kidney problems in certain people  If you take blood thinner medicine, always ask your healthcare provider if NSAIDs are safe for you  Always read the medicine label and follow directions      Nasal steroid sprays may help decrease inflammation in your nose and sinuses  Decongestants  help reduce swelling and drain mucus in the nose and sinuses  They may help you breathe easier  Antihistamines  help dry mucus in the nose and relieve sneezing  Antibiotics  help treat or prevent a bacterial infection  Take your medicine as directed  Contact your healthcare provider if you think your medicine is not helping or if you have side effects  Tell your provider if you are allergic to any medicine  Keep a list of the medicines, vitamins, and herbs you take  Include the amounts, and when and why you take them  Bring the list or the pill bottles to follow-up visits  Carry your medicine list with you in case of an emergency  Self-care:   Rinse your sinuses as directed  Use a sinus rinse device to rinse your nasal passages with a saline (salt water) solution or distilled water  Do not use tap water  This will help thin the mucus in your nose and rinse away pollen and dirt  It will also help reduce swelling so you can breathe normally  Use a humidifier  to increase air moisture in your home  This may make it easier for you to breathe and help decrease your cough  Sleep with your head elevated  Place an extra pillow under your head before you go to sleep to help your sinuses drain  Drink liquids as directed  Ask your healthcare provider how much liquid to drink each day and which liquids are best for you  Liquids will thin the mucus in your nose and help it drain  Avoid drinks that contain alcohol or caffeine  Do not smoke, and avoid secondhand smoke  Nicotine and other chemicals in cigarettes and cigars can make your symptoms worse  Ask your healthcare provider for information if you currently smoke and need help to quit  E-cigarettes or smokeless tobacco still contain nicotine  Talk to your healthcare provider before you use these products      Prevent the spread of germs:   Wash your hands often with soap and water  Wash your hands after you use the bathroom, change a child's diaper, or sneeze  Wash your hands before you prepare or eat food  Stay away from people who are sick  Some germs spread easily and quickly through contact  Follow up with your doctor as directed: You may be referred to an ear, nose, and throat specialist  Write down your questions so you remember to ask them during your visits  © Copyright [employer] 2022 Information is for End User's use only and may not be sold, redistributed or otherwise used for commercial purposes  The above information is an  only  It is not intended as medical advice for individual conditions or treatments  Talk to your doctor, nurse or pharmacist before following any medical regimen to see if it is safe and effective for you

## 2023-07-05 DIAGNOSIS — F51.01 PRIMARY INSOMNIA: ICD-10-CM

## 2023-07-05 DIAGNOSIS — F41.9 ANXIETY: ICD-10-CM

## 2023-07-05 RX ORDER — ALPRAZOLAM 1 MG/1
1 TABLET ORAL
Qty: 30 TABLET | Refills: 0 | Status: SHIPPED | OUTPATIENT
Start: 2023-07-05

## 2023-07-05 RX ORDER — ESZOPICLONE 1 MG/1
1 TABLET, FILM COATED ORAL
Qty: 30 TABLET | Refills: 0 | Status: SHIPPED | OUTPATIENT
Start: 2023-07-05

## 2023-07-13 DIAGNOSIS — K21.9 GERD WITHOUT ESOPHAGITIS: ICD-10-CM

## 2023-07-13 RX ORDER — OMEPRAZOLE 20 MG/1
CAPSULE, DELAYED RELEASE ORAL
Qty: 90 CAPSULE | Refills: 0 | Status: SHIPPED | OUTPATIENT
Start: 2023-07-13

## 2023-07-21 DIAGNOSIS — M25.512 LEFT SHOULDER PAIN, UNSPECIFIED CHRONICITY: ICD-10-CM

## 2023-07-21 RX ORDER — METHADONE HYDROCHLORIDE 10 MG/1
30 TABLET ORAL DAILY
Qty: 90 TABLET | Refills: 0 | Status: SHIPPED | OUTPATIENT
Start: 2023-07-21

## 2023-07-25 DIAGNOSIS — I82.409 ACUTE DVT (DEEP VENOUS THROMBOSIS) (HCC): ICD-10-CM

## 2023-07-25 RX ORDER — APIXABAN 5 MG/1
TABLET, FILM COATED ORAL
Qty: 180 TABLET | Refills: 0 | Status: SHIPPED | OUTPATIENT
Start: 2023-07-25

## 2023-07-26 NOTE — TELEPHONE ENCOUNTER
54 years old right hip pain for about 5 months time seems to be getting progressively worse stiffness difficulty simple activities such as putting his shoes on.  Symptoms feel similar to those that he had on the left hip prior to her us for placing it in 2017.  After he underwent hip replacement 2017 he was able to get off of opioid pain medications and he reports not be taking any opioid pain medications at this time.  No constitutional symptoms.  He is a couple months out from instrumented cervical and thoracic spine fusion    Exam shows internal and external rotation of the right hip is painful forearm no signs infection instability somewhat tender lumbar spine as well     X-rays show arthritic changes of right hip well placed components on the left degenerative changes noted in lumbar spine     Assessment:  Right hip arthrosis     Plan: We will schedule for intra-articular fluoroscopic guided injection into the right hip, physical therapy, patient is not interested in hip replacement surgery at this time as he recently underwent instrumented cervical and thoracic spine fusion   PC from [facility], they need a new prescription sent with new directions  It needs to say Take on tablet by mouth TID for  "chronic pain" as per state law  Please resend   Thanks

## 2023-08-02 DIAGNOSIS — F41.9 ANXIETY: ICD-10-CM

## 2023-08-02 DIAGNOSIS — F51.01 PRIMARY INSOMNIA: ICD-10-CM

## 2023-08-02 RX ORDER — ALPRAZOLAM 1 MG/1
1 TABLET ORAL
Qty: 30 TABLET | Refills: 0 | Status: SHIPPED | OUTPATIENT
Start: 2023-08-02

## 2023-08-02 RX ORDER — ESZOPICLONE 1 MG/1
1 TABLET, FILM COATED ORAL
Qty: 30 TABLET | Refills: 0 | Status: SHIPPED | OUTPATIENT
Start: 2023-08-02

## 2023-08-16 ENCOUNTER — TELEPHONE (OUTPATIENT)
Dept: PSYCHIATRY | Facility: CLINIC | Age: 53
End: 2023-08-16

## 2023-08-16 NOTE — TELEPHONE ENCOUNTER
Writer KENDRICK regarding a referral for CBT-I therapy and requested patient to return call. Please transfer call to writer. Thank you.

## 2023-08-22 DIAGNOSIS — M25.512 LEFT SHOULDER PAIN, UNSPECIFIED CHRONICITY: ICD-10-CM

## 2023-08-22 RX ORDER — METHADONE HYDROCHLORIDE 10 MG/1
30 TABLET ORAL DAILY
Qty: 90 TABLET | Refills: 0 | Status: SHIPPED | OUTPATIENT
Start: 2023-08-22

## 2023-08-22 NOTE — TELEPHONE ENCOUNTER
Last Rx sent 7.21.23  #90      Hi, this is Ely Sharp birth date 1970. My phone number is [phone] and I need a refill please. Called in to [facility] in [place] on methadone, 10 milligrams, 3 pills taken daily for chronic pain. And I think that's all I need. Thank you very much.  Bye, bye.

## 2023-08-24 NOTE — TELEPHONE ENCOUNTER
Writer KENDRICK regarding CBT-I therapy and requested patient return call for further information and/or for scheduling. Please transfer call upon return call. Thank you.

## 2023-08-30 DIAGNOSIS — F41.9 ANXIETY: ICD-10-CM

## 2023-08-30 DIAGNOSIS — F51.01 PRIMARY INSOMNIA: ICD-10-CM

## 2023-08-30 DIAGNOSIS — F32.A DEPRESSION, UNSPECIFIED DEPRESSION TYPE: ICD-10-CM

## 2023-08-30 RX ORDER — SERTRALINE HYDROCHLORIDE 100 MG/1
TABLET, FILM COATED ORAL
Qty: 180 TABLET | Refills: 1 | Status: SHIPPED | OUTPATIENT
Start: 2023-08-30

## 2023-08-30 RX ORDER — ESZOPICLONE 1 MG/1
1 TABLET, FILM COATED ORAL
Qty: 30 TABLET | Refills: 0 | Status: SHIPPED | OUTPATIENT
Start: 2023-08-30

## 2023-08-30 RX ORDER — ALPRAZOLAM 1 MG/1
1 TABLET ORAL
Qty: 30 TABLET | Refills: 0 | Status: SHIPPED | OUTPATIENT
Start: 2023-08-30

## 2023-09-02 ENCOUNTER — TELEPHONE (OUTPATIENT)
Dept: OTHER | Facility: OTHER | Age: 53
End: 2023-09-02

## 2023-09-02 NOTE — TELEPHONE ENCOUNTER
Jessica physical therapy at [facility] [place] [place] call to request a Verbal Order for a visiting nurse

## 2023-09-07 ENCOUNTER — TELEPHONE (OUTPATIENT)
Dept: INTERNAL MEDICINE CLINIC | Facility: CLINIC | Age: 53
End: 2023-09-07
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
## 2023-09-08 ENCOUNTER — TELEPHONE (OUTPATIENT)
Dept: INTERNAL MEDICINE CLINIC | Facility: CLINIC | Age: 53
End: 2023-09-08

## 2023-09-13 ENCOUNTER — TELEMEDICINE (OUTPATIENT)
Dept: INTERNAL MEDICINE CLINIC | Facility: CLINIC | Age: 53
End: 2023-09-13
Payer: COMMERCIAL

## 2023-09-13 DIAGNOSIS — D68.51 FACTOR 5 LEIDEN MUTATION, HETEROZYGOUS (HCC): ICD-10-CM

## 2023-09-13 DIAGNOSIS — S81.802D WOUND OF LEFT LOWER EXTREMITY, SUBSEQUENT ENCOUNTER: ICD-10-CM

## 2023-09-13 DIAGNOSIS — Z90.3 POSTGASTRECTOMY MALABSORPTION: ICD-10-CM

## 2023-09-13 DIAGNOSIS — S32.001D CLOSED BURST FRACTURE OF LUMBAR VERTEBRA WITH ROUTINE HEALING, SUBSEQUENT ENCOUNTER: Primary | ICD-10-CM
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
## 2023-09-13 DIAGNOSIS — K91.2 POSTGASTRECTOMY MALABSORPTION: ICD-10-CM

## 2023-09-13 PROCEDURE — 99214 OFFICE O/P EST MOD 30 MIN: CPT | Performed by: INTERNAL MEDICINE
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
## 2023-09-25 DIAGNOSIS — M25.512 LEFT SHOULDER PAIN, UNSPECIFIED CHRONICITY: ICD-10-CM

## 2023-09-25 RX ORDER — METHADONE HYDROCHLORIDE 10 MG/1
30 TABLET ORAL DAILY
Qty: 90 TABLET | Refills: 0 | Status: SHIPPED | OUTPATIENT
Start: 2023-09-25

## 2023-09-25 NOTE — TELEPHONE ENCOUNTER
Hi this is [facility] [place] birth date 1970. I need a refill please on methadone 10 milligram 3 pills taken daily for chronic pain and I need that called in please to the [facility] in [place]. My phone number is [phone] and I believe that's all. Thank you very much. Bye, bye.     Last Rx 8.22.23

## 2023-09-27 DIAGNOSIS — F32.A DEPRESSION, UNSPECIFIED DEPRESSION TYPE: ICD-10-CM

## 2023-09-27 RX ORDER — VENLAFAXINE HYDROCHLORIDE 75 MG/1
75 CAPSULE, EXTENDED RELEASE ORAL DAILY
Qty: 90 CAPSULE | Refills: 1 | Status: SHIPPED | OUTPATIENT
Start: 2023-09-27

## 2023-10-02 DIAGNOSIS — F41.9 ANXIETY: ICD-10-CM

## 2023-10-02 DIAGNOSIS — F51.01 PRIMARY INSOMNIA: ICD-10-CM

## 2023-10-02 RX ORDER — ALPRAZOLAM 1 MG/1
1 TABLET ORAL
Qty: 30 TABLET | Refills: 0 | Status: SHIPPED | OUTPATIENT
Start: 2023-10-02

## 2023-10-02 RX ORDER — ESZOPICLONE 1 MG/1
1 TABLET, FILM COATED ORAL
Qty: 30 TABLET | Refills: 0 | Status: SHIPPED | OUTPATIENT
Start: 2023-10-02

## 2023-10-14 DIAGNOSIS — K21.9 GERD WITHOUT ESOPHAGITIS: ICD-10-CM

## 2023-10-14 DIAGNOSIS — G43.909 MIGRAINE WITHOUT STATUS MIGRAINOSUS, NOT INTRACTABLE, UNSPECIFIED MIGRAINE TYPE: ICD-10-CM

## 2023-10-16 RX ORDER — OMEPRAZOLE 20 MG/1
CAPSULE, DELAYED RELEASE ORAL
Qty: 90 CAPSULE | Refills: 0 | Status: SHIPPED | OUTPATIENT
Start: 2023-10-16

## 2023-10-16 RX ORDER — TOPIRAMATE 100 MG/1
100 TABLET, FILM COATED ORAL 2 TIMES DAILY
Qty: 180 TABLET | Refills: 0 | Status: SHIPPED | OUTPATIENT
Start: 2023-10-16

## 2023-10-25 DIAGNOSIS — M25.512 LEFT SHOULDER PAIN, UNSPECIFIED CHRONICITY: ICD-10-CM

## 2023-10-25 DIAGNOSIS — S52.502A CLOSED FRACTURE OF DISTAL ENDS OF LEFT RADIUS AND ULNA, INITIAL ENCOUNTER: ICD-10-CM

## 2023-10-25 DIAGNOSIS — S52.602A CLOSED FRACTURE OF DISTAL ENDS OF LEFT RADIUS AND ULNA, INITIAL ENCOUNTER: ICD-10-CM

## 2023-10-25 DIAGNOSIS — M25.522 PAIN IN LEFT ELBOW: ICD-10-CM

## 2023-10-25 RX ORDER — METHADONE HYDROCHLORIDE 10 MG/1
30 TABLET ORAL DAILY
Qty: 90 TABLET | Refills: 0 | Status: SHIPPED | OUTPATIENT
Start: 2023-10-25

## 2023-10-25 RX ORDER — METHOCARBAMOL 500 MG/1
500 TABLET, FILM COATED ORAL 4 TIMES DAILY
Qty: 56 TABLET | Refills: 0 | Status: CANCELLED | OUTPATIENT
Start: 2023-10-25 | End: 2023-11-08

## 2023-10-25 NOTE — TELEPHONE ENCOUNTER
Hi this is L2C first date 1970. I need a refill please on methadone 10 milligrams, 3 pills taken daily for chronic pain called into the [facility] in [place]. My phone number is [phone]. I think that was everything. Thank you very much. steven Martinez. You received a voice mail from [address] [place] [address].          LAST FILL 9.25.23

## 2023-10-26 DIAGNOSIS — I82.409 ACUTE DVT (DEEP VENOUS THROMBOSIS) (HCC): ICD-10-CM

## 2023-10-26 RX ORDER — APIXABAN 5 MG/1
TABLET, FILM COATED ORAL
Qty: 180 TABLET | Refills: 0 | Status: SHIPPED | OUTPATIENT
Start: 2023-10-26

## 2023-10-27 ENCOUNTER — OFFICE VISIT (OUTPATIENT)
Dept: INTERNAL MEDICINE CLINIC | Facility: CLINIC | Age: 53
End: 2023-10-27
Payer: MEDICARE

## 2023-10-27 VITALS
WEIGHT: 160.8 LBS | DIASTOLIC BLOOD PRESSURE: 88 MMHG | BODY MASS INDEX: 26.79 KG/M2 | SYSTOLIC BLOOD PRESSURE: 120 MMHG | OXYGEN SATURATION: 97 % | HEIGHT: 65 IN | TEMPERATURE: 98.6 F | HEART RATE: 64 BPM

## 2023-10-27 DIAGNOSIS — Z23 ENCOUNTER FOR IMMUNIZATION: ICD-10-CM

## 2023-10-27 DIAGNOSIS — S81.802D WOUND OF LEFT LOWER EXTREMITY, SUBSEQUENT ENCOUNTER: ICD-10-CM

## 2023-10-27 DIAGNOSIS — F51.01 PRIMARY INSOMNIA: Primary | ICD-10-CM

## 2023-10-27 DIAGNOSIS — S32.050D COMPRESSION FRACTURE OF L5 VERTEBRA WITH ROUTINE HEALING: ICD-10-CM

## 2023-10-27 PROCEDURE — 99214 OFFICE O/P EST MOD 30 MIN: CPT | Performed by: INTERNAL MEDICINE

## 2023-10-27 PROCEDURE — 90471 IMMUNIZATION ADMIN: CPT | Performed by: INTERNAL MEDICINE

## 2023-10-27 PROCEDURE — 90686 IIV4 VACC NO PRSV 0.5 ML IM: CPT | Performed by: INTERNAL MEDICINE

## 2023-10-27 RX ORDER — ESZOPICLONE 2 MG/1
2 TABLET, FILM COATED ORAL
Qty: 30 TABLET | Refills: 0 | Status: SHIPPED | OUTPATIENT
Start: 2023-10-27

## 2023-10-27 RX ORDER — GAUZE BANDAGE 4" X 4"
24 SPONGE TOPICAL DAILY
Qty: 24 EACH | Refills: 1 | Status: SHIPPED | OUTPATIENT
Start: 2023-10-27

## 2023-10-27 NOTE — ASSESSMENT & PLAN NOTE
Overall sleep is doing better with Lunesta, but still not optimally improved. She is also taking Xanax at bedtime. She completed sleep study which was suggestive of mild sleep apnea. Majority of events recorded were central apneas. Evaluated by sleep medicine with follow-up arranged for later this year  -After discussion, we will cautiously increase Lunesta dose to 2 mg daily.   If she experiences any side effects or parasomnias she should reduce her dose to 1 mg daily and let me know      Previous medications: Ambien, trazodone, Lunesta, nortriptyline, temazepam, Seroquel
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
## 2023-10-30 DIAGNOSIS — F51.01 PRIMARY INSOMNIA: ICD-10-CM

## 2023-10-30 DIAGNOSIS — F41.9 ANXIETY: ICD-10-CM

## 2023-10-30 PROBLEM — S32.050D COMPRESSION FRACTURE OF L5 VERTEBRA WITH ROUTINE HEALING: Status: ACTIVE | Noted: 2023-10-30
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
## 2023-10-31 RX ORDER — ALPRAZOLAM 1 MG/1
1 TABLET ORAL
Qty: 30 TABLET | Refills: 0 | Status: SHIPPED | OUTPATIENT
Start: 2023-10-31

## 2023-11-22 DIAGNOSIS — F51.01 PRIMARY INSOMNIA: ICD-10-CM

## 2023-11-22 RX ORDER — ESZOPICLONE 2 MG/1
2 TABLET, FILM COATED ORAL
Qty: 30 TABLET | Refills: 0 | Status: SHIPPED | OUTPATIENT
Start: 2023-11-22

## 2023-11-27 DIAGNOSIS — F51.01 PRIMARY INSOMNIA: ICD-10-CM

## 2023-11-27 DIAGNOSIS — M25.512 LEFT SHOULDER PAIN, UNSPECIFIED CHRONICITY: ICD-10-CM

## 2023-11-27 DIAGNOSIS — F41.9 ANXIETY: ICD-10-CM

## 2023-11-27 RX ORDER — ALPRAZOLAM 1 MG/1
1 TABLET ORAL
Qty: 30 TABLET | Refills: 0 | Status: SHIPPED | OUTPATIENT
Start: 2023-11-27

## 2023-11-27 RX ORDER — METHADONE HYDROCHLORIDE 10 MG/1
30 TABLET ORAL DAILY
Qty: 90 TABLET | Refills: 0 | Status: SHIPPED | OUTPATIENT
Start: 2023-11-27

## 2023-12-08 ENCOUNTER — TELEPHONE (OUTPATIENT)
Dept: INTERNAL MEDICINE CLINIC | Facility: CLINIC | Age: 53
End: 2023-12-08

## 2023-12-08 NOTE — TELEPHONE ENCOUNTER
Received a voicemail at our office from [facility] that they have questions regarding a prior auth for Methadone 10 mg. She is not a patient of our practice. Can someone reach out to them. Thanks.      [phone]

## 2023-12-20 ENCOUNTER — OFFICE VISIT (OUTPATIENT)
Dept: SLEEP CENTER | Facility: CLINIC | Age: 53
End: 2023-12-20
Payer: MEDICARE

## 2023-12-20 VITALS
WEIGHT: 160 LBS | HEIGHT: 65 IN | HEART RATE: 72 BPM | RESPIRATION RATE: 18 BRPM | BODY MASS INDEX: 26.66 KG/M2 | DIASTOLIC BLOOD PRESSURE: 78 MMHG | OXYGEN SATURATION: 98 % | SYSTOLIC BLOOD PRESSURE: 128 MMHG

## 2023-12-20 DIAGNOSIS — G47.31 CENTRAL SLEEP APNEA: Primary | ICD-10-CM

## 2023-12-20 PROCEDURE — 99214 OFFICE O/P EST MOD 30 MIN: CPT | Performed by: NURSE PRACTITIONER

## 2023-12-20 NOTE — Clinical Note
Please assist patient to schedule ECHO.  This was ordered after discussion with Dr. Yates, after her appointment day.
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
## 2023-12-22 DIAGNOSIS — F51.01 PRIMARY INSOMNIA: ICD-10-CM

## 2023-12-22 RX ORDER — ESZOPICLONE 2 MG/1
2 TABLET, FILM COATED ORAL
Qty: 30 TABLET | Refills: 0 | Status: SHIPPED | OUTPATIENT
Start: 2023-12-22

## 2023-12-22 NOTE — PATIENT INSTRUCTIONS
Schedule ECHO to evaluate ejection fraction  Plan to schedule ASV sleep study after results of cardiac testing are reviewed  Plan to start use of ASV equipment  Discuss alternative treatment for pain with Dr. Altamirano, if possible      Nursing Support:  When: Monday through Friday 7A-5PM except holidays  Where: Our direct line is [phone].    If you are having a true emergency please call 911.  In the event that the line is busy or it is after hours please leave a voice message and we will return your call.  Please speak clearly, leaving your full name, birth date, best number to reach you and the reason for your call.   Medication refills: We will need the name of the medication, the dosage, the ordering provider, whether you get a 30 or 90 day refill, and the pharmacy name and address.  Medications will be ordered by the provider only.  Nurses cannot call in prescriptions.  Please allow 7 days for medication refills.  Physician requested updates: If your provider requested that you call with an update after starting medication, please be ready to provide us the medication and dosage, what time you take your medication, the time you attempt to fall asleep, time you fall asleep, when you wake up, and what time you get out of bed.  Sleep Study Results: We will contact you with sleep study results and/or next steps after the physician has reviewed your testing.

## 2023-12-26 DIAGNOSIS — F51.01 PRIMARY INSOMNIA: ICD-10-CM

## 2023-12-26 DIAGNOSIS — F41.9 ANXIETY: ICD-10-CM

## 2023-12-26 RX ORDER — ALPRAZOLAM 1 MG/1
1 TABLET ORAL
Qty: 30 TABLET | Refills: 0 | Status: SHIPPED | OUTPATIENT
Start: 2023-12-26

## 2023-12-29 DIAGNOSIS — M25.512 LEFT SHOULDER PAIN, UNSPECIFIED CHRONICITY: ICD-10-CM

## 2023-12-29 RX ORDER — METHADONE HYDROCHLORIDE 10 MG/1
30 TABLET ORAL DAILY
Qty: 90 TABLET | Refills: 0 | Status: SHIPPED | OUTPATIENT
Start: 2023-12-29

## 2024-01-02 ENCOUNTER — HOSPITAL ENCOUNTER (OUTPATIENT)
Dept: NON INVASIVE DIAGNOSTICS | Facility: CLINIC | Age: 54
Discharge: HOME | End: 2024-01-02
Payer: MEDICARE

## 2024-01-02 VITALS
WEIGHT: 160 LBS | SYSTOLIC BLOOD PRESSURE: 128 MMHG | DIASTOLIC BLOOD PRESSURE: 78 MMHG | BODY MASS INDEX: 26.66 KG/M2 | HEART RATE: 72 BPM | HEIGHT: 65 IN

## 2024-01-02 DIAGNOSIS — G47.31 CENTRAL SLEEP APNEA: Primary | ICD-10-CM

## 2024-01-02 DIAGNOSIS — G47.31 CENTRAL SLEEP APNEA: ICD-10-CM

## 2024-01-02 LAB
AORTIC ROOT: 2.9 CM
APICAL FOUR CHAMBER EJECTION FRACTION: 69 %
ASCENDING AORTA: 3.4 CM
E WAVE DECELERATION TIME: 196 MS
E/A RATIO: 1.37
FRACTIONAL SHORTENING: 30 (ref 28–44)
INTERVENTRICULAR SEPTUM IN DIASTOLE (PARASTERNAL SHORT AXIS VIEW): 0.7 CM
INTERVENTRICULAR SEPTUM: 0.7 CM (ref 0.6–1.1)
LAAS-AP2: 17.6 CM2
LAAS-AP4: 15.5 CM2
LEFT ATRIUM SIZE: 3.6 CM
LEFT ATRIUM VOLUME (MOD BIPLANE): 46 ML
LEFT ATRIUM VOLUME INDEX (MOD BIPLANE): 25.7 ML/M2
LEFT INTERNAL DIMENSION IN SYSTOLE: 3.5 CM (ref 2.1–4)
LEFT VENTRICULAR INTERNAL DIMENSION IN DIASTOLE: 5 CM (ref 3.5–6)
LEFT VENTRICULAR POSTERIOR WALL IN END DIASTOLE: 0.9 CM
LEFT VENTRICULAR STROKE VOLUME: 68 ML
LVSV (TEICH): 68 ML
MV E'TISSUE VEL-LAT: 16 CM/S
MV E'TISSUE VEL-SEP: 12 CM/S
MV PEAK A VEL: 0.59 M/S
MV PEAK E VEL: 81 CM/S
MV STENOSIS PRESSURE HALF TIME: 57 MS
MV VALVE AREA P 1/2 METHOD: 3.86
RA PRESSURE ESTIMATED: 3 MMHG
RIGHT ATRIUM AREA SYSTOLE A4C: 17 CM2
RIGHT VENTRICLE ID DIMENSION: 3.6 CM
RV PSP: 26 MMHG
SL CV LEFT ATRIUM LENGTH A2C: 5.1 CM
SL CV LV EF: 60
SL CV PED ECHO LEFT VENTRICLE DIASTOLIC VOLUME (MOD BIPLANE) 2D: 118 ML
SL CV PED ECHO LEFT VENTRICLE SYSTOLIC VOLUME (MOD BIPLANE) 2D: 50 ML
TR MAX PG: 23 MMHG
TR PEAK VELOCITY: 2.4 M/S
TRICUSPID VALVE PEAK REGURGITATION VELOCITY: 2.39 M/S

## 2024-01-02 PROCEDURE — 93306 TTE W/DOPPLER COMPLETE: CPT

## 2024-01-02 PROCEDURE — 93306 TTE W/DOPPLER COMPLETE: CPT | Performed by: INTERNAL MEDICINE

## 2024-01-10 ENCOUNTER — TELEPHONE (OUTPATIENT)
Dept: SLEEP CENTER | Facility: CLINIC | Age: 54
End: 2024-01-10

## 2024-01-10 NOTE — TELEPHONE ENCOUNTER
Called patient and advised of echo results.    Scheduled ASV study 8/2/24 in [place]. Instructions provided.  Verbalized understanding. Added to wait list and Teams message sent to office staff to expedite.

## 2024-01-15 DIAGNOSIS — G43.909 MIGRAINE WITHOUT STATUS MIGRAINOSUS, NOT INTRACTABLE, UNSPECIFIED MIGRAINE TYPE: ICD-10-CM

## 2024-01-15 DIAGNOSIS — K21.9 GERD WITHOUT ESOPHAGITIS: ICD-10-CM

## 2024-01-15 RX ORDER — OMEPRAZOLE 20 MG/1
CAPSULE, DELAYED RELEASE ORAL
Qty: 90 CAPSULE | Refills: 0 | Status: SHIPPED | OUTPATIENT
Start: 2024-01-15

## 2024-01-15 RX ORDER — TOPIRAMATE 100 MG/1
100 TABLET, FILM COATED ORAL 2 TIMES DAILY
Qty: 180 TABLET | Refills: 0 | Status: SHIPPED | OUTPATIENT
Start: 2024-01-15

## 2024-01-17 DIAGNOSIS — I82.409 ACUTE DVT (DEEP VENOUS THROMBOSIS) (HCC): ICD-10-CM

## 2024-01-17 RX ORDER — APIXABAN 5 MG/1
TABLET, FILM COATED ORAL
Qty: 180 TABLET | Refills: 1 | Status: SHIPPED | OUTPATIENT
Start: 2024-01-17

## 2024-01-22 DIAGNOSIS — F41.9 ANXIETY: ICD-10-CM

## 2024-01-22 DIAGNOSIS — F51.01 PRIMARY INSOMNIA: ICD-10-CM

## 2024-01-22 NOTE — TELEPHONE ENCOUNTER
Reason for call:   [x] Refill   [] Prior Auth  [] Other:     Office:   [x] PCP/Provider - MIKALA Duque / Adarsh  [] Specialty/Provider -     Medication:   Alprazolam 1mg qhs prn  #30  Eszopiclone 2mg qhs prn  #30      Pharmacy: [id number] - [place], [place] - [address] [place] [address]     Does the patient have enough for 3 days?   [] Yes   [x] No - Send as HP to POD

## 2024-01-23 DIAGNOSIS — F51.01 PRIMARY INSOMNIA: ICD-10-CM

## 2024-01-23 RX ORDER — ESZOPICLONE 2 MG/1
2 TABLET, FILM COATED ORAL
Qty: 30 TABLET | Refills: 0 | Status: CANCELLED | OUTPATIENT
Start: 2024-01-23

## 2024-01-23 RX ORDER — ESZOPICLONE 2 MG/1
2 TABLET, FILM COATED ORAL
Qty: 30 TABLET | Refills: 0 | Status: ON HOLD | OUTPATIENT
Start: 2024-01-23

## 2024-01-23 RX ORDER — ALPRAZOLAM 1 MG/1
1 TABLET ORAL
Qty: 30 TABLET | Refills: 0 | Status: ON HOLD | OUTPATIENT
Start: 2024-01-23

## 2024-01-23 NOTE — TELEPHONE ENCOUNTER
Patient  Called to check status of Medication refill request, upon review patient was warm transferred to Practice/clinical/clerical.

## 2024-01-23 NOTE — TELEPHONE ENCOUNTER
Ely Acuña    Patient is checking on her medication refills, she needs them today to be able to sleep at night.    Patient needs the medication before 1:30pm before she leaves for work    Medication:   Alprazolam 1mg qhs prn  #30  Eszopiclone 2mg qhs prn  #30    CB: [phone]

## 2024-01-29 DIAGNOSIS — M25.512 LEFT SHOULDER PAIN, UNSPECIFIED CHRONICITY: ICD-10-CM

## 2024-01-29 RX ORDER — METHADONE HYDROCHLORIDE 10 MG/1
30 TABLET ORAL DAILY
Qty: 90 TABLET | Refills: 0 | Status: SHIPPED | OUTPATIENT
Start: 2024-01-29

## 2024-01-29 NOTE — TELEPHONE ENCOUNTER
Reason for call:   [x] Refill   [] Prior Auth  [] Other:     Office:   [x] PCP/Provider - Adarsh   [] Specialty/Provider -     Medication: Methadone     Dose/Frequency: 10mg three tablets by mouth daily     Quantity: 90    Pharmacy: [facility] #[id number]    Does the patient have enough for 3 days?   [] Yes   [x] No - Send as HP to POD

## 2024-02-04 ENCOUNTER — APPOINTMENT (EMERGENCY)
Dept: RADIOLOGY | Facility: HOSPITAL | Age: 54
DRG: 054 | End: 2024-02-04
Attending: EMERGENCY MEDICINE
Payer: MEDICARE

## 2024-02-04 ENCOUNTER — APPOINTMENT (EMERGENCY)
Dept: RADIOLOGY | Facility: HOSPITAL | Age: 54
DRG: 054 | End: 2024-02-04
Payer: MEDICARE

## 2024-02-04 ENCOUNTER — HOSPITAL ENCOUNTER (INPATIENT)
Facility: HOSPITAL | Age: 54
LOS: 2 days | Discharge: HOME | DRG: 054 | End: 2024-02-06
Attending: EMERGENCY MEDICINE | Admitting: HOSPITALIST
Payer: MEDICARE

## 2024-02-04 DIAGNOSIS — D68.59 PROTEIN S DEFICIENCY (HCC): ICD-10-CM

## 2024-02-04 DIAGNOSIS — M54.2 NECK PAIN: ICD-10-CM

## 2024-02-04 DIAGNOSIS — D68.51 FACTOR 5 LEIDEN MUTATION, HETEROZYGOUS (HCC): ICD-10-CM

## 2024-02-04 DIAGNOSIS — H53.8 BLURRED VISION, LEFT EYE: ICD-10-CM

## 2024-02-04 DIAGNOSIS — R42 VERTIGO: Primary | ICD-10-CM

## 2024-02-04 LAB
ANION GAP SERPL CALCULATED.3IONS-SCNC: 3 MMOL/L
APTT PPP: 29 SECONDS (ref 23–37)
ATRIAL RATE: 67 BPM
BASOPHILS # BLD AUTO: 0.03 THOUSANDS/ÂΜL (ref 0–0.1)
BASOPHILS NFR BLD AUTO: 1 % (ref 0–1)
BUN SERPL-MCNC: 19 MG/DL (ref 5–25)
CALCIUM SERPL-MCNC: 8.3 MG/DL (ref 8.4–10.2)
CARDIAC TROPONIN I PNL SERPL HS: <2 NG/L
CARDIAC TROPONIN I PNL SERPL HS: <2 NG/L
CHLORIDE SERPL-SCNC: 111 MMOL/L (ref 96–108)
CO2 SERPL-SCNC: 26 MMOL/L (ref 21–32)
CREAT SERPL-MCNC: 0.49 MG/DL (ref 0.6–1.3)
EOSINOPHIL # BLD AUTO: 0.15 THOUSAND/ÂΜL (ref 0–0.61)
EOSINOPHIL NFR BLD AUTO: 2 % (ref 0–6)
ERYTHROCYTE [DISTWIDTH] IN BLOOD BY AUTOMATED COUNT: 13.4 % (ref 11.6–15.1)
ERYTHROCYTE [DISTWIDTH] IN BLOOD BY AUTOMATED COUNT: 13.5 % (ref 11.6–15.1)
GFR SERPL CREATININE-BSD FRML MDRD: 110 ML/MIN/1.73SQ M
GLUCOSE SERPL-MCNC: 84 MG/DL (ref 65–140)
HCT VFR BLD AUTO: 42.3 % (ref 34.8–46.1)
HCT VFR BLD AUTO: 44 % (ref 34.8–46.1)
HGB BLD-MCNC: 13.2 G/DL (ref 11.5–15.4)
HGB BLD-MCNC: 14 G/DL (ref 11.5–15.4)
IMM GRANULOCYTES # BLD AUTO: 0.02 THOUSAND/UL (ref 0–0.2)
IMM GRANULOCYTES NFR BLD AUTO: 0 % (ref 0–2)
INR PPP: 1.1 (ref 0.84–1.19)
LYMPHOCYTES # BLD AUTO: 2.41 THOUSANDS/ÂΜL (ref 0.6–4.47)
LYMPHOCYTES NFR BLD AUTO: 37 % (ref 14–44)
MCH RBC QN AUTO: 30 PG (ref 26.8–34.3)
MCH RBC QN AUTO: 30.3 PG (ref 26.8–34.3)
MCHC RBC AUTO-ENTMCNC: 31.2 G/DL (ref 31.4–37.4)
MCHC RBC AUTO-ENTMCNC: 31.8 G/DL (ref 31.4–37.4)
MCV RBC AUTO: 95 FL (ref 82–98)
MCV RBC AUTO: 96 FL (ref 82–98)
MONOCYTES # BLD AUTO: 0.48 THOUSAND/ÂΜL (ref 0.17–1.22)
MONOCYTES NFR BLD AUTO: 7 % (ref 4–12)
NEUTROPHILS # BLD AUTO: 3.38 THOUSANDS/ÂΜL (ref 1.85–7.62)
NEUTS SEG NFR BLD AUTO: 53 % (ref 43–75)
NRBC BLD AUTO-RTO: 0 /100 WBCS
P AXIS: 66 DEGREES
PLATELET # BLD AUTO: 237 THOUSANDS/UL (ref 149–390)
PLATELET # BLD AUTO: 238 THOUSANDS/UL (ref 149–390)
PMV BLD AUTO: 9.6 FL (ref 8.9–12.7)
PMV BLD AUTO: 9.6 FL (ref 8.9–12.7)
POTASSIUM SERPL-SCNC: 4.7 MMOL/L (ref 3.5–5.3)
PR INTERVAL: 144 MS
PROTHROMBIN TIME: 14.1 SECONDS (ref 11.6–14.5)
QRS AXIS: 46 DEGREES
QRSD INTERVAL: 84 MS
QT INTERVAL: 408 MS
QTC INTERVAL: 431 MS
RBC # BLD AUTO: 4.4 MILLION/UL (ref 3.81–5.12)
RBC # BLD AUTO: 4.62 MILLION/UL (ref 3.81–5.12)
SODIUM SERPL-SCNC: 140 MMOL/L (ref 135–147)
T WAVE AXIS: 35 DEGREES
VENTRICULAR RATE: 67 BPM
WBC # BLD AUTO: 6.47 THOUSAND/UL (ref 4.31–10.16)
WBC # BLD AUTO: 6.76 THOUSAND/UL (ref 4.31–10.16)

## 2024-02-04 PROCEDURE — 99223 1ST HOSP IP/OBS HIGH 75: CPT | Performed by: STUDENT IN AN ORGANIZED HEALTH CARE EDUCATION/TRAINING PROGRAM

## 2024-02-04 PROCEDURE — 80048 BASIC METABOLIC PNL TOTAL CA: CPT

## 2024-02-04 PROCEDURE — 85610 PROTHROMBIN TIME: CPT | Performed by: FAMILY MEDICINE

## 2024-02-04 PROCEDURE — 85027 COMPLETE CBC AUTOMATED: CPT | Performed by: FAMILY MEDICINE

## 2024-02-04 PROCEDURE — 70496 CT ANGIOGRAPHY HEAD: CPT

## 2024-02-04 PROCEDURE — 85730 THROMBOPLASTIN TIME PARTIAL: CPT

## 2024-02-04 PROCEDURE — 99285 EMERGENCY DEPT VISIT HI MDM: CPT

## 2024-02-04 PROCEDURE — 70498 CT ANGIOGRAPHY NECK: CPT

## 2024-02-04 PROCEDURE — 96360 HYDRATION IV INFUSION INIT: CPT

## 2024-02-04 PROCEDURE — 99285 EMERGENCY DEPT VISIT HI MDM: CPT | Performed by: EMERGENCY MEDICINE

## 2024-02-04 PROCEDURE — 93005 ELECTROCARDIOGRAM TRACING: CPT

## 2024-02-04 PROCEDURE — 93010 ELECTROCARDIOGRAM REPORT: CPT | Performed by: INTERNAL MEDICINE

## 2024-02-04 PROCEDURE — 99223 1ST HOSP IP/OBS HIGH 75: CPT | Performed by: FAMILY MEDICINE

## 2024-02-04 PROCEDURE — 85025 COMPLETE CBC W/AUTO DIFF WBC: CPT

## 2024-02-04 PROCEDURE — G1004 CDSM NDSC: HCPCS

## 2024-02-04 PROCEDURE — 84484 ASSAY OF TROPONIN QUANT: CPT

## 2024-02-04 PROCEDURE — 71045 X-RAY EXAM CHEST 1 VIEW: CPT

## 2024-02-04 PROCEDURE — 96361 HYDRATE IV INFUSION ADD-ON: CPT

## 2024-02-04 PROCEDURE — 36415 COLL VENOUS BLD VENIPUNCTURE: CPT

## 2024-02-04 RX ORDER — MELATONIN
2000 DAILY
Status: DISCONTINUED | OUTPATIENT
Start: 2024-02-05 | End: 2024-02-06 | Stop reason: HOSPADM

## 2024-02-04 RX ORDER — ACETAMINOPHEN 325 MG/1
650 TABLET ORAL EVERY 6 HOURS PRN
Status: DISCONTINUED | OUTPATIENT
Start: 2024-02-04 | End: 2024-02-06 | Stop reason: HOSPADM

## 2024-02-04 RX ORDER — VENLAFAXINE HYDROCHLORIDE 75 MG/1
75 CAPSULE, EXTENDED RELEASE ORAL DAILY
Status: DISCONTINUED | OUTPATIENT
Start: 2024-02-05 | End: 2024-02-06 | Stop reason: HOSPADM

## 2024-02-04 RX ORDER — HEPARIN SODIUM 1000 [USP'U]/ML
5600 INJECTION, SOLUTION INTRAVENOUS; SUBCUTANEOUS ONCE
Status: DISCONTINUED | OUTPATIENT
Start: 2024-02-04 | End: 2024-02-06

## 2024-02-04 RX ORDER — MECLIZINE HYDROCHLORIDE 25 MG/1
25 TABLET ORAL EVERY 8 HOURS SCHEDULED
Status: DISCONTINUED | OUTPATIENT
Start: 2024-02-04 | End: 2024-02-06 | Stop reason: HOSPADM

## 2024-02-04 RX ORDER — TOPIRAMATE 100 MG/1
100 TABLET, FILM COATED ORAL 2 TIMES DAILY
Status: DISCONTINUED | OUTPATIENT
Start: 2024-02-04 | End: 2024-02-06 | Stop reason: HOSPADM

## 2024-02-04 RX ORDER — DOCUSATE SODIUM 100 MG/1
100 CAPSULE, LIQUID FILLED ORAL 2 TIMES DAILY
Status: DISCONTINUED | OUTPATIENT
Start: 2024-02-04 | End: 2024-02-06 | Stop reason: HOSPADM

## 2024-02-04 RX ORDER — HEPARIN SODIUM 1000 [USP'U]/ML
2800 INJECTION, SOLUTION INTRAVENOUS; SUBCUTANEOUS EVERY 6 HOURS PRN
Status: DISCONTINUED | OUTPATIENT
Start: 2024-02-04 | End: 2024-02-06

## 2024-02-04 RX ORDER — MECLIZINE HYDROCHLORIDE 25 MG/1
25 TABLET ORAL EVERY 8 HOURS SCHEDULED
Status: DISCONTINUED | OUTPATIENT
Start: 2024-02-04 | End: 2024-02-04

## 2024-02-04 RX ORDER — FAMOTIDINE 20 MG/1
20 TABLET, FILM COATED ORAL 2 TIMES DAILY
Status: DISCONTINUED | OUTPATIENT
Start: 2024-02-04 | End: 2024-02-06 | Stop reason: HOSPADM

## 2024-02-04 RX ORDER — FLUTICASONE PROPIONATE 50 MCG
2 SPRAY, SUSPENSION (ML) NASAL DAILY
Status: DISCONTINUED | OUTPATIENT
Start: 2024-02-05 | End: 2024-02-06 | Stop reason: HOSPADM

## 2024-02-04 RX ORDER — HEPARIN SODIUM 10000 [USP'U]/100ML
3-30 INJECTION, SOLUTION INTRAVENOUS
Status: DISCONTINUED | OUTPATIENT
Start: 2024-02-04 | End: 2024-02-06

## 2024-02-04 RX ORDER — METHADONE HYDROCHLORIDE 10 MG/1
30 TABLET ORAL DAILY
Status: DISCONTINUED | OUTPATIENT
Start: 2024-02-05 | End: 2024-02-06 | Stop reason: HOSPADM

## 2024-02-04 RX ORDER — SODIUM CHLORIDE 9 MG/ML
150 INJECTION, SOLUTION INTRAVENOUS CONTINUOUS
Status: DISCONTINUED | OUTPATIENT
Start: 2024-02-04 | End: 2024-02-06

## 2024-02-04 RX ORDER — ALPRAZOLAM 0.5 MG/1
1 TABLET ORAL
Status: DISCONTINUED | OUTPATIENT
Start: 2024-02-04 | End: 2024-02-06 | Stop reason: HOSPADM

## 2024-02-04 RX ORDER — HEPARIN SODIUM 1000 [USP'U]/ML
5600 INJECTION, SOLUTION INTRAVENOUS; SUBCUTANEOUS EVERY 6 HOURS PRN
Status: DISCONTINUED | OUTPATIENT
Start: 2024-02-04 | End: 2024-02-06

## 2024-02-04 RX ORDER — ZOLPIDEM TARTRATE 5 MG/1
5 TABLET ORAL
Status: DISCONTINUED | OUTPATIENT
Start: 2024-02-04 | End: 2024-02-05

## 2024-02-04 RX ORDER — SERTRALINE HYDROCHLORIDE 100 MG/1
100 TABLET, FILM COATED ORAL 2 TIMES DAILY
Status: DISCONTINUED | OUTPATIENT
Start: 2024-02-04 | End: 2024-02-06 | Stop reason: HOSPADM

## 2024-02-04 RX ORDER — ONDANSETRON 2 MG/ML
4 INJECTION INTRAMUSCULAR; INTRAVENOUS EVERY 6 HOURS PRN
Status: DISCONTINUED | OUTPATIENT
Start: 2024-02-04 | End: 2024-02-06 | Stop reason: HOSPADM

## 2024-02-04 RX ORDER — PANTOPRAZOLE SODIUM 40 MG/1
40 TABLET, DELAYED RELEASE ORAL
Status: DISCONTINUED | OUTPATIENT
Start: 2024-02-05 | End: 2024-02-06 | Stop reason: HOSPADM

## 2024-02-04 RX ADMIN — ALPRAZOLAM 1 MG: 0.5 TABLET ORAL at 23:50

## 2024-02-04 RX ADMIN — ZOLPIDEM TARTRATE 5 MG: 5 TABLET ORAL at 22:21

## 2024-02-04 RX ADMIN — SODIUM CHLORIDE 150 ML/HR: 0.9 INJECTION, SOLUTION INTRAVENOUS at 18:34

## 2024-02-04 RX ADMIN — TOPIRAMATE 100 MG: 100 TABLET, FILM COATED ORAL at 18:31

## 2024-02-04 RX ADMIN — FAMOTIDINE 20 MG: 20 TABLET, FILM COATED ORAL at 18:31

## 2024-02-04 RX ADMIN — SODIUM CHLORIDE 150 ML/HR: 0.9 INJECTION, SOLUTION INTRAVENOUS at 23:54

## 2024-02-04 RX ADMIN — SODIUM CHLORIDE 150 ML/HR: 0.9 INJECTION, SOLUTION INTRAVENOUS at 11:54

## 2024-02-04 RX ADMIN — IOHEXOL 85 ML: 350 INJECTION, SOLUTION INTRAVENOUS at 13:56

## 2024-02-04 RX ADMIN — DOCUSATE SODIUM 100 MG: 100 CAPSULE, LIQUID FILLED ORAL at 18:31

## 2024-02-04 RX ADMIN — MECLIZINE HYDROCHLORIDE 25 MG: 25 TABLET ORAL at 22:21

## 2024-02-04 RX ADMIN — HEPARIN SODIUM 18 UNITS/KG/HR: 10000 INJECTION, SOLUTION INTRAVENOUS at 19:35

## 2024-02-04 RX ADMIN — SERTRALINE HYDROCHLORIDE 100 MG: 100 TABLET ORAL at 18:31
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
## 2024-02-04 NOTE — ASSESSMENT & PLAN NOTE
53-year-old female with history of migraine, chronic pain syndrome, insomnia, chronic DVT on Eliquis, presented to ER with complaint of dizziness, ambulatory dysfunction secondary to dizziness, feeling like patient is going to pass out.  CTA head and neck negative  CBC and BMP unremarkable  Obtain orthostatic  B12/folate/MMA  Telemetry  MRI brain  Meclizine ATC  Lumbar puncture  Neurology consult

## 2024-02-04 NOTE — ASSESSMENT & PLAN NOTE
Currently patient is taking Lunesta and Xanax at bedtime.  Lunesta nonformulary, will replace with Ambien.
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
## 2024-02-04 NOTE — ED ATTENDING ATTESTATION
Final Diagnoses:     1. Vertigo    2. Neck pain    3. Factor 5 Leiden mutation, heterozygous (HCC)    4. Protein S deficiency (HCC)    5. Blurred vision, left eye           IEverton MD, saw and evaluated the patient. All available labs and X-rays were ordered by me or the resident / non-physician and have been reviewed by myself. I discussed the patient with the resident / non-physician and agree with the resident's / non-physician practitioner's findings and plan as documented in the resident's / non-physician practicitioner's note, except where noted.   At this point, I agree with the current assessment done in the ED.   I was present during key portions of all procedures performed unless otherwise stated.     HPI:  NURSING TRIAGE:    This is a 53 y.o. female presenting for evaluation of dizziness. For 3 hours, she feels the room is spinning.   Speaking very slowly, feels dry mouth.   No focal numbness/weakness  +neck pain.  Per EMS, she had CP.  Patietn patient, she denies CP/SOB.   Non-focal.    Chief Complaint   Patient presents with    Dizziness     Patient coming in via EMS after feeling dizzy 4-5 hours ago. Patient feels like the room is spinning. -cp -sob       PHYSICAL: ASSESSMENT + PLAN:   Pertinent: Keeping eyes closed.  Eyes not bothering her but won't tell me why.   No nystagmus I can elicit  Speech is dysphagic however has extremely dry MM likely contributing to it.  No pronator drift  No hyper-reflexia.   No facial assymetry.   No nystagmus.     General: VS reviewed  Appears in NAD  awake, alert.   Well-nourished, well-developed. Appears stated age.   Speaking normally in full sentences.   Head: Normocephalic, atraumatic  Eyes: EOM-I. No diplopia.   No hyphema.   No subconjunctival hemorrhages.  Symmetrical lids.   ENT: Atraumatic external nose and ears.    MMM  No malocclusion. No stridor. Normal phonation. No drooling. Normal swallowing.   Neck: No JVD.  CV: No pallor noted  Lungs:    No tachypnea  No respiratory distress  Abd: soft nt nd no rebound/guarding  MSK:   FROM spontaneously  Skin: Dry, intact.   Neuro: Awake, alert, GCS15, CN II-XII grossly intact.   Motor grossly intact.  Psychiatric/Behavioral: interacting normally; appropriate mood/affect.    Exam: deferred    Vitals:    02/05/24 2345 02/06/24 0212 02/06/24 0745 02/06/24 1451   BP: 131/83 132/81  126/73   BP Location:    Right arm   Pulse: 59 (!) 52 57 62   Resp:  18  16   Temp: 99 °F (37.2 °C)   98.4 °F (36.9 °C)   TempSrc:    Oral   SpO2: 98% 99% 94% 97%   Weight:       Height:        CTH/CTA HC for bleed, mass, changes.  CXR for PNa  CBC for anemia, leukocytosis.  BMP for dehydration  Trop for strain pattern. EKG for arrythmia  Fluids for dehydration on exam.   Re-evaluate.  Unclear reason for dizziness. Exam didn't elucidate anything useful unfortuantely. Not HINTS candidate. Doubt stroke.     There are no obvious limitations to social determinants of care.   Nursing note reviewed.   Vitals reviewed.   Orders placed by myself and/or advanced practitioner / resident.    Previous chart was reviewed  No language barrier.   History obtained from patient.    There are no limitations to the history obtained:     Past Medical: Past Surgical:    has a past medical history of Anemia, Anxiety, Depression, Facial cellulitis, Factor 5 Leiden mutation, heterozygous (HCC), Fracture, cuboid, Gastrojejunal ulcer, History of small bowel obstruction, Hypertension, Iron deficiency anemia, and Obstructive sleep apnea (adult) (pediatric).  has a past surgical history that includes Abdominoplasty; Gastric bypass; Small intestine surgery; Incisional hernia repair; Cholecystectomy; and pr open tx radiocarpal/intercarpal dislc 1/> bones (Left, 1/6/2022).   Social: Cardiac (Echo/Cath)   Social History     Substance and Sexual Activity   Alcohol Use Not Currently     Social History     Tobacco Use   Smoking Status Never    Passive exposure: Never    Smokeless Tobacco Never     Social History     Substance and Sexual Activity   Drug Use Never    Comment: usues methadone for pain relief    No results found for this or any previous visit.    No results found for this or any previous visit.    No results found for this or any previous visit.     Labs: Imaging:   Labs Reviewed   CBC AND DIFFERENTIAL - Abnormal       Result Value Ref Range Status    WBC 6.47  4.31 - 10.16 Thousand/uL Final    RBC 4.40  3.81 - 5.12 Million/uL Final    Hemoglobin 13.2  11.5 - 15.4 g/dL Final    Hematocrit 42.3  34.8 - 46.1 % Final    MCV 96  82 - 98 fL Final    MCH 30.0  26.8 - 34.3 pg Final    MCHC 31.2 (*) 31.4 - 37.4 g/dL Final    RDW 13.4  11.6 - 15.1 % Final    MPV 9.6  8.9 - 12.7 fL Final    Platelets 238  149 - 390 Thousands/uL Final    nRBC 0  /100 WBCs Final    Neutrophils Relative 53  43 - 75 % Final    Immat GRANS % 0  0 - 2 % Final    Lymphocytes Relative 37  14 - 44 % Final    Monocytes Relative 7  4 - 12 % Final    Eosinophils Relative 2  0 - 6 % Final    Basophils Relative 1  0 - 1 % Final    Neutrophils Absolute 3.38  1.85 - 7.62 Thousands/µL Final    Immature Grans Absolute 0.02  0.00 - 0.20 Thousand/uL Final    Lymphocytes Absolute 2.41  0.60 - 4.47 Thousands/µL Final    Monocytes Absolute 0.48  0.17 - 1.22 Thousand/µL Final    Eosinophils Absolute 0.15  0.00 - 0.61 Thousand/µL Final    Basophils Absolute 0.03  0.00 - 0.10 Thousands/µL Final   BASIC METABOLIC PANEL - Abnormal    Sodium 140  135 - 147 mmol/L Final    Potassium 4.7  3.5 - 5.3 mmol/L Final    Chloride 111 (*) 96 - 108 mmol/L Final    CO2 26  21 - 32 mmol/L Final    ANION GAP 3  mmol/L Final    BUN 19  5 - 25 mg/dL Final    Creatinine 0.49 (*) 0.60 - 1.30 mg/dL Final    Comment: Standardized to IDMS reference method    Glucose 84  65 - 140 mg/dL Final    Comment: If the patient is fasting, the ADA then defines impaired fasting glucose as > 100 mg/dL and diabetes as > or equal to 123 mg/dL.    Calcium  "8.3 (*) 8.4 - 10.2 mg/dL Final    eGFR 110  ml/min/1.73sq m Final    Narrative:     National Kid[employer] Foundation guidelines for Chronic Kidney Disease (CKD):     Stage 1 with normal or high GFR (GFR > 90 mL/min/1.73 square meters)    Stage 2 Mild CKD (GFR = 60-89 mL/min/1.73 square meters)    Stage 3A Moderate CKD (GFR = 45-59 mL/min/1.73 square meters)    Stage 3B Moderate CKD (GFR = 30-44 mL/min/1.73 square meters)    Stage 4 Severe CKD (GFR = 15-29 mL/min/1.73 square meters)    Stage 5 End Stage CKD (GFR <15 mL/min/1.73 square meters)  Note: GFR calculation is accurate only with a steady state creatinine   HS TROPONIN I 0HR - Normal    hs TnI 0hr <2  \"Refer to ACS Flowchart\"- see link ng/L Final    Comment:                                              Initial (time 0) result  If >=50 ng/L, Myocardial injury suggested ;  Type of myocardial injury and treatment strategy  to be determined.  If 5-49 ng/L, a delta result at 2 hours and or 4 hours will be needed to further evaluate.  If <4 ng/L, and chest pain has been >3 hours since onset, patient may qualify for discharge based on the HEART score in the ED.  If <5 ng/L and <3hours since onset of chest pain, a delta result at 2 hours will be needed to further evaluate.    HS Troponin 99th Percentile URL of a Health Population=12 ng/L with a 95% Confidence Interval of 8-18 ng/L.    Second Troponin (time 2 hours)  If calculated delta >= 20 ng/L,  Myocardial injury suggested ; Type of myocardial injury and treatment strategy to be determined.  If 5-49 ng/L and the calculated delta is 5-19 ng/L, consult medical service for evaluation.  Continue evaluation for ischemia on ecg and other possible etiology and repeat hs troponin at 4 hours.  If delta is <5 ng/L at 2 hours, consider discharge based on risk stratification via the HEART score (if in ED), or MARGIE risk score in IP/Observation.    HS Troponin 99th Percentile URL of a Health Population=12 ng/L with a 95% " "Confidence Interval of 8-18 ng/L.   HS TROPONIN I 2HR    hs TnI 2hr <2  \"Refer to ACS Flowchart\"- see link ng/L Final    Comment:                                              Initial (time 0) result  If >=50 ng/L, Myocardial injury suggested ;  Type of myocardial injury and treatment strategy  to be determined.  If 5-49 ng/L, a delta result at 2 hours and or 4 hours will be needed to further evaluate.  If <4 ng/L, and chest pain has been >3 hours since onset, patient may qualify for discharge based on the HEART score in the ED.  If <5 ng/L and <3hours since onset of chest pain, a delta result at 2 hours will be needed to further evaluate.    HS Troponin 99th Percentile URL of a Health Population=12 ng/L with a 95% Confidence Interval of 8-18 ng/L.    Second Troponin (time 2 hours)  If calculated delta >= 20 ng/L,  Myocardial injury suggested ; Type of myocardial injury and treatment strategy to be determined.  If 5-49 ng/L and the calculated delta is 5-19 ng/L, consult medical service for evaluation.  Continue evaluation for ischemia on ecg and other possible etiology and repeat hs troponin at 4 hours.  If delta is <5 ng/L at 2 hours, consider discharge based on risk stratification via the HEART score (if in ED), or MARGIE risk score in IP/Observation.    HS Troponin 99th Percentile URL of a Health Population=12 ng/L with a 95% Confidence Interval of 8-18 ng/L.    Delta 2hr hsTnI     Final    Comment: Unable to Calculate    MRI brain and orbits wo and w contrast   Final Result         1. Scattered white matter lesions are nonspecific and could be related to precocious microangiopathy, especially if there are cerebrovascular risk factors. Other post infectious, post inflammatory or demyelinating processes including multiple sclerosis    or Lyme disease could be considered in the differential diagnosis. Patients with migraine headaches or vasculitis can also have similar white matter changes. Further clinical assessment " advised.   2. No acute infarction, intracranial hemorrhage or mass.   3. Normal-appearing optic nerves and orbits bilaterally.            Workstation performed: [id number]         CTA head and neck with and without contrast   Final Result      No acute intracranial abnormality.      Mild chronic microangiopathy.      Negative CTA head and neck for large vessel occlusion, dissection, aneurysm, or high-grade stenosis.      Additional chronic/incidental findings as detailed above.                        Workstation performed: [id number]         XR chest 1 view portable   Final Result      No acute cardiopulmonary disease.            Workstation performed: [id number]            Medications: Code Status:   Medications   iohexol (OMNIPAQUE) 350 MG/ML injection (MULTI-DOSE) 85 mL (85 mL Intravenous Given 2/4/24 8486)   zolpidem (AMBIEN) tablet 5 mg (5 mg Oral Given 2/5/24 3521)   Gadobutrol injection (SINGLE-DOSE) SOLN 7 mL (7 mL Intravenous Given 2/5/24 0014)    Code Status: Prior  Advance Directive and Living Will:      Power of :    POLST:       Orders Placed This Encounter   Procedures    Midline Insertion    CTA head and neck with and without contrast    XR chest 1 view portable    MRI brain and orbits wo and w contrast    CBC and differential    Basic metabolic panel    HS Troponin 0hr (reflex protocol)    HS Troponin I 2hr    Vitamin B12    Folate    Methylmalonic acid, serum    Basic metabolic panel    Protime-INR    APTT    CBC (With Platelets)    APTT    Protime-INR    CBC    APTT    APTT    RPR-Syphilis Screening (Total Syphilis IGG/IGM)    APTT    Comprehensive metabolic panel    CBC and differential    Magnesium    APTT    APTT    UA w Reflex to Microscopic w Reflex to Culture    Discharge Diet    Notify admitting physician    Notify admitting physician on arrival    Activity as tolerated    Consult to neurology    Inpatient consult to Complex Venous Access Team    Inpatient consult to Infectious  Diseases    Inpatient consult to Ophthalmology    Droplet isolation status    ECG 12 lead    ECG 12 lead    INPATIENT ADMISSION    Discharge patient     Time reflects when diagnosis was documented in both MDM as applicable and the Disposition within this note       Time User Action Codes Description Comment    2/4/2024  2:53 PM Prabhu Tucker Add [R42] Vertigo     2/4/2024  4:21 PM Garrett Prabhu Add [M54.2] Neck pain     2/5/2024 10:09 AM Deneen Lyon Add [D68.51] Factor 5 Leiden mutation, heterozygous ([facility])     2/5/2024 10:09 AM Deneen Lyon Add [D68.59] Protein S deficiency ([facility])     2/5/2024 10:46 AM Deneen Lyon Add [H53.8] Blurred vision, left eye           ED Disposition       ED Disposition   Admit    Condition   Stable    Date/Time   Sun Feb 4, 2024  4:21 PM    Comment   Case was discussed with Dr. Rodríguez and the patient's admission status was agreed to be Admission Status: inpatient status to the service of Dr. Rodríguez.               Follow-up Information       Follow up With Specialties Details Why Contact Info    Solis Altamirano, DO Internal Medicine Schedule an appointment as soon as possible for a visit in 1 week(s)  [address] [place] [id number] [phone]      Rickie Walters MD Internal Medicine   [address] [place] [id number]  [phone]      Rickie Walters MD Internal Medicine   [address] [place] [address]  [phone]            Discharge Medication List as of 2/6/2024  4:00 PM        START taking these medications    Details   acetaminophen (TYLENOL) 325 mg tablet Take 2 tablets (650 mg total) by mouth every 6 (six) hours as needed for mild pain, Starting Tue 2/6/2024, No Print           CONTINUE these medications which have NOT CHANGED    Details   ALPRAZolam (XANAX) 1 mg tablet Take 1 tablet (1 mg total) by mouth daily at bedtime as needed for anxiety or sleep, Starting Tue 1/23/2024,  Normal      apixaban (Eliquis) 5 mg take 1 tablet by mouth twice a day, Normal      cholecalciferol (VITAMIN D3) 1,000 units tablet Take 2 tablets (2,000 Units total) by mouth daily, Starting Wed 2/22/2023, Normal      eszopiclone (LUNESTA) 2 mg tablet Take 1 tablet (2 mg total) by mouth daily at bedtime as needed for sleep Take immediately before bedtime, Starting Tue 1/23/2024, Normal      famotidine (PEPCID) 20 mg tablet Take 1 tablet (20 mg total) by mouth 2 (two) times a day, Starting Fri 6/16/2023, Until Mon 6/10/2024, Normal      fluticasone (FLONASE) 50 mcg/act nasal spray 2 sprays into each nostril daily, Starting Sun 6/25/2023, Normal      methadone (DOLOPHINE) 10 mg tablet Take 3 tablets by mouth daily,, Starting Mon 1/29/2024, Normal      naloxone (NARCAN) 4 mg/0.1 mL nasal spray Administer 1 spray into a nostril. If no response after 2-3 minutes, give another dose in the other nostril using a new spray., Normal      omeprazole (PriLOSEC) 20 mg delayed release capsule take 1 capsule by mouth once daily, Normal      Pediatric Multiple Vit-C-FA (FRUITY CHEWABLES MULTIVITAMIN) CHEW Chew 1 tablet daily , Historical Med      senna (SENOKOT) 8.6 MG tablet Take 1 tablet by mouth as needed  , Historical Med      sertraline (ZOLOFT) 100 mg tablet take 1 tablet by mouth twice a day, Normal      sucralfate (Carafate) 1 g/10 mL suspension Take 10 mL (1 g total) by mouth 4 (four) times a day as needed (dyspepsia), Starting Mon 9/14/2020, Normal      topiramate (TOPAMAX) 100 mg tablet take 1 tablet by mouth twice a day, Starting Mon 1/15/2024, Normal      venlafaxine (EFFEXOR-XR) 75 mg 24 hr capsule take 1 capsule by mouth once daily, Starting Wed 9/27/2023, Normal      Wound Dressings (Adaptic Non-Adhering Dressing) PADS Apply 24 each topically in the morning, Starting Fri 10/27/2023, Normal           STOP taking these medications       clotrimazole (LOTRIMIN) 1 % cream Comments:   Reason for Stopping:          Cyanocobalamin (B-12) 1000 MCG SUBL Comments:   Reason for Stopping:         methocarbamol (ROBAXIN) 500 mg tablet Comments:   Reason for Stopping:         mupirocin (BACTROBAN) 2 % ointment Comments:   Reason for Stopping:             Outpatient Discharge Orders   Discharge Diet     Activity as tolerated     Prior to Admission Medications   Prescriptions Last Dose Informant Patient Reported? Taking?   ALPRAZolam (XANAX) 1 mg tablet   No No   Sig: Take 1 tablet (1 mg total) by mouth daily at bedtime as needed for anxiety or sleep   Cyanocobalamin (B-12) 1000 MCG SUBL  Self Yes No   Sig: Place under the tongue   Pediatric Multiple Vit-C-FA (FRUITY CHEWABLES MULTIVITAMIN) CHEW  Self Yes No   Sig: Chew 1 tablet daily    Wound Dressings (Adaptic Non-Adhering Dressing) PADS   No No   Sig: Apply 24 each topically in the morning   apixaban (Eliquis) 5 mg   No No   Sig: take 1 tablet by mouth twice a day   cholecalciferol (VITAMIN D3) 1,000 units tablet  Self No No   Sig: Take 2 tablets (2,000 Units total) by mouth daily   clotrimazole (LOTRIMIN) 1 % cream  Self No No   Sig: Apply topically 2 (two) times a day   eszopiclone (LUNESTA) 2 mg tablet   No No   Sig: Take 1 tablet (2 mg total) by mouth daily at bedtime as needed for sleep Take immediately before bedtime   famotidine (PEPCID) 20 mg tablet  Self No No   Sig: Take 1 tablet (20 mg total) by mouth 2 (two) times a day   fluticasone (FLONASE) 50 mcg/act nasal spray  Self No No   Si sprays into each nostril daily   methadone (DOLOPHINE) 10 mg tablet   No No   Sig: Take 3 tablets by mouth daily,   methocarbamol (ROBAXIN) 500 mg tablet  Self No No   Sig: Take 1 tablet (500 mg total) by mouth 4 (four) times a day for 14 days   mupirocin (BACTROBAN) 2 % ointment   No No   Sig: Apply topically daily   naloxone (NARCAN) 4 mg/0.1 mL nasal spray  Self No No   Sig: Administer 1 spray into a nostril. If no response after 2-3 minutes, give another dose in the other nostril using  "a new spray.   omeprazole (PriLOSEC) 20 mg delayed release capsule   No No   Sig: take 1 capsule by mouth once daily   senna (SENOKOT) 8.6 MG tablet  Self Yes No   Sig: Take 1 tablet by mouth as needed     sertraline (ZOLOFT) 100 mg tablet  Self No No   Sig: take 1 tablet by mouth twice a day   sucralfate (Carafate) 1 g/10 mL suspension  Self No No   Sig: Take 10 mL (1 g total) by mouth 4 (four) times a day as needed (dyspepsia)   topiramate (TOPAMAX) 100 mg tablet   No No   Sig: take 1 tablet by mouth twice a day   venlafaxine (EFFEXOR-XR) 75 mg 24 hr capsule  Self No No   Sig: take 1 capsule by mouth once daily      Facility-Administered Medications: None                        Portions of the record may have been created with voice recognition software. Occasional wrong word or \"sound a like\" substitutions may have occurred due to the inherent limitations of voice recognition software. Read the chart carefully and recognize, using context, where substitutions have occurred.    Electronically signed by:  Everton Foss  "

## 2024-02-04 NOTE — CONSULTS
NEUROLOGY    CONSULT     Name: Ely Cantrell  Age & Sex: 53 y.o. female  MRN: [id number]  Unit/Bed#: ED 20   Encounter: [id number] Length of Stay: 0  ASSESSMENT & PLAN   Dizziness  Assessment & Plan  53 year old female presenting with acute onset dizziness described as lightheadedness, neck pain, and left eye pain which began overnight. Family reported concern that patient was confused yesterday and not acting herself.  Also endorses orthopnea and urinary incontinence.   CTA H/N without any acute findings.     Impression: Unclear etiology. Differential diagnosis remains broad - low suspicion for vascular insult cannot r/o meningitis/encephalitis at this time,    Plan:  Frequent neuro checks, obtain stat CTH if change in exam  AC: Hold home Eliquis and place on heparin drip pending IR LP for CSF analysis  Imaging: Obtain MR Brain with attention to the orbits with and without contrast  IR for Lumbar Puncture  Rest of care per primary        Recommendations for outpatient neurological follow up have yet to be determined.   Pending for discharge: LP  CHIEF COMPLAINT     Chief Complaint   Patient presents with    Dizziness     Patient coming in via EMS after feeling dizzy 4-5 hours ago. Patient feels like the room is spinning. -cp -sob       Consult to neurology  Consult performed by: Santos Matson DO  Consult ordered by: Everton Foss MD        HISTORY OF PRESENT ILLNESS   Reason for Consult: Dizziness  Hx and PE limited by: none    Patient is a 53-year-old female with a history of factor V Leiden mutation on Eliquis, anxiety, depression, hypertension who presents today for evaluation of dizziness.  Patient reports waking up at 4 AM this morning and feeling dizzy described as lightheaded/presyncope that has been ongoing and waxes and waning associated with worsening with movement or sitting up from a supine position.  Also notes presence of left ocular pain with blurry vision that also began last  night as well as presence of neck pain which is aggravated with movement. Patient also describes sensation of orthopnea that is intermittent. Denies any recent sicknesses or changes to her medications.       Review of Systems   Constitutional:  Negative for chills, fatigue, fever and unexpected weight change.   HENT:  Negative for drooling, hearing loss, sinus pressure, sinus pain, tinnitus, trouble swallowing and voice change.    Eyes:  Positive for pain. Negative for photophobia, redness and visual disturbance.   Respiratory:  Negative for chest tightness and shortness of breath.    Cardiovascular:  Negative for chest pain, palpitations and leg swelling.   Gastrointestinal:  Negative for constipation, diarrhea and nausea.   Endocrine: Negative for cold intolerance, heat intolerance, polydipsia and polyphagia.   Genitourinary:  Negative for difficulty urinating.   Musculoskeletal:  Positive for neck pain and neck stiffness. Negative for arthralgias, back pain, gait problem and myalgias.   Skin:  Negative for pallor and rash.   Neurological:  Positive for dizziness. Negative for tremors, seizures, syncope, facial asymmetry, speech difficulty, weakness, light-headedness, numbness and headaches.   Psychiatric/Behavioral:  Negative for behavioral problems, confusion, decreased concentration and sleep disturbance.      PAST MEDICAL HISTORY     Past Medical History:   Diagnosis Date    Anemia     Anxiety     Depression     Facial cellulitis     Factor 5 Leiden mutation, heterozygous (HCC)     Fracture, cuboid     LAST ASSESSED: 3/26/15    Gastrojejunal ulcer     ANASTOMOTIC    History of small bowel obstruction     Hypertension     Iron deficiency anemia     LAST ASSESSED: AND RESOLVED: 4/13/16    Obstructive sleep apnea (adult) (pediatric)        Allergies:   Allergies   Allergen Reactions    Amoxicillin Hives and Shortness Of Breath    Aspirin Hives and Other (See Comments)     Short of breath    Butorphanol  Anaphylaxis and Other (See Comments)     Other reaction(s): BUTORPHANOL TARTRATE (STADOL) (loss of breath)    Morphine Other (See Comments) and Hallucinations     takes vicodin at home    Duloxetine Other (See Comments)     Rapid heartbeat    Azithromycin Rash    Gabapentin Palpitations    Nitrofurantoin Hives and Rash    Sulfa Antibiotics Hives, Rash and Other (See Comments)       PAST SURGICAL HISTORY     Past Surgical History:   Procedure Laterality Date    ABDOMINOPLASTY      x2    CHOLECYSTECTOMY      GASTRIC BYPASS      FOR MORBID OBESITY    INCISIONAL HERNIA REPAIR      [place] OPEN TX RADIOCARPAL/INTERCARPAL DISLC  BONES Left 2022    Procedure: OPEN REDUCTION W/ INTERNAL FIXATION (ORIF) LEFT DISTAL RADIUS FRACTURE;  Surgeon: Donovan Kamara MD;  Location: [facility];  Service: Orthopedics    SMALL INTESTINE SURGERY       SOCIAL & FAMILY HISTORY     Social History     Substance and Sexual Activity   Alcohol Use Not Currently       Social History     Substance and Sexual Activity   Drug Use Never    Comment: usues methadone for pain relief     Social History     Tobacco Use   Smoking Status Never    Passive exposure: Never   Smokeless Tobacco Never       Family History:  Family History   Problem Relation Age of Onset    Diabetes Family         MELLITUS    Cancer Family     Hypertension Family     Osteoporosis Family     Asthma Family     Stroke Family         SYNDROME    Breast cancer Mother     Depression Mother     COPD Father     Asthma Child        Code Status: Prior  Advance Directive and Living Will:      Power of :    POLST:    OBJECTIVE     Vitals:    24 1200 24 1230 24 1300 24 1330   BP: 103/55 111/59 118/66 113/63   Pulse: 64 62 64 60   Resp: 12 12 12 13   Temp:       TempSrc:       SpO2: 98% 97% 99% 97%        Temperature:   Temp (24hrs), Av.7 °F (36.5 °C), Min:97.7 °F (36.5 °C), Max:97.7 °F (36.5 °C)    Temperature: 97.7 °F (36.5 °C)    Intake & Output:  I/O          02/02 0701  02/03 0700 02/03 0701  02/04 0700 02/04 0701 02/05 0700    I.V.   1000    Total Intake   1000    Net   +1000                   Weights:        There is no height or weight on file to calculate BMI.  Weight (last 2 days)       None            GENERAL EXAM:  Constitutional:Alert. Not in acute distress. Not ill-appearing, toxic-appearing or diaphoretic.   HENT: Normocephalic and atraumatic. Nose and Ears normal.   Eyes: No scleral icterus. No discharge.   Neck: Neck Supple. ROM normal.  Cardiovascular: Distal extremities warm without palpable edema or tenderness, no observed significant swelling.   Pulmonary: Pulmonary effort is normal. Not in respiratory distress  Abdominal: Abdomen is flat and not distended  Musculoskeletal:  Skin: Warm and dry  Psychiatric: Normal behavior and appropriate affect     NEUROLOGIC  EXAM:  Mental Status: alertness: alert, orientation: time, date, person, place, speech:normal rate and volume, affect: normal, thought content exhibits logical connections  Cranial Nerves:  II: Pupils equal, round, reactive to light and accommodation, Visual Fields normal  III, IV, VI: EOM full and intact  V: facial sensation was normal and symmetrical  VII: facial symmetry equal  VIII: normal hearing to speech  IX, X: normal palatal elevation, no uvular deviation  XI: 5/5 head turn and 5/5 shoulder shrug bilaterally  XII: midline tongue protrusion  Motor: Normal bulk, tone, no involuntary movements or tremors     DELTOID   BICEP   TRICEPS   WRIST  EXTENSION   WRIST  FLEXION   DORSAL  INTEROSSEI      RIGHT 5 5 5 5 5 5 5   LEFT 5 5 5 5 5 5 5      HIP  FLEXION KNEE  EXTENSION DORSI   PLANTAR     RIGHT 5 5 5 5   LEFT 5 5 5 5   Reflexes: No clonus, no Prasad's, no cross abductors, toes down     BICEP   TRICEPS   BRACHIO   PATELLAR   ACHILLES   RIGHT 2+ 2+ 2+ 2+ 2+   LEFT 2+ 2+ 2+ 2+ 2+   Sensory:Normal light touch sensation  Coordination: Cerebellar arm drift absent, Finger-to-nose,  bilaterally intact Heel To Shin normal bilaterally  Station/Gait: deferred d/t medical acuity   LABORATORY DATA   Labs: I have personally reviewed pertinent reports.    Results from last 7 days   Lab Units 02/04/24  1128   WBC Thousand/uL 6.47   HEMOGLOBIN g/dL 13.2   HEMATOCRIT % 42.3   PLATELETS Thousands/uL 238   NEUTROS PCT % 53   MONOS PCT % 7   EOS PCT % 2      Results from last 7 days   Lab Units 02/04/24  1305   SODIUM mmol/L 140   POTASSIUM mmol/L 4.7   CHLORIDE mmol/L 111*   CO2 mmol/L 26   BUN mg/dL 19   CREATININE mg/dL 0.49*   CALCIUM mg/dL 8.3*                            Micro:  Lab Results   Component Value Date    URINECX No Growth <1000 cfu/mL 04/25/2023    URINECX No Growth <1000 cfu/mL 02/22/2020    URINECX No Growth <1000 cfu/mL 11/10/2019       IMAGING & DIAGNOSTIC TESTING   Radiology Results: I have personally reviewed pertinent reports and I have personally reviewed pertinent films in PACS    CTA head and neck with and without contrast   Final Result by Farshad Jacobs MD (02/04 8164)      No acute intracranial abnormality.      Mild chronic microangiopathy.      Negative CTA head and neck for large vessel occlusion, dissection, aneurysm, or high-grade stenosis.      Additional chronic/incidental findings as detailed above.                        Workstation performed: [id number]         XR chest 1 view portable    (Results Pending)       ACTIVE MEDICATIONS     Current Facility-Administered Medications   Medication Dose Route Frequency    sodium chloride 0.9 % infusion  150 mL/hr Intravenous Continuous       HOME MEDICATIONS     Prior to Admission medications    Medication Sig Start Date End Date Taking? Authorizing Provider   ALPRAZolam (XANAX) 1 mg tablet Take 1 tablet (1 mg total) by mouth daily at bedtime as needed for anxiety or sleep 1/23/24   Solis Altamirano DO   apixaban (Eliquis) 5 mg take 1 tablet by mouth twice a day 1/17/24   Solis Altamirano DO   cholecalciferol  (VITAMIN D3) 1,000 units tablet Take 2 tablets (2,000 Units total) by mouth daily 2/22/23   Solis Altamirano DO   clotrimazole (LOTRIMIN) 1 % cream Apply topically 2 (two) times a day 6/19/23   Solis Altamirano DO   Cyanocobalamin (B-12) 1000 MCG SUBL Place under the tongue    Historical Provider, MD   eszopiclone (LUNESTA) 2 mg tablet Take 1 tablet (2 mg total) by mouth daily at bedtime as needed for sleep Take immediately before bedtime 1/23/24   Solis Altamirano DO   famotidine (PEPCID) 20 mg tablet Take 1 tablet (20 mg total) by mouth 2 (two) times a day 6/16/23 6/10/24  Solis Altamirano DO   fluticasone (FLONASE) 50 mcg/act nasal spray 2 sprays into each nostril daily 6/25/23   Jose Walters PA-C   methadone (DOLOPHINE) 10 mg tablet Take 3 tablets by mouth daily, 1/29/24   Solis Altamirano DO   methocarbamol (ROBAXIN) 500 mg tablet Take 1 tablet (500 mg total) by mouth 4 (four) times a day for 14 days 4/11/23 10/27/23  Lisa Estrada PA-C   mupirocin (BACTROBAN) 2 % ointment Apply topically daily 10/27/23   Solis Altamirano DO   naloxone (NARCAN) 4 mg/0.1 mL nasal spray Administer 1 spray into a nostril. If no response after 2-3 minutes, give another dose in the other nostril using a new spray. 4/13/21   Stephen Guerrero MD   omeprazole (PriLOSEC) 20 mg delayed release capsule take 1 capsule by mouth once daily 1/15/24   Solis Altamirano DO   Pediatric Multiple Vit-C-FA (FRUITY CHEWABLES MULTIVITAMIN) CHEW Chew 1 tablet daily     Historical Provider, MD   senna (SENOKOT) 8.6 MG tablet Take 1 tablet by mouth as needed      Historical Provider, MD   sertraline (ZOLOFT) 100 mg tablet take 1 tablet by mouth twice a day 8/30/23   Solis Altamirano DO   sucralfate (Carafate) 1 g/10 mL suspension Take 10 mL (1 g total) by mouth 4 (four) times a day as needed (dyspepsia) 9/14/20   Rickie Walters MD   topiramate (TOPAMAX) 100 mg tablet take 1 tablet by mouth twice a day 1/15/24   Solis Altamirano,  DO   venlafaxine (EFFEXOR-XR) 75 mg 24 hr capsule take 1 capsule by mouth once daily 9/27/23   Solis Altamirano DO   Wound Dressings (Adaptic Non-Adhering Dressing) PADS Apply 24 each topically in the morning 10/27/23   Solis Altamirano DO       =======================================================================  I have discussed the patient's history, physical exam findings, assessment, and plan in detail with attending, Dr. Gregory Matson, DO   [facility] Neurology Residency, PGY-2

## 2024-02-04 NOTE — ED NOTES
Patient called me in stating she has voided on the floor when neurology stood her. Upon further inspection there was no urine to be found anywhere including her underwear that remained on. Patient could not believe how real it felt and that it did not actually happen.     Sharmaine Rodriguez RN  02/04/24 1438

## 2024-02-04 NOTE — ASSESSMENT & PLAN NOTE
53 year old female presenting with acute onset dizziness described as lightheadedness and room spinning, neck pain, and left eye pain which began at night on 2/3/24. Family reported concern that patient was confused and not acting herself.  CTA H/N without any acute findings. MRI Brain and orbits with/without contrast showed nonspecific WM disease consistent with microangiopathic process.  Pt reports that she feels improved. No longer experiencing dizziness or confusion. Her left eye symptoms have also improved and is no longer causing her pain or blurry vision.   Can hold off on lumbar puncture given patient improved from clinical standpoint as she is no longer dizzy or confused, continues to be afebrile and without leukocytosis as well a endorsing concern I regards to get a LP given her vertebral fracture.     Plan:  After risks benefits discussion with patient it was decided to discontinue lumbar puncture  AC: Can discontinue heparin gtt and switch back to home eliquis  No further imaging indicated from neurology perspective  Infectious disease and opthomology consulted, input appreciated   Rest of care per primary    Neurology will sign off, please contact with any questions or concerns

## 2024-02-05 ENCOUNTER — APPOINTMENT (OUTPATIENT)
Dept: RADIOLOGY | Facility: HOSPITAL | Age: 54
DRG: 054 | End: 2024-02-05
Payer: MEDICARE

## 2024-02-05 PROBLEM — Z86.711 HISTORY OF PULMONARY EMBOLISM: Status: ACTIVE | Noted: 2024-02-05

## 2024-02-05 PROBLEM — H53.8 BLURRED VISION, LEFT EYE: Status: ACTIVE | Noted: 2024-02-05

## 2024-02-05 LAB
ANION GAP SERPL CALCULATED.3IONS-SCNC: 4 MMOL/L
APTT PPP: 127 SECONDS (ref 23–37)
APTT PPP: 152 SECONDS (ref 23–37)
APTT PPP: 60 SECONDS (ref 23–37)
APTT PPP: 87 SECONDS (ref 23–37)
BUN SERPL-MCNC: 11 MG/DL (ref 5–25)
CALCIUM SERPL-MCNC: 8 MG/DL (ref 8.4–10.2)
CHLORIDE SERPL-SCNC: 113 MMOL/L (ref 96–108)
CO2 SERPL-SCNC: 25 MMOL/L (ref 21–32)
CREAT SERPL-MCNC: 0.62 MG/DL (ref 0.6–1.3)
ERYTHROCYTE [DISTWIDTH] IN BLOOD BY AUTOMATED COUNT: 13.7 % (ref 11.6–15.1)
FOLATE SERPL-MCNC: 13.3 NG/ML
GFR SERPL CREATININE-BSD FRML MDRD: 101 ML/MIN/1.73SQ M
GLUCOSE SERPL-MCNC: 121 MG/DL (ref 65–140)
HCT VFR BLD AUTO: 39.1 % (ref 34.8–46.1)
HGB BLD-MCNC: 12.2 G/DL (ref 11.5–15.4)
INR PPP: 1.08 (ref 0.84–1.19)
MCH RBC QN AUTO: 30.3 PG (ref 26.8–34.3)
MCHC RBC AUTO-ENTMCNC: 31.2 G/DL (ref 31.4–37.4)
MCV RBC AUTO: 97 FL (ref 82–98)
PLATELET # BLD AUTO: 208 THOUSANDS/UL (ref 149–390)
PMV BLD AUTO: 9.8 FL (ref 8.9–12.7)
POTASSIUM SERPL-SCNC: 3.7 MMOL/L (ref 3.5–5.3)
PROTHROMBIN TIME: 13.9 SECONDS (ref 11.6–14.5)
RBC # BLD AUTO: 4.03 MILLION/UL (ref 3.81–5.12)
SODIUM SERPL-SCNC: 142 MMOL/L (ref 135–147)
VIT B12 SERPL-MCNC: 501 PG/ML (ref 180–914)
WBC # BLD AUTO: 9.51 THOUSAND/UL (ref 4.31–10.16)

## 2024-02-05 PROCEDURE — 85610 PROTHROMBIN TIME: CPT | Performed by: FAMILY MEDICINE

## 2024-02-05 PROCEDURE — 70543 MRI ORBT/FAC/NCK W/O &W/DYE: CPT

## 2024-02-05 PROCEDURE — 99232 SBSQ HOSP IP/OBS MODERATE 35: CPT | Performed by: INTERNAL MEDICINE

## 2024-02-05 PROCEDURE — 85730 THROMBOPLASTIN TIME PARTIAL: CPT | Performed by: FAMILY MEDICINE

## 2024-02-05 PROCEDURE — 70553 MRI BRAIN STEM W/O & W/DYE: CPT

## 2024-02-05 PROCEDURE — 05HC33Z INSERTION OF INFUSION DEVICE INTO LEFT BASILIC VEIN, PERCUTANEOUS APPROACH: ICD-10-PCS | Performed by: INTERNAL MEDICINE

## 2024-02-05 PROCEDURE — 80048 BASIC METABOLIC PNL TOTAL CA: CPT | Performed by: FAMILY MEDICINE

## 2024-02-05 PROCEDURE — A9585 GADOBUTROL INJECTION: HCPCS | Performed by: INTERNAL MEDICINE

## 2024-02-05 PROCEDURE — 99233 SBSQ HOSP IP/OBS HIGH 50: CPT | Performed by: STUDENT IN AN ORGANIZED HEALTH CARE EDUCATION/TRAINING PROGRAM

## 2024-02-05 PROCEDURE — 97166 OT EVAL MOD COMPLEX 45 MIN: CPT

## 2024-02-05 PROCEDURE — 85027 COMPLETE CBC AUTOMATED: CPT | Performed by: FAMILY MEDICINE

## 2024-02-05 PROCEDURE — 99222 1ST HOSP IP/OBS MODERATE 55: CPT | Performed by: INTERNAL MEDICINE

## 2024-02-05 PROCEDURE — 85730 THROMBOPLASTIN TIME PARTIAL: CPT | Performed by: INTERNAL MEDICINE

## 2024-02-05 PROCEDURE — 97163 PT EVAL HIGH COMPLEX 45 MIN: CPT

## 2024-02-05 PROCEDURE — 82607 VITAMIN B-12: CPT | Performed by: FAMILY MEDICINE

## 2024-02-05 PROCEDURE — 83918 ORGANIC ACIDS TOTAL QUANT: CPT | Performed by: FAMILY MEDICINE

## 2024-02-05 PROCEDURE — 82746 ASSAY OF FOLIC ACID SERUM: CPT | Performed by: FAMILY MEDICINE

## 2024-02-05 RX ORDER — ZOLPIDEM TARTRATE 5 MG/1
10 TABLET ORAL
Status: DISCONTINUED | OUTPATIENT
Start: 2024-02-05 | End: 2024-02-06 | Stop reason: HOSPADM

## 2024-02-05 RX ORDER — GADOBUTROL 604.72 MG/ML
7 INJECTION INTRAVENOUS
Status: COMPLETED | OUTPATIENT
Start: 2024-02-05 | End: 2024-02-05

## 2024-02-05 RX ORDER — ZOLPIDEM TARTRATE 5 MG/1
5 TABLET ORAL ONCE
Status: COMPLETED | OUTPATIENT
Start: 2024-02-05 | End: 2024-02-05

## 2024-02-05 RX ADMIN — DOCUSATE SODIUM 100 MG: 100 CAPSULE, LIQUID FILLED ORAL at 08:19

## 2024-02-05 RX ADMIN — MECLIZINE HYDROCHLORIDE 25 MG: 25 TABLET ORAL at 13:59

## 2024-02-05 RX ADMIN — HEPARIN SODIUM 15 UNITS/KG/HR: 10000 INJECTION, SOLUTION INTRAVENOUS at 14:02

## 2024-02-05 RX ADMIN — MECLIZINE HYDROCHLORIDE 25 MG: 25 TABLET ORAL at 23:34

## 2024-02-05 RX ADMIN — PANTOPRAZOLE SODIUM 40 MG: 40 TABLET, DELAYED RELEASE ORAL at 05:21

## 2024-02-05 RX ADMIN — METHADONE HYDROCHLORIDE 30 MG: 10 TABLET ORAL at 13:59

## 2024-02-05 RX ADMIN — VENLAFAXINE HYDROCHLORIDE 75 MG: 75 CAPSULE, EXTENDED RELEASE ORAL at 08:22

## 2024-02-05 RX ADMIN — DOCUSATE SODIUM 100 MG: 100 CAPSULE, LIQUID FILLED ORAL at 18:19

## 2024-02-05 RX ADMIN — FLUTICASONE PROPIONATE 2 SPRAY: 50 SPRAY, METERED NASAL at 08:22

## 2024-02-05 RX ADMIN — GADOBUTROL 7 ML: 604.72 INJECTION INTRAVENOUS at 22:52

## 2024-02-05 RX ADMIN — ALPRAZOLAM 1 MG: 0.5 TABLET ORAL at 23:36

## 2024-02-05 RX ADMIN — SERTRALINE HYDROCHLORIDE 100 MG: 100 TABLET ORAL at 18:19

## 2024-02-05 RX ADMIN — SODIUM CHLORIDE 150 ML/HR: 0.9 INJECTION, SOLUTION INTRAVENOUS at 15:36

## 2024-02-05 RX ADMIN — FAMOTIDINE 20 MG: 20 TABLET, FILM COATED ORAL at 08:19

## 2024-02-05 RX ADMIN — FAMOTIDINE 20 MG: 20 TABLET, FILM COATED ORAL at 18:19

## 2024-02-05 RX ADMIN — TOPIRAMATE 100 MG: 100 TABLET, FILM COATED ORAL at 18:19

## 2024-02-05 RX ADMIN — TOPIRAMATE 100 MG: 100 TABLET, FILM COATED ORAL at 08:19

## 2024-02-05 RX ADMIN — ZOLPIDEM TARTRATE 5 MG: 5 TABLET ORAL at 05:21

## 2024-02-05 RX ADMIN — ZOLPIDEM TARTRATE 10 MG: 5 TABLET ORAL at 23:34

## 2024-02-05 RX ADMIN — MECLIZINE HYDROCHLORIDE 25 MG: 25 TABLET ORAL at 05:21

## 2024-02-05 RX ADMIN — HEPARIN SODIUM 15 UNITS/KG/HR: 10000 INJECTION, SOLUTION INTRAVENOUS at 15:38

## 2024-02-05 RX ADMIN — SERTRALINE HYDROCHLORIDE 100 MG: 100 TABLET ORAL at 08:19

## 2024-02-05 RX ADMIN — Medication 2000 UNITS: at 08:19

## 2024-02-05 NOTE — PROGRESS NOTES
"    NEUROLOGY RESIDENCY PROGRESS NOTE   Name: Ely Cantrell   Age & Sex: 53 y.o. female   MRN: [id number]  Unit/Bed#: [facility] 710-01   Encounter: [id number]    Recommendations for outpatient neurological follow up have yet to be determined.  Pending for discharge: Medical work up  ASSESSMENT & PLAN   * Dizziness  Assessment & Plan  53 year old female presenting with acute onset dizziness described as lightheadedness and room spinning, neck pain, and left eye pain which began at night on 2/3/24. Family reported concern that patient was confused and not acting herself. Also endorses orthopnea and an episode of urinary incontinence.   CTA H/N without any acute findings.     Impression: Unclear etiology. Differential diagnosis remains broad - low suspicion for vascular insult cannot r/o meningitis/encephalitis at this time    Plan:  Frequent neuro checks, obtain stat CTH if change in exam  AC: Hold home Eliquis and place on heparin drip pending IR LP for CSF analysis  Imaging: Obtain MR Brain with attention to the orbits with and without contrast  IR for Lumbar Puncture tomorrow  Inpatient consult to Ophthalmology for full funduscopic evaluation  Rest of care per primary      SUBJECTIVE   Patient was seen and examined this morning at bedside. No acute events over night. Pt is improving clinically. She reports less dizziness. She stood up once this morning and felt like the room \"tilts\". Denies lightheadedness or nausea. Pt's neck pain is improving, but she still has some pain with movement especially when turning head to left. She continues to report that her left eye feels \"crusty\". She has left eye pain which she describes as soreness associated with light and extraocular movements.  Pt reports restless legs for several hours last night. She has an intermittent history of this. She has received iron infusions in the past. She also reports she did not receive her usual dose of sleep meds so she had poor sleep. Pt has " had no urinary incontinence overnight.     Pertinent Negatives include: numbness, weakness, speech or visual changes, syncope, paralysis/weakness, numbness or tingling, tremor, speech impairment     Review of Systems   Constitutional:  Negative for chills and fever.   HENT:  Negative for hearing loss.    Eyes:  Positive for photophobia and pain. Negative for discharge, redness and visual disturbance.   Respiratory:  Negative for shortness of breath.    Cardiovascular:  Negative for chest pain.   Gastrointestinal:  Negative for nausea and vomiting.   Genitourinary:  Negative for difficulty urinating, frequency and urgency.   Musculoskeletal:  Positive for neck pain.   Neurological:  Positive for dizziness. Negative for tremors, syncope, speech difficulty, weakness, light-headedness and numbness.   Psychiatric/Behavioral:  Positive for sleep disturbance. Negative for agitation and behavioral problems.      OBJECTIVE   Patient ID: Ely Cantrell is a 53 y.o. female.  Vitals:    24 1951 24 2354 24 0325 24 0730   BP: 98/52 96/52 100/63 120/64   Pulse: 62 56 (!) 54 56   Resp:    15   Temp: 98 °F (36.7 °C)   97.9 °F (36.6 °C)   TempSrc:       SpO2: 97% 96% 97% 98%   Weight:       Height:            Temperature:   Temp (24hrs), Av.9 °F (36.6 °C), Min:97.8 °F (36.6 °C), Max:98 °F (36.7 °C)    Temperature: 97.9 °F (36.6 °C)    GENERAL EXAM:  Constitutional:Alert. Not in acute distress. Not ill-appearing, toxic-appearing or diaphoretic.   HENT: Normocephalic and atraumatic. Nose and Ears normal.   Eyes: No scleral icterus. No discharge.   Neck: Neck Supple. ROM normal.  Cardiovascular: Distal extremities warm without palpable edema or tenderness, no observed significant swelling.   Pulmonary: Pulmonary effort is normal. Not in respiratory distress  Abdominal: Abdomen is flat and not distended  Musculoskeletal: No swelling or deformity.  Skin: Warm and dry  Psychiatric: Normal behavior and  appropriate affect     NEUROLOGIC  EXAM:  Mental Status: alertness: alert, orientation: time, date, person, place, city, president, speech:fluent, affect: normal, thought content exhibits logical connections  Cranial Nerves:  II: Pupils equal, round, reactive to light and accommodation, Visual Fields normal  III, IV, VI: EOM full and intact  V: facial sensation was normal and symmetrical  VII: Facial symmetry:facial symmetry equal  VIII: normal hearing to speech  IX, X: normal palatal elevation, no uvular deviation  XI: 5/5 head turn and 5/5 shoulder shrug bilaterally  XII: midline tongue protrusion  Motor: Normal bulk, tone, no involuntary movements or tremors     DELTOID   BICEP   TRICEPS   WRIST  EXTENSION   WRIST  FLEXION   DORSAL  INTEROSSEI      RIGHT 5 5 5 5 5 5 5   LEFT 5 5 5 5 5 5 5      HIP  FLEXION KNEE  EXTENSION DORSI   PLANTAR     RIGHT 5 5 5 5   LEFT 5 5 5 5   Reflexes: No clonus, no Prasad's, no cross abductors, toes down     BICEP   TRICEPS   BRACHIO   PATELLAR   ACHILLES   RIGHT 2+ 2+ 2+ 2+ 2+   LEFT 2+ 2+ 2+ 2+ 2+   Sensory:Normal light touch sensation  Coordination: Cerebellar arm drift absent, Finger-to-nose bilaterally intact, Heel To Hodges normal bilaterally  Station/Gait: Deferred   LABORATORY DATA   Labs: I have personally reviewed pertinent reports.    Results from last 7 days   Lab Units 02/05/24  1136 02/04/24  1931 02/04/24  1128   WBC Thousand/uL 9.51 6.76 6.47   HEMOGLOBIN g/dL 12.2 14.0 13.2   HEMATOCRIT % 39.1 44.0 42.3   PLATELETS Thousands/uL 208 237 238   NEUTROS PCT %  --   --  53   MONOS PCT %  --   --  7   EOS PCT %  --   --  2      Results from last 7 days   Lab Units 02/05/24  1136 02/04/24  1305   SODIUM mmol/L 142 140   POTASSIUM mmol/L 3.7 4.7   CHLORIDE mmol/L 113* 111*   CO2 mmol/L 25 26   BUN mg/dL 11 19   CREATININE mg/dL 0.62 0.49*   CALCIUM mg/dL 8.0* 8.3*              Results from last 7 days   Lab Units 02/05/24  1136 02/05/24  0141 02/04/24  2013   INR  1.08   --  1.10   PTT seconds 152* 87* 29             IMAGING & DIAGNOSTIC TESTING   Radiology Results: I have personally reviewed pertinent reports.      CTA head and neck with and without contrast   Final Result by Farshad Jacobs MD (02/04 1434)      No acute intracranial abnormality.      Mild chronic microangiopathy.      Negative CTA head and neck for large vessel occlusion, dissection, aneurysm, or high-grade stenosis.      Additional chronic/incidental findings as detailed above.                        Workstation performed: [id number]         XR chest 1 view portable   Final Result by Valente Pozo MD (02/05 0849)      No acute cardiopulmonary disease.            Workstation performed: [id number]         MRI inpatient order    (Results Pending)   FL IN-patient lumbar puncture    (Results Pending)       Other Diagnostic Testing: I have personally reviewed pertinent reports.    ACTIVE MEDICATIONS     Current Facility-Administered Medications   Medication Dose Route Frequency    acetaminophen (TYLENOL) tablet 650 mg  650 mg Oral Q6H PRN    ALPRAZolam (XANAX) tablet 1 mg  1 mg Oral HS PRN    cholecalciferol (VITAMIN D3) tablet 2,000 Units  2,000 Units Oral Daily    docusate sodium (COLACE) capsule 100 mg  100 mg Oral BID    famotidine (PEPCID) tablet 20 mg  20 mg Oral BID    fluticasone (FLONASE) 50 mcg/act nasal spray 2 spray  2 spray Nasal Daily    heparin (porcine) 25,000 units in 0.45% NaCl 250 mL infusion (premix)  3-30 Units/kg/hr (Order-Specific) Intravenous Titrated    heparin (porcine) injection 2,800 Units  2,800 Units Intravenous Q6H PRN    heparin (porcine) injection 5,600 Units  5,600 Units Intravenous Once    heparin (porcine) injection 5,600 Units  5,600 Units Intravenous Q6H PRN    meclizine (ANTIVERT) tablet 25 mg  25 mg Oral Q8H ANUSHA    methadone (DOLOPHINE) tablet 30 mg  30 mg Oral Daily    ondansetron (ZOFRAN) injection 4 mg  4 mg Intravenous Q6H PRN    pantoprazole (PROTONIX) EC tablet  40 mg  40 mg Oral Early Morning    sertraline (ZOLOFT) tablet 100 mg  100 mg Oral BID    sodium chloride 0.9 % infusion  150 mL/hr Intravenous Continuous    topiramate (TOPAMAX) tablet 100 mg  100 mg Oral BID    venlafaxine (EFFEXOR-XR) 24 hr capsule 75 mg  75 mg Oral Daily    zolpidem (AMBIEN) tablet 10 mg  10 mg Oral HS       Prior to Admission medications    Medication Sig Start Date End Date Taking? Authorizing Provider   ALPRAZolam (XANAX) 1 mg tablet Take 1 tablet (1 mg total) by mouth daily at bedtime as needed for anxiety or sleep 1/23/24   Solis Altamirano DO   apixaban (Eliquis) 5 mg take 1 tablet by mouth twice a day 1/17/24   Solis Altamirano DO   cholecalciferol (VITAMIN D3) 1,000 units tablet Take 2 tablets (2,000 Units total) by mouth daily 2/22/23   Solis Altamirano DO   clotrimazole (LOTRIMIN) 1 % cream Apply topically 2 (two) times a day 6/19/23   Solis Altamirano DO   Cyanocobalamin (B-12) 1000 MCG SUBL Place under the tongue    Historical Provider, MD   eszopiclone (LUNESTA) 2 mg tablet Take 1 tablet (2 mg total) by mouth daily at bedtime as needed for sleep Take immediately before bedtime 1/23/24   Solis Altamirano DO   famotidine (PEPCID) 20 mg tablet Take 1 tablet (20 mg total) by mouth 2 (two) times a day 6/16/23 6/10/24  Solis Altamirano DO   fluticasone (FLONASE) 50 mcg/act nasal spray 2 sprays into each nostril daily 6/25/23   Jose Walters PA-C   methadone (DOLOPHINE) 10 mg tablet Take 3 tablets by mouth daily, 1/29/24   Solis Altamirano DO   methocarbamol (ROBAXIN) 500 mg tablet Take 1 tablet (500 mg total) by mouth 4 (four) times a day for 14 days 4/11/23 10/27/23  Lisa Estrada PA-C   mupirocin (BACTROBAN) 2 % ointment Apply topically daily 10/27/23   Solis Altamirano    naloxone (NARCAN) 4 mg/0.1 mL nasal spray Administer 1 spray into a nostril. If no response after 2-3 minutes, give another dose in the other nostril using a new spray. 4/13/21   Stephen Matthew  MD Cesar   omeprazole (PriLOSEC) 20 mg delayed release capsule take 1 capsule by mouth once daily 1/15/24   Solis Altamirano DO   Pediatric Multiple Vit-C-FA (FRUITY CHEWABLES MULTIVITAMIN) CHEW Chew 1 tablet daily     Historical Provider, MD   senna (SENOKOT) 8.6 MG tablet Take 1 tablet by mouth as needed      Historical Provider, MD   sertraline (ZOLOFT) 100 mg tablet take 1 tablet by mouth twice a day 8/30/23   Solis Altamirano DO   sucralfate (Carafate) 1 g/10 mL suspension Take 10 mL (1 g total) by mouth 4 (four) times a day as needed (dyspepsia) 9/14/20   Rickie Walters MD   topiramate (TOPAMAX) 100 mg tablet take 1 tablet by mouth twice a day 1/15/24   Solis Altamirano DO   venlafaxine (EFFEXOR-XR) 75 mg 24 hr capsule take 1 capsule by mouth once daily 9/27/23   Solis Altamirano DO   Wound Dressings (Adaptic Non-Adhering Dressing) PADS Apply 24 each topically in the morning 10/27/23   Solis Altamirano DO         VTE Pharmacologic Prophylaxis: Heparin Drip  VTE Mechanical Prophylaxis: sequential compression device    ==  Stacey De La Fuente, MS3

## 2024-02-05 NOTE — PLAN OF CARE
Problem: PAIN - ADULT  Goal: Verbalizes/displays adequate comfort level or baseline comfort level  Description: Interventions:  - Encourage patient to monitor pain and request assistance  - Assess pain using appropriate pain scale  - Administer analgesics based on type and severity of pain and evaluate response  - Implement non-pharmacological measures as appropriate and evaluate response  - Consider cultural and social influences on pain and pain management  - Notify physician/advanced practitioner if interventions unsuccessful or patient reports new pain  Outcome: Progressing     Problem: INFECTION - ADULT  Goal: Absence or prevention of progression during hospitalization  Description: INTERVENTIONS:  - Assess and monitor for signs and symptoms of infection  - Monitor lab/diagnostic results  - Monitor all insertion sites, i.e. indwelling lines, tubes, and drains  - Monitor endotracheal if appropriate and nasal secretions for changes in amount and color  - [place] appropriate cooling/warming therapies per order  - Administer medications as ordered  - Instruct and encourage patient and family to use good hand hygiene technique  - Identify and instruct in appropriate isolation precautions for identified infection/condition  Outcome: Progressing  Goal: Absence of fever/infection during neutropenic period  Description: INTERVENTIONS:  - Monitor WBC    Outcome: Progressing     Problem: SAFETY ADULT  Goal: Patient will remain free of falls  Description: INTERVENTIONS:  - Educate patient/family on patient safety including physical limitations  - Instruct patient to call for assistance with activity   - Consult OT/PT to assist with strengthening/mobility   - Keep Call bell within reach  - Keep bed low and locked with side rails adjusted as appropriate  - Keep care items and personal belongings within reach  - Initiate and maintain comfort rounds  - Make Fall Risk Sign visible to staff  - Offer Toileting every  Hours,  in advance of need  - Initiate/Maintain alarm  - Obtain necessary fall risk management equipment:   - Apply yellow socks and bracelet for high fall risk patients  - Consider moving patient to room near nurses station  Outcome: Progressing  Goal: Maintain or return to baseline ADL function  Description: INTERVENTIONS:  -  Assess patient's ability to carry out ADLs; assess patient's baseline for ADL function and identify physical deficits which impact ability to perform ADLs (bathing, care of mouth/teeth, toileting, grooming, dressing, etc.)  - Assess/evaluate cause of self-care deficits   - Assess range of motion  - Assess patient's mobility; develop plan if impaired  - Assess patient's need for assistive devices and provide as appropriate  - Encourage maximum independence but intervene and supervise when necessary  - Involve family in performance of ADLs  - Assess for home care needs following discharge   - Consider OT consult to assist with ADL evaluation and planning for discharge  - Provide patient education as appropriate  Outcome: Progressing  Goal: Maintains/Returns to pre admission functional level  Description: INTERVENTIONS:  - Perform AM-PAC 6 Click Basic Mobility/ Daily Activity assessment daily.  - Set and communicate daily mobility goal to care team and patient/family/caregiver.   - Collaborate with rehabilitation services on mobility goals if consulted  - Perform Range of Motion  times a day.  - Reposition patient every  hours.  - Dangle patient  times a day  - Stand patient  times a day  - Ambulate patient  times a day  - Out of bed to chair  times a day   - Out of bed for meals  times a day  - Out of bed for toileting  - Record patient progress and toleration of activity level   Outcome: Progressing     Problem: DISCHARGE PLANNING  Goal: Discharge to home or other facility with appropriate resources  Description: INTERVENTIONS:  - Identify barriers to discharge w/patient and caregiver  - Arrange for  needed discharge resources and transportation as appropriate  - Identify discharge learning needs (meds, wound care, etc.)  - Arrange for interpretive services to assist at discharge as needed  - Refer to Case Management Department for coordinating discharge planning if the patient needs post-hospital services based on physician/advanced practitioner order or complex needs related to functional status, cognitive ability, or social support system  Outcome: Progressing     Problem: Knowledge Deficit  Goal: Patient/family/caregiver demonstrates understanding of disease process, treatment plan, medications, and discharge instructions  Description: Complete learning assessment and assess knowledge base.  Interventions:  - Provide teaching at level of understanding  - Provide teaching via preferred learning methods  Outcome: Progressing     Problem: NEUROSENSORY - ADULT  Goal: Achieves stable or improved neurological status  Description: INTERVENTIONS  - Monitor and report changes in neurological status  - Monitor vital signs such as temperature, blood pressure, glucose, and any other labs ordered   - Initiate measures to prevent increased intracranial pressure  - Monitor for seizure activity and implement precautions if appropriate      Outcome: Progressing  Goal: Remains free of injury related to seizures activity  Description: INTERVENTIONS  - Maintain airway, patient safety  and administer oxygen as ordered  - Monitor patient for seizure activity, document and report duration and description of seizure to physician/advanced practitioner  - If seizure occurs,  ensure patient safety during seizure  - Reorient patient post seizure  - Seizure pads on all 4 side rails  - Instruct patient/family to notify RN of any seizure activity including if an aura is experienced  - Instruct patient/family to call for assistance with activity based on nursing assessment  - Administer anti-seizure medications if ordered    Outcome:  Progressing  Goal: Achieves maximal functionality and self care  Description: INTERVENTIONS  - Monitor swallowing and airway patency with patient fatigue and changes in neurological status  - Encourage and assist patient to increase activity and self care.   - Encourage visually impaired, hearing impaired and aphasic patients to use assistive/communication devices  Outcome: Progressing     Problem: CARDIOVASCULAR - ADULT  Goal: Maintains optimal cardiac output and hemodynamic stability  Description: INTERVENTIONS:  - Monitor I/O, vital signs and rhythm  - Monitor for S/S and trends of decreased cardiac output  - Administer and titrate ordered vasoactive medications to optimize hemodynamic stability  - Assess quality of pulses, skin color and temperature  - Assess for signs of decreased coronary artery perfusion  - Instruct patient to report change in severity of symptoms  Outcome: Progressing  Goal: Absence of cardiac dysrhythmias or at baseline rhythm  Description: INTERVENTIONS:  - Continuous cardiac monitoring, vital signs, obtain 12 lead EKG if ordered  - Administer antiarrhythmic and heart rate control medications as ordered  - Monitor electrolytes and administer replacement therapy as ordered  Outcome: Progressing     Problem: RESPIRATORY - ADULT  Goal: Achieves optimal ventilation and oxygenation  Description: INTERVENTIONS:  - Assess for changes in respiratory status  - Assess for changes in mentation and behavior  - Position to facilitate oxygenation and minimize respiratory effort  - Oxygen administered by appropriate delivery if ordered  - Initiate smoking cessation education as indicated  - Encourage broncho-pulmonary hygiene including cough, deep breathe, Incentive Spirometry  - Assess the need for suctioning and aspirate as needed  - Assess and instruct to report SOB or any respiratory difficulty  - Respiratory Therapy support as indicated  Outcome: Progressing     Problem: GASTROINTESTINAL -  ADULT  Goal: Minimal or absence of nausea and/or vomiting  Description: INTERVENTIONS:  - Administer IV fluids if ordered to ensure adequate hydration  - Maintain NPO status until nausea and vomiting are resolved  - Nasogastric tube if ordered  - Administer ordered antiemetic medications as needed  - Provide nonpharmacologic comfort measures as appropriate  - Advance diet as tolerated, if ordered  - Consider nutrition services referral to assist patient with adequate nutrition and appropriate food choices  Outcome: Progressing  Goal: Maintains or returns to baseline bowel function  Description: INTERVENTIONS:  - Assess bowel function  - Encourage oral fluids to ensure adequate hydration  - Administer IV fluids if ordered to ensure adequate hydration  - Administer ordered medications as needed  - Encourage mobilization and activity  - Consider nutritional services referral to assist patient with adequate nutrition and appropriate food choices  Outcome: Progressing  Goal: Maintains adequate nutritional intake  Description: INTERVENTIONS:  - Monitor percentage of each meal consumed  - Identify factors contributing to decreased intake, treat as appropriate  - Assist with meals as needed  - Monitor I&O, weight, and lab values if indicated  - Obtain nutrition services referral as needed  Outcome: Progressing  Goal: Establish and maintain optimal ostomy function  Description: INTERVENTIONS:  - Assess bowel function  - Encourage oral fluids to ensure adequate hydration  - Administer IV fluids if ordered to ensure adequate hydration   - Administer ordered medications as needed  - Encourage mobilization and activity  - Nutrition services referral to assist patient with appropriate food choices  - Assess stoma site  - Consider wound care consult   Outcome: Progressing  Goal: Oral mucous membranes remain intact  Description: INTERVENTIONS  - Assess oral mucosa and hygiene practices  - Implement preventative oral hygiene  regimen  - Implement oral medicated treatments as ordered  - Initiate Nutrition services referral as needed  Outcome: Progressing     Problem: GENITOURINARY - ADULT  Goal: Maintains or returns to baseline urinary function  Description: INTERVENTIONS:  - Assess urinary function  - Encourage oral fluids to ensure adequate hydration if ordered  - Administer IV fluids as ordered to ensure adequate hydration  - Administer ordered medications as needed  - Offer frequent toileting  - Follow urinary retention protocol if ordered  Outcome: Progressing  Goal: Absence of urinary retention  Description: INTERVENTIONS:  - Assess patient’s ability to void and empty bladder  - Monitor I/O  - Bladder scan as needed  - Discuss with physician/AP medications to alleviate retention as needed  - Discuss catheterization for long term situations as appropriate  Outcome: Progressing  Goal: Urinary catheter remains patent  Description: INTERVENTIONS:  - Assess patency of urinary catheter  - If patient has a chronic lang, consider changing catheter if non-functioning  - Follow guidelines for intermittent irrigation of non-functioning urinary catheter  Outcome: Progressing     Problem: METABOLIC, FLUID AND ELECTROLYTES - ADULT  Goal: Electrolytes maintained within normal limits  Description: INTERVENTIONS:  - Monitor labs and assess patient for signs and symptoms of electrolyte imbalances  - Administer electrolyte replacement as ordered  - Monitor response to electrolyte replacements, including repeat lab results as appropriate  - Instruct patient on fluid and nutrition as appropriate  Outcome: Progressing  Goal: Fluid balance maintained  Description: INTERVENTIONS:  - Monitor labs   - Monitor I/O and WT  - Instruct patient on fluid and nutrition as appropriate  - Assess for signs & symptoms of volume excess or deficit  Outcome: Progressing  Goal: Glucose maintained within target range  Description: INTERVENTIONS:  - Monitor Blood Glucose as  ordered  - Assess for signs and symptoms of hyperglycemia and hypoglycemia  - Administer ordered medications to maintain glucose within target range  - Assess nutritional intake and initiate nutrition service referral as needed  Outcome: Progressing     Problem: SKIN/TISSUE INTEGRITY - ADULT  Goal: Skin Integrity remains intact(Skin Breakdown Prevention)  Description: Assess:  -Perform [place] assessment every   -Clean and moisturize skin every   -Inspect skin when repositioning, toileting, and assisting with ADLS  -Assess under medical devices such as  every   -Assess extremities for adequate circulation and sensation     Bed Management:  -Have minimal linens on bed & keep smooth, unwrinkled  -Change linens as needed when moist or perspiring  -Avoid sitting or lying in one position for more than  hours while in bed  -Keep HOB at degrees     Toileting:  -Offer bedside commode  -Assess for incontinence every   -Use incontinent care products after each incontinent episode such as     Activity:  -Mobilize patient  times a day  -Encourage activity and walks on unit  -Encourage or provide ROM exercises   -Turn and reposition patient every  Hours  -Use appropriate equipment to lift or move patient in bed  -Instruct/ Assist with weight shifting every  when out of bed in chair  -Consider limitation of chair time  hour intervals    Skin Care:  -Avoid use of baby powder, tape, friction and shearing, hot water or constrictive clothing  -Relieve pressure over bony prominences using   -Do not massage red bony areas    Next Steps:  -Teach patient strategies to minimize risks such as    -Consider consults to  interdisciplinary teams such as   Outcome: Progressing  Goal: Incision(s), wounds(s) or drain site(s) healing without S/S of infection  Description: INTERVENTIONS  - Assess and document dressing, incision, wound bed, drain sites and surrounding tissue  - Provide patient and family education  - Perform skin care/dressing  changes every   Outcome: Progressing  Goal: Pressure injury heals and does not worsen  Description: Interventions:  - Implement low air loss mattress or specialty surface (Criteria met)  - Apply silicone foam dressing  - Instruct/assist with weight shifting every  minutes when in chair   - Limit chair time to  hour intervals  - Use special pressure reducing interventions such as  when in chair   - Apply fecal or urinary incontinence containment device   - Perform passive or active ROM every   - Turn and reposition patient & offload bony prominences every  hours   - Utilize friction reducing device or surface for transfers   - Consider consults to  interdisciplinary teams such as   - Use incontinent care products after each incontinent episode such as   - Consider nutrition services referral as needed  Outcome: Progressing     Problem: HEMATOLOGIC - ADULT  Goal: Maintains hematologic stability  Description: INTERVENTIONS  - Assess for signs and symptoms of bleeding or hemorrhage  - Monitor labs  - Administer supportive blood products/factors as ordered and appropriate  Outcome: Progressing     Problem: MUSCULOSKELETAL - ADULT  Goal: Maintain or return mobility to safest level of function  Description: INTERVENTIONS:  - Assess patient's ability to carry out ADLs; assess patient's baseline for ADL function and identify physical deficits which impact ability to perform ADLs (bathing, care of mouth/teeth, toileting, grooming, dressing, etc.)  - Assess/evaluate cause of self-care deficits   - Assess range of motion  - Assess patient's mobility  - Assess patient's need for assistive devices and provide as appropriate  - Encourage maximum independence but intervene and supervise when necessary  - Involve family in performance of ADLs  - Assess for home care needs following discharge   - Consider OT consult to assist with ADL evaluation and planning for discharge  - Provide patient education as appropriate  Outcome:  Progressing  Goal: Maintain proper alignment of affected body part  Description: INTERVENTIONS:  - Support, maintain and protect limb and body alignment  - Provide patient/ family with appropriate education  Outcome: Progressing

## 2024-02-05 NOTE — OCCUPATIONAL THERAPY NOTE
Occupational Therapy Evaluation     Patient Name: Ely Cantrell  Today's Date: 2/5/2024  Problem List  Principal Problem:    Dizziness  Active Problems:    Anxiety    Primary insomnia    Migraine, unspecified, not intractable, without status migrainosus    Pain syndrome, chronic    Chronic deep vein thrombosis (DVT) of right femoral vein (HCC)    Compression fracture of L5 vertebra with routine healing    Past Medical History  Past Medical History:   Diagnosis Date    Anemia     Anxiety     Depression     Facial cellulitis     Factor 5 Leiden mutation, heterozygous (HCC)     Fracture, cuboid     LAST ASSESSED: 3/26/15    Gastrojejunal ulcer     ANASTOMOTIC    History of small bowel obstruction     Hypertension     Iron deficiency anemia     LAST ASSESSED: AND RESOLVED: 4/13/16    Obstructive sleep apnea (adult) (pediatric)      Past Surgical History  Past Surgical History:   Procedure Laterality Date    ABDOMINOPLASTY      x2    CHOLECYSTECTOMY      GASTRIC BYPASS      FOR MORBID OBESITY    INCISIONAL HERNIA REPAIR      PA OPEN TX RADIOCARPAL/INTERCARPAL DISLC 1/> BONES Left 1/6/2022    Procedure: OPEN REDUCTION W/ INTERNAL FIXATION (ORIF) LEFT DISTAL RADIUS FRACTURE;  Surgeon: Donovan Kamara MD;  [facility]: [facility];  Service: Orthopedics    SMALL INTESTINE SURGERY             02/05/24 0942   OT Last Visit   OT Visit Date 02/05/24   Note Type   Note type Evaluation   Pain Assessment   Pain Assessment Tool 0-10   Pain Score No Pain   Restrictions/Precautions   Weight Bearing Precautions Per Order No   Braces or Orthoses   (pt reports owning back brace for comfort)   Other Precautions Droplet precautions;Fall Risk;Multiple lines   Home Living   Type of Home Apartment  (room on 1st floor)   Home Layout One level;Performs ADLs on one level;Able to live on main level with bedroom/bathroom  (0 SALLY, washer+ dryer on one floor)   Bathroom Shower/Tub Tub/shower unit   Bathroom Toilet Standard   Bathroom  Equipment Other (Comment)  (no DME)   Home Equipment Walker   Prior Function   Level of Deputy Independent with ADLs;Independent with functional mobility;Independent with IADLS   Lives With Alone  (+ pet)   Receives Help From Family  (Children live nearby and visit daily)   IADLs Independent with driving;Independent with meal prep;Independent with medication management   Falls in the last 6 months 1 to 4  (1-2 falls per pt report)   Vocational Full time employment  (works at [employer])   Lifestyle   Autonomy Pt IND with ADL/IADL and was not using AD PTA, but has walker + drives   Reciprocal Relationships supportive family   Service to Others FT employment at [employer]   Intrinsic Gratification enjoys spending time with family + grandkids   ADL   Where Assessed Edge of bed   Eating Assistance 7  Independent   Grooming Assistance 7  Independent   UB Bathing Assistance 6  Modified Independent   UB Bathing Deficit Increased time to complete   LB Bathing Assistance 5  Supervision/Setup   UB Dressing Assistance 6  Modified independent   UB Dressing Deficit Increased time to complete   LB Dressing Assistance 5  Supervision/Setup   Toileting Assistance  5  Supervision/Setup   Bed Mobility   Supine to Sit Unable to assess   Sit to Supine Unable to assess   Additional Comments pt sitting EOB upon arrival.   Transfers   Sit to Stand 5  Supervision   Additional items Increased time required   Stand to Sit 5  Supervision   Additional items Increased time required   Additional Comments No AD used   Functional Mobility   Functional Mobility 5  Supervision   Additional Comments pt participated in functional mobility of short distance in hallway holding on to IV stand with supervision. Attempted mobility without holding on to IV and appeared unsteady. Offered HHA and pt refused, preferred to hold onto IV   Additional items   (no AD used)   Balance   Static Sitting Fair   Dynamic Sitting Fair   Static Standing Fair -   Dynamic Standing  Poor +   Ambulatory Poor +   Activity Tolerance   Activity Tolerance Patient tolerated treatment well;Patient limited by fatigue   Medical Staff Made Aware OT Lynne, PT Dilcia   Nurse Made Aware Yes-Cleared   RUE Assessment   RUE Assessment WFL   LUE Assessment   LUE Assessment WFL   Cognition   Overall Cognitive Status WFL   Arousal/Participation Alert;Responsive;Cooperative   Attention Within functional limits   Orientation Level Oriented X4   Memory Within functional limits   Following Commands Follows one step commands with increased time or repetition   Comments Pt pleasant and cooperative; maintained attention t/o OT session. Was able to recall past events however, required increase time + repetition of directions.   Assessment   Limitation Decreased Safe judgement during ADL;Decreased cognition;Decreased endurance;Decreased self-care trans;Decreased high-level ADLs   Prognosis Fair   Assessment Pt seen for Occupational Therapy Evaluation. Pt is 53 y.o. female admitted to [facility] on 2/4/2024 with Dizziness and  has a past medical history of Anemia, Anxiety, Depression, Facial cellulitis, Factor 5 Leiden mutation, heterozygous (HCC), Fracture, cuboid, Gastrojejunal ulcer, History of small bowel obstruction, Hypertension, Iron deficiency anemia, and Obstructive sleep apnea (adult) (pediatric). Pt presented with resolved symptoms, but experiencing fatigue. Pt lives alone with cat in an apartment on the 1st floor with no steps to get in. Pt experienced 1-2 fall(s) per pt report in the past 6 months. Pt was IND with ADLs/IADLs, functional mobility, transfers, and + for driving and working FT at [employer]. Pt was A/Ox 4; would benefit from formal cognitive assessment. Pt required Supervision for bed mobility, standing and functional mobility/transfers, and ADL/IADLs. Pt currently presents with no impairments in the following categories impacting activities of daily living: mobility, transfers, difficulty  performing ADLs/IADLs (self-care, grooming, cooking, cleaning), activity tolerance, endurance, standing balance/tolerance and sitting balance/tolerance. The patient's raw score on the -PAC Daily Activity Inpatient Short Form is 20. A raw score of greater than or equal to 19 suggests the patient may benefit from discharge to home. Please refer to the recommendation of the Occupational Therapist for safe discharge planning. From OT standpoint recommend OP OT with focus on safety 2nd to driving + living alone upon D/C.   Goals   Patient Goals to go home   LTG Time Frame 10-14   Plan   Treatment Interventions ADL retraining;Endurance training;Cognitive reorientation;Patient/family training;Energy conservation;Activityengagement   Goal Expiration Date 02/19/24   OT Frequency 3-5x/wk   Discharge Recommendation   Rehab Resource Intensity Level, OT III (Minimum Resource Intensity)  (OP OT with focus on safety 2nd to living alone + driving)   -[facility] Daily Activity Inpatient   Lower Body Dressing 3   Bathing 3   Toileting 3   Upper Body Dressing 3   Grooming 4   Eating 4   Daily Activity Raw Score 20   Daily Activity Standardized Score (Calc for Raw Score >=11) 42.03   AM-[facility] Applied Cognition Inpatient   Following a Speech/Presentation 4   Understanding Ordinary Conversation 4   Taking Medications 3   Remembering Where Things Are Placed or Put Away 4   Remembering List of 4-5 Errands 3   Taking Care of Complicated Tasks 3   Applied Cognition Raw Score 21   Applied Cognition Standardized Score 44.3   End of Consult   Patient Position at End of Consult Bedside chair;Bed/Chair alarm activated;All needs within reach   Nurse Communication Nurse aware of consult     Goals:  - Pt will demonstrate safe bed mobility with Mod I in order to participate in ADLs  - Pt will complete UB bathing/dressing with Mod I utilizing energy conservation techniques upon discharge.  - Pt will complete LB bathing/dressing with Mod I and proper body  mechanics upon discharge home.  - Pt will complete toiletting with Mod I in order to increase independence.  - Pt will participate in functional transfer tasks with Mod I while demonstrating safe actions.  - Pt will engage in functional mobility with Mod I while using AD and no cues for safety.  - Pt will increase activity tolerance to 25-30 mins to increase overall safety and independence in self-care activities upon discharge.  - Pt will increase dynamic standing balance to fair in order to participate in ADLs upon discharge.  - Pt will demonstrate educational competency with recall of safety precautions.  - Pt will implement energy conservation techniques for completing multi-step activities with <2 verbal cues.  - Pt will participate in cognitive assessment with good attention and participation for safe d/c planning.    Kana Dempsey, OTS

## 2024-02-05 NOTE — PLAN OF CARE
Problem: OCCUPATIONAL THERAPY ADULT  Goal: Performs self-care activities at highest level of function for planned discharge setting.  See evaluation for individualized goals.  Description: Treatment Interventions: ADL retraining, Endurance training, Cognitive reorientation, Patient/family training, Energy conservation, Activityengagement          See flowsheet documentation for full assessment, interventions and recommendations.   Note: Limitation: Decreased Safe judgement during ADL, Decreased cognition, Decreased endurance, Decreased self-care trans, Decreased high-level ADLs  Prognosis: Fair  Assessment: Pt seen for Occupational Therapy Evaluation. Pt is 53 y.o. female admitted to [facility] on 2/4/2024 with Dizziness and  has a past medical history of Anemia, Anxiety, Depression, Facial cellulitis, Factor 5 Leiden mutation, heterozygous (HCC), Fracture, cuboid, Gastrojejunal ulcer, History of small bowel obstruction, Hypertension, Iron deficiency anemia, and Obstructive sleep apnea (adult) (pediatric). Pt presented with resolved symptoms, but experiencing fatigue. Pt lives alone with cat in an apartment on the 1st floor with no steps to get in. Pt experienced 1-2 fall(s) per pt report in the past 6 months. Pt was IND with ADLs/IADLs, functional mobility, transfers, and + for driving and working FT at [employer]. Pt was A/Ox 4; would benefit from formal cognitive assessment. Pt required Supervision for bed mobility, standing and functional mobility/transfers, and ADL/IADLs. Pt currently presents with no impairments in the following categories impacting activities of daily living: mobility, transfers, difficulty performing ADLs/IADLs (self-care, grooming, cooking, cleaning), activity tolerance, endurance, standing balance/tolerance and sitting balance/tolerance. The patient's raw score on the -PAC Daily Activity Inpatient Short Form is 20. A raw score of greater than or equal to 19 suggests the patient may  benefit from discharge to home. Please refer to the recommendation of the Occupational Therapist for safe discharge planning. From OT standpoint recommend OP OT with focus on safety 2nd to driving + living alone upon D/C.     Rehab Resource Intensity Level, OT: III (Minimum Resource Intensity) (OP OT with focus on safety 2nd to living alone + driving)

## 2024-02-05 NOTE — PROCEDURES
Midline Insertion    Date/Time: 2/5/2024 10:46 AM    Performed by: Valeria Owen RN  Authorized by: Deneen Lyon MD    Patient location:  Bedside  Other Assisting Provider: Yes (comment) ([facility]ure[facility])    Consent:     Consent obtained:  Verbal (None required)    Consent given by:  Patient    Risks discussed:  Arterial puncture, bleeding, infection, incorrect placement and nerve damage    Alternatives discussed:  No treatment and delayed treatment  Universal protocol:     Procedure explained and questions answered to patient or proxy's satisfaction: yes      Relevant documents present and verified: yes      Site/side marked: yes      Immediately prior to procedure, a time out was called: yes      Patient identity confirmed:  Verbally with patient, arm band, provided demographic data and hospital-assigned identification number  Pre-procedure details:     Hand hygiene: Hand hygiene performed prior to insertion      Sterile barrier technique: All elements of maximal sterile technique followed      Skin preparation:  ChloraPrep    Skin preparation agent: Skin preparation agent completely dried prior to procedure    Indications:     Midline indications: new site    Anesthesia (see MAR for exact dosages):     Anesthesia method:  None  Procedure details:     Location:  Left basilic    Laterality:  Left    Site selection rationale:  Largest, most patent vein    Catheter size:  18 gauge    Landmarks identified: yes      Ultrasound guidance: yes      Ultrasound image availability:  Not saved    Sterile ultrasound techniques: Sterile gel and sterile probe covers were used      Number of attempts:  1    Successful placement: yes      Catheter length (cm):  10    Exposed catheter length (cm):  0    Arm circumference (cm):  33    Lot number:  [id number] Exp: 10/31/2024  Post-procedure details:     Post-procedure:  Dressing applied and securement device placed    Assessment:  Blood return through all ports     Post-procedure complications: none      Patient tolerance of procedure:  Tolerated well, no immediate complications    Observer: Yes

## 2024-02-05 NOTE — RESTORATIVE TECHNICIAN NOTE
Restorative Technician Note      Patient Name: Ely Cantrell     Note Type: Mobility  Patient Position Upon Consult: Supine  Activity Performed: Ambulated; Dangled; Stood  Assistive Device: Other (Comment) (none)  Education Provided: Yes  Patient Position at End of Consult: Bedside chair; All needs within reach; Bed/Chair alarm activated    Kamala ESPINOZA, Restorative Technician,
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
## 2024-02-05 NOTE — UTILIZATION REVIEW
"NOTIFICATION OF INPATIENT ADMISSION   AUTHORIZATION REQUEST   SERVICING FACILITY:   [facility]  Address: [address] [place] [address]  Tax ID: [id number]  NPI: [id number] ATTENDING PROVIDER:  Attending Name and NPI#: Deneen Lyon Md [[id number]]  Address: [address] [place] [address]  Phone: [phone]   ADMISSION INFORMATION:  Place of Service: Inpatient [facility]  Place of Service Code: 21  Inpatient Admission Date/Time: 2/4/24  4:50 PM  Discharge Date/Time: No discharge date for patient encounter.  Admitting Diagnosis Code/Description:  Neck pain [M54.2]  Vertigo [R42]     UTILIZATION REVIEW CONTACT:  Jaylyn Sprague"Tonya\"Sam Utilization   Network Utilization Review Department  Phone: [phone]  Fax: [phone]  Email: [email or web address]  Contact for approvals/pending authorizations, clinical reviews, and discharge.     PHYSICIAN ADVISORY SERVICES:  Medical Necessity Denial & Jlhc-ou-Rcuf Review  Phone: [phone]  Fax: [phone]  Email: [email or web address]     DISCHARGE SUPPORT TEAM:  For Patients Discharge Needs & Updates  Phone: [phone] opt. 2 Fax: [phone]  Email: [email or web address]     "

## 2024-02-05 NOTE — PLAN OF CARE
Problem: PAIN - ADULT  Goal: Verbalizes/displays adequate comfort level or baseline comfort level  Description: Interventions:  - Encourage patient to monitor pain and request assistance  - Assess pain using appropriate pain scale  - Administer analgesics based on type and severity of pain and evaluate response  - Implement non-pharmacological measures as appropriate and evaluate response  - Consider cultural and social influences on pain and pain management  - Notify physician/advanced practitioner if interventions unsuccessful or patient reports new pain  Outcome: Progressing     Problem: INFECTION - ADULT  Goal: Absence or prevention of progression during hospitalization  Description: INTERVENTIONS:  - Assess and monitor for signs and symptoms of infection  - Monitor lab/diagnostic results  - Monitor all insertion sites, i.e. indwelling lines, tubes, and drains  - Monitor endotracheal if appropriate and nasal secretions for changes in amount and color  - [place] appropriate cooling/warming therapies per order  - Administer medications as ordered  - Instruct and encourage patient and family to use good hand hygiene technique  - Identify and instruct in appropriate isolation precautions for identified infection/condition  Outcome: Progressing  Goal: Absence of fever/infection during neutropenic period  Description: INTERVENTIONS:  - Monitor WBC    Outcome: Progressing     Problem: SAFETY ADULT  Goal: Patient will remain free of falls  Description: INTERVENTIONS:  - Educate patient/family on patient safety including physical limitations  - Instruct patient to call for assistance with activity   - Consult OT/PT to assist with strengthening/mobility   - Keep Call bell within reach  - Keep bed low and locked with side rails adjusted as appropriate  - Keep care items and personal belongings within reach  - Initiate and maintain comfort rounds  - Make Fall Risk Sign visible to staff  - Offer Toileting every  Hours,  in advance of need  - Initiate/Maintain alarm  - Obtain necessary fall risk management equipment:   - Apply yellow socks and bracelet for high fall risk patients  - Consider moving patient to room near nurses station  Outcome: Progressing  Goal: Maintain or return to baseline ADL function  Description: INTERVENTIONS:  -  Assess patient's ability to carry out ADLs; assess patient's baseline for ADL function and identify physical deficits which impact ability to perform ADLs (bathing, care of mouth/teeth, toileting, grooming, dressing, etc.)  - Assess/evaluate cause of self-care deficits   - Assess range of motion  - Assess patient's mobility; develop plan if impaired  - Assess patient's need for assistive devices and provide as appropriate  - Encourage maximum independence but intervene and supervise when necessary  - Involve family in performance of ADLs  - Assess for home care needs following discharge   - Consider OT consult to assist with ADL evaluation and planning for discharge  - Provide patient education as appropriate  Outcome: Progressing  Goal: Maintains/Returns to pre admission functional level  Description: INTERVENTIONS:  - Perform AM-PAC 6 Click Basic Mobility/ Daily Activity assessment daily.  - Set and communicate daily mobility goal to care team and patient/family/caregiver.   - Collaborate with rehabilitation services on mobility goals if consulted  - Perform Range of Motion  times a day.  - Reposition patient every  hours.  - Dangle patient  times a day  - Stand patient  times a day  - Ambulate patient  times a day  - Out of bed to chair  times a day   - Out of bed for meals  times a day  - Out of bed for toileting  - Record patient progress and toleration of activity level   Outcome: Progressing

## 2024-02-05 NOTE — PHYSICAL THERAPY NOTE
Physical Therapy Evaluation    Patient Name: Ely Cantrell    Today's Date: 2/5/2024     Problem List  Principal Problem:    Dizziness  Active Problems:    Anxiety    Primary insomnia    Migraine, unspecified, not intractable, without status migrainosus    Pain syndrome, chronic    Chronic deep vein thrombosis (DVT) of right femoral vein (HCC)    Compression fracture of L5 vertebra with routine healing       Past Medical History  Past Medical History:   Diagnosis Date    Anemia     Anxiety     Depression     Facial cellulitis     Factor 5 Leiden mutation, heterozygous (HCC)     Fracture, cuboid     LAST ASSESSED: 3/26/15    Gastrojejunal ulcer     ANASTOMOTIC    History of small bowel obstruction     Hypertension     Iron deficiency anemia     LAST ASSESSED: AND RESOLVED: 4/13/16    Obstructive sleep apnea (adult) (pediatric)         Past Surgical History  Past Surgical History:   Procedure Laterality Date    ABDOMINOPLASTY      x2    CHOLECYSTECTOMY      GASTRIC BYPASS      FOR MORBID OBESITY    INCISIONAL HERNIA REPAIR      [place] OPEN TX RADIOCARPAL/INTERCARPAL DISLC 1/> BONES Left 1/6/2022    Procedure: OPEN REDUCTION W/ INTERNAL FIXATION (ORIF) LEFT DISTAL RADIUS FRACTURE;  Surgeon: Donovan Kamara MD;  Location: [facility];  Service: Orthopedics    SMALL INTESTINE SURGERY          02/05/24 0959   PT Last Visit   PT Visit Date 02/05/24   Note Type   Note type Evaluation   Pain Assessment   Pain Assessment Tool 0-10   Pain Score No Pain   Restrictions/Precautions   Weight Bearing Precautions Per Order No   Braces or Orthoses Other (Comment)  (pt reports owning back brace prn for comfort for hx of L5 compression fx)   Other Precautions Fall Risk;Pain;Multiple lines   Home Living   Type of Home Apartment  (1st floor)   Home Layout One level;Performs ADLs on one level;Able to live on main level with bedroom/bathroom  (0 SALLY)   Bathroom Shower/Tub Tub/shower  unit   Bathroom Toilet Standard   Bathroom Equipment Other (Comment)  (denies DME)   Bathroom Accessibility Accessible   Home Equipment Walker  (was not using PTA)   Additional Comments Pt reports living in 1st floor apartment, 0 SALLY and was not using AD PTA   Prior Function   Level of [place] Independent with ADLs;Independent with functional mobility;Independent with IADLS   Lives With Alone   Receives Help From Family   IADLs Independent with driving;Independent with meal prep;Independent with medication management   Falls in the last 6 months 1 to 4  (1-2 falls per pt report)   Vocational Full time employment   General   Family/Caregiver Present No   Cognition   Overall Cognitive Status WFL   Arousal/Participation Alert   Orientation Level Oriented X4   Memory Within functional limits   Following Commands Follows one step commands with increased time or repetition   Comments Pt pleasant and cooperative t/o PT session. Slow processing at times   RLE Assessment   RLE Assessment WFL  (Grossly 3/5)   LLE Assessment   LLE Assessment WFL  (Grossly 3/5)   Bed Mobility   Supine to Sit Unable to assess   Sit to Supine Unable to assess   Additional Comments Upon arrival, pt sitting EOB. Pt left sitting in chair with all needs following PT session   Transfers   Sit to Stand 5  Supervision   Additional items Increased time required   Stand to Sit 5  Supervision   Additional items Increased time required   Additional Comments Transfers w/o AD   Ambulation/Elevation   Gait pattern Narrow CUAUHTEMOC;Decreased foot clearance;Shuffling;Scissoring;Excessively slow;Short stride   Gait Assistance 5  Supervision   Additional items Verbal cues   Assistive Device Other (Comment)  (IV pole)   Distance 60'   Balance   Static Sitting Fair +   Dynamic Sitting Fair   Static Standing Fair -   Dynamic Standing Poor +   Ambulatory Poor +   Endurance Deficit   Endurance Deficit Yes   Endurance Deficit Description fatigue, weakness   Activity  Tolerance   Activity Tolerance Patient tolerated treatment well;Patient limited by fatigue   Medical Staff Made Aware OT Lynne- co eval 2/2 medical complexity   Nurse Made Aware RN cleared and updated   Assessment   Prognosis Good   Problem List Decreased strength;Decreased endurance;Impaired balance;Decreased mobility   Assessment Pt is a 53 y.o. female seen for PT evaluation s/p admit to [facility] on 2/4/2024. Pt was admitted with a primary dx of: Dizziness.  PT now consulted for assessment of mobility and d/c needs. Pt with Up and OOB as tolerated  orders.  Pts current comorbidities effecting treatment include: Anxiety, migraine, Compression fx of L5, chronic pain syndrome. Pts current clinical presentation is Unstable/ Unpredictable (high complexity) due to Ongoing medical management for primary dx, Increased reliance on more restrictive AD compared to baseline, Decreased activity tolerance compared to baseline, Fall risk, Increased assistance needed from caregiver at current time, Continuous pulse oximetry monitoring . Prior to admission, pt was living alone in apartment and was IND in all mobility w/o AD. Upon evaluation, pt currently is requiring  SUP for transfers; SUP for ambulation 60 ft w/  IV pole vs no AD . Pt presents at PT eval functioning below baseline and currently w/ overall mobility deficits 2* to: impaired balance, decreased endurance, impaired coordination, gait deviations, pain, decreased activity tolerance compared to baseline, decreased functional mobility tolerance compared to baseline, fall risk. Pt currently at a fall risk 2* to impairments listed above.  Pt will continue to benefit from skilled acute PT interventions to address stated impairments; to maximize functional mobility; for ongoing pt/ family training; and DME needs. At conclusion of PT session pt returned back in chair, chair alarm engaged, and all needs in reach with phone and call bell within reach. Pt denies any  further questions at this time. The patient's AM-PAC Basic Mobility Inpatient Short Form Raw Score is 22. A Raw score of greater than 16 suggests the patient may benefit from discharge to home. Please also refer to the recommendation of the Physical Therapist for safe discharge planning. Recommend home w/ OPPT upon hospital D/C.   Barriers to Discharge Decreased caregiver support   Goals   Patient Goals to get better   STG Expiration Date 02/19/24   Short Term Goal #1 STG 1. Pt will be able to perform bed mobility tasks with mod I in order to improve overall functional mobility and assist in safe d/c. STG 3. Pt will be able to perform functional transfer with mod I in order to improve overall functional mobility and assist in safe d/c. STG 4. Pt will be able to ambulate at least 250 feet with least restrictive device with mod I A in order to improve overall functional mobility and assist in safe d/c. STG 5. Pt will improve sitting/standing static/dynamic balance 1/2 grade in order to improve functional mobility and assist in safe d/c. STG 6. Pt will improve LE strength by 1/2 grade in order to improve functional mobility and assist in safe d/c.   PT Treatment Day 0   Plan   Treatment/Interventions Functional transfer training;LE strengthening/ROM;Therapeutic exercise;Endurance training;Gait training;Bed mobility;Patient/family training;Equipment eval/education;Spoke to nursing;Spoke to case management;OT   PT Frequency 2-3x/wk   Discharge Recommendation   Rehab Resource Intensity Level, PT III (Minimum Resource Intensity)  (OPPT)   Equipment Recommended   (TBD- SPC vs RW trial)   AM-PAC Basic Mobility Inpatient   Turning in Flat Bed Without Bedrails 4   Lying on Back to Sitting on Edge of Flat Bed Without Bedrails 4   Moving Bed to Chair 4   Standing Up From Chair Using Arms 4   Walk in Room 3   Climb 3-5 Stairs With Railing 3   Basic Mobility Inpatient Raw Score 22   Basic Mobility Standardized Score 47.4   Highest  Level Of Mobility   JH-HLM Goal 7: Walk 25 feet or more   JH-HLM Achieved 7: Walk 25 feet or more   Modified Meredith Scale   Modified Meredith Scale 3   End of Consult   Patient Position at End of Consult Bedside chair;All needs within reach;Bed/Chair alarm activated     Dilcia Lopez PT, DPT

## 2024-02-05 NOTE — NURSING NOTE
Arrived and noted patient on radiology schedule for a LP. Upon reviewing chart noted patient   on anticoagulant medication of Eliquis at home, called floor RN Teressa Stafford and she upon speaking to the patient informed me that her last dose of Eliquis was 2/3 @ 10 pm, she is   required to stop per periprocedural management of coagulation status and hemostasis risk in percutaneous image guided procedure guidelines for 2 days. ORLIN Luke made aware of this information also. RN aware that patient will need to wait 2 days to have the LP done per Radiology guidelines.

## 2024-02-05 NOTE — UTILIZATION REVIEW
Initial Clinical Review    Admission: Date/Time/Statement:   Admission Orders (From admission, onward)       Ordered        02/04/24 1650  INPATIENT ADMISSION  Once                          Orders Placed This Encounter   Procedures    INPATIENT ADMISSION     Standing Status:   Standing     Number of Occurrences:   1     Order Specific Question:   Level of Care     Answer:   Med Surg [16]     Order Specific Question:   Estimated length of stay     Answer:   More than 2 Midnights     Order Specific Question:   Certification     Answer:   I certify that inpatient services are medically necessary for this patient for a duration of greater than two midnights. See H&P and MD Progress Notes for additional information about the patient's course of treatment.     ED Arrival Information       Expected   2/4/2024 10:54    Arrival   2/4/2024 11:03    Acuity   Urgent              Means of arrival   Ambulance    Escorted by   [employer] ([place])    Service   Hospitalist    Admission type   Emergency              Arrival complaint   -             Chief Complaint   Patient presents with    Dizziness     Patient coming in via EMS after feeling dizzy 4-5 hours ago. Patient feels like the room is spinning. -cp -sob        Initial Presentation: 53 y.o. female presents to ed from home via ems for evaluation and treatment of dizziness.  Patient reports room spinning, chest pain, shortness of breath. Poor historian.    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  Family concerned with confusion.  Clinical assessment L eye blurry vision   Initially treated with iv .9% ns 150/hr.  Admit to inpatient med surg for dizziness.      Neurology consult completed:  Difficult to determine etiology.  Exam notable for intermittent R horizontal nystagmus, axial ataxia on standing.  Cannot rule out central etiology of vestibular syndrome.  Plan MRI brain and IR LP, bacterial / viral infectious studies,  IgG index, oligoclonal bands.      Date: 2-5-24    Day 2: inpatient med surg   Treatment plan includes: MRI brain, telemetry, orthostatic vs,  PT/OT evaluations, iv fluids 150/hr, iv heparin gtt.        ED Triage Vitals [02/04/24 1106]   97.7 °F (36.5 °C) 77 20 119/59 99 %      Oral Monitor         No Pain            Additional Vital Signs:     Date/Time Temp Pulse Resp BP MAP (mmHg) SpO2 O2 Device   02/05/24 07:30:42 97.9 °F (36.6 °C) 56 15 120/64 83 98 % --   02/05/24 03:25:13 -- 54 Abnormal  -- 100/63 75 97 % --   02/04/24 23:54:40 -- 56 -- 96/52 67 96 % --   02/04/24 2030 -- -- -- -- -- -- None (Room air)   02/04/24 19:51:06 98 °F (36.7 °C) 62 -- 98/52 67 97 % --   02/04/24 1822 -- -- 16 -- -- -- --   02/04/24 18:12:14 97.8 °F (36.6 °C) 50 Abnormal  16 124/67 86 95 % --   02/04/24 1700 -- 52 Abnormal  13 104/58 79 96 % --   02/04/24 1330 -- 60 13 113/63 83 97 % --   02/04/24 1300 -- 64 12 118/66 87 99 % --   02/04/24 1230 -- 62 12 111/59 80 97 % None (Room air)   02/04/24 1200 -- 64 12 103/55 73 98 % --   02/04/24 1130 -- 70 20 106/67 82 100 % --   02/04/24 1106 97.7 °F (36.5 °C) 77 20 119/59 -- 99 % None (Room air)           Pertinent Labs/Diagnostic Test Results:       02/05/24 0932  APTT  Once        Status: Needs to be Collected      02/05/24 0600  Vitamin B12  Morning draw        Status: Needs to be Collected      02/05/24 0600  Folate  Morning draw        Status: Needs to be Collected      02/05/24 0600  Methylmalonic acid, serum  Morning draw        Status: Needs to be Collected      02/05/24 0600  Basic metabolic panel  Morning draw        Status: Needs to be Collected      02/05/24 0600  Protime-INR  Morning draw        Status: Needs to be Collected      02/05/24 0600  APTT  Morning draw        Status: Needs to be Collected      02/05/24 0600  CBC (With Platelets)  Morning draw        Status: Needs to be Collected      02/04/24 1852  IgG Synthesis/Index Rate, CSF  Once        Status: Needs to be  "Collected      02/04/24 1852  Oligoclonal banding  Once        Status: Needs to be Collected      02/04/24 1852  CMV culture  Once        Status: Needs to be Collected      02/04/24 1852  HSV TYPE 1,2 DNA PCR (CSF) [facility]  Once        Status: Needs to be Collected      02/04/24 1852  West Nile virus, CSF  Once        Status: Needs to be Collected      02/04/24 1852  csf - Miscellaneous Test  Once        Comments: Save 10cc   Status: Needs to be Collected      02/04/24 1850  Meningitis/Encephalitis (ME) Panel  (Meningitis Encephalitis Panel with Isolation)     Status: Needs to be Collected   \"And\" Linked Group Details      02/04/24 1847  Glucose CSF  Once        Status: Needs to be Collected      02/04/24 1847  CSF white cell count with differential  Once        Status: Needs to be Collected      02/04/24 1847  Protein CSF  Once        Status: Needs to be Collected      02/04/24 1847  RBC count,CSF  Once        Status: Needs to be Collected      02/04/24 1847  Gram stain CSF  Once        Status: Needs to be Collected      02/04/24 1847  IGG, CSF  Once        Status: Needs to be Collected              CTA head and neck with and without contrast   Final (02/04 1434)      No acute intracranial abnormality.      Mild chronic microangiopathy.      Negative CTA head and neck for large vessel occlusion, dissection, aneurysm, or high-grade stenosis.         XR chest 1 view portable   Final (02/05 0849)      No acute cardiopulmonary disease.          Results from last 7 days   Lab Units 02/04/24  1931 02/04/24  1128   WBC Thousand/uL 6.76 6.47   HEMOGLOBIN g/dL 14.0 13.2   HEMATOCRIT % 44.0 42.3   PLATELETS Thousands/uL 237 238   NEUTROS ABS Thousands/µL  --  3.38         Results from last 7 days   Lab Units 02/04/24  1305   SODIUM mmol/L 140   POTASSIUM mmol/L 4.7   CHLORIDE mmol/L 111*   CO2 mmol/L 26   ANION GAP mmol/L 3   BUN mg/dL 19   CREATININE mg/dL 0.49*   EGFR ml/min/1.73sq m 110   CALCIUM mg/dL 8.3*     Results from " last 7 days   Lab Units 02/04/24  1305   GLUCOSE RANDOM mg/dL 84     Results from last 7 days   Lab Units 02/04/24  1305 02/04/24  1128   HS TNI 0HR ng/L  --  <2   HS TNI 2HR ng/L <2  --      Results from last 7 days   Lab Units 02/05/24  0141 02/04/24 2013   PROTIME seconds  --  14.1   INR   --  1.10   PTT seconds 87* 29       ED Treatment:   Medication Administration from 02/04/2024 1103 to 02/04/2024 1753         Date/Time Order Dose Route Action     02/04/2024 1327 EST sodium chloride 0.9 % infusion 150 mL/hr Intravenous Restarted     02/04/2024 1154 EST sodium chloride 0.9 % infusion 150 mL/hr Intravenous New Bag          Past Medical History:   Diagnosis Date    Anemia     Anxiety     Depression     Facial cellulitis     Factor 5 Leiden mutation, heterozygous (HCC)     Fracture, cuboid     LAST ASSESSED: 3/26/15    Gastrojejunal ulcer     ANASTOMOTIC    History of small bowel obstruction     Hypertension     Iron deficiency anemia     LAST ASSESSED: AND RESOLVED: 4/13/16    Obstructive sleep apnea (adult) (pediatric)      Present on Admission:   Anxiety   Primary insomnia   Pain syndrome, chronic   Chronic deep vein thrombosis (DVT) of right femoral vein (HCC)   Migraine, unspecified, not intractable, without status migrainosus      Admitting Diagnosis:     Neck pain [M54.2]  Vertigo [R42]    Age/Sex: 53 y.o. female    Scheduled Medications:    cholecalciferol, 2,000 Units, Oral, Daily  docusate sodium, 100 mg, Oral, BID  famotidine, 20 mg, Oral, BID  fluticasone, 2 spray, Nasal, Daily  heparin (porcine), 5,600 Units, Intravenous, Once  meclizine, 25 mg, Oral, Q8H ANUSHA  methadone, 30 mg, Oral, Daily  pantoprazole, 40 mg, Oral, Early Morning  sertraline, 100 mg, Oral, BID  topiramate, 100 mg, Oral, BID  venlafaxine, 75 mg, Oral, Daily  zolpidem, 10 mg, Oral, HS      Continuous IV Infusions:  heparin (porcine), 3-30 Units/kg/hr (Order-Specific), Intravenous, Titrated  sodium chloride, 150 mL/hr, Intravenous,  Continuous      PRN Meds:  acetaminophen, 650 mg, Oral, Q6H PRN  ALPRAZolam, 1 mg, Oral, HS PRN  heparin (porcine), 2,800 Units, Intravenous, Q6H PRN  heparin (porcine), 5,600 Units, Intravenous, Q6H PRN  ondansetron, 4 mg, Intravenous, Q6H PRN        IP CONSULT TO NEUROLOGY    Network Utilization Review Department  ATTENTION: Please call with any questions or concerns to [phone] and carefully listen to the prompts so that you are directed to the right person. All voicemails are confidential.   For Discharge needs, contact Care Management DC Support Team at [phone] opt. [phone]  Send all requests for admission clinical reviews, approved or denied determinations and any other requests to dedicated fax number below belonging to the campus where the patient is receiving treatment. List of dedicated fax numbers for the Facilities:  FACILITY NAME UR FAX NUMBER   ADMISSION DENIALS (Administrative/Medical Necessity) [phone]   DISCHARGE SUPPORT TEAM (NETWORK) [phone]   PARENT CHILD HEALTH (Maternity/NICU/Pediatrics) [phone]   [facility] [phone]   [facility] [phone]   [facility] [phone]   [facility] [phone]   [facility] [phone]   [facility] [phone]   [facility] [phone]   [facility] [phone]   [facility] [phone]   [facility] [phone]   [facility] [phone]   [facility] [phone]

## 2024-02-05 NOTE — PLAN OF CARE
Problem: PHYSICAL THERAPY ADULT  Goal: Performs mobility at highest level of function for planned discharge setting.  See evaluation for individualized goals.  Description: Treatment/Interventions: Functional transfer training, LE strengthening/ROM, Therapeutic exercise, Endurance training, Gait training, Bed mobility, Patient/family training, Equipment eval/education, Spoke to nursing, Spoke to case management, OT  Equipment Recommended:  (TBD- SPC vs RW trial)       See flowsheet documentation for full assessment, interventions and recommendations.  Note: Prognosis: Good  Problem List: Decreased strength, Decreased endurance, Impaired balance, Decreased mobility  Assessment: Pt is a 53 y.o. female seen for PT evaluation s/p admit to [facility] on 2/4/2024. Pt was admitted with a primary dx of: Dizziness.  PT now consulted for assessment of mobility and d/c needs. Pt with Up and OOB as tolerated  orders.  Pts current comorbidities effecting treatment include: Anxiety, migraine, Compression fx of L5, chronic pain syndrome. Pts current clinical presentation is Unstable/ Unpredictable (high complexity) due to Ongoing medical management for primary dx, Increased reliance on more restrictive AD compared to baseline, Decreased activity tolerance compared to baseline, Fall risk, Increased assistance needed from caregiver at current time, Continuous pulse oximetry monitoring . Prior to admission, pt was living alone in apartment and was IND in all mobility w/o AD. Upon evaluation, pt currently is requiring  SUP for transfers; SUP for ambulation 60 ft w/  IV pole vs no AD . Pt presents at PT eval functioning below baseline and currently w/ overall mobility deficits 2* to: impaired balance, decreased endurance, impaired coordination, gait deviations, pain, decreased activity tolerance compared to baseline, decreased functional mobility tolerance compared to baseline, fall risk. Pt currently at a fall risk 2* to  impairments listed above.  Pt will continue to benefit from skilled acute PT interventions to address stated impairments; to maximize functional mobility; for ongoing pt/ family training; and DME needs. At conclusion of PT session pt returned back in chair, chair alarm engaged, and all needs in reach with phone and call bell within reach. Pt denies any further questions at this time. The patient's AM-PAC Basic Mobility Inpatient Short Form Raw Score is 22. A Raw score of greater than 16 suggests the patient may benefit from discharge to home. Please also refer to the recommendation of the Physical Therapist for safe discharge planning. Recommend home w/ OPPT upon hospital D/C.  Barriers to Discharge: Decreased caregiver support     Rehab Resource Intensity Level, PT: III (Minimum Resource Intensity) (OPPT)    See flowsheet documentation for full assessment.

## 2024-02-06 VITALS
TEMPERATURE: 98.4 F | RESPIRATION RATE: 16 BRPM | WEIGHT: 160 LBS | HEIGHT: 65 IN | DIASTOLIC BLOOD PRESSURE: 73 MMHG | HEART RATE: 62 BPM | OXYGEN SATURATION: 97 % | BODY MASS INDEX: 26.66 KG/M2 | SYSTOLIC BLOOD PRESSURE: 126 MMHG

## 2024-02-06 LAB
ALBUMIN SERPL BCP-MCNC: 3 G/DL (ref 3.5–5)
ALP SERPL-CCNC: 82 U/L (ref 34–104)
ALT SERPL W P-5'-P-CCNC: 23 U/L (ref 7–52)
ANION GAP SERPL CALCULATED.3IONS-SCNC: 6 MMOL/L
APTT PPP: 38 SECONDS (ref 23–37)
APTT PPP: 64 SECONDS (ref 23–37)
AST SERPL W P-5'-P-CCNC: 27 U/L (ref 13–39)
BASOPHILS # BLD AUTO: 0.03 THOUSANDS/ÂΜL (ref 0–0.1)
BASOPHILS NFR BLD AUTO: 1 % (ref 0–1)
BILIRUB SERPL-MCNC: 0.27 MG/DL (ref 0.2–1)
BILIRUB UR QL STRIP: NEGATIVE
BUN SERPL-MCNC: 8 MG/DL (ref 5–25)
CALCIUM ALBUM COR SERPL-MCNC: 8.2 MG/DL (ref 8.3–10.1)
CALCIUM SERPL-MCNC: 7.4 MG/DL (ref 8.4–10.2)
CHLORIDE SERPL-SCNC: 111 MMOL/L (ref 96–108)
CLARITY UR: CLEAR
CO2 SERPL-SCNC: 26 MMOL/L (ref 21–32)
COLOR UR: COLORLESS
CREAT SERPL-MCNC: 0.58 MG/DL (ref 0.6–1.3)
EOSINOPHIL # BLD AUTO: 0.42 THOUSAND/ÂΜL (ref 0–0.61)
EOSINOPHIL NFR BLD AUTO: 7 % (ref 0–6)
ERYTHROCYTE [DISTWIDTH] IN BLOOD BY AUTOMATED COUNT: 13.7 % (ref 11.6–15.1)
GFR SERPL CREATININE-BSD FRML MDRD: 104 ML/MIN/1.73SQ M
GLUCOSE SERPL-MCNC: 77 MG/DL (ref 65–140)
GLUCOSE UR STRIP-MCNC: NEGATIVE MG/DL
HCT VFR BLD AUTO: 35.3 % (ref 34.8–46.1)
HGB BLD-MCNC: 10.9 G/DL (ref 11.5–15.4)
HGB UR QL STRIP.AUTO: NEGATIVE
IMM GRANULOCYTES # BLD AUTO: 0.01 THOUSAND/UL (ref 0–0.2)
IMM GRANULOCYTES NFR BLD AUTO: 0 % (ref 0–2)
KETONES UR STRIP-MCNC: NEGATIVE MG/DL
LEUKOCYTE ESTERASE UR QL STRIP: NEGATIVE
LYMPHOCYTES # BLD AUTO: 2.82 THOUSANDS/ÂΜL (ref 0.6–4.47)
LYMPHOCYTES NFR BLD AUTO: 45 % (ref 14–44)
MAGNESIUM SERPL-MCNC: 1.8 MG/DL (ref 1.9–2.7)
MCH RBC QN AUTO: 29.8 PG (ref 26.8–34.3)
MCHC RBC AUTO-ENTMCNC: 30.9 G/DL (ref 31.4–37.4)
MCV RBC AUTO: 96 FL (ref 82–98)
MONOCYTES # BLD AUTO: 0.44 THOUSAND/ÂΜL (ref 0.17–1.22)
MONOCYTES NFR BLD AUTO: 7 % (ref 4–12)
NEUTROPHILS # BLD AUTO: 2.46 THOUSANDS/ÂΜL (ref 1.85–7.62)
NEUTS SEG NFR BLD AUTO: 40 % (ref 43–75)
NITRITE UR QL STRIP: NEGATIVE
NRBC BLD AUTO-RTO: 0 /100 WBCS
PH UR STRIP.AUTO: 7 [PH]
PLATELET # BLD AUTO: 171 THOUSANDS/UL (ref 149–390)
PMV BLD AUTO: 10 FL (ref 8.9–12.7)
POTASSIUM SERPL-SCNC: 3.6 MMOL/L (ref 3.5–5.3)
PROT SERPL-MCNC: 5.4 G/DL (ref 6.4–8.4)
PROT UR STRIP-MCNC: NEGATIVE MG/DL
RBC # BLD AUTO: 3.66 MILLION/UL (ref 3.81–5.12)
SODIUM SERPL-SCNC: 143 MMOL/L (ref 135–147)
SP GR UR STRIP.AUTO: 1.01 (ref 1–1.03)
TREPONEMA PALLIDUM IGG+IGM AB [PRESENCE] IN SERUM OR PLASMA BY IMMUNOASSAY: NORMAL
UROBILINOGEN UR STRIP-ACNC: <2 MG/DL
WBC # BLD AUTO: 6.18 THOUSAND/UL (ref 4.31–10.16)

## 2024-02-06 PROCEDURE — 85730 THROMBOPLASTIN TIME PARTIAL: CPT | Performed by: INTERNAL MEDICINE

## 2024-02-06 PROCEDURE — 86780 TREPONEMA PALLIDUM: CPT | Performed by: PHYSICIAN ASSISTANT

## 2024-02-06 PROCEDURE — 99232 SBSQ HOSP IP/OBS MODERATE 35: CPT | Performed by: STUDENT IN AN ORGANIZED HEALTH CARE EDUCATION/TRAINING PROGRAM

## 2024-02-06 PROCEDURE — 80053 COMPREHEN METABOLIC PANEL: CPT | Performed by: INTERNAL MEDICINE

## 2024-02-06 PROCEDURE — 99239 HOSP IP/OBS DSCHRG MGMT >30: CPT | Performed by: STUDENT IN AN ORGANIZED HEALTH CARE EDUCATION/TRAINING PROGRAM

## 2024-02-06 PROCEDURE — 81003 URINALYSIS AUTO W/O SCOPE: CPT | Performed by: STUDENT IN AN ORGANIZED HEALTH CARE EDUCATION/TRAINING PROGRAM

## 2024-02-06 PROCEDURE — 83735 ASSAY OF MAGNESIUM: CPT | Performed by: INTERNAL MEDICINE

## 2024-02-06 PROCEDURE — 99232 SBSQ HOSP IP/OBS MODERATE 35: CPT | Performed by: INTERNAL MEDICINE

## 2024-02-06 PROCEDURE — 85025 COMPLETE CBC W/AUTO DIFF WBC: CPT | Performed by: INTERNAL MEDICINE

## 2024-02-06 PROCEDURE — 85730 THROMBOPLASTIN TIME PARTIAL: CPT | Performed by: STUDENT IN AN ORGANIZED HEALTH CARE EDUCATION/TRAINING PROGRAM

## 2024-02-06 RX ORDER — ACETAMINOPHEN 325 MG/1
650 TABLET ORAL EVERY 6 HOURS PRN
Start: 2024-02-06

## 2024-02-06 RX ADMIN — VENLAFAXINE HYDROCHLORIDE 75 MG: 75 CAPSULE, EXTENDED RELEASE ORAL at 08:09

## 2024-02-06 RX ADMIN — FAMOTIDINE 20 MG: 20 TABLET, FILM COATED ORAL at 08:09

## 2024-02-06 RX ADMIN — METHADONE HYDROCHLORIDE 30 MG: 10 TABLET ORAL at 13:04

## 2024-02-06 RX ADMIN — DOCUSATE SODIUM 100 MG: 100 CAPSULE, LIQUID FILLED ORAL at 08:09

## 2024-02-06 RX ADMIN — APIXABAN 5 MG: 5 TABLET, FILM COATED ORAL at 13:04

## 2024-02-06 RX ADMIN — SERTRALINE HYDROCHLORIDE 100 MG: 100 TABLET ORAL at 08:09

## 2024-02-06 RX ADMIN — PANTOPRAZOLE SODIUM 40 MG: 40 TABLET, DELAYED RELEASE ORAL at 05:01

## 2024-02-06 RX ADMIN — SODIUM CHLORIDE 150 ML/HR: 0.9 INJECTION, SOLUTION INTRAVENOUS at 07:40

## 2024-02-06 RX ADMIN — MECLIZINE HYDROCHLORIDE 25 MG: 25 TABLET ORAL at 13:04

## 2024-02-06 RX ADMIN — TOPIRAMATE 100 MG: 100 TABLET, FILM COATED ORAL at 08:09

## 2024-02-06 RX ADMIN — MECLIZINE HYDROCHLORIDE 25 MG: 25 TABLET ORAL at 05:01

## 2024-02-06 RX ADMIN — Medication 2000 UNITS: at 08:09
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
## 2024-02-06 NOTE — DISCHARGE INSTR - AVS FIRST PAGE
If your symptoms return or worsen, please seek care at an ED or urgent care  If your urinary symptoms persist, please contact your PCP

## 2024-02-06 NOTE — RESTORATIVE TECHNICIAN NOTE
Restorative Technician Note      Patient Name: Ely Cantrell     Note Type: Mobility  Patient Position Upon Consult: Supine  Activity Performed: Ambulated; Dangled; Stood  Assistive Device: Other (Comment) (none)  Education Provided: Yes  Patient Position at End of Consult: Supine; All needs within reach; Bed/Chair alarm activated    Kamala ESPINOZA, Restorative Technician,

## 2024-02-06 NOTE — CASE MANAGEMENT
Case Management Assessment    Patient name Ely Cantrell  Location [facility] 710/[facility] 710-01 MRN [id number]  : 1970 Date 2024       Current Admission Date: 2024  Current Admission Diagnosis:Dizziness   Patient Active Problem List    Diagnosis Date Noted    History of pulmonary embolism 2024    Blurred vision, left eye 2024    Dizziness 2024    Compression fracture of L5 vertebra with routine healing 10/30/2023    Rash 2023    Central sleep apnea     Obstructive sleep apnea (adult) (pediatric)     Pain in left elbow 2023    Breast pain, left 2023    Iron (Fe) deficiency anemia 2022    Closed fracture of left distal radius and ulna 2021    Breast mass, right 10/20/2021    SBO (small bowel obstruction) (HCC) 2021    Factor 5 Leiden mutation, heterozygous ([facility]) 03/15/2021    Protein S deficiency ([facility]) 03/15/2021    Adhesive capsulitis of left shoulder 2020    Chronic deep vein thrombosis (DVT) of right femoral vein ([facility]) 2020    Neutrophilic leukocytosis 2020    Symptomatic varicose veins of both lower extremities 2020    Urinary tract infection with hematuria 11/10/2019    ASCUS with positive high risk HPV cervical 2019    Ventral hernia without obstruction or gangrene 2018    Anxiety 2017    Postgastrectomy malabsorption 2017    History of bariatric surgery 2016    Vitamin D deficiency 10/29/2014    Primary insomnia 2012    GERD without esophagitis 2012    Depression 2012    Migraine, unspecified, not intractable, without status migrainosus 2012    Pain syndrome, chronic 2012      LOS (days): 2  Geometric Mean LOS (GMLOS) (days):   Days to GMLOS:     OBJECTIVE:    Risk of Unplanned Readmission Score: 14.12         Current admission status: Inpatient       Preferred Pharmacy:   [id number] - [place], [place] - [facility] [address]  [address] [facility]  [address]  Phone: [phone] Fax: [phone]    [id number] - [place], [place] - [address]  [address] [place] [address]  Phone: [phone] Fax: [phone]    [id number] - [place] - [address]  [address] [facility] [place] [address]  Phone: [phone] Fax: [phone]    [id number] - [place], [place] - [address]  [address] [facility] [address]  Phone: [phone] Fax: [phone]    Primary Care Provider: Solis Altamirano DO    Primary Insurance: [facility]  Secondary Insurance:     ASSESSMENT:  Active Health Care Proxies    There are no active Health Care Proxies on file.       Advance Directives  Does patient have a Health Care POA?: No  Was patient offered paperwork?: Yes  Does patient currently have a Health Care decision maker?: No  Does patient have Advance Directives?: No  Primary Contact: cecilio gallo (Daughter)  [phone] (Mobile)         Readmission Root Cause  30 Day Readmission: No    Patient Information  Admitted from:: Home  Mental Status: Alert  During Assessment patient was accompanied by: Not accompanied during assessment  Assessment information provided by:: Patient  Primary Caregiver: Self  Support Systems: Self  County of Residence: [place]  What [facility] do you live in?: [facility]  Home entry access options. Select all that apply.: Stairs  Number of steps to enter home.: 2  Do the steps have railings?: Yes  Type of Current Residence: Apartment  Floor Level: 1  Upon entering residence, is there a bedroom on the main floor (no further steps)?: Yes  Upon entering residence, is there a bathroom on the main floor (no further steps)?: Yes  Living Arrangements: Lives Alone  Is patient a ?: No    Activities of Daily Living Prior to Admission  Functional Status: Independent  Completes ADLs independently?: Yes  Ambulates independently?: Yes  Does patient use assisted devices?:  No  Does patient currently own DME?: No  Does patient have a history of Outpatient Therapy (PT/OT)?: Yes  Does the patient have a history of Short-Term Rehab?: No  Does patient have a history of HHC?: Yes  Does patient currently have HHC?: No         Patient Information Continued  Income Source: Employed  Does patient have prescription coverage?: Yes  Does patient receive dialysis treatments?: No  Does patient have a history of substance abuse?: No  Does patient have a history of Mental Health Diagnosis?: No    PHQ 2/9 Screening   Reviewed PHQ 2/9 Depression Screening Score?: No    Means of Transportation  Means of Transport to Appts:: Family transport      Housing Stability: Low Risk  (2/6/2024)    Housing Stability Vital Sign     Unable to Pay for Housing in the Last Year: No     Number of Places Lived in the Last Year: 1     Unstable Housing in the Last Year: No   Food Insecurity: No Food Insecurity (2/6/2024)    Hunger Vital Sign     Worried About Running Out of Food in the Last Year: Never true     Ran Out of Food in the Last Year: Never true   Transportation Needs: No Transportation Needs (2/6/2024)    PRAPARE - Transportation     Lack of Transportation (Medical): No     Lack of Transportation (Non-Medical): No   Utilities: Not At Risk (2/6/2024)    [facility] Utilities     Threatened with loss of utilities: No     CM reviewed d/c planning process including the following: identifying help at home, patient preference for d/c planning needs,[facility] Meds to Bed program, availability of treatment team to discuss questions or concerns patient and/or family may have regarding understanding medications and recognizing signs and symptoms once discharged. CM also encouraged patient to follow up with all recommended appointments after discharge. Patient advised of importance for patient and family to participate in managing patient’s medical well being.   Patient lives alone and is independent at baseline. She will likely  need a ride home on discharge.  staff will assist as needed.     Lyft ride requested for 530pm

## 2024-02-06 NOTE — PROGRESS NOTES
NEUROLOGY RESIDENCY PROGRESS NOTE   Name: Ely Cantrell   Age & Sex: 53 y.o. female   MRN: [id number]  Unit/Bed#: [facility] 710-01   Encounter: [id number]    Recommendations for outpatient neurological follow up have yet to be determined.  Pending for discharge: Medical work up  ASSESSMENT & PLAN   * Dizziness  Assessment & Plan  53 year old female presenting with acute onset dizziness described as lightheadedness and room spinning, neck pain, and left eye pain which began at night on 2/3/24. Family reported concern that patient was confused and not acting herself.  CTA H/N without any acute findings. MRI Brain and orbits with/without contrast showed nonspecific WM disease consistent with microangiopathic process.  Pt reports that she feels improved. No longer experiencing dizziness or confusion. Her left eye symptoms have also improved and is no longer causing her pain or blurry vision.   Can hold off on lumbar puncture given patient improved from clinical standpoint as she is no longer dizzy or confused, continues to be afebrile and without leukocytosis as well a endorsing concern I regards to get a LP given her vertebral fracture.     Plan:  After risks benefits discussion with patient it was decided to discontinue lumbar puncture  AC: Can discontinue heparin gtt and switch back to home eliquis  No further imaging indicated from neurology perspective  Infectious disease and opthomology consulted, input appreciated   Rest of care per primary    Neurology will sign off, please contact with any questions or concerns      SUBJECTIVE   Patient was seen and examined this morning at bedside. No acute events over night. Pt is improving clinically. She is np longer feeling dizzy or experiencing left ocular symptoms. States that her neck pain has significantly improved. No longer confused and is back at her base line.     Pertinent Negatives include: numbness, weakness, speech or visual changes, syncope,  paralysis/weakness, numbness or tingling, tremor, speech impairment     Review of Systems   Constitutional:  Negative for chills and fever.   HENT:  Negative for hearing loss.    Eyes:  Positive for photophobia. Negative for pain, discharge, redness and visual disturbance.   Respiratory:  Negative for shortness of breath.    Cardiovascular:  Negative for chest pain.   Gastrointestinal:  Negative for nausea and vomiting.   Genitourinary:  Negative for difficulty urinating, frequency and urgency.   Musculoskeletal:  Positive for neck pain.   Neurological:  Negative for dizziness, tremors, syncope, speech difficulty, weakness, light-headedness and numbness.   Psychiatric/Behavioral:  Negative for agitation, behavioral problems and sleep disturbance.      OBJECTIVE   Patient ID: Ely Cantrell is a 53 y.o. female.  Vitals:    24 1632 24 2345 24 0212 24 0745   BP: 120/64 131/83 132/81    Pulse:  59 (!) 52 57   Resp: 20  18    Temp: 97.9 °F (36.6 °C) 99 °F (37.2 °C)     TempSrc:       SpO2:  98% 99% 94%   Weight:       Height:            Temperature:   Temp (24hrs), Av.5 °F (36.9 °C), Min:97.9 °F (36.6 °C), Max:99 °F (37.2 °C)    Temperature: 99 °F (37.2 °C)    GENERAL EXAM:  Constitutional:Alert. Not in acute distress. Not ill-appearing, toxic-appearing or diaphoretic.   HENT: Normocephalic and atraumatic. Nose and Ears normal.   Eyes: No scleral icterus. No discharge.   Neck: Neck Supple. ROM normal.  Cardiovascular: Distal extremities warm without palpable edema or tenderness, no observed significant swelling.   Pulmonary: Pulmonary effort is normal. Not in respiratory distress  Abdominal: Abdomen is flat and not distended  Musculoskeletal: No swelling or deformity.  Skin: Warm and dry  Psychiatric: Normal behavior and appropriate affect     NEUROLOGIC  EXAM:  Mental Status: alertness: alert, orientation: time, date, person, place, city, president, speech:fluent, affect: normal, thought  content exhibits logical connections  Cranial Nerves:  II: Pupils equal, round, reactive to light and accommodation, Visual Fields normal  III, IV, VI: EOM full and intact  V: facial sensation was normal and symmetrical  VII: Facial symmetry:facial symmetry equal  VIII: normal hearing to speech  IX, X: normal palatal elevation, no uvular deviation  XI: 5/5 head turn and 5/5 shoulder shrug bilaterally  XII: midline tongue protrusion  Motor: Normal bulk, tone, no involuntary movements or tremors     DELTOID   BICEP   TRICEPS   WRIST  EXTENSION   WRIST  FLEXION   DORSAL  INTEROSSEI      RIGHT 5 5 5 5 5 5 5   LEFT 5 5 5 5 5 5 5      HIP  FLEXION KNEE  EXTENSION DORSI   PLANTAR     RIGHT 5 5 5 5   LEFT 5 5 5 5   Reflexes: No clonus, no Prasad's, no cross abductors, toes down     BICEP   TRICEPS   BRACHIO   PATELLAR   ACHILLES   RIGHT 2+ 2+ 2+ 2+ 2+   LEFT 2+ 2+ 2+ 2+ 2+   Sensory:Normal light touch sensation  Coordination: Cerebellar arm drift absent, Finger-to-nose bilaterally intact, Heel To Hodges normal bilaterally  Station/Gait: Deferred   LABORATORY DATA   Labs: I have personally reviewed pertinent reports.    Results from last 7 days   Lab Units 02/06/24 0444 02/05/24  1136 02/04/24  1931 02/04/24  1128   WBC Thousand/uL 6.18 9.51 6.76 6.47   HEMOGLOBIN g/dL 10.9* 12.2 14.0 13.2   HEMATOCRIT % 35.3 39.1 44.0 42.3   PLATELETS Thousands/uL 171 208 237 238   NEUTROS PCT % 40*  --   --  53   MONOS PCT % 7  --   --  7   EOS PCT % 7*  --   --  2      Results from last 7 days   Lab Units 02/06/24 0444 02/05/24  1136 02/04/24  1305   SODIUM mmol/L 143 142 140   POTASSIUM mmol/L 3.6 3.7 4.7   CHLORIDE mmol/L 111* 113* 111*   CO2 mmol/L 26 25 26   BUN mg/dL 8 11 19   CREATININE mg/dL 0.58* 0.62 0.49*   CALCIUM mg/dL 7.4* 8.0* 8.3*   ALK PHOS U/L 82  --   --    ALT U/L 23  --   --    AST U/L 27  --   --      Results from last 7 days   Lab Units 02/06/24  0444   MAGNESIUM mg/dL 1.8*          Results from last 7 days    Lab Units 02/06/24  1200 02/06/24  0241 02/05/24  2048 02/05/24  1241 02/05/24  1136 02/05/24  0141 02/04/24 2013   INR   --   --   --   --  1.08  --  1.10   PTT seconds 38* 64* 60*   < > 152*   < > 29    < > = values in this interval not displayed.             IMAGING & DIAGNOSTIC TESTING   Radiology Results: I have personally reviewed pertinent reports.      MRI brain and orbits wo and w contrast   Final Result by Bry Aviles MD (02/06 0807)         1. Scattered white matter lesions are nonspecific and could be related to precocious microangiopathy, especially if there are cerebrovascular risk factors. Other post infectious, post inflammatory or demyelinating processes including multiple sclerosis    or Lyme disease could be considered in the differential diagnosis. Patients with migraine headaches or vasculitis can also have similar white matter changes. Further clinical assessment advised.   2. No acute infarction, intracranial hemorrhage or mass.   3. Normal-appearing optic nerves and orbits bilaterally.            Workstation performed: [id number]         CTA head and neck with and without contrast   Final Result by Farshad Jacobs MD (02/04 1434)      No acute intracranial abnormality.      Mild chronic microangiopathy.      Negative CTA head and neck for large vessel occlusion, dissection, aneurysm, or high-grade stenosis.      Additional chronic/incidental findings as detailed above.                        Workstation performed: [id number]         XR chest 1 view portable   Final Result by Valente Pozo MD (02/05 0849)      No acute cardiopulmonary disease.            Workstation performed: [id number]             Other Diagnostic Testing: I have personally reviewed pertinent reports.    ACTIVE MEDICATIONS     Current Facility-Administered Medications   Medication Dose Route Frequency    acetaminophen (TYLENOL) tablet 650 mg  650 mg Oral Q6H PRN    ALPRAZolam (XANAX) tablet 1 mg  1 mg  Oral HS PRN    apixaban (ELIQUIS) tablet 5 mg  5 mg Oral BID    cholecalciferol (VITAMIN D3) tablet 2,000 Units  2,000 Units Oral Daily    docusate sodium (COLACE) capsule 100 mg  100 mg Oral BID    famotidine (PEPCID) tablet 20 mg  20 mg Oral BID    fluticasone (FLONASE) 50 mcg/act nasal spray 2 spray  2 spray Nasal Daily    meclizine (ANTIVERT) tablet 25 mg  25 mg Oral Q8H NAUSHA    methadone (DOLOPHINE) tablet 30 mg  30 mg Oral Daily    ondansetron (ZOFRAN) injection 4 mg  4 mg Intravenous Q6H PRN    pantoprazole (PROTONIX) EC tablet 40 mg  40 mg Oral Early Morning    sertraline (ZOLOFT) tablet 100 mg  100 mg Oral BID    topiramate (TOPAMAX) tablet 100 mg  100 mg Oral BID    venlafaxine (EFFEXOR-XR) 24 hr capsule 75 mg  75 mg Oral Daily    zolpidem (AMBIEN) tablet 10 mg  10 mg Oral HS       Prior to Admission medications    Medication Sig Start Date End Date Taking? Authorizing Provider   ALPRAZolam (XANAX) 1 mg tablet Take 1 tablet (1 mg total) by mouth daily at bedtime as needed for anxiety or sleep 1/23/24   Solis Altamirano DO   apixaban (Eliquis) 5 mg take 1 tablet by mouth twice a day 1/17/24   Solis Altamirano DO   cholecalciferol (VITAMIN D3) 1,000 units tablet Take 2 tablets (2,000 Units total) by mouth daily 2/22/23   Solis Altamirano DO   clotrimazole (LOTRIMIN) 1 % cream Apply topically 2 (two) times a day 6/19/23   Solis Altamirano DO   Cyanocobalamin (B-12) 1000 MCG SUBL Place under the tongue    Historical Provider, MD   eszopiclone (LUNESTA) 2 mg tablet Take 1 tablet (2 mg total) by mouth daily at bedtime as needed for sleep Take immediately before bedtime 1/23/24   Solis Altamirano DO   famotidine (PEPCID) 20 mg tablet Take 1 tablet (20 mg total) by mouth 2 (two) times a day 6/16/23 6/10/24  Max Mega Widawski, DO   fluticasone (FLONASE) 50 mcg/act nasal spray 2 sprays into each nostril daily 6/25/23   Jose Walters PA-C   methadone (DOLOPHINE) 10 mg tablet Take 3 tablets by mouth daily,  1/29/24   Solis Altamirano DO   methocarbamol (ROBAXIN) 500 mg tablet Take 1 tablet (500 mg total) by mouth 4 (four) times a day for 14 days 4/11/23 10/27/23  Lisa Estrada PA-C   mupirocin (BACTROBAN) 2 % ointment Apply topically daily 10/27/23   Solis Altamirano DO   naloxone (NARCAN) 4 mg/0.1 mL nasal spray Administer 1 spray into a nostril. If no response after 2-3 minutes, give another dose in the other nostril using a new spray. 4/13/21   Stephen Guerrero MD   omeprazole (PriLOSEC) 20 mg delayed release capsule take 1 capsule by mouth once daily 1/15/24   Solis Altamirano DO   Pediatric Multiple Vit-C-FA (FRUITY CHEWABLES MULTIVITAMIN) CHEW Chew 1 tablet daily     Historical Provider, MD   senna (SENOKOT) 8.6 MG tablet Take 1 tablet by mouth as needed      Historical Provider, MD   sertraline (ZOLOFT) 100 mg tablet take 1 tablet by mouth twice a day 8/30/23   Solis Altamirano DO   sucralfate (Carafate) 1 g/10 mL suspension Take 10 mL (1 g total) by mouth 4 (four) times a day as needed (dyspepsia) 9/14/20   Rickie Walters MD   topiramate (TOPAMAX) 100 mg tablet take 1 tablet by mouth twice a day 1/15/24   Solis Altamirano DO   venlafaxine (EFFEXOR-XR) 75 mg 24 hr capsule take 1 capsule by mouth once daily 9/27/23   Solis Altamirano DO   Wound Dressings (Adaptic Non-Adhering Dressing) PADS Apply 24 each topically in the morning 10/27/23   Solis Altamirano DO         VTE Pharmacologic Prophylaxis: Heparin Drip  VTE Mechanical Prophylaxis: sequential compression device    ==  Santos Matson DO, MS3

## 2024-02-06 NOTE — ASSESSMENT & PLAN NOTE
Currently patient is taking Lunesta and Xanax at bedtime.  Lunesta nonformulary, replaced with Ambien.

## 2024-02-06 NOTE — PROGRESS NOTES
Assumed care of patient from [id number]. Patient is AxOx4 & is on RA. VSS. Patient complains of anxiety, PRN Xanax was given. Patient is independent in the room. Patient was taken down for a MRI of the brain. Heparin gtt is maintained. NSS @ 150 mL/hr. Patient is on Droplet isolation. Bed is in lowest position. Alarms absent. All needs are within reach.
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
## 2024-02-06 NOTE — ASSESSMENT & PLAN NOTE
Presented on 2/4/24 with a 2 day history of blurred vision in the left eye with pain and dizziness with neck pain and stiffness since 1 day  CTA head and neck - Mild chronic microangiopathy.   Symptoms improved today  MRI brain and orbits ordered  Plan for LP on 2/6/24 after Eliquis held for 48 hrs  Seen by ID - bacterial meningitis less likely r/o viral, f/u CSF  Seen by ophthalmology - unremarkable eye exam  F/u MRI
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
## 2024-02-07 ENCOUNTER — TRANSITIONAL CARE MANAGEMENT (OUTPATIENT)
Dept: INTERNAL MEDICINE CLINIC | Facility: CLINIC | Age: 54
End: 2024-02-07

## 2024-02-09 ENCOUNTER — TELEPHONE (OUTPATIENT)
Dept: INTERNAL MEDICINE CLINIC | Facility: CLINIC | Age: 54
End: 2024-02-09

## 2024-02-12 LAB — METHYLMALONATE SERPL-SCNC: 187 NMOL/L (ref 0–378)

## 2024-02-16 DIAGNOSIS — F32.A DEPRESSION, UNSPECIFIED DEPRESSION TYPE: ICD-10-CM

## 2024-02-16 RX ORDER — SERTRALINE HYDROCHLORIDE 100 MG/1
TABLET, FILM COATED ORAL
Qty: 180 TABLET | Refills: 1 | Status: SHIPPED | OUTPATIENT
Start: 2024-02-16

## 2024-02-21 ENCOUNTER — HOSPITAL ENCOUNTER (OUTPATIENT)
Dept: SLEEP CENTER | Facility: CLINIC | Age: 54
Discharge: HOME | End: 2024-02-21
Payer: MEDICARE

## 2024-02-21 DIAGNOSIS — G47.31 CENTRAL SLEEP APNEA: ICD-10-CM

## 2024-02-21 DIAGNOSIS — F51.01 PRIMARY INSOMNIA: ICD-10-CM

## 2024-02-21 PROBLEM — N39.0 URINARY TRACT INFECTION WITH HEMATURIA: Status: RESOLVED | Noted: 2019-11-10 | Resolved: 2024-02-21

## 2024-02-21 PROBLEM — R31.9 URINARY TRACT INFECTION WITH HEMATURIA: Status: RESOLVED | Noted: 2019-11-10 | Resolved: 2024-02-21

## 2024-02-21 PROCEDURE — 95811 POLYSOM 6/>YRS CPAP 4/> PARM: CPT | Performed by: PSYCHIATRY & NEUROLOGY

## 2024-02-21 PROCEDURE — 95811 POLYSOM 6/>YRS CPAP 4/> PARM: CPT

## 2024-02-21 RX ORDER — ESZOPICLONE 2 MG/1
2 TABLET, FILM COATED ORAL
Qty: 30 TABLET | Refills: 0 | Status: SHIPPED | OUTPATIENT
Start: 2024-02-21

## 2024-02-21 NOTE — TELEPHONE ENCOUNTER
Reason for call:   [x] Refill   [] Prior Auth  [] Other:     Office:   [x] PCP/Provider - Solis Altamirano DO  [] Specialty/Provider -   Medication    eszopiclone (LUNESTA) 2 mg tablet  Dose: 2 mg Route: Oral Frequency: Daily at bedtime PRN for sleep  Dispense Quantity: 30 tablet  Sig: Take 1 tablet (2 mg total) by mouth daily at bedtime as needed for sleep Take immediately before bedtime    Pharmacy  [id number] - [place], [place]    Does the patient have enough for 3 days?   [] Yes   [x] No - Send as HP to POD

## 2024-02-22 DIAGNOSIS — F51.01 PRIMARY INSOMNIA: ICD-10-CM

## 2024-02-22 DIAGNOSIS — F41.9 ANXIETY: ICD-10-CM

## 2024-02-22 RX ORDER — ALPRAZOLAM 1 MG/1
1 TABLET ORAL
Qty: 30 TABLET | Refills: 0 | Status: SHIPPED | OUTPATIENT
Start: 2024-02-22

## 2024-02-22 NOTE — TELEPHONE ENCOUNTER
Reason for call:   [x] Refill   [] Prior Auth  [] Other:     Office:   [x] PCP/Provider -   [] Specialty/Provider -     Medication:   Alprazolam 1mg- take 1 tablet by mouth daily at bedtime as needed for anxiety       Pharmacy: [facility] [place]    Does the patient have enough for 3 days?   [] Yes   [x] No - Send as HP to POD

## 2024-02-22 NOTE — PROGRESS NOTES
Sleep Study Documentation    Pre-Sleep Study       Sleep testing procedure explained to patient:YES    Patient napped prior to study:NO    Caffeine:Dayshift worker after 12PM.  Caffeine use:YES- coffee  6 to 18 ounces    Alcohol:Dayshift workers after 5PM: Alcohol use:YES-Wine 2 to 3 servings    Typical day for patient:YES       Study Documentation    Sleep Study Indications: Diagnostic results  AHI 23, While supine AHI 8 and in Rem 12.  137 [place] apneas  sleep Study: Treatment   Optimal PAP pressure: 8 EPAP 15 Max PS 3 Min PS  Leak:Small  Snore:Eliminated  REM Obtained:no  Supplemental O2: no    Minimum SaO2 92  Baseline SaO2 95  PAP mask tried (list all) Nasal but did not like or tolerated  PAP mask choice (final)Simplus Celso and Ravinder  PAP mask type:full face  PAP pressure at which snoring was eliminated 8 epap 3 ps min 15 max ps  Minimum SaO2 at final PAP pressure 92  Mode of Therapy:ASV max EPAP:8    max pressure support:15  min pressure support:3             CPAP changed to BiPAP:No    Mode of Therapy:ASV max EPAP:8  Max PS15 MinPS 3      EKG abnormalities: no     EEG abnormalities: no    Were abnormal behaviors in sleep observed:NO    Is Total Sleep Study Recording Time < 2 hours: N/A    Is Total Sleep Study Recording Time > 2 hours but study is incomplete: N/A    Is Total Sleep Study Recording Time 6 hours or more but sleep was not obtained: NO          Post-Sleep Study    Medication used at bedtime or during sleep study:YES prescription sleep aid and other prescription medications    Patient reports time it took to fall asleep:30 to 60 minutes    Patient reports waking up during study:3 or more times.  Patient reports returning to sleep in greater than 30 minutes.    Patient reports sleeping 2 to 4 hours without dreaming.    Does the Patient feel this is a typical night of sleep:worse than usual    Patient rated sleepiness: Very sleepy or tired    PAP treatment:yes: Post PAP treatment patient  reports feeling worse and  would wear PAP mask at home.

## 2024-02-25 DIAGNOSIS — G47.31 CENTRAL SLEEP APNEA: Primary | ICD-10-CM

## 2024-02-26 ENCOUNTER — TELEPHONE (OUTPATIENT)
Dept: SLEEP CENTER | Facility: CLINIC | Age: 54
End: 2024-02-26

## 2024-02-26 NOTE — TELEPHONE ENCOUNTER
----- Message from JASE Stinson sent at 2/25/2024  8:15 PM EST -----  ASV study completed with complete resolution of sleep apnea at the settings noted.  Equipment prescription provided.  Patient to be scheduled for set up of equipment with compliance follow up with Dr. Milan Hidalgo or Alanis 31-91 days after set up.

## 2024-02-26 NOTE — TELEPHONE ENCOUNTER
Left call back message for the patient.   ASV machine ordered for the patient post sleep study.   Also sent a [place] message

## 2024-02-27 ENCOUNTER — OFFICE VISIT (OUTPATIENT)
Dept: INTERNAL MEDICINE CLINIC | Facility: CLINIC | Age: 54
End: 2024-02-27
Payer: MEDICARE

## 2024-02-27 VITALS
SYSTOLIC BLOOD PRESSURE: 134 MMHG | OXYGEN SATURATION: 95 % | HEART RATE: 94 BPM | WEIGHT: 162 LBS | BODY MASS INDEX: 26.99 KG/M2 | DIASTOLIC BLOOD PRESSURE: 60 MMHG | RESPIRATION RATE: 18 BRPM | HEIGHT: 65 IN | TEMPERATURE: 95.8 F

## 2024-02-27 DIAGNOSIS — Z12.12 SCREENING FOR COLORECTAL CANCER: Primary | ICD-10-CM

## 2024-02-27 DIAGNOSIS — Z12.31 ENCOUNTER FOR SCREENING MAMMOGRAM FOR BREAST CANCER: ICD-10-CM

## 2024-02-27 DIAGNOSIS — Z00.00 ANNUAL PHYSICAL EXAM: ICD-10-CM

## 2024-02-27 DIAGNOSIS — Z12.11 SCREENING FOR COLORECTAL CANCER: Primary | ICD-10-CM

## 2024-02-27 DIAGNOSIS — Z12.4 SCREENING FOR CERVICAL CANCER: ICD-10-CM

## 2024-02-27 PROCEDURE — 99396 PREV VISIT EST AGE 40-64: CPT | Performed by: INTERNAL MEDICINE

## 2024-02-27 NOTE — PROGRESS NOTES
ADULT ANNUAL PHYSICAL  [facility]    NAME: Ely Cantrell  AGE: 54 y.o. SEX: female  : 1970     DATE: 2024     Assessment and Plan:     She has a medical history of protein S deficiency, factor 5 Leiden mutation with chronic DVT on Eliquis, SBO in May of 2021 status post surgical intervention, chronic pain maintained on methadone, status post gastric bypass procedure with iron deficiency anemia, L4/5 compression fractures     Encouraged her to consider shingles vaccination  Appears patient may be due for recall colonoscopy.  Will try to address at follow-up visit  Due for follow-up mammogram 2024    She has had some dietary indiscretions with weight gain. Knows she needs to cut back on drinking and certain foods       Problem List Items Addressed This Visit    None  Visit Diagnoses     Screening for colorectal cancer    -  Primary    Screening for cervical cancer        Encounter for screening mammogram for breast cancer        Annual physical exam              Immunizations and preventive care screenings were discussed with patient today. Appropriate education was printed on patient's after visit summary.    Counseling:  Alcohol/drug use: discussed moderation in alcohol intake, the recommendations for healthy alcohol use, and avoidance of illicit drug use.  Dental Health: discussed importance of regular tooth brushing, flossing, and dental visits.  Injury prevention: discussed safety/seat belts, safety helmets, smoke detectors, carbon dioxide detectors, and smoking near bedding or upholstery.  Sexual health: discussed sexually transmitted diseases, partner selection, use of condoms, avoidance of unintended pregnancy, and contraceptive alternatives.  Exercise: the importance of regular exercise/physical activity was discussed. Recommend exercise 3-5 times per week for at least 30 minutes.          Return in about 4 months (around  2024).     Chief Complaint:     Chief Complaint   Patient presents with   • Annual Exam   • Follow-up     4 month discussed with pt and she d/c pneumo/zoster       History of Present Illness:     Adult Annual Physical   Patient here for a comprehensive physical exam       Depression Screening  PHQ-2/9 Depression Screening    Little interest or pleasure in doing things: 1 - several days  Feeling down, depressed, or hopeless: 1 - several days  Trouble falling or staying asleep, or sleeping too much: 2 - more than half the days  Feeling tired or having little energy: 1 - several days  Poor appetite or overeatin - more than half the days  Feeling bad about yourself - or that you are a failure or have let yourself or your family down: 1 - several days  Trouble concentrating on things, such as reading the newspaper or watching television: 0 - not at all  Moving or speaking so slowly that other people could have noticed. Or the opposite - being so fidgety or restless that you have been moving around a lot more than usual: 1 - several days  Thoughts that you would be better off dead, or of hurting yourself in some way: 0 - not at all  PHQ-9 Score: 9  PHQ-9 Interpretation: Mild depression       General Health  Sleep: sleeps poorly.   Hearing: normal - bilateral.  Vision: wears glasses.   Dental: regular dental visits.        Review of Systems:     Review of Systems   Past Medical History:     Past Medical History:   Diagnosis Date   • Anemia    • Anxiety    • Depression    • Facial cellulitis    • Factor 5 Leiden mutation, heterozygous (HCC)    • Fracture, cuboid     LAST ASSESSED: 3/26/15   • Gastrojejunal ulcer     ANASTOMOTIC   • History of small bowel obstruction    • Hypertension    • Iron deficiency anemia     LAST ASSESSED: AND RESOLVED: 16   • Obstructive sleep apnea (adult) (pediatric)       Past Surgical History:     Past Surgical History:   Procedure Laterality Date   • ABDOMINOPLASTY      x2   •  CHOLECYSTECTOMY     • GASTRIC BYPASS      FOR MORBID OBESITY   • INCISIONAL HERNIA REPAIR     • CO OPEN TX RADIOCARPAL/INTERCARPAL DISLC 1/> BONES Left 1/6/2022    Procedure: OPEN REDUCTION W/ INTERNAL FIXATION (ORIF) LEFT DISTAL RADIUS FRACTURE;  Surgeon: Donovan Kamara MD;  Location: [facility];  Service: Orthopedics   • SMALL INTESTINE SURGERY        Social History:     Social History     Socioeconomic History   • Marital status:      Spouse name: None   • Number of children: None   • Years of education: None   • Highest education level: None   Occupational History   • None   Tobacco Use   • Smoking status: Never     Passive exposure: Never   • Smokeless tobacco: Never   Vaping Use   • Vaping status: Never Used   Substance and Sexual Activity   • Alcohol use: Not Currently   • Drug use: Never     Comment: usues methadone for pain relief   • Sexual activity: Not Currently     Partners: Male   Other Topics Concern   • None   Social History Narrative   • None     Social Determinants of Health     Financial Resource Strain: High Risk (8/31/2023)    Received from [facility]    Overall Financial Resource Strain (CARDIA)    • Difficulty of Paying Living Expenses: Very hard   Food Insecurity: No Food Insecurity (2/6/2024)    Hunger Vital Sign    • Worried About Running Out of Food in the Last Year: Never true    • Ran Out of Food in the Last Year: Never true   Transportation Needs: No Transportation Needs (2/6/2024)    PRAPARE - Transportation    • Lack of Transportation (Medical): No    • Lack of Transportation (Non-Medical): No   Physical Activity: Not on file   Stress: Not on file   Social Connections: Not on file   Intimate Partner Violence: Not At Risk (8/31/2023)    Received from [facility]    Humiliation, Afraid, Rape, and Kick questionnaire    • Fear of Current or Ex-Partner: No    • Emotionally Abused: No    • Physically Abused: No    • Sexually Abused: No    Housing Stability: Low Risk  (2/6/2024)    Housing Stability Vital Sign    • Unable to Pay for Housing in the Last Year: No    • Number of Places Lived in the Last Year: 1    • Unstable Housing in the Last Year: No      Family History:     Family History   Problem Relation Age of Onset   • Diabetes Family         MELLITUS   • Cancer Family    • Hypertension Family    • Osteoporosis Family    • Asthma Family    • Stroke Family         SYNDROME   • Breast cancer Mother    • Depression Mother    • COPD Father    • Asthma Child       Current Medications:     Current Outpatient Medications   Medication Sig Dispense Refill   • acetaminophen (TYLENOL) 325 mg tablet Take 2 tablets (650 mg total) by mouth every 6 (six) hours as needed for mild pain     • ALPRAZolam (XANAX) 1 mg tablet Take 1 tablet (1 mg total) by mouth daily at bedtime as needed for anxiety or sleep 30 tablet 0   • apixaban (Eliquis) 5 mg take 1 tablet by mouth twice a day 180 tablet 1   • cholecalciferol (VITAMIN D3) 1,000 units tablet Take 2 tablets (2,000 Units total) by mouth daily 180 tablet 0   • eszopiclone (LUNESTA) 2 mg tablet Take 1 tablet (2 mg total) by mouth daily at bedtime as needed for sleep Take immediately before bedtime 30 tablet 0   • famotidine (PEPCID) 20 mg tablet Take 1 tablet (20 mg total) by mouth 2 (two) times a day 60 tablet 0   • fluticasone (FLONASE) 50 mcg/act nasal spray 2 sprays into each nostril daily 16 g 0   • methadone (DOLOPHINE) 10 mg tablet Take 3 tablets by mouth daily, 90 tablet 0   • naloxone (NARCAN) 4 mg/0.1 mL nasal spray Administer 1 spray into a nostril. If no response after 2-3 minutes, give another dose in the other nostril using a new spray. 1 each 1   • omeprazole (PriLOSEC) 20 mg delayed release capsule take 1 capsule by mouth once daily 90 capsule 0   • Pediatric Multiple Vit-C-FA (FRUITY CHEWABLES MULTIVITAMIN) CHEW Chew 1 tablet daily      • senna (SENOKOT) 8.6 MG tablet Take 1 tablet by mouth as  "needed       • sertraline (ZOLOFT) 100 mg tablet take 1 tablet by mouth twice a day 180 tablet 1   • sucralfate (Carafate) 1 g/10 mL suspension Take 10 mL (1 g total) by mouth 4 (four) times a day as needed (dyspepsia) 420 mL 2   • topiramate (TOPAMAX) 100 mg tablet take 1 tablet by mouth twice a day 180 tablet 0   • venlafaxine (EFFEXOR-XR) 75 mg 24 hr capsule take 1 capsule by mouth once daily 90 capsule 1   • Wound Dressings (Adaptic Non-Adhering Dressing) PADS Apply 24 each topically in the morning 24 each 1     No current facility-administered medications for this visit.      Allergies:     Allergies   Allergen Reactions   • Amoxicillin Hives and Shortness Of Breath   • Aspirin Hives and Other (See Comments)     Short of breath   • Butorphanol Anaphylaxis and Other (See Comments)     Other reaction(s): BUTORPHANOL TARTRATE (STADOL) (loss of breath)   • Morphine Other (See Comments) and Hallucinations     takes vicodin at home   • Duloxetine Other (See Comments)     Rapid heartbeat   • Azithromycin Rash   • Gabapentin Palpitations   • Nitrofurantoin Hives and Rash   • Sulfa Antibiotics Hives, Rash and Other (See Comments)      Physical Exam:     /60 (BP Location: Left arm, Patient Position: Sitting, Cuff Size: Standard)   Pulse 94   Temp (!) 95.8 °F (35.4 °C)   Resp 18   Ht 5' 5\" (1.651 m)   Wt 73.5 kg (162 lb)   LMP 08/12/2020   SpO2 95%   BMI 26.96 kg/m²     Physical Exam  Constitutional:       Appearance: Normal appearance. She is not ill-appearing.   HENT:      Head: Normocephalic and atraumatic.   Eyes:      General: No scleral icterus.        Right eye: No discharge.         Left eye: No discharge.   Cardiovascular:      Rate and Rhythm: Normal rate and regular rhythm.      Heart sounds: No murmur heard.     No friction rub.   Pulmonary:      Effort: Pulmonary effort is normal.      Breath sounds: Normal breath sounds. No wheezing or rales.   Abdominal:      General: Abdomen is flat. There is " no distension.      Palpations: Abdomen is soft.      Tenderness: There is no abdominal tenderness.   Musculoskeletal:         General: No swelling, tenderness or deformity.   Skin:     General: Skin is warm and dry.      Findings: No erythema.   Neurological:      Mental Status: She is alert and oriented to person, place, and time. Mental status is at baseline.      Motor: No weakness.   Psychiatric:         Mood and Affect: Mood normal.         Behavior: Behavior normal.              Solis Altamirano DO  [facility] INTERNAL MEDICINE [place]  Depression Screening Follow-up Plan: Patient's depression screening was positive with a PHQ-2 score of . Their PHQ-9 score was 9. Patient assessed for underlying major depression. They have no active suicidal ideations. Brief counseling provided and recommend additional follow-up/re-evaluation next office visit.

## 2024-02-29 ENCOUNTER — TELEPHONE (OUTPATIENT)
Dept: SLEEP CENTER | Facility: CLINIC | Age: 54
End: 2024-02-29

## 2024-02-29 NOTE — TELEPHONE ENCOUNTER
Compliance follow up 4/8/24.RX for AVS machine and clinicals sent to [facility] via [employer]

## 2024-03-01 LAB

## 2024-03-04 DIAGNOSIS — M25.512 LEFT SHOULDER PAIN, UNSPECIFIED CHRONICITY: ICD-10-CM

## 2024-03-04 RX ORDER — METHADONE HYDROCHLORIDE 10 MG/1
30 TABLET ORAL DAILY
Qty: 90 TABLET | Refills: 0 | Status: SHIPPED | OUTPATIENT
Start: 2024-03-04

## 2024-03-04 NOTE — TELEPHONE ENCOUNTER
Reason for call:   [x] Refill   [] Prior Auth  [] Other:     Office:   [x] PCP/Provider -   [] Specialty/Provider -     Medication: methadone     Dose/Frequency: Max Mega Altamirano,      Quantity: 90 tabs    Pharmacy: [id number] - [address]        Does the patient have enough for 3 days?   [] Yes   [x] No - Send as HP to POD

## 2024-03-07 LAB

## 2024-03-12 LAB
DME PARACHUTE DELIVERY DATE EXPECTED: NORMAL
DME PARACHUTE DELIVERY DATE REQUESTED: NORMAL
DME PARACHUTE ITEM DESCRIPTION: NORMAL
DME PARACHUTE ORDER STATUS: NORMAL
DME PARACHUTE SUPPLIER NAME: NORMAL
DME PARACHUTE SUPPLIER PHONE: NORMAL

## 2024-03-19 DIAGNOSIS — F51.01 PRIMARY INSOMNIA: ICD-10-CM

## 2024-03-19 NOTE — TELEPHONE ENCOUNTER
Reason for call:   [x] Refill   [] Prior Auth  [] Other:     Office:   [x] PCP/Provider -   [] Specialty/Provider -     Medication: Lunesta    Dose/Frequency: 2 mg     Quantity: #30    Pharmacy: [facility]    Does the patient have enough for 3 days?   [] Yes   [x] No - Send as HP to POD

## 2024-03-21 DIAGNOSIS — F51.01 PRIMARY INSOMNIA: ICD-10-CM

## 2024-03-21 DIAGNOSIS — F41.9 ANXIETY: ICD-10-CM

## 2024-03-21 RX ORDER — ESZOPICLONE 2 MG/1
2 TABLET, FILM COATED ORAL
Qty: 30 TABLET | Refills: 0 | OUTPATIENT
Start: 2024-03-21

## 2024-03-21 RX ORDER — ESZOPICLONE 2 MG/1
2 TABLET, FILM COATED ORAL
Qty: 30 TABLET | Refills: 0 | Status: SHIPPED | OUTPATIENT
Start: 2024-03-21

## 2024-03-21 RX ORDER — ALPRAZOLAM 1 MG/1
1 TABLET ORAL
Qty: 30 TABLET | Refills: 0 | Status: SHIPPED | OUTPATIENT
Start: 2024-03-21

## 2024-03-21 NOTE — TELEPHONE ENCOUNTER
Reason for call:   [x] Refill   [] Prior Auth  [] Other:     Office:   [x] PCP/Provider - [facility]  [] Specialty/Provider -     Medication: ALPRAZOLAM    Dose/Frequency: 1 MG/ QD HS PRN SLEEP OR ANXIETY    Quantity: 30    Pharmacy: [id number] [place], [place]    Does the patient have enough for 3 days?   [x] Yes   [] No - Send as HP to POD

## 2024-03-21 NOTE — TELEPHONE ENCOUNTER
Pt called she needs this medication refilled she doesn't have anything for tonight  please advise

## 2024-03-21 NOTE — TELEPHONE ENCOUNTER
Pt called in check the status of prescription for the following Medication: Lunesta Dose/Frequency: 2 mg . Did say is under process but she still would want to speak to practice warm transferred the call and was told Arlette will call back the pt soon relayed the same message to pt. Thanks

## 2024-03-28 LAB

## 2024-04-02 DIAGNOSIS — M25.512 LEFT SHOULDER PAIN, UNSPECIFIED CHRONICITY: ICD-10-CM

## 2024-04-02 RX ORDER — METHADONE HYDROCHLORIDE 10 MG/1
30 TABLET ORAL DAILY
Qty: 90 TABLET | Refills: 0 | Status: SHIPPED | OUTPATIENT
Start: 2024-04-02

## 2024-04-02 NOTE — TELEPHONE ENCOUNTER
Reason for call:   [x] Refill   [] Prior Auth  [] Other:     Office:   [x] PCP/Provider - Dr Altamirano  [] Specialty/Provider -     Medication: methadone    Dose/Frequency: 10 mg 3D    Quantity: 30D     Pharmacy: [facility]  on file     Does the patient have enough for 3 days?   [] Yes   [x] No - Send as HP to POD

## 2024-04-10 DIAGNOSIS — F51.01 PRIMARY INSOMNIA: ICD-10-CM

## 2024-04-10 RX ORDER — ESZOPICLONE 2 MG/1
2 TABLET, FILM COATED ORAL
Qty: 30 TABLET | Refills: 0 | Status: SHIPPED | OUTPATIENT
Start: 2024-04-10

## 2024-04-10 NOTE — TELEPHONE ENCOUNTER
Reason for call:   [x] Refill   [] Prior Auth  [] Other:     Office:   [x] PCP/Provider -   [] Specialty/Provider -     Medication: eszopiclone (LUNESTA) 2 mg tablet     Dose/Frequency: Take 1 tablet (2 mg total) by mouth daily at bedtime as needed for sleep Take immediately before bedtime     Quantity: 30    Pharmacy: [facility] [address]     Does the patient have enough for 3 days?   [] Yes   [x] No - Send as HP to POD

## 2024-04-12 DIAGNOSIS — F32.A DEPRESSION, UNSPECIFIED DEPRESSION TYPE: ICD-10-CM

## 2024-04-12 RX ORDER — VENLAFAXINE HYDROCHLORIDE 75 MG/1
75 CAPSULE, EXTENDED RELEASE ORAL DAILY
Qty: 90 CAPSULE | Refills: 1 | Status: SHIPPED | OUTPATIENT
Start: 2024-04-12

## 2024-04-22 DIAGNOSIS — F41.9 ANXIETY: ICD-10-CM

## 2024-04-22 DIAGNOSIS — F51.01 PRIMARY INSOMNIA: ICD-10-CM

## 2024-04-22 NOTE — TELEPHONE ENCOUNTER
Reason for call:   [x] Refill   [] Prior Auth  [] Other:     Office:   [x] PCP/Provider -   [] Specialty/Provider -     Medication: XANAX    Dose/Frequency: 1 MG    Quantity: 30    Pharmacy:   [id number] - [place] [address] [address]  Phone: [phone]  Fax: [phone]     Does the patient have enough for 3 days?   [] Yes   [x] No - Send as HP to POD

## 2024-04-23 RX ORDER — ALPRAZOLAM 1 MG/1
1 TABLET ORAL
Qty: 30 TABLET | Refills: 0 | Status: SHIPPED | OUTPATIENT
Start: 2024-04-23

## 2024-04-24 DIAGNOSIS — K21.9 GERD WITHOUT ESOPHAGITIS: ICD-10-CM

## 2024-04-24 DIAGNOSIS — G43.909 MIGRAINE WITHOUT STATUS MIGRAINOSUS, NOT INTRACTABLE, UNSPECIFIED MIGRAINE TYPE: ICD-10-CM

## 2024-04-24 RX ORDER — OMEPRAZOLE 20 MG/1
CAPSULE, DELAYED RELEASE ORAL
Qty: 90 CAPSULE | Refills: 1 | Status: SHIPPED | OUTPATIENT
Start: 2024-04-24

## 2024-04-24 RX ORDER — TOPIRAMATE 100 MG/1
100 TABLET, FILM COATED ORAL 2 TIMES DAILY
Qty: 180 TABLET | Refills: 1 | Status: SHIPPED | OUTPATIENT
Start: 2024-04-24

## 2024-04-29 DIAGNOSIS — M25.512 LEFT SHOULDER PAIN, UNSPECIFIED CHRONICITY: ICD-10-CM

## 2024-04-29 RX ORDER — METHADONE HYDROCHLORIDE 10 MG/1
30 TABLET ORAL DAILY
Qty: 90 TABLET | Refills: 0 | Status: SHIPPED | OUTPATIENT
Start: 2024-04-29

## 2024-04-29 NOTE — TELEPHONE ENCOUNTER
Reason for call:   [x] Refill   [] Prior Auth  [] Other:     Office:   [x] PCP/Provider -  Solis Altamirano, DO   [] Specialty/Provider -     Medication: methadone (DOLOPHINE) 10 mg tablet     Dose/Frequency: Take 3 tablets by mouth daily     Quantity: 90    Pharmacy: [id number] [address] [phone]    Does the patient have enough for 3 days?   [] Yes   [x] No - Send as HP to POD

## 2024-04-30 ENCOUNTER — TELEPHONE (OUTPATIENT)
Age: 54
End: 2024-04-30

## 2024-04-30 NOTE — TELEPHONE ENCOUNTER
PA for methadone (DOLOPHINE)     Submitted via    [x]CMM-KEY [id number]  []Surescripts-Case ID #    []Faxed to plan   []Other website    []Phone call Case ID #      Office notes sent, clinical questions answered. Awaiting determination    Turnaround time for your insurance to make a decision on your Prior Authorization can take 7-21 business days.

## 2024-05-20 DIAGNOSIS — F41.9 ANXIETY: ICD-10-CM

## 2024-05-20 DIAGNOSIS — F51.01 PRIMARY INSOMNIA: ICD-10-CM

## 2024-05-20 RX ORDER — ALPRAZOLAM 1 MG/1
1 TABLET ORAL
Qty: 30 TABLET | Refills: 0 | Status: SHIPPED | OUTPATIENT
Start: 2024-05-20

## 2024-05-20 RX ORDER — ESZOPICLONE 2 MG/1
2 TABLET, FILM COATED ORAL
Qty: 30 TABLET | Refills: 0 | Status: SHIPPED | OUTPATIENT
Start: 2024-05-20

## 2024-05-20 NOTE — TELEPHONE ENCOUNTER
Reason for call:   [x] Refill   [] Prior Auth  [] Other:     Office:   [x] PCP/Provider - Dr Altamirano  [] Specialty/Provider -     Medication:   Alprazolam 1 mg, 1 hs, 30  Lunesta 2mg, 1 hs, 30      [facility]   [facility]    Does the patient have enough for 3 days?   [] Yes   [x] No - Send as HP to POD

## 2024-06-04 DIAGNOSIS — M25.512 LEFT SHOULDER PAIN, UNSPECIFIED CHRONICITY: ICD-10-CM

## 2024-06-04 RX ORDER — METHADONE HYDROCHLORIDE 10 MG/1
30 TABLET ORAL DAILY
Qty: 90 TABLET | Refills: 0 | Status: SHIPPED | OUTPATIENT
Start: 2024-06-04

## 2024-06-04 NOTE — TELEPHONE ENCOUNTER
Reason for call:   [x] Refill   [] Prior Auth  [] Other:     Office:   [x] PCP/Provider -  Solis Altamirano, DO   [] Specialty/Provider -     Medication:  methadone (DOLOPHINE) 10 mg tablet    Dose/Frequency: Take 3 tablets by mouth daily,     Quantity: 90    Pharmacy: [id number] [address] [phone]    Does the patient have enough for 3 days?   [] Yes   [x] No - Send as HP to POD

## 2024-06-10 NOTE — ADDENDUM NOTE
Addended by: Galindo Lozada on: 10/22/2019 04:45 PM     Modules accepted: Orders Harrison is a 77 Homer Sumner MD

## 2024-06-12 ENCOUNTER — TELEPHONE (OUTPATIENT)
Dept: INTERNAL MEDICINE CLINIC | Facility: CLINIC | Age: 54
End: 2024-06-12

## 2024-06-12 NOTE — TELEPHONE ENCOUNTER
Left message @ 10:20 for Ely to call our office.  As per Dr. Altamirano, her 6.27.24 appt needs to be sooner than 6.27.24 and changed to an opioid visit.

## 2024-06-12 NOTE — TELEPHONE ENCOUNTER
Patient returned call. Warm transfer to clerical [facility] successful.

## 2024-06-14 DIAGNOSIS — F51.01 PRIMARY INSOMNIA: ICD-10-CM

## 2024-06-14 DIAGNOSIS — F41.9 ANXIETY: ICD-10-CM

## 2024-06-14 RX ORDER — ALPRAZOLAM 1 MG/1
1 TABLET ORAL
Qty: 30 TABLET | Refills: 0 | Status: SHIPPED | OUTPATIENT
Start: 2024-06-14

## 2024-06-14 RX ORDER — ESZOPICLONE 2 MG/1
2 TABLET, FILM COATED ORAL
Qty: 30 TABLET | Refills: 0 | Status: SHIPPED | OUTPATIENT
Start: 2024-06-14

## 2024-06-14 NOTE — TELEPHONE ENCOUNTER
Patient took extra Alprazolam to fly so she will run out early.   Reason for call:   [x] Refill   [] Prior Auth  [] Other:     Office:   [x] PCP/Provider - Adarsh  [] Specialty/Provider -         Does the patient have enough for 3 days?   [] Yes   [x] No - Send as HP to POD

## 2024-06-20 ENCOUNTER — TELEPHONE (OUTPATIENT)
Age: 54
End: 2024-06-20

## 2024-06-20 NOTE — TELEPHONE ENCOUNTER
Appointment scheduled with provider.    Reason: Office Visit    Symptoms: Medication Review     Provider: Dr. Solis Altamirano    Date/Time: Tuesday 6/25/2024 at 11:45 am

## 2024-06-25 ENCOUNTER — OFFICE VISIT (OUTPATIENT)
Dept: INTERNAL MEDICINE CLINIC | Facility: CLINIC | Age: 54
End: 2024-06-25
Payer: MEDICARE

## 2024-06-25 VITALS
OXYGEN SATURATION: 96 % | SYSTOLIC BLOOD PRESSURE: 100 MMHG | HEART RATE: 74 BPM | WEIGHT: 168 LBS | BODY MASS INDEX: 27.99 KG/M2 | HEIGHT: 65 IN | TEMPERATURE: 97.6 F | DIASTOLIC BLOOD PRESSURE: 64 MMHG | RESPIRATION RATE: 18 BRPM

## 2024-06-25 DIAGNOSIS — G47.31 CENTRAL SLEEP APNEA: ICD-10-CM

## 2024-06-25 DIAGNOSIS — F32.A DEPRESSION, UNSPECIFIED DEPRESSION TYPE: ICD-10-CM

## 2024-06-25 DIAGNOSIS — D68.51 FACTOR 5 LEIDEN MUTATION, HETEROZYGOUS (HCC): ICD-10-CM

## 2024-06-25 DIAGNOSIS — F41.9 ANXIETY: Primary | ICD-10-CM

## 2024-06-25 DIAGNOSIS — S32.050D COMPRESSION FRACTURE OF L5 VERTEBRA WITH ROUTINE HEALING: ICD-10-CM

## 2024-06-25 DIAGNOSIS — F51.01 PRIMARY INSOMNIA: ICD-10-CM

## 2024-06-25 DIAGNOSIS — G89.4 PAIN SYNDROME, CHRONIC: ICD-10-CM

## 2024-06-25 PROCEDURE — 99214 OFFICE O/P EST MOD 30 MIN: CPT | Performed by: INTERNAL MEDICINE

## 2024-06-25 RX ORDER — ESZOPICLONE 3 MG/1
3 TABLET, FILM COATED ORAL
Qty: 30 TABLET | Refills: 0 | Status: SHIPPED | OUTPATIENT
Start: 2024-06-25

## 2024-06-25 NOTE — ASSESSMENT & PLAN NOTE
Has had worsening of insomnia.  Likely exacerbated by stress/anxiety  -She previously completed sleep study which was suggestive of mild sleep apnea.  Majority of events recorded were central apneas. Had ASV study with resolution of sleep apnea noted  -She needs follow-up with sleep medicine to discuss ongoing treatment.  Will readdress at follow-up  -After discussion, we will cautiously increase Lunesta to 3 mg daily at bedtime  -She will continue on alprazolam 1 mg daily at bedtime  -She is aware of increased risk of side effects with concurrent use of benzodiazepine and nonbenzodiazepine hypnotics.  Advised that medications are not to be combined with alcohol under any circumstances      Previous medications: Ambien, trazodone, Lunesta, nortriptyline, temazepam, Seroquel
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
## 2024-06-25 NOTE — ASSESSMENT & PLAN NOTE
-Patient unfortunately recently lost her job.  She is understandably distressed by this.  She requests referral today to establish with therapist.  I placed internal referral to [facility] in addition to social work care management program to try to facilitate appointment  -On Sertraline, Effexor.  Will continue current regimen for now  -Continues alprazolam at bedtime for anxiety and insomnia

## 2024-06-26 ENCOUNTER — PATIENT OUTREACH (OUTPATIENT)
Dept: INTERNAL MEDICINE CLINIC | Facility: CLINIC | Age: 54
End: 2024-06-26

## 2024-06-26 NOTE — ASSESSMENT & PLAN NOTE
-history of factor 5 Leiden mutation, protein S deficiency, recurrent DVT and PE  -remains on indefinite anticoagulation with Eliquis
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
## 2024-06-26 NOTE — PROGRESS NOTES
JONAH HOGAN received a referral from patient's PCP regarding patient's need for assistance establishing mental health services. JONAH HOGAN attempted to contact patient at this time. JESSICAM requesting a call in return at patient's earliest convenience.Will continue attempts to reach patient.

## 2024-06-26 NOTE — ASSESSMENT & PLAN NOTE
-Chronic nerve pain issues related to prior abdominoplasty complicated by MRSA infection of the right hip  -maintained on methadone 30 mg daily.  Continues to tolerate without issue    -Urine drug screen completed February 2023 with appropriate findings  -Repeat urine drug screen was collected today in the office  -Patient has severe pain caused by a medical condition  -There are no current concerns regarding opioid misuse or use disorder    -Continue to monitor for concomitant drug-drug interactions and potential for adverse events  -Counseled on potential adverse effects of opioid analgesics, including risk of misuse, abuse, addiction  -Based on patient's clinical circumstances, amount of opioid prescribed is warranted in order to adequately manage the patient's pain  -Patient has previously tried and had inadequate response to fentanyl patches and tramadol

## 2024-06-27 ENCOUNTER — PATIENT OUTREACH (OUTPATIENT)
Dept: INTERNAL MEDICINE CLINIC | Facility: CLINIC | Age: 54
End: 2024-06-27

## 2024-06-27 NOTE — PROGRESS NOTES
JONAH HOGAN contacted pt at this time to follow up on referral received. Patient states she is having a difficult time coping since losing her job on Monday. Patient states she just received her last paycheck today and is concerned about having a hard time paying for her bills.     Patient states she has used [employer] and applied to many jobs online. She plans to go to local shops and establishments in person today to see if she can put out any additional applications. JONAH HOGAN suggested that perhaps [employer] would also be a useful resource.    Patient is on [employer] but plans to contact them today as she is unable to pay her current amount owed. Patient does receive small amount in SNAP-JONAH HOGAN encouraged pt to report change in income as they might  be able to increase amount received although not guaranteed.     Patient is not currently behind on rent, but is worrisome about how she will pay for July rent as she does not have sufficient funds. JONAH HOGAN agreed to look into housing/rental assistance programs via Find Help and send pt info on programs via email. Pt agreeable.    JONAH HOGAN provided pt with contact info for [facility] ([phone]) and [facility] ([phone]). Encouraged her to reach out and request to schedule an intake appt. JONAH HOGAN encouraged pt to reach out if she has any difficulty scheduling appointment so that JONAH HOGAN can assist further or provide alternate options. Patient verbalized understanding and expressed gratitude for the assistance.

## 2024-06-29 LAB
1OH-MIDAZOLAM UR-MCNC: NEGATIVE NG/ML
6-BETA NALTREXOL UR CFM-MCNC: NEGATIVE NG/ML
6MAM UR QL: NEGATIVE NG/ML
A-OH ALPRAZ UR-MCNC: 188 NG/ML
A-OH-TRIAZOLAM UR-MCNC: NEGATIVE NG/ML
AMPHETAMINES UR QL: NEGATIVE NG/ML
BARBITURATES UR QL: NEGATIVE NG/ML
BENZODIAZ UR QL: POSITIVE NG/ML
BUPRENORPHINE UR QL: NEGATIVE NG/ML
BUTYLONE: NEGATIVE NG/ML
BZE UR QL: NEGATIVE NG/ML
CREAT UR-MCNC: 56.4 MG/DL
EDDP UR-MCNC: 2291 NG/ML
ETHANOL UR QL: POSITIVE NG/ML
ETHYL GLUCURONIDE UR-MCNC: ABNORMAL NG/ML
ETHYL SULFATE UR-MCNC: 8793 NG/ML
FENTANYL: NEGATIVE NG/ML
GABAPENTIN UR-MCNC: NEGATIVE NG/ML
LORAZEPAM UR-MCNC: NEGATIVE NG/ML
MDPV: NEGATIVE NG/ML
MEPHEDRONE: NEGATIVE NG/ML
METHADONE UR QL: POSITIVE NG/ML
METHADONE UR-MCNC: 825 NG/ML
METHYLONE: NEGATIVE NG/ML
NALTREXONE UR-MCNC: NEGATIVE NG/ML
NORDIAZEPAM UR-MCNC: NEGATIVE NG/ML
OPIATES UR QL: NEGATIVE NG/ML
OXAZEPAM UR-MCNC: NEGATIVE NG/ML
OXIDANTS UR QL: NEGATIVE MCG/ML
OXYCODONE UR QL: NEGATIVE NG/ML
PCP UR QL: NEGATIVE NG/ML
PH UR: 7.6 [PH] (ref 4.5–9)
SL AMB AMINOCLONAZEPAM: NEGATIVE NG/ML
SL AMB HYDROXYETHYLFLURAZEPAM: NEGATIVE NG/ML
SL AMB MEDMATCH AOH ALPRAZOLAM: ABNORMAL
SL AMB MEDMATCH EDDP: ABNORMAL
SL AMB MEDMATCH ETG: ABNORMAL
SL AMB MEDMATCH ETS: ABNORMAL
SL AMB MEDMATCH METHADONE: ABNORMAL
SL AMB PREGABALIN: NEGATIVE NG/ML
SL AMB RITALINIC ACID: NEGATIVE NG/ML
TEMAZEPAM UR-MCNC: NEGATIVE NG/ML
THC UR QL: NEGATIVE NG/ML

## 2024-07-04 DIAGNOSIS — M25.512 LEFT SHOULDER PAIN, UNSPECIFIED CHRONICITY: ICD-10-CM

## 2024-07-04 RX ORDER — METHADONE HYDROCHLORIDE 10 MG/1
30 TABLET ORAL DAILY
Qty: 90 TABLET | Refills: 0 | Status: CANCELLED | OUTPATIENT
Start: 2024-07-04

## 2024-07-04 NOTE — TELEPHONE ENCOUNTER
Medication Refill Request     Name  methadone (DOLOPHINE) 10 mg tablet   Dose/Frequency Take 3 tablets by mouth daily,,   Quantity 90 tablet   Verified pharmacy   [x]  Verified ordering Provider   [x]  Does patient have enough for the next 3 days? Yes [] No [x]

## 2024-07-05 NOTE — TELEPHONE ENCOUNTER
Pt called to see if methadone 10mg was sent to pharmacy as she will have none left at the end of today. Pt is very concernned it will not be sent in time for the weekend  . I did reach out to clincal to office to see if there was any chance it would be refilled today but office was asssiting pts at that time.\  thank you

## 2024-07-06 ENCOUNTER — NURSE TRIAGE (OUTPATIENT)
Dept: OTHER | Facility: OTHER | Age: 54
End: 2024-07-06

## 2024-07-06 DIAGNOSIS — M25.512 LEFT SHOULDER PAIN, UNSPECIFIED CHRONICITY: ICD-10-CM

## 2024-07-06 RX ORDER — METHADONE HYDROCHLORIDE 10 MG/1
30 TABLET ORAL DAILY
Qty: 90 TABLET | Refills: 0 | Status: SHIPPED | OUTPATIENT
Start: 2024-07-06

## 2024-07-06 NOTE — TELEPHONE ENCOUNTER
"Regarding: refill methadone / pain  ----- Message from Jessica HAMILTON sent at 7/6/2024 10:48 AM EDT -----  \"I called Wednesday or Thursday about my medication and it still hasn't been refilled. I cannot go with this, I have pain. It is the methadone 10mg\"    "

## 2024-07-06 NOTE — TELEPHONE ENCOUNTER
"Reason for Disposition  • Caller requesting a CONTROLLED substance prescription refill (e.g., narcotics, ADHD medicines)    Answer Assessment - Initial Assessment Questions  1. DRUG NAME: \"What medicine do you need to have refilled?\"      Methadone 10mg  2. REFILLS REMAINING: \"How many refills are remaining?\" (Note: The label on the medicine or pill bottle will show how many refills are remaining. If there are no refills remaining, then a renewal may be needed.)      0  3. EXPIRATION DATE: \"What is the expiration date?\" (Note: The label states when the prescription will , and thus can no longer be refilled.)      0  4. PRESCRIBING HCP: \"Who prescribed it?\" Reason: If prescribed by specialist, call should be referred to that group.      Dr. Altamirano    5. SYMPTOMS: \"Do you have any symptoms?\"      Patient is having pain; chronic pain in right hip (9/10). So bad that patient doesn't want to get out of bed. Patient has been out of medication; called on Thursday.    Protocols used: Medication Refill and Renewal Call-ADULT-    "

## 2024-07-09 DIAGNOSIS — F51.01 PRIMARY INSOMNIA: ICD-10-CM

## 2024-07-09 DIAGNOSIS — F41.9 ANXIETY: ICD-10-CM

## 2024-07-09 RX ORDER — ALPRAZOLAM 1 MG/1
1 TABLET ORAL
Qty: 30 TABLET | Refills: 0 | Status: CANCELLED | OUTPATIENT
Start: 2024-07-09

## 2024-07-09 NOTE — TELEPHONE ENCOUNTER
Reason for call:   [x] Refill   [] Prior Auth  [] Other:     Office:   [x] PCP/Provider - Solis Altamirano,    [] Specialty/Provider -     Medication: ALPRAZolam (XANAX) 1 mg tablet     Dose/Frequency: 1 mg, Oral, Daily at bedtime PRN     Quantity: 30    Pharmacy:  [id number] [address]     Does the patient have enough for 3 days?   [x] Yes   [] No - Send as HP to POD

## 2024-07-16 DIAGNOSIS — F51.01 PRIMARY INSOMNIA: ICD-10-CM

## 2024-07-16 DIAGNOSIS — F41.9 ANXIETY: ICD-10-CM

## 2024-07-16 NOTE — TELEPHONE ENCOUNTER
Reason for call:   [x] Refill   [] Prior Auth  [x] Other: Patient stated she no longer has any medication    Office:   [x] PCP/Provider - [facility] P[facility] - Solis Altamirano DO   [] Specialty/Provider -     Medication: ALPRAZolam (XANAX) 1 mg tablet     Dose/Frequency: Take 1 tablet (1 mg total) by mouth daily at bedtime as needed for anxiety or sleep,     Quantity: 30 Tablets    Pharmacy: [id number] [address] [phone]    Does the patient have enough for 3 days?   [] Yes   [x] No - Send as HP to POD

## 2024-07-17 RX ORDER — ALPRAZOLAM 1 MG/1
TABLET ORAL
Qty: 30 TABLET | Refills: 0 | Status: SHIPPED | OUTPATIENT
Start: 2024-07-17

## 2024-07-17 RX ORDER — ALPRAZOLAM 1 MG/1
1 TABLET ORAL
Qty: 30 TABLET | Refills: 0 | OUTPATIENT
Start: 2024-07-17

## 2024-07-17 NOTE — TELEPHONE ENCOUNTER
Pt called to check on update of refill request. She stated she is completely out of her meds and was not able to sleep last night. She is hoping to be able to pick this up today. Pt would like call back once refill is sent so she can  at the pharmacy. Thank you.

## 2024-07-25 DIAGNOSIS — I82.409 ACUTE DVT (DEEP VENOUS THROMBOSIS) (HCC): ICD-10-CM

## 2024-07-25 RX ORDER — APIXABAN 5 MG/1
TABLET, FILM COATED ORAL
Qty: 200 TABLET | Refills: 1 | Status: SHIPPED | OUTPATIENT
Start: 2024-07-25

## 2024-07-26 DIAGNOSIS — F51.01 PRIMARY INSOMNIA: ICD-10-CM

## 2024-07-26 RX ORDER — ESZOPICLONE 3 MG/1
3 TABLET, FILM COATED ORAL
Qty: 30 TABLET | Refills: 0 | Status: SHIPPED | OUTPATIENT
Start: 2024-07-26

## 2024-07-26 NOTE — TELEPHONE ENCOUNTER
Reason for call:   [x] Refill   [] Prior Auth  [] Other:     Office:   [x] PCP/Provider - Solis Altamirano  [] Specialty/Provider -     Medication:       Pharmacy: Confirmed [facility]    Does the patient have enough for 3 days?   [] Yes   [x] No - Send as HP to POD

## 2024-07-29 ENCOUNTER — TELEPHONE (OUTPATIENT)
Age: 54
End: 2024-07-29

## 2024-07-29 NOTE — TELEPHONE ENCOUNTER
PA for METHADONE 10 MG     Submitted via    [x][employer]-KEY [id number]  []Surescripts-Case ID #  []Faxed to plan   []Other website   []Phone call Case ID #     Office notes sent, clinical questions answered. Awaiting determination    Turnaround time for your insurance to make a decision on your Prior Authorization can take 7-21 business days.

## 2024-07-31 NOTE — TELEPHONE ENCOUNTER
PA for METHADONE Denied    Reason:(Screenshot if applicable)        Message sent to office clinical pool Yes    Denial letter scanned into Media Yes    Appeal started No ( Provider will need to decide if appeal is warranted and send clinical documentation to Prior Authorization Team for initiation.)    **Please follow up with your patient regarding denial and next steps**

## 2024-08-04 LAB

## 2024-08-05 DIAGNOSIS — M25.512 LEFT SHOULDER PAIN, UNSPECIFIED CHRONICITY: ICD-10-CM

## 2024-08-05 RX ORDER — METHADONE HYDROCHLORIDE 10 MG/1
30 TABLET ORAL DAILY
Qty: 90 TABLET | Refills: 0 | Status: SHIPPED | OUTPATIENT
Start: 2024-08-05

## 2024-08-05 NOTE — TELEPHONE ENCOUNTER
Patient calling to see if medication has been filled yet. I advised her original message was forwarded to the provider 4 hours ago. It has not been filled yet but was sent with high importance. I advised as soon as the provider is able to review the message he will. She asks if this will be done today. I advised I cannot guarantee her medication will be filled today with such short notice.

## 2024-08-05 NOTE — TELEPHONE ENCOUNTER
Reason for call:   [x] Refill   [] Prior Auth  [] Other:     Office:   [x] PCP/Provider - [facility] Altamirano,    [] Specialty/Provider -     Medication: methadone (DOLOPHINE) 10 mg tablet     Dose/Frequency: Take 3 tablets by mouth daily    Quantity: 90 tablet     Pharmacy: [id number] [address] [phone]    Does the patient have enough for 3 days?   [] Yes   [x] No - Send as HP to POD

## 2024-08-08 ENCOUNTER — TELEPHONE (OUTPATIENT)
Age: 54
End: 2024-08-08

## 2024-08-08 NOTE — TELEPHONE ENCOUNTER
"Behavioral Health Outpatient Intake Questions    Referred By   :     Please advise interviewee that they need to answer all questions truthfully to allow for best care, and any misrepresentations of information may affect their ability to be seen at this clinic   => Was this discussed? Yes     If Minor Child (under age 18)    Who is/are the legal guardian(s) of the child?     Is there a custody agreement? No     If \"YES\"- Custody orders must be obtained prior to scheduling the first appointment  In addition, Consent to Treatment must be signed by all legal guardians prior to scheduling the first appointment    If \"NO\"- Consent to Treatment must be signed by all legal guardians prior to scheduling the first appointment    Behavioral Health Outpatient Intake History -     Presenting Problem (in patient's own words): depression, health issues, lost her job    Are there any communication barriers for this patient?     No                                               If yes, please describe barriers:     If there is a unique situation, please refer to Zachariah Whyte/Marla Loredo for final determination.    Are you taking any psychiatric medications? Yes     If \"YES\" -What are they Xanax     If \"YES\" -Who prescribes?     Has the Patient previously received outpatient Talk Therapy or Medication Management from [facility]  No        If \"YES\"- When, Where and with Whom?          If \"NO\" -Has Patient received these services elsewhere?       If \"YES\" -When, Where, and with Whom?counselor years ago    Has the Patient abused alcohol or other substances in the last 6 months ? No  No concerns of substance abuse are reported.     If \"YES\" -What substance, How much, How often?       If illegal substance: Refer to [facility] (for RACHEL) or [facility] Offices.   If Alcohol in excess of 10 drinks per week:  Refer to [facility] (for RACHEL) or [facility] Offices    Legal History-     Is this treatment court ordered? No   If \"yes \"send to " ":  Talk Therapy : Send to Zachariah Loredo for final determination   Med Management: Send to Dr Medrano for final determination     Has the Patient been convicted of a felony?  No   If \"Yes\" send to -When, What?   Talk Therapy: Send to Zachariah Loredo for final determination   Med Management: Send to Dr Medrano for final determination     ACCEPTED as a patient Yes  If \"Yes\" Appointment Date: 11/6    Referred Elsewhere? No  If “Yes” - (Where? Ex: [facility], [facility], [facility], [facility], etc.)        Name of Insurance Co:[facility]  Insurance ID#  [id number]  Insurance Phone #[phone]  If ins is primary or secondary?sec  If patient is a minor, parents information such as Name, D.O.B of guarantor.  "

## 2024-08-11 DIAGNOSIS — F32.A DEPRESSION, UNSPECIFIED DEPRESSION TYPE: ICD-10-CM

## 2024-08-11 RX ORDER — SERTRALINE HYDROCHLORIDE 100 MG/1
TABLET, FILM COATED ORAL
Qty: 180 TABLET | Refills: 1 | Status: SHIPPED | OUTPATIENT
Start: 2024-08-11

## 2024-08-12 DIAGNOSIS — F51.01 PRIMARY INSOMNIA: ICD-10-CM

## 2024-08-12 DIAGNOSIS — F41.9 ANXIETY: ICD-10-CM

## 2024-08-12 NOTE — TELEPHONE ENCOUNTER
Reason for call:   [x] Refill   [] Prior Auth  [] Other:     Office:   [x] PCP/Provider - [facility] [place]  [] Specialty/Provider -     Medication: ALPRAZolam (XANAX) 1 mg tablet     Dose/Frequency:  take 1 tablet by mouth at bedtime if needed for sleep anxiety     Quantity: 30    Pharmacy: [id number] [address]     Does the patient have enough for 3 days?   [] Yes   [x] No - Send as HP to POD

## 2024-08-13 RX ORDER — ALPRAZOLAM 1 MG
1 TABLET ORAL
Qty: 30 TABLET | Refills: 0 | Status: SHIPPED | OUTPATIENT
Start: 2024-08-13

## 2024-08-15 ENCOUNTER — TELEPHONE (OUTPATIENT)
Dept: PSYCHIATRY | Facility: CLINIC | Age: 54
End: 2024-08-15

## 2024-08-15 NOTE — TELEPHONE ENCOUNTER
One week follow up call for New Patient appointment with Suzanne Cope [[id number]] on 11/6/24  was made on 8/8/24. Writer informed patient of New Patient paperwork needing to be completed 5 days prior to the appointment. Writer confirmed paperwork has been sent via My Chart.

## 2024-08-21 ENCOUNTER — OCCMED (OUTPATIENT)
Dept: URGENT CARE | Facility: CLINIC | Age: 54
End: 2024-08-21

## 2024-08-21 DIAGNOSIS — Z02.89 ENCOUNTER FOR PHYSICAL EXAMINATION RELATED TO EMPLOYMENT: Primary | ICD-10-CM

## 2024-08-23 ENCOUNTER — TELEPHONE (OUTPATIENT)
Age: 54
End: 2024-08-23

## 2024-08-23 DIAGNOSIS — F51.01 PRIMARY INSOMNIA: ICD-10-CM

## 2024-08-23 NOTE — TELEPHONE ENCOUNTER
Pt called back requesting to speak w/Jessica.    Warm transferred to Genevieve with clerical to assist.

## 2024-08-23 NOTE — TELEPHONE ENCOUNTER
Pt called back stating that she was just notified by her employer that the forms needed for her to begin working on Monday have not been received.    Pt requesting to speak w/Gabriella Molina transferred to Jessica with clerical to assist.

## 2024-08-23 NOTE — TELEPHONE ENCOUNTER
Patient called stating she dropped of some paperwork x2 days ago for her pain medication stating that she's compliant with taking it appropriately . Patient states she is unable to start the job until they receive this form . Warm transferred to Genevieve in office for further assistance .

## 2024-08-26 RX ORDER — ESZOPICLONE 3 MG/1
3 TABLET, FILM COATED ORAL
Qty: 30 TABLET | Refills: 0 | Status: SHIPPED | OUTPATIENT
Start: 2024-08-26

## 2024-08-26 NOTE — TELEPHONE ENCOUNTER
Patient called requesting refill for Lunesta 3 mg. Patient made aware medication was refilled on 8/26/24 for 30 with 0 refills to [facility] pharmacy. Patient instructed to contact the pharmacy to obtain refills of medication. Patient verbalized understanding.

## 2024-08-30 ENCOUNTER — TELEPHONE (OUTPATIENT)
Age: 54
End: 2024-08-30

## 2024-08-30 NOTE — TELEPHONE ENCOUNTER
Pt got transferred from [facility]. Pt called them while looking for [facility]. Pt is looking for pre-employment physical and drug test that was to be sent to her employer. Contacted Urgent Care and was able to get information from clerical in office. Was able to warm transfer pt in order to have pprwrk resent to employer. Original pprwrk was sent 8/26/24 but was not received by employer. [facility] clerical resent pprwrk today.

## 2024-09-03 DIAGNOSIS — M25.512 LEFT SHOULDER PAIN, UNSPECIFIED CHRONICITY: ICD-10-CM

## 2024-09-03 RX ORDER — METHADONE HYDROCHLORIDE 10 MG/1
30 TABLET ORAL DAILY
Qty: 90 TABLET | Refills: 0 | Status: SHIPPED | OUTPATIENT
Start: 2024-09-03

## 2024-09-03 NOTE — TELEPHONE ENCOUNTER
Reason for call:   [x] Refill   [] Prior Auth  [] Other:     Office:   [x] PCP/Provider -   [] Specialty/Provider -     Medication: METHADONE    Dose/Frequency: 10 MG    Quantity: 90    Pharmacy:   [id number] - [address] [address]  Phone: [phone]  Fax: [phone]     Does the patient have enough for 3 days?   [] Yes   [x] No - Send as HP to POD

## 2024-09-03 NOTE — TELEPHONE ENCOUNTER
PA for METHADONE SUBMITTED     via    [x]CMM-KEY:  [id number]  [][employer]-Case ID #   []Faxed to plan   []Other website   []Phone call Case ID #     Office notes sent, clinical questions answered. Awaiting determination    Turnaround time for your insurance to make a decision on your Prior Authorization can take 7-21 business days.

## 2024-09-04 RX ORDER — METHADONE HYDROCHLORIDE 10 MG/1
30 TABLET ORAL DAILY
Qty: 90 TABLET | Refills: 0 | OUTPATIENT
Start: 2024-09-04

## 2024-09-09 DIAGNOSIS — F51.01 PRIMARY INSOMNIA: ICD-10-CM

## 2024-09-09 DIAGNOSIS — F41.9 ANXIETY: ICD-10-CM

## 2024-09-09 RX ORDER — ALPRAZOLAM 1 MG
1 TABLET ORAL
Qty: 30 TABLET | Refills: 0 | Status: SHIPPED | OUTPATIENT
Start: 2024-09-09

## 2024-09-09 NOTE — TELEPHONE ENCOUNTER
Reason for call:   [x] Refill   [] Prior Auth  [] Other:     Office:   [x] PCP/Provider -   [] Specialty/Provider -     Medication:     Pharmacy: [id number] - [address] [[id number]]     Does the patient have enough for 3 days?   [] Yes   [x] No - Send as HP to POD

## 2024-09-12 ENCOUNTER — TELEPHONE (OUTPATIENT)
Dept: PSYCHIATRY | Facility: CLINIC | Age: 54
End: 2024-09-12

## 2024-09-12 NOTE — TELEPHONE ENCOUNTER
Called pt to let them know that their provider will be out of office 11/06. PT is now scheduled for 10/03 at 5 pm. Pt stated that their schedule to be scheduled for an appt is form 9:45-2 pm and 2 pm -4:25 pm.

## 2024-09-19 ENCOUNTER — TELEPHONE (OUTPATIENT)
Dept: PSYCHIATRY | Facility: CLINIC | Age: 54
End: 2024-09-19

## 2024-09-19 NOTE — TELEPHONE ENCOUNTER
One week follow up call for New Patient appointment with Suzanne Cope [[id number]] on 10/3/24  was made on 9/12/24. Writer informed patient of New Patient paperwork needing to be completed 5 days prior to the appointment. Writer confirmed paperwork has been sent via My Chart.

## 2024-09-23 DIAGNOSIS — F51.01 PRIMARY INSOMNIA: ICD-10-CM

## 2024-09-23 RX ORDER — ESZOPICLONE 3 MG/1
3 TABLET, FILM COATED ORAL
Qty: 30 TABLET | Refills: 0 | Status: SHIPPED | OUTPATIENT
Start: 2024-09-23

## 2024-09-23 NOTE — TELEPHONE ENCOUNTER
Reason for call:   [x] Refill   [] Prior Auth  [] Other:     Office:   [x] PCP/Provider - Solis Altamirano,    [] Specialty/Provider -     Medication: eszopiclone (LUNESTA) 3 MG tablet     Dose/Frequency: Take 1 tablet (3 mg total) by mouth daily at bedtime as needed for sleep Take immediately before bedtime,     Quantity: 30    Pharmacy: [id number] [address]     Does the patient have enough for 3 days?   [] Yes   [x] No - Send as HP to POD

## 2024-09-26 ENCOUNTER — OFFICE VISIT (OUTPATIENT)
Dept: URGENT CARE | Age: 54
End: 2024-09-26
Payer: MEDICARE

## 2024-09-26 ENCOUNTER — HOSPITAL ENCOUNTER (EMERGENCY)
Facility: HOSPITAL | Age: 54
Discharge: HOME | End: 2024-09-26
Attending: EMERGENCY MEDICINE
Payer: MEDICARE

## 2024-09-26 ENCOUNTER — APPOINTMENT (EMERGENCY)
Dept: RADIOLOGY | Facility: HOSPITAL | Age: 54
End: 2024-09-26
Payer: MEDICARE

## 2024-09-26 VITALS
SYSTOLIC BLOOD PRESSURE: 132 MMHG | OXYGEN SATURATION: 98 % | DIASTOLIC BLOOD PRESSURE: 62 MMHG | TEMPERATURE: 97.4 F | RESPIRATION RATE: 18 BRPM | HEART RATE: 80 BPM

## 2024-09-26 VITALS
HEART RATE: 79 BPM | OXYGEN SATURATION: 97 % | DIASTOLIC BLOOD PRESSURE: 79 MMHG | TEMPERATURE: 98.4 F | SYSTOLIC BLOOD PRESSURE: 137 MMHG | RESPIRATION RATE: 18 BRPM

## 2024-09-26 DIAGNOSIS — S22.31XA CLOSED FRACTURE OF ONE RIB OF RIGHT SIDE, INITIAL ENCOUNTER: Primary | ICD-10-CM

## 2024-09-26 DIAGNOSIS — R07.9 CHEST PAIN, UNSPECIFIED TYPE: Primary | ICD-10-CM

## 2024-09-26 LAB
ATRIAL RATE: 73 BPM
ATRIAL RATE: 75 BPM
P AXIS: 46 DEGREES
P AXIS: 79 DEGREES
PR INTERVAL: 118 MS
PR INTERVAL: 126 MS
QRS AXIS: 47 DEGREES
QRS AXIS: 57 DEGREES
QRSD INTERVAL: 70 MS
QRSD INTERVAL: 74 MS
QT INTERVAL: 370 MS
QT INTERVAL: 376 MS
QTC INTERVAL: 407 MS
QTC INTERVAL: 419 MS
T WAVE AXIS: 31 DEGREES
T WAVE AXIS: 35 DEGREES
VENTRICULAR RATE: 73 BPM
VENTRICULAR RATE: 75 BPM

## 2024-09-26 PROCEDURE — 93005 ELECTROCARDIOGRAM TRACING: CPT

## 2024-09-26 PROCEDURE — 71250 CT THORAX DX C-: CPT

## 2024-09-26 PROCEDURE — 93010 ELECTROCARDIOGRAM REPORT: CPT

## 2024-09-26 PROCEDURE — 99284 EMERGENCY DEPT VISIT MOD MDM: CPT | Performed by: EMERGENCY MEDICINE

## 2024-09-26 PROCEDURE — 93010 ELECTROCARDIOGRAM REPORT: CPT | Performed by: INTERNAL MEDICINE

## 2024-09-26 PROCEDURE — 99285 EMERGENCY DEPT VISIT HI MDM: CPT

## 2024-09-26 PROCEDURE — 99213 OFFICE O/P EST LOW 20 MIN: CPT

## 2024-09-26 RX ORDER — LIDOCAINE 50 MG/G
1 PATCH TOPICAL DAILY
Qty: 30 PATCH | Refills: 0 | Status: SHIPPED | OUTPATIENT
Start: 2024-09-26

## 2024-09-26 NOTE — ED NOTES
This RN attempted to gain IV access, however was unsuccessful.  Pt stated she typically gets a US guided line.  Provider made aware     Kathy Mars, RN  09/26/24 [id number]

## 2024-09-26 NOTE — PATIENT INSTRUCTIONS
Please go to [facility] Emergency Department for further evaluation of your chest pain.

## 2024-09-26 NOTE — ED PROVIDER NOTES
Emergency Department Note- Ely Cantrell 54 y.o. female MRN: [id number]    Unit/Bed#: QCE Encounter: [id number]      Final Diagnoses:     1. Closed fracture of one rib of right side, initial encounter      ED Course as of 09/26/24 1424   Thu Sep 26, 2024   1201 ECG interpreted by me, sinus rhythm, rate of 75, no acute ischemic or infarctive changes, no acute change from February 4, 2024   1357 Patient reassessed, no change to the above findings, feeling comfortable.  Discussed CT findings with patient including about rib fracture.  She said that she has an incentive spirometer at home, instructed her to use it hourly.       HPI:   Patient is a 54-year-old female history of GERD, hypertension, factor V Leiden deficiency with previous DVT and PE, currently on Eliquis for several years, says that she is on the Lunesta, and used to have a problem with sleepwalking on Ambien.  Tuesday night, 2 days ago, she remembers being in her kitchen, thinks that she possibly fell asleep standing up because she then remembers being on the ground on her buttocks.  She does not think she hit her chest or head on anything but was near a table and thinks maybe she did possibly hit her chest on the table.  She had no headache, she was able to go to bed normally, she woke up at 5:45 AM yesterday morning as normally scheduled for work, notes that her chest felt somewhat sore.  It was worse when she twisted, bended, takes a deep breath, twisted her neck or yawned.  She had the pain was mild, went about her day yesterday but then today noticed the chest discomfort was worse.  It feels identical to how it fell yesterday just a bit more severe.  No associated nausea, vomiting, diaphoresis, or radiation.  No exertional component.  She says she is on methadone for chronic pain, does not need pain medication for it.Patient denies any prolonged travel history, no recent long travel, no immobilizations, or hospitalizations.  She has been  compliant with her Eliquis.  Earlier today she went to urgent care and was recommended to go to the ED.      MEDICAL DECISION MAKING   Patient has a isolated second rib fracture, no evidence of life-threatening pneumothorax.  ECG shows no evidence of life-threatening dysrhythmia or significant cardiac abnormalities.  I do not believe this represents acute coronary syndrome.I do not believe this patient's complaints are from pulmonary embolism and I believe they would most likely be harmed through false positive test results and other complications of testing by further pursuing the diagnosis of pulmonary embolism.  Patient stable for discharge home with outpatient follow-up.    COLLECTION AND INTERPRETATION OF DATA  I reviewed prior external notes, including the right fourth 2024 ECG    I ordered each unique test  Tests reviewed personally by me:  ECG: See my ED course  Imaging: I independently interpreted the chest CT and found a minimally displaced right second rib fracture.      Physical:     Vitals:    09/26/24 1105 09/26/24 1106   BP:  137/79   Pulse:  79   Resp:  18   Temp: 98.4 °F (36.9 °C)    SpO2:  97%       General:  Patient is well-appearing  Head:  Atraumatic  Eyes:  Conjunctiva pink  ENT:  Mucous membranes are moist  Neck:  Supple nontender  Cardiac:  S1-S2, without murmurs  Lungs:  Clear to auscultation bilaterally, some mild tenderness to the sternum, no crepitus, no paradoxical motion or flail segment or rib tenderness.  Abdomen:  Soft, nontender, normal bowel sounds, no CVA tenderness, no tympany, no rigidity, no guarding  Extremities:  Normal range of motion, no pedal edema or calf asymmetry, radial pulses are equal and symmetric bilaterally,No bony tenderness to the bilateral bilateral humeral heads, humerus, elbows, radius, ulna, hands, hips, femurs, knees, tibia, fibula, feet. No pain with passive range of motion at the bilateral shoulders, elbows, wrists, hips, knees, or ankles.  No midline  thoracic, lumbar, sacral tenderness, deformities, or step-offs.  Neurologic:  Awake, fluent speech, normal comprehension, AAOx3  Skin:  Pink warm and dry  Psychiatric:  Alert, pleasant, cooperative      Critical Care Time  Procedures    Past Medical:    has a past medical history of Anemia, Anxiety, Depression, Facial cellulitis, Factor 5 Leiden mutation, heterozygous (HCC), Fracture, cuboid, Gastrojejunal ulcer, History of small bowel obstruction, Hypertension, Iron deficiency anemia, and Obstructive sleep apnea (adult) (pediatric).    Past Surgical:    has a past surgical history that includes Abdominoplasty; Gastric bypass; Small intestine surgery; Incisional hernia repair; Cholecystectomy; and pr open tx radiocarpal/intercarpal dislc 1/> bones (Left, 1/6/2022).    Social:     Social History     Substance and Sexual Activity   Alcohol Use Not Currently     Social History     Tobacco Use   Smoking Status Never    Passive exposure: Never   Smokeless Tobacco Never     Social History     Substance and Sexual Activity   Drug Use Never    Comment: usues methadone for pain relief       Outpatient Medications:     No current facility-administered medications on file prior to encounter.     Current Outpatient Medications on File Prior to Encounter   Medication Sig Dispense Refill    acetaminophen (TYLENOL) 325 mg tablet Take 2 tablets (650 mg total) by mouth every 6 (six) hours as needed for mild pain      ALPRAZolam (XANAX) 1 mg tablet Take 1 tablet (1 mg total) by mouth daily at bedtime as needed for anxiety 30 tablet 0    apixaban (Eliquis) 5 mg take 1 tablet by mouth twice a day 200 tablet 1    cholecalciferol (VITAMIN D3) 1,000 units tablet Take 2 tablets (2,000 Units total) by mouth daily 180 tablet 0    eszopiclone (LUNESTA) 3 MG tablet Take 1 tablet (3 mg total) by mouth daily at bedtime as needed for sleep Take immediately before bedtime 30 tablet 0    famotidine (PEPCID) 20 mg tablet Take 1 tablet (20 mg total)  by mouth 2 (two) times a day 60 tablet 0    fluticasone (FLONASE) 50 mcg/act nasal spray 2 sprays into each nostril daily 16 g 0    methadone (DOLOPHINE) 10 mg tablet Take 3 tablets by mouth daily, 90 tablet 0    naloxone (NARCAN) 4 mg/0.1 mL nasal spray Administer 1 spray into a nostril. If no response after 2-3 minutes, give another dose in the other nostril using a new spray. 1 each 1    omeprazole (PriLOSEC) 20 mg delayed release capsule take 1 capsule by mouth once daily 90 capsule 1    Pediatric Multiple Vit-C-FA (FRUITY CHEWABLES MULTIVITAMIN) CHEW Chew 1 tablet daily       senna (SENOKOT) 8.6 MG tablet Take 1 tablet by mouth as needed        sertraline (ZOLOFT) 100 mg tablet take 1 tablet by mouth twice a day 180 tablet 1    sucralfate (Carafate) 1 g/10 mL suspension Take 10 mL (1 g total) by mouth 4 (four) times a day as needed (dyspepsia) 420 mL 2    topiramate (TOPAMAX) 100 mg tablet take 1 tablet by mouth twice a day 180 tablet 1    venlafaxine (EFFEXOR-XR) 75 mg 24 hr capsule take 1 capsule by mouth once daily 90 capsule 1    Wound Dressings (Adaptic Non-Adhering Dressing) PADS Apply 24 each topically in the morning 24 each 1     Prior to Admission Medications   Prescriptions Last Dose Informant Patient Reported? Taking?   ALPRAZolam (XANAX) 1 mg tablet   No No   Sig: Take 1 tablet (1 mg total) by mouth daily at bedtime as needed for anxiety   Pediatric Multiple Vit-C-FA (FRUITY CHEWABLES MULTIVITAMIN) CHEW  Self Yes No   Sig: Chew 1 tablet daily    Wound Dressings (Adaptic Non-Adhering Dressing) PADS   No No   Sig: Apply 24 each topically in the morning   acetaminophen (TYLENOL) 325 mg tablet   No No   Sig: Take 2 tablets (650 mg total) by mouth every 6 (six) hours as needed for mild pain   apixaban (Eliquis) 5 mg   No No   Sig: take 1 tablet by mouth twice a day   cholecalciferol (VITAMIN D3) 1,000 units tablet  Self No No   Sig: Take 2 tablets (2,000 Units total) by mouth daily   eszopiclone  (LUNESTA) 3 MG tablet   No No   Sig: Take 1 tablet (3 mg total) by mouth daily at bedtime as needed for sleep Take immediately before bedtime   famotidine (PEPCID) 20 mg tablet  Self No No   Sig: Take 1 tablet (20 mg total) by mouth 2 (two) times a day   fluticasone (FLONASE) 50 mcg/act nasal spray  Self No No   Si sprays into each nostril daily   methadone (DOLOPHINE) 10 mg tablet   No No   Sig: Take 3 tablets by mouth daily,   naloxone (NARCAN) 4 mg/0.1 mL nasal spray  Self No No   Sig: Administer 1 spray into a nostril. If no response after 2-3 minutes, give another dose in the other nostril using a new spray.   omeprazole (PriLOSEC) 20 mg delayed release capsule   No No   Sig: take 1 capsule by mouth once daily   senna (SENOKOT) 8.6 MG tablet  Self Yes No   Sig: Take 1 tablet by mouth as needed     sertraline (ZOLOFT) 100 mg tablet   No No   Sig: take 1 tablet by mouth twice a day   sucralfate (Carafate) 1 g/10 mL suspension  Self No No   Sig: Take 10 mL (1 g total) by mouth 4 (four) times a day as needed (dyspepsia)   topiramate (TOPAMAX) 100 mg tablet   No No   Sig: take 1 tablet by mouth twice a day   venlafaxine (EFFEXOR-XR) 75 mg 24 hr capsule   No No   Sig: take 1 capsule by mouth once daily      Facility-Administered Medications: None           Medications - No data to display  CT chest wo contrast   Final Result      Acute minimally displaced fracture of the anterior aspect of the right second rib.         The study was marked in EPIC for immediate notification.      Workstation performed: [id number]           Orders Placed This Encounter   Procedures    CT chest wo contrast    ECG 12 lead     Labs Reviewed - No data to display  Time reflects when diagnosis was documented in both MDM as applicable and the Disposition within this note       Time User Action Codes Description Comment    2024  1:59 PM Juan Flores Add [S22.31XA] Closed fracture of one rib of right side, initial encounter           ED  Disposition       ED Disposition   Discharge    Condition   Stable    Date/Time   Thu Sep 26, 2024  1:59 PM    Comment   Ely Cantrell discharge to home/self care.                   Follow-up Information       Follow up With Specialties Details Why Contact Stefano Altamirano,  Internal Medicine Schedule an appointment as soon as possible for a visit in 1 week  [address] [facility] [address]  [facility] [address]  [phone]            Discharge Medication List as of 9/26/2024  2:10 PM        START taking these medications    Details   lidocaine (LIDODERM) 5 % Apply 1 patch topically over 12 hours daily Remove & Discard patch within 12 hours or as directed by MD, Starting Thu 9/26/2024, Normal           CONTINUE these medications which have NOT CHANGED    Details   acetaminophen (TYLENOL) 325 mg tablet Take 2 tablets (650 mg total) by mouth every 6 (six) hours as needed for mild pain, Starting Tue 2/6/2024, No Print      ALPRAZolam (XANAX) 1 mg tablet Take 1 tablet (1 mg total) by mouth daily at bedtime as needed for anxiety, Starting Mon 9/9/2024, Normal      apixaban (Eliquis) 5 mg take 1 tablet by mouth twice a day, Normal      cholecalciferol (VITAMIN D3) 1,000 units tablet Take 2 tablets (2,000 Units total) by mouth daily, Starting Wed 2/22/2023, Normal      eszopiclone (LUNESTA) 3 MG tablet Take 1 tablet (3 mg total) by mouth daily at bedtime as needed for sleep Take immediately before bedtime, Starting Mon 9/23/2024, Normal      famotidine (PEPCID) 20 mg tablet Take 1 tablet (20 mg total) by mouth 2 (two) times a day, Starting Fri 6/16/2023, Until Tue 6/25/2024, Normal      fluticasone (FLONASE) 50 mcg/act nasal spray 2 sprays into each nostril daily, Starting Sun 6/25/2023, Normal      methadone (DOLOPHINE) 10 mg tablet Take 3 tablets by mouth daily,, Starting Tue 9/3/2024, Normal      naloxone (NARCAN) 4 mg/0.1 mL nasal spray Administer 1 spray into a nostril. If no response after 2-3  minutes, give another dose in the other nostril using a new spray., Normal      omeprazole (PriLOSEC) 20 mg delayed release capsule take 1 capsule by mouth once daily, Normal      Pediatric Multiple Vit-C-FA (FRUITY CHEWABLES MULTIVITAMIN) CHEW Chew 1 tablet daily , Historical Med      senna (SENOKOT) 8.6 MG tablet Take 1 tablet by mouth as needed  , Historical Med      sertraline (ZOLOFT) 100 mg tablet take 1 tablet by mouth twice a day, Normal      sucralfate (Carafate) 1 g/10 mL suspension Take 10 mL (1 g total) by mouth 4 (four) times a day as needed (dyspepsia), Starting Mon 9/14/2020, Normal      topiramate (TOPAMAX) 100 mg tablet take 1 tablet by mouth twice a day, Starting Wed 4/24/2024, Normal      venlafaxine (EFFEXOR-XR) 75 mg 24 hr capsule take 1 capsule by mouth once daily, Starting Fri 4/12/2024, Normal      Wound Dressings (Adaptic Non-Adhering Dressing) PADS Apply 24 each topically in the morning, Starting Fri 10/27/2023, Normal           No discharge procedures on file.                       Electronically signed by:  SULEIMAN Tiwari, DO  09/26/24 [id number]

## 2024-09-26 NOTE — DISCHARGE INSTRUCTIONS
Rib Fracture   WHAT YOU NEED TO KNOW:   A rib fracture is a crack or break in a rib bone. Rib fractures usually heal within 6 weeks. You should be able to return to normal activities before that time. Do not wrap anything around your body to try to splint your ribs. This can prevent you from taking deep breaths and increases your risk for pneumonia.       DISCHARGE INSTRUCTIONS:   Call 911 for any of the following:   You have trouble breathing.    You have new or increased pain.  Return to the emergency department if:   You have a fever or a cough.  You have questions or concerns about your condition or care.    Deep breathing:  Deep breathing will decrease your risk for pneumonia. Hug a pillow on the injured side while doing this exercise, to decrease pain. Take a deep breath and hold it for as long as possible. You should let the air out and then cough strongly. Deep breaths help open your airway. You may be given an incentive spirometer to help take deep breaths. Put the plastic piece in your mouth. Take a slow, deep breath. You should then let the air out and cough. Repeat these steps 10 times every hour.

## 2024-09-26 NOTE — PROGRESS NOTES
[facility]        NAME: Ely Cantrell is a 54 y.o. female  : 1970    MRN: [id number]  DATE: 2024  TIME: 10:35 AM    Assessment and Plan   Chest pain, unspecified type [R07.9]  1. Chest pain, unspecified type  Transfer to other facility          EKG in office: SR rate 73, without ectopy, no acute ST elevations or changes when compared to  EKG, pending final read.  Patient with nonlabored respirations of 16.  Unable to reproduce chest pain with palpation or movement.  Concern for differential diagnosis including PE, non-STEMI, musculoskeletal chest pain, costochondritis, chest contusion, neck strain.  Unable to rule out life threats in urgent care. Patient requires further evaluation of her chest pain given her history of PE and DVT on Eliquis with recent falls.  Patient agreeable to transfer to emergency department.  ADT order placed.  Patient declined EMS transfer.  Patient reports she will have her friend drive her via POV.  Patient left clinic awake alert oriented, steady gait.      Patient Instructions       Follow up with PCP in 3-5 days.  Proceed to  ER if symptoms worsen.    If tests have been performed at [facility], our office will contact you with results if changes need to be made to the care plan discussed with you at the visit.  You can [facility] your full [facility] on [facility].    Chief Complaint     Chief Complaint   Patient presents with    Chest Pain     Chest pain started yesterday. Patient remembers falling Tuesday night but was unsure what exactly happened. She states that medication she is on causes her to sleepwalk at times. Sharp pains in chest at random times.          History of Present Illness       Pt is a 54 year old female with a history of PE, DVT, JHONATAN, and HTN, presents with 2 days of chest pain, worse with inspiration.  She reports increased SOB,  pain with neck movement.   She states she has had an increase in sleepwalking at night, with a  fall backwards 2 days ago.  Currently on Eliquis.  She denies LOC, no head strike, however reports she has had increased pain since the fall.  She has not taken anything for her pain. No recent travel, she does not smoke.  She denies headaches, dizziness, nausea, or vomiting.     Chest Pain   Associated symptoms include shortness of breath. Pertinent negatives include no abdominal pain, cough, dizziness, fever, headaches, numbness or weakness.   Pertinent negatives for past medical history include no seizures.       Review of Systems   Review of Systems   Constitutional:  Negative for chills and fever.   HENT:  Negative for congestion.    Respiratory:  Positive for shortness of breath. Negative for cough, chest tightness, wheezing and stridor.    Cardiovascular:  Positive for chest pain.   Gastrointestinal:  Negative for abdominal pain.   Neurological:  Negative for dizziness, tremors, seizures, syncope, facial asymmetry, speech difficulty, weakness, light-headedness, numbness and headaches.         Current Medications       Current Outpatient Medications:     acetaminophen (TYLENOL) 325 mg tablet, Take 2 tablets (650 mg total) by mouth every 6 (six) hours as needed for mild pain, Disp: , Rfl:     ALPRAZolam (XANAX) 1 mg tablet, Take 1 tablet (1 mg total) by mouth daily at bedtime as needed for anxiety, Disp: 30 tablet, Rfl: 0    apixaban (Eliquis) 5 mg, take 1 tablet by mouth twice a day, Disp: 200 tablet, Rfl: 1    cholecalciferol (VITAMIN D3) 1,000 units tablet, Take 2 tablets (2,000 Units total) by mouth daily, Disp: 180 tablet, Rfl: 0    eszopiclone (LUNESTA) 3 MG tablet, Take 1 tablet (3 mg total) by mouth daily at bedtime as needed for sleep Take immediately before bedtime, Disp: 30 tablet, Rfl: 0    fluticasone (FLONASE) 50 mcg/act nasal spray, 2 sprays into each nostril daily, Disp: 16 g, Rfl: 0    methadone (DOLOPHINE) 10 mg tablet, Take 3 tablets by mouth daily,, Disp: 90 tablet, Rfl: 0    naloxone  (NARCAN) 4 mg/0.1 mL nasal spray, Administer 1 spray into a nostril. If no response after 2-3 minutes, give another dose in the other nostril using a new spray., Disp: 1 each, Rfl: 1    omeprazole (PriLOSEC) 20 mg delayed release capsule, take 1 capsule by mouth once daily, Disp: 90 capsule, Rfl: 1    Pediatric Multiple Vit-C-FA (FRUITY CHEWABLES MULTIVITAMIN) CHEW, Chew 1 tablet daily , Disp: , Rfl:     senna (SENOKOT) 8.6 MG tablet, Take 1 tablet by mouth as needed  , Disp: , Rfl:     sertraline (ZOLOFT) 100 mg tablet, take 1 tablet by mouth twice a day, Disp: 180 tablet, Rfl: 1    sucralfate (Carafate) 1 g/10 mL suspension, Take 10 mL (1 g total) by mouth 4 (four) times a day as needed (dyspepsia), Disp: 420 mL, Rfl: 2    topiramate (TOPAMAX) 100 mg tablet, take 1 tablet by mouth twice a day, Disp: 180 tablet, Rfl: 1    venlafaxine (EFFEXOR-XR) 75 mg 24 hr capsule, take 1 capsule by mouth once daily, Disp: 90 capsule, Rfl: 1    Wound Dressings (Adaptic Non-Adhering Dressing) PADS, Apply 24 each topically in the morning, Disp: 24 each, Rfl: 1    famotidine (PEPCID) 20 mg tablet, Take 1 tablet (20 mg total) by mouth 2 (two) times a day, Disp: 60 tablet, Rfl: 0    Current Allergies     Allergies as of 09/26/2024 - Reviewed 09/26/2024   Allergen Reaction Noted    Amoxicillin Hives and Shortness Of Breath 11/16/2015    Aspirin Hives and Other (See Comments) 08/23/2009    Butorphanol Anaphylaxis and Other (See Comments) 08/23/2009    Morphine Other (See Comments) and Hallucinations 08/23/2009    Duloxetine Other (See Comments) 07/30/2013    Azithromycin Rash 05/14/2020    Gabapentin Palpitations 02/15/2016    Nitrofurantoin Hives and Rash 06/14/2012    Sulfa antibiotics Hives, Rash, and Other (See Comments) 08/23/2009            The following portions of the patient's history were reviewed and updated as appropriate: allergies, current medications, past family history, past medical history, past social history, past  surgical history and problem list.     Past Medical History:   Diagnosis Date    Anemia     Anxiety     Depression     Facial cellulitis     Factor 5 Leiden mutation, heterozygous (HCC)     Fracture, cuboid     LAST ASSESSED: 3/26/15    Gastrojejunal ulcer     ANASTOMOTIC    History of small bowel obstruction     Hypertension     Iron deficiency anemia     LAST ASSESSED: AND RESOLVED: 4/13/16    Obstructive sleep apnea (adult) (pediatric)        Past Surgical History:   Procedure Laterality Date    ABDOMINOPLASTY      x2    CHOLECYSTECTOMY      GASTRIC BYPASS      FOR MORBID OBESITY    INCISIONAL HERNIA REPAIR      [place] OPEN TX RADIOCARPAL/INTERCARPAL DISLC 1/> BONES Left 1/6/2022    Procedure: OPEN REDUCTION W/ INTERNAL FIXATION (ORIF) LEFT DISTAL RADIUS FRACTURE;  Surgeon: Donovan Kamara MD;  Location: [facility];  Service: Orthopedics    SMALL INTESTINE SURGERY         Family History   Problem Relation Age of Onset    Diabetes Family         MELLITUS    Cancer Family     Hypertension Family     Osteoporosis Family     Asthma Family     Stroke Family         SYNDROME    Breast cancer Mother     Depression Mother     COPD Father     Asthma Child          Medications have been verified.        Objective   /62   Pulse 80   Temp (!) 97.4 °F (36.3 °C)   Resp 18   LMP 08/12/2020   SpO2 98%   Patient's last menstrual period was 08/12/2020.       Physical Exam     Physical Exam  Vitals and nursing note reviewed.   Constitutional:       General: She is not in acute distress.     Appearance: She is well-developed and normal weight. She is not ill-appearing.   HENT:      Head: Normocephalic.   Eyes:      Extraocular Movements: Extraocular movements intact.      Pupils: Pupils are equal, round, and reactive to light.   Cardiovascular:      Rate and Rhythm: Normal rate and regular rhythm.      Heart sounds: Normal heart sounds. Heart sounds not distant.   Pulmonary:      Effort: Pulmonary effort is normal. No  tachypnea.      Breath sounds: Normal breath sounds. No decreased breath sounds, wheezing, rhonchi or rales.   Chest:      Chest wall: Tenderness present. No mass or edema. There is no dullness to percussion.   Abdominal:      Palpations: Abdomen is soft.   Musculoskeletal:         General: Normal range of motion.      Cervical back: Normal range of motion and neck supple.   Skin:     Capillary Refill: Capillary refill takes less than 2 seconds.   Neurological:      General: No focal deficit present.      Mental Status: She is alert.

## 2024-09-26 NOTE — Clinical Note
Ely Cantrell was seen and treated in our emergency department on 9/26/2024.                Diagnosis:     Ely  is off the rest of the shift today.    She may return on this date:          If you have any questions or concerns, please don't hesitate to call.      Juan Flores, DO    ______________________________           _______________          _______________  Hospital Representative                              Date                                Time

## 2024-09-26 NOTE — Clinical Note
Ely Cantrell was seen and treated in our emergency department on 9/26/2024.                Diagnosis:     Ely  .    She may return on this date:     Please excuse from work Sept 27     If you have any questions or concerns, please don't hesitate to call.      Juan Flores, DO    ______________________________           _______________          _______________  Hospital Representative                              Date                                Time

## 2024-09-27 ENCOUNTER — PATIENT OUTREACH (OUTPATIENT)
Dept: CASE MANAGEMENT | Facility: OTHER | Age: 54
End: 2024-09-27

## 2024-09-27 ENCOUNTER — VBI (OUTPATIENT)
Dept: INTERNAL MEDICINE CLINIC | Facility: CLINIC | Age: 54
End: 2024-09-27

## 2024-09-27 DIAGNOSIS — Z71.89 COMPLEX CARE COORDINATION: Primary | ICD-10-CM

## 2024-09-27 NOTE — TELEPHONE ENCOUNTER
09/27/24 10:18 AM    Patient contacted post ED visit, first outreach attempt made. Message was left for patient to return a call to the VBI Department at [facility]: Phone [phone].  High Risk Letter sent.    Thank you.  Sheba Gonzáles MA  [facility] VALUE BASED VIR

## 2024-09-27 NOTE — PROGRESS NOTES
Referral received from . Chart reviewed.  Patient was seen in the ED at [facility] on 9/26 for a closed rib fracture.  Message left on patient's voicemail with my contact information.

## 2024-09-27 NOTE — LETTER
[facility] INTERNAL MEDICINE JONATHANANGELICA  [address]  [place] [address]    Date: 09/30/24    Ely Cantrell  [address]  [place] [address]    Dear Ely:                                                                                                                                Thank you for choosing [facility] emergency department for care.  Your primary care provider wants to make sure that your ongoing medical care is being addressed. If you require follow up care as a result of your emergency department visit, there are a few things the practice would like you to know.                As part of the network's continuing commitment to caring for our patients, we have added more same day appointments and have extended office hours to meet your medical needs. After hours, on-call physicians are available via your primary care provider's main office line.               We encourage you to contact our office prior to seeking treatment to discuss your symptoms with the medical staff.  Together, we can determine the correct course of action.  A majority of non-emergent conditions such as: common cold, flu-like symptoms, fevers, strains/sprains, dislocations, minor burns, cuts and animal bites can be treated at [facility] facilities. Diagnostic testing is available at some sites.               Of course, if you are experiencing a life threatening medical emergency call 911 or proceed directly to the nearest emergency room.    Your nearest [facility] facility is conveniently located at:    [address] [address]  [phone]  SKIP THE WAIT  Conveniently offered at most [facility] locations  [place] your spot online at [email or web address] or on the [facility] Are[facility]    Sincerely,    [facility] INTERNAL MEDICINE Rosine  Dept: [phone]

## 2024-09-27 NOTE — TELEPHONE ENCOUNTER
09/27/24 2:45 PM    Patient contacted post ED visit, second outreach attempt made. Message was left for patient to return a call to the VBI Department at [facility]: Phone [phone].    Thank you.  Sheba Gonzáles MA  [facility] VALUE BASED VIR

## 2024-09-30 LAB
ATRIAL RATE: 73 BPM
P AXIS: 46 DEGREES
PR INTERVAL: 118 MS
QRS AXIS: 47 DEGREES
QRSD INTERVAL: 74 MS
QT INTERVAL: 370 MS
QTC INTERVAL: 407 MS
T WAVE AXIS: 35 DEGREES
VENTRICULAR RATE: 73 BPM

## 2024-09-30 PROCEDURE — 93010 ELECTROCARDIOGRAM REPORT: CPT | Performed by: INTERNAL MEDICINE

## 2024-09-30 NOTE — TELEPHONE ENCOUNTER
09/30/24 9:24 AM    Patient contacted post ED visit, third outreach attempt made. Message was left for patient to return a call to either the VBI Department at [facility]: Phone [phone] or the PCP office.     Thank you.  Sheba Gonzáles MA  [facility] VALUE BASED VIR

## 2024-09-30 NOTE — TELEPHONE ENCOUNTER
09/30/24 12:00 PM    Patient contacted post ED visit, VBI department spoke with patient/caregiver and outreach was successful.    Thank you.  Sheba Gonzáles MA  [facility] VALUE BASED VIR

## 2024-10-02 ENCOUNTER — PATIENT OUTREACH (OUTPATIENT)
Dept: CASE MANAGEMENT | Facility: OTHER | Age: 54
End: 2024-10-02

## 2024-10-02 DIAGNOSIS — M25.512 LEFT SHOULDER PAIN, UNSPECIFIED CHRONICITY: ICD-10-CM

## 2024-10-02 RX ORDER — METHADONE HYDROCHLORIDE 10 MG/1
30 TABLET ORAL DAILY
Qty: 90 TABLET | Refills: 0 | Status: SHIPPED | OUTPATIENT
Start: 2024-10-02

## 2024-10-02 NOTE — PROGRESS NOTES
Message left on patient's voicemail with my contact information.  Unable to reach letter sent to my chart.

## 2024-10-02 NOTE — TELEPHONE ENCOUNTER
Reason for call:   [x] Refill   [] Prior Auth  [] Other:     Office:   [x] PCP/Provider - DR Altamirano  [] Specialty/Provider -     Medication: methadone     Dose/Frequency: 10 mg 3 tablets daily     Quantity: 30D     Pharmacy: [facility] file     Does the patient have enough for 3 days?   [] Yes   [x] No - Send as HP to POD

## 2024-10-02 NOTE — LETTER
Date: 10/02/24    Dear Ely Cantrell,   My name is Elisa, I am a registered nurse care manager working with [facility].  I have not been able to reach you and would like to set a time that I can talk with you over the phone.  My work is to help patients that have complex medical conditions get the care they need. This includes patients who may have been in the hospital or emergency room.   Please call me with any questions you may have at [phone]. I look forward to speaking with you.  Sincerely,  Elisa Valle RN  Outpatient Care Manager

## 2024-10-03 ENCOUNTER — OFFICE VISIT (OUTPATIENT)
Dept: BEHAVIORAL/MENTAL HEALTH CLINIC | Facility: CLINIC | Age: 54
End: 2024-10-03
Payer: COMMERCIAL

## 2024-10-03 DIAGNOSIS — F32.A DEPRESSION, UNSPECIFIED DEPRESSION TYPE: Primary | ICD-10-CM

## 2024-10-03 DIAGNOSIS — F41.9 ANXIETY: ICD-10-CM

## 2024-10-03 PROCEDURE — 90791 PSYCH DIAGNOSTIC EVALUATION: CPT
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
## 2024-10-08 DIAGNOSIS — F51.01 PRIMARY INSOMNIA: ICD-10-CM

## 2024-10-08 DIAGNOSIS — F41.9 ANXIETY: ICD-10-CM

## 2024-10-08 RX ORDER — ALPRAZOLAM 1 MG
1 TABLET ORAL
Qty: 30 TABLET | Refills: 0 | Status: SHIPPED | OUTPATIENT
Start: 2024-10-08

## 2024-10-09 DIAGNOSIS — F32.A DEPRESSION, UNSPECIFIED DEPRESSION TYPE: ICD-10-CM

## 2024-10-10 RX ORDER — VENLAFAXINE HYDROCHLORIDE 75 MG/1
75 CAPSULE, EXTENDED RELEASE ORAL DAILY
Qty: 90 CAPSULE | Refills: 1 | Status: SHIPPED | OUTPATIENT
Start: 2024-10-10

## 2024-10-17 ENCOUNTER — PATIENT OUTREACH (OUTPATIENT)
Dept: CASE MANAGEMENT | Facility: OTHER | Age: 54
End: 2024-10-17

## 2024-10-17 ENCOUNTER — SOCIAL WORK (OUTPATIENT)
Dept: BEHAVIORAL/MENTAL HEALTH CLINIC | Facility: CLINIC | Age: 54
End: 2024-10-17
Payer: COMMERCIAL

## 2024-10-17 DIAGNOSIS — F32.A DEPRESSION, UNSPECIFIED DEPRESSION TYPE: Primary | ICD-10-CM

## 2024-10-17 DIAGNOSIS — F41.9 ANXIETY: ICD-10-CM

## 2024-10-17 PROCEDURE — 90834 PSYTX W PT 45 MINUTES: CPT

## 2024-10-17 NOTE — BH TREATMENT PLAN
"Outpatient Behavioral Health Psychotherapy Treatment Plan    Ely E Óscar  1970     Date of Initial Psychotherapy Assessment: 10/3/2024   Date of Current Treatment Plan: 10/17/24  Treatment Plan Target Date: 4/15/2024  Treatment Plan Expiration Date: TBD    Diagnosis:   1. Depression, unspecified depression type        2. Anxiety            Area(s) of Need: Coping skills to manage    Long Term Goal 1 (in the client's own words): \" Stop drinking alcohol to assist with weight loss and chances for alcoholism\"    Stage of Change: Contemplation    Target Date for completion: TBD     Anticipated therapeutic modalities: Cognitive Behavioral Therapy and Supportive Therapy.     People identified to complete this goal: Ely and this clinician are responsible for this treatment goal      Objective 1: (identify the means of measuring success in meeting the objective): Ely will work with clinician to identify the reasons behind her drinking and work to identify positive ways to cope with her moods.      Objective 2: (identify the means of measuring success in meeting the objective): Ely will work to process emotions surrounding her physical condition that is negatively impacting her mental health.      Long Term Goal 2 (in the client's own words): \" I want to spend more time with my grandchildren\"    Stage of Change: Contemplation    Target Date for completion: TBD     Anticipated therapeutic modalities: Client-Centered Therapy     People identified to complete this goal: Ely is responsible for this treatment goal      Objective 1: (identify the means of measuring success in meeting the objective): Ely will work to improve her connections with her family and work to improve communication and quality time in efforts to enhance rapport between her grandchildren       I am currently under the care of a [facility] psychiatric provider: no    My [facility] psychiatric provider is: Prescribed by PCP    I am " "currently taking psychiatric medications: Yes, as prescribed    I feel that I will be ready for discharge from mental health care when I reach the following (measurable goal/objective): \"When I can feel like I don't want to burst into tears all the time\" When PHQ-9 scores are cut in half consistently.    For children and adults who have a legal guardian:   Has there been any change to custody orders and/or guardianship status? NA. If yes, attach updated documentation.    I have created my Crisis Plan and have been offered a copy of this plan    Behavioral Health Treatment Plan  Luke: Diagnosis and Treatment Plan explained to Ely Cantrell acknowledges an understanding of their diagnosis. Ely Cantrell agrees to this treatment plan.    I have been offered a copy of this Treatment Plan. yes        "

## 2024-10-17 NOTE — PSYCH
"Behavioral Health Psychotherapy Progress Note    Psychotherapy Provided: Individual Psychotherapy     1. Depression, unspecified depression type        2. Anxiety            Goals addressed in session: Goal 1 and Goal 2     DATA: Ely joined session in person. She actively participated in the creation of her initial safety plan and treatment plan. Ely provided written consent for bother her treatment plan and safety plan. She has the desire to  \"stop drinking alcohol to assist with weight loss and chances for alcoholism\" and \" I want to spend more time with my grandchildren\". She was open to the idea of of working on areas in therapy and worked with clinician to make her safety plan as well. During this session, this clinician used the following therapeutic modalities: Engagement Strategies and Client-centered Therapy    Substance Abuse was addressed during this session. If the client is diagnosed with a co-occurring substance use disorder, please indicate any changes in the frequency or amount of use: hasn't drank in 3 days. Stage of change for addressing substance use diagnoses: Pre-contemplation    ASSESSMENT:  Ely Cantrell presents with a Euthymic/ normal mood, her affect is Normal range and intensity, which is congruent, with her mood and the content of the session. The client has not made progress on their goals. Ely Cantrell presents with a minimal risk of suicide, minimal risk of self-harm, and minimal risk of harm to others.    For any risk assessment that surpasses a \"low\" rating, a safety plan must be developed.    A safety plan was indicated: no  If yes, describe in detail N/A    PLAN: Between sessions, Ely Cantrell will continue working on her treatment plan goals. At the next session, the therapist will use Client-centered Therapy and Cognitive Behavioral Therapy to address anxiety and depression.    Behavioral Health Treatment Plan and Discharge Planning: Ely Cantrell is aware " of and agrees to continue to work on their treatment plan. They have identified and are working toward their discharge goals. yes    Visit start and stop times:    10/17/24  Start Time: 1300  Stop Time: 1352  Total Visit Time: 52 minutes

## 2024-10-17 NOTE — BH CRISIS PLAN
Client Name: Ely Cantrell       Client YOB: 1970    Lob[place]hawn Safety Plan      Creation Date: 10/17/24 Update Date: 10/17/25   Created By: Suzanne Cope       Step 1: Warning Signs:   Warning Signs   Thinking about my mom   Suicidal thoughts   regret/grief   crying            Step 2: Internal Coping Strategies:   Internal Coping Strategies   Play with my cat   collaging            Step 3: People and social settings that provide distraction:   Name Contact Information   Noel ( Sister) [phone]    Places   in my car           Step 4: People whom I can ask for help during a crisis:      Name Contact Information    Noel (Sister) [place] [phone]    Claudia (Daughter) -[phone]      Step 5: Professionals or agencies I can contact during a crisis:      Clinican/Agency Name Phone Emergency Contact    Suzanne Cope [phone] [facility] Professional      Local Emergency Department Emergency Department Phone Emergency Department Address    RITA Mathew [phone] [address], [place] [address]        Crisis Phone Numbers:   Suicide Prevention Lifeline: Call or Text  048 Crisis Text Line: Text HOME to 323-566   Please note: Some [facility] do not have a separate number for Child/Adolescent specific crisis. If your county is not listed under Child/Adolescent, please call the adult number for your county      Adult Crisis Numbers: Child/Adolescent Crisis Numbers   [facility]: [phone] [facility]: [phone]   [facility]: [phone] [facility]: [phone]   [facility]: [phone] [place]: [phone]   [facility]: [phone] [place] [facility]: [phone]   [place] [facility]: [phone]   [facility]: [phone]   [facility]: [phone]   [facility]: [phone] (daytime) [phone] (after hours, weekends, holidays)      Step 6: Making the environment  safer (plan for lethal means safety):   Patient did not identify any lethal methods: Yes     Optional: What is most important to me and worth living for?   My family and friends.     Kanika Safety Plan. Elisha Robin and Rob Hess. Used with permission of the authors.

## 2024-10-18 DIAGNOSIS — G43.909 MIGRAINE WITHOUT STATUS MIGRAINOSUS, NOT INTRACTABLE, UNSPECIFIED MIGRAINE TYPE: ICD-10-CM

## 2024-10-18 RX ORDER — TOPIRAMATE 100 MG/1
100 TABLET, FILM COATED ORAL 2 TIMES DAILY
Qty: 180 TABLET | Refills: 1 | Status: SHIPPED | OUTPATIENT
Start: 2024-10-18

## 2024-10-19 DIAGNOSIS — K21.9 GERD WITHOUT ESOPHAGITIS: ICD-10-CM

## 2024-10-22 DIAGNOSIS — F51.01 PRIMARY INSOMNIA: ICD-10-CM

## 2024-10-22 RX ORDER — ESZOPICLONE 3 MG/1
3 TABLET, FILM COATED ORAL
Qty: 30 TABLET | Refills: 0 | Status: SHIPPED | OUTPATIENT
Start: 2024-10-22

## 2024-10-22 NOTE — TELEPHONE ENCOUNTER
Reason for call:   [x] Refill   [] Prior Auth  [] Other:     Office:   [x] PCP/Provider - Solis Altamirano,    [] Specialty/Provider -     Medication: eszopiclone (LUNESTA) 3 MG tablet / Take 1 tablet (3 mg total) by mouth daily at bedtime as needed for sleep Take immediately before bedtime     Pharmacy: [id number] [address]     Does the patient have enough for 3 days?   [] Yes   [x] No - Send as HP to POD

## 2024-10-31 DIAGNOSIS — M25.512 LEFT SHOULDER PAIN, UNSPECIFIED CHRONICITY: ICD-10-CM

## 2024-10-31 NOTE — TELEPHONE ENCOUNTER
Reason for call:   [x] Refill   [] Prior Auth  [] Other:     Office:   [x] PCP/Provider - Solis Altamirano, DO   [] Specialty/Provider -     Medication:     methadone (DOLOPHINE) 10 mg tablet       Dose/Frequency:     30 mg, Oral, Daily       Quantity: 90    Pharmacy: [id number] - [address]     Does the patient have enough for 3 days?   [] Yes   [x] No - Send as HP to POD

## 2024-11-01 RX ORDER — METHADONE HYDROCHLORIDE 10 MG/1
30 TABLET ORAL DAILY
Qty: 90 TABLET | Refills: 0 | Status: SHIPPED | OUTPATIENT
Start: 2024-11-01

## 2024-11-05 ENCOUNTER — TELEPHONE (OUTPATIENT)
Age: 54
End: 2024-11-05

## 2024-11-05 DIAGNOSIS — F41.9 ANXIETY: ICD-10-CM

## 2024-11-05 DIAGNOSIS — F51.01 PRIMARY INSOMNIA: ICD-10-CM

## 2024-11-05 NOTE — TELEPHONE ENCOUNTER
Pt called in stating would want to be seen sooner on or before 11/11/2024 as her insurance needs to be changed after that date. Kindly call her if can r/s her next appt. Thanks

## 2024-11-05 NOTE — TELEPHONE ENCOUNTER
Reason for call:   [x] Refill   [] Prior Auth  [] Other:     Office:   [x] PCP/Provider -   [] Specialty/Provider -     Medication: Alprazolam 1 mg, take 1 tablet by mouth daily at bedtime as needed      Pharmacy: [facility]    Does the patient have enough for 3 days?   [x] Yes   [] No - Send as HP to POD

## 2024-11-06 RX ORDER — ALPRAZOLAM 1 MG/1
1 TABLET ORAL
Qty: 30 TABLET | Refills: 0 | Status: SHIPPED | OUTPATIENT
Start: 2024-11-06

## 2024-11-07 ENCOUNTER — OFFICE VISIT (OUTPATIENT)
Dept: INTERNAL MEDICINE CLINIC | Facility: CLINIC | Age: 54
End: 2024-11-07
Payer: MEDICARE

## 2024-11-07 VITALS
WEIGHT: 166 LBS | SYSTOLIC BLOOD PRESSURE: 120 MMHG | RESPIRATION RATE: 14 BRPM | HEART RATE: 80 BPM | BODY MASS INDEX: 27.66 KG/M2 | TEMPERATURE: 98.1 F | HEIGHT: 65 IN | DIASTOLIC BLOOD PRESSURE: 76 MMHG

## 2024-11-07 DIAGNOSIS — F51.01 PRIMARY INSOMNIA: ICD-10-CM

## 2024-11-07 DIAGNOSIS — D68.51 FACTOR 5 LEIDEN MUTATION, HETEROZYGOUS (HCC): ICD-10-CM

## 2024-11-07 DIAGNOSIS — Z90.3 POSTGASTRECTOMY MALABSORPTION: ICD-10-CM

## 2024-11-07 DIAGNOSIS — G89.4 PAIN SYNDROME, CHRONIC: ICD-10-CM

## 2024-11-07 DIAGNOSIS — K91.2 POSTGASTRECTOMY MALABSORPTION: ICD-10-CM

## 2024-11-07 DIAGNOSIS — K91.2 POSTSURGICAL MALABSORPTION: Primary | ICD-10-CM

## 2024-11-07 PROCEDURE — 99214 OFFICE O/P EST MOD 30 MIN: CPT | Performed by: INTERNAL MEDICINE

## 2024-11-07 NOTE — ASSESSMENT & PLAN NOTE
-history of gastric bypass approximately 15+ years ago  -Complicated by iron deficiency, she has gotten periodic iron infusions in the past  -Will obtain updated labs at this time

## 2024-11-07 NOTE — ASSESSMENT & PLAN NOTE
-Continue alprazolam 1 mg daily at bedtime  -Continue eszopiclone 3 mg daily at bedtime  -She previously completed sleep study which was suggestive of mild sleep apnea.  Majority of events recorded were central apneas. Had ASV study with resolution of sleep apnea noted  -Will need follow-up with sleep medicine to discuss ongoing treatment.  Will readdress when insurance situation has resolved     Previous medications: Ambien, trazodone, Lunesta, nortriptyline, temazepam, Seroquel

## 2024-11-07 NOTE — PROGRESS NOTES
Ambulatory Visit  Name: Ely Cantrell      : 1970      MRN: [id number]  Encounter Provider: Solis Altamirano DO  Encounter Date: 2024   Encounter department: [facility] INTERNAL MEDICINE [place]    Here today for follow-up  She has a medical history of protein S deficiency/factor 5 Leiden mutation with chronic DVT on Eliquis, SBO in May of 2021 status post surgical intervention, chronic pain maintained on methadone, status post gastric bypass procedure complicated by iron deficiency anemia, L4/5 compression fractures, central sleep apnea, insomnia    Patient reports shaking of her legs when she sits down to watch TV.  This is relieved when she gets up and moves around.  She does not have similar issues at bedtime.  This is perhaps related to RLS and lack of nocturnal symptoms may be from her alprazolam/sleep medications?  -Recommend rechecking iron panel and labs as below to evaluate for any underlying iron deficiency    Assessment & Plan  Postsurgical malabsorption    Orders:    CBC; Future    Comprehensive metabolic panel; Future    Folate; Future    Iron Panel (Includes Ferritin, Iron Sat%, Iron, and TIBC); Future    PTH, intact; Future    Vitamin B1, whole blood; Future    Vitamin B12; Future    Vitamin D 25 hydroxy; Future    Lipid Panel with Direct LDL reflex; Future    TSH, 3rd generation with Free T4 reflex; Future    Postgastrectomy malabsorption  -history of gastric bypass approximately 15+ years ago  -Complicated by iron deficiency, she has gotten periodic iron infusions in the past  -Will obtain updated labs at this time         Pain syndrome, chronic  -Chronic nerve pain issues related to prior abdominoplasty complicated by MRSA infection of the right hip. Also with history of L5 burst fracture, L4 compression deformities  -Continue methadone 30 mg daily.  Continues to tolerate without issue    -Urine drug screen completed 2024 with no findings of concern  -Patient has severe  "pain caused by a medical condition  -There are no current concerns regarding opioid misuse or use disorder    -Continue to monitor for concomitant drug-drug interactions and potential for adverse events  -Counseled on potential adverse effects of opioid analgesics, including risk of misuse, abuse, addiction  -Based on patient's clinical circumstances, amount of opioid prescribed is warranted in order to adequately manage the patient's pain  -Patient has previously tried and had inadequate response to fentanyl patches and tramadol           Primary insomnia  -Continue alprazolam 1 mg daily at bedtime  -Continue eszopiclone 3 mg daily at bedtime  -She previously completed sleep study which was suggestive of mild sleep apnea.  Majority of events recorded were central apneas. Had ASV study with resolution of sleep apnea noted  -Will need follow-up with sleep medicine to discuss ongoing treatment.  Will readdress when insurance situation has resolved     Previous medications: Ambien, trazodone, Lunesta, nortriptyline, temazepam, Seroquel                  Factor 5 Leiden mutation, heterozygous (HCC)  -history of factor 5 Leiden mutation, protein S deficiency, recurrent DVT and PE  -Continue indefinite anticoagulation with Eliquis            History of Present Illness     HPI      Review of Systems        Objective     /76 (BP Location: Left arm, Patient Position: Sitting, Cuff Size: Standard)   Pulse 80   Temp 98.1 °F (36.7 °C)   Resp 14   Ht 5' 5\" (1.651 m)   Wt 75.3 kg (166 lb)   LMP 08/12/2020   BMI 27.62 kg/m²     Physical Exam  HENT:      Head: Normocephalic and atraumatic.      Right Ear: Tympanic membrane normal. There is no impacted cerumen.      Left Ear: Tympanic membrane normal. There is no impacted cerumen.      Mouth/Throat:      Mouth: Mucous membranes are moist.      Pharynx: No oropharyngeal exudate or posterior oropharyngeal erythema.   Eyes:      General: No scleral icterus.        Right " eye: No discharge.         Left eye: No discharge.      Pupils: Pupils are equal, round, and reactive to light.   Cardiovascular:      Rate and Rhythm: Normal rate and regular rhythm.      Heart sounds: Normal heart sounds. No murmur heard.  Pulmonary:      Effort: Pulmonary effort is normal.      Breath sounds: Normal breath sounds. No wheezing, rhonchi or rales.

## 2024-11-07 NOTE — ASSESSMENT & PLAN NOTE
-history of factor 5 Leiden mutation, protein S deficiency, recurrent DVT and PE  -Continue indefinite anticoagulation with Eliquis

## 2024-11-07 NOTE — ASSESSMENT & PLAN NOTE
-Chronic nerve pain issues related to prior abdominoplasty complicated by MRSA infection of the right hip. Also with history of L5 burst fracture, L4 compression deformities  -Continue methadone 30 mg daily.  Continues to tolerate without issue    -Urine drug screen completed June 2024 with no findings of concern  -Patient has severe pain caused by a medical condition  -There are no current concerns regarding opioid misuse or use disorder    -Continue to monitor for concomitant drug-drug interactions and potential for adverse events  -Counseled on potential adverse effects of opioid analgesics, including risk of misuse, abuse, addiction  -Based on patient's clinical circumstances, amount of opioid prescribed is warranted in order to adequately manage the patient's pain  -Patient has previously tried and had inadequate response to fentanyl patches and tramadol

## 2024-11-11 ENCOUNTER — APPOINTMENT (OUTPATIENT)
Dept: LAB | Facility: IMAGING CENTER | Age: 54
End: 2024-11-11

## 2024-11-11 ENCOUNTER — APPOINTMENT (OUTPATIENT)
Dept: LAB | Facility: CLINIC | Age: 54
End: 2024-11-11
Payer: MEDICARE

## 2024-11-11 DIAGNOSIS — K91.2 POSTSURGICAL MALABSORPTION: ICD-10-CM

## 2024-11-11 LAB
25(OH)D3 SERPL-MCNC: 22.3 NG/ML (ref 30–100)
ALBUMIN SERPL BCG-MCNC: 3.7 G/DL (ref 3.5–5)
ALP SERPL-CCNC: 120 U/L (ref 34–104)
ALT SERPL W P-5'-P-CCNC: 20 U/L (ref 7–52)
ANION GAP SERPL CALCULATED.3IONS-SCNC: 4 MMOL/L (ref 4–13)
AST SERPL W P-5'-P-CCNC: 31 U/L (ref 13–39)
BILIRUB SERPL-MCNC: 0.47 MG/DL (ref 0.2–1)
BUN SERPL-MCNC: 15 MG/DL (ref 5–25)
CALCIUM SERPL-MCNC: 8.6 MG/DL (ref 8.4–10.2)
CHLORIDE SERPL-SCNC: 103 MMOL/L (ref 96–108)
CHOLEST SERPL-MCNC: 157 MG/DL
CO2 SERPL-SCNC: 29 MMOL/L (ref 21–32)
CREAT SERPL-MCNC: 0.55 MG/DL (ref 0.6–1.3)
ERYTHROCYTE [DISTWIDTH] IN BLOOD BY AUTOMATED COUNT: 13.4 % (ref 11.6–15.1)
FERRITIN SERPL-MCNC: 15 NG/ML (ref 11–307)
FOLATE SERPL-MCNC: >22.3 NG/ML
GFR SERPL CREATININE-BSD FRML MDRD: 105 ML/MIN/1.73SQ M
GLUCOSE P FAST SERPL-MCNC: 81 MG/DL (ref 65–99)
HCT VFR BLD AUTO: 42.2 % (ref 34.8–46.1)
HDLC SERPL-MCNC: 71 MG/DL
HGB BLD-MCNC: 13.2 G/DL (ref 11.5–15.4)
IRON SATN MFR SERPL: 18 % (ref 15–50)
IRON SERPL-MCNC: 68 UG/DL (ref 50–212)
LDLC SERPL CALC-MCNC: 70 MG/DL (ref 0–100)
MCH RBC QN AUTO: 30.1 PG (ref 26.8–34.3)
MCHC RBC AUTO-ENTMCNC: 31.3 G/DL (ref 31.4–37.4)
MCV RBC AUTO: 96 FL (ref 82–98)
PLATELET # BLD AUTO: 234 THOUSANDS/UL (ref 149–390)
PMV BLD AUTO: 10.2 FL (ref 8.9–12.7)
POTASSIUM SERPL-SCNC: 4.4 MMOL/L (ref 3.5–5.3)
PROT SERPL-MCNC: 7 G/DL (ref 6.4–8.4)
PTH-INTACT SERPL-MCNC: 41.8 PG/ML (ref 12–88)
RBC # BLD AUTO: 4.39 MILLION/UL (ref 3.81–5.12)
SODIUM SERPL-SCNC: 136 MMOL/L (ref 135–147)
TIBC SERPL-MCNC: 386 UG/DL (ref 250–450)
TRIGL SERPL-MCNC: 78 MG/DL
TSH SERPL DL<=0.05 MIU/L-ACNC: 1 UIU/ML (ref 0.45–4.5)
UIBC SERPL-MCNC: 318 UG/DL (ref 155–355)
VIT B12 SERPL-MCNC: 298 PG/ML (ref 180–914)
WBC # BLD AUTO: 6.68 THOUSAND/UL (ref 4.31–10.16)

## 2024-11-11 PROCEDURE — 36415 COLL VENOUS BLD VENIPUNCTURE: CPT

## 2024-11-11 PROCEDURE — 82728 ASSAY OF FERRITIN: CPT

## 2024-11-11 PROCEDURE — 83550 IRON BINDING TEST: CPT

## 2024-11-11 PROCEDURE — 84443 ASSAY THYROID STIM HORMONE: CPT

## 2024-11-11 PROCEDURE — 80053 COMPREHEN METABOLIC PANEL: CPT

## 2024-11-11 PROCEDURE — 82306 VITAMIN D 25 HYDROXY: CPT

## 2024-11-11 PROCEDURE — 83970 ASSAY OF PARATHORMONE: CPT

## 2024-11-11 PROCEDURE — 84425 ASSAY OF VITAMIN B-1: CPT

## 2024-11-11 PROCEDURE — 80061 LIPID PANEL: CPT

## 2024-11-11 PROCEDURE — 82746 ASSAY OF FOLIC ACID SERUM: CPT

## 2024-11-11 PROCEDURE — 82607 VITAMIN B-12: CPT

## 2024-11-11 PROCEDURE — 85027 COMPLETE CBC AUTOMATED: CPT

## 2024-11-11 PROCEDURE — 83540 ASSAY OF IRON: CPT

## 2024-11-12 DIAGNOSIS — E53.8 B12 DEFICIENCY: ICD-10-CM

## 2024-11-12 DIAGNOSIS — E55.9 VITAMIN D DEFICIENCY: Primary | ICD-10-CM

## 2024-11-12 DIAGNOSIS — E61.1 IRON DEFICIENCY: ICD-10-CM

## 2024-11-12 RX ORDER — LANOLIN ALCOHOL/MO/W.PET/CERES
1000 CREAM (GRAM) TOPICAL DAILY
Qty: 90 TABLET | Refills: 0 | Status: SHIPPED | OUTPATIENT
Start: 2024-11-12

## 2024-11-12 RX ORDER — FERROUS SULFATE 324(65)MG
324 TABLET, DELAYED RELEASE (ENTERIC COATED) ORAL
Qty: 90 TABLET | Refills: 0 | Status: SHIPPED | OUTPATIENT
Start: 2024-11-12

## 2024-11-12 RX ORDER — MULTIVIT-MIN/IRON/FOLIC ACID/K 18-600-40
2 CAPSULE ORAL DAILY
Qty: 180 CAPSULE | Refills: 0 | Status: SHIPPED | OUTPATIENT
Start: 2024-11-12

## 2024-11-14 ENCOUNTER — TELEPHONE (OUTPATIENT)
Age: 54
End: 2024-11-14

## 2024-11-14 NOTE — TELEPHONE ENCOUNTER
Patient returned call and stated that she would like to go ahead with the infusions. Patient stated she is covered by her insurance until 12/30 and would to have this done. Once order has been placed by pcp please return patient call to notify.

## 2024-11-14 NOTE — TELEPHONE ENCOUNTER
Relayed results to patient as per provider message. Patient expressed understanding and did not have any further questions.                                                              Please complete Result Management task.

## 2024-11-15 LAB — VIT B1 BLD-SCNC: 139.2 NMOL/L (ref 66.5–200)

## 2024-11-18 DIAGNOSIS — D50.8 OTHER IRON DEFICIENCY ANEMIA: Primary | ICD-10-CM

## 2024-11-18 RX ORDER — SODIUM CHLORIDE 9 MG/ML
20 INJECTION, SOLUTION INTRAVENOUS ONCE
OUTPATIENT
Start: 2024-11-25

## 2024-11-18 NOTE — TELEPHONE ENCOUNTER
Patient calling upset asking for her iron infusion to be ordered for her. She is demanding Dr. Altamirano to call her back.

## 2024-11-18 NOTE — TELEPHONE ENCOUNTER
I spoke with Dr. Altamirano.  He entered orders for weekly Venofer x 5 weeks.  I spoke with Ely, phone number was provided for the Infusion Center so she can schedule the infusions.

## 2024-11-19 DIAGNOSIS — F51.01 PRIMARY INSOMNIA: ICD-10-CM

## 2024-11-19 RX ORDER — ESZOPICLONE 3 MG/1
3 TABLET, FILM COATED ORAL
Qty: 30 TABLET | Refills: 0 | Status: SHIPPED | OUTPATIENT
Start: 2024-11-19

## 2024-11-19 NOTE — TELEPHONE ENCOUNTER
Reason for call:   [x] Refill   [] Prior Auth  [] Other:     Office:   [x] PCP/Provider - Solis Altamirano,   [] Specialty/Provider -     Medication: eszopiclone (LUNESTA) 3 MG tablet     Dose/Frequency: 3 mg, Oral, Daily at bedtime PRN     Quantity: 30    Pharmacy: [id number] - [place]     Does the patient have enough for 3 days?   [] Yes   [x] No - Send as HP to POD

## 2024-11-21 ENCOUNTER — SOCIAL WORK (OUTPATIENT)
Dept: BEHAVIORAL/MENTAL HEALTH CLINIC | Facility: CLINIC | Age: 54
End: 2024-11-21
Payer: COMMERCIAL

## 2024-11-21 DIAGNOSIS — F32.A DEPRESSION, UNSPECIFIED DEPRESSION TYPE: Primary | ICD-10-CM

## 2024-11-21 DIAGNOSIS — F41.9 ANXIETY: ICD-10-CM

## 2024-11-21 PROCEDURE — 90837 PSYTX W PT 60 MINUTES: CPT

## 2024-11-21 NOTE — PSYCH
"Behavioral Health Psychotherapy Progress Note    Psychotherapy Provided: Individual Psychotherapy     1. Depression, unspecified depression type        2. Anxiety            Goals addressed in session: Goal 1 and Goal 2     DATA: Ely joined session in person. She reported stress related to insurance difficulties due to making to much as well as trying to support her daughter who is dealing with a break-up. She also discussed with clinician feelings of disappointment as she \" got carried away with drinking\" and is dealing with the guilt from her decisions. Clinician and Ely discussed reasons behind her decisions and the idea of transfer addiction since getting bypass surgery done.   During this session, this clinician used the following therapeutic modalities: Supportive Psychotherapy    Substance Abuse was addressed during this session. If the client is diagnosed with a co-occurring substance use disorder, please indicate any changes in the frequency or amount of use: reports purchasing a 24 pack and drinking 1 tall can of 10% alcohol a night at most 1.5 cans until finished last Friday and has not drank sense. States she is getting drunk off one drink due to gastric bypass and one is all it takes. Stage of change for addressing substance use diagnoses: Pre-contemplation    ASSESSMENT:  Ely Cantrell presents with a Euthymic/ normal mood, her affect is Normal range and intensity, which is congruent, with her mood and the content of the session. The client has not made progress on their goals. Ely is open and honest about her struggles and wants to make active change to accomplish her goals.Ely Cantrell presents with a minimal risk of suicide, minimal risk of self-harm, and minimal risk of harm to others.    For any risk assessment that surpasses a \"low\" rating, a safety plan must be developed.    A safety plan was indicated: no  If yes, describe in detail n/a    PLAN: Between sessions, Ely QUEEN" Óscar will continue working on her treatment goals. At the next session, the therapist will use Cognitive Behavioral Therapy and Supportive Psychotherapy to address alcohol use and mood.    Behavioral Health Treatment Plan and Discharge Planning: Ely E Óscar is aware of and agrees to continue to work on their treatment plan. They have identified and are working toward their discharge goals. yes    Visit start and stop times:    11/21/24  Start Time: 1000  Stop Time: 1054  Total Visit Time: 54 minutes

## 2024-12-03 DIAGNOSIS — M25.512 LEFT SHOULDER PAIN, UNSPECIFIED CHRONICITY: ICD-10-CM

## 2024-12-03 RX ORDER — METHADONE HYDROCHLORIDE 10 MG/1
30 TABLET ORAL DAILY
Qty: 90 TABLET | Refills: 0 | Status: SHIPPED | OUTPATIENT
Start: 2024-12-03

## 2024-12-03 NOTE — TELEPHONE ENCOUNTER
Reason for call:   [x] Refill   [] Prior Auth  [] Other:     Office:   [x] PCP/Provider -Dr Altamirano    [] Specialty/Provider -     Medication: methadone     Dose/Frequency: 10 mg take 3 tablets daily     Quantity: 90    Pharmacy: [facility] [address]     Does the patient have enough for 3 days?   [] Yes   [x] No - Send as HP to POD

## 2024-12-04 DIAGNOSIS — F41.9 ANXIETY: ICD-10-CM

## 2024-12-04 DIAGNOSIS — F51.01 PRIMARY INSOMNIA: ICD-10-CM

## 2024-12-04 RX ORDER — ALPRAZOLAM 1 MG/1
1 TABLET ORAL
Qty: 30 TABLET | Refills: 0 | Status: SHIPPED | OUTPATIENT
Start: 2024-12-04

## 2024-12-04 NOTE — TELEPHONE ENCOUNTER
Reason for call:   [x] Refill   [] Prior Auth  [] Other:     Office:   [x] PCP/Provider - Dr Altamirano  [] Specialty/Provider -     Medication:   Alprazolam 1 mg, 1hs, 30    Pharmacy: [facility] [place]    Does the patient have enough for 3 days?   [] Yes   [x] No - Send as HP to POD

## 2024-12-10 ENCOUNTER — SOCIAL WORK (OUTPATIENT)
Dept: BEHAVIORAL/MENTAL HEALTH CLINIC | Facility: CLINIC | Age: 54
End: 2024-12-10
Payer: COMMERCIAL

## 2024-12-10 DIAGNOSIS — F41.9 ANXIETY: ICD-10-CM

## 2024-12-10 DIAGNOSIS — F32.A DEPRESSION, UNSPECIFIED DEPRESSION TYPE: Primary | ICD-10-CM

## 2024-12-10 PROCEDURE — 90834 PSYTX W PT 45 MINUTES: CPT

## 2024-12-10 NOTE — PSYCH
"Behavioral Health Psychotherapy Progress Note    Psychotherapy Provided: Individual Psychotherapy     1. Depression, unspecified depression type        2. Anxiety            Goals addressed in session: Goal 1 and Goal 2     DATA: Ely joined session in person to discuss desire for future relationships. She discussed with clinician and on and off relationship that consisted of negative patterns. She reports reaching out to him and having internal conflict surrounding if this is a pattern she wants to maintain. Ely stated \" I want to be with someone so of course I'll fall into the pattern\". Clinician worked with Ely to discuss wants for relationship, past relationships and self-worth.During this session, this clinician used the following therapeutic modalities: Client-centered Therapy and Cognitive Behavioral Therapy    Substance Abuse was not addressed during this session. If the client is diagnosed with a co-occurring substance use disorder, please indicate any changes in the frequency or amount of use: n/a. Stage of change for addressing substance use diagnoses: No substance use/Not applicable    ASSESSMENT:  Ely Cantrell presents with a Euthymic/ normal mood, her affect is Normal range and intensity, which is congruent, with her mood and the content of the session. The client has made progress on their goals. Ely Cantrell presents with a minimal risk of suicide, minimal risk of self-harm, and minimal risk of harm to others.    For any risk assessment that surpasses a \"low\" rating, a safety plan must be developed.    A safety plan was indicated: no  If yes, describe in detail n/a    PLAN: Between sessions, Ely Cantrell will continue working on her treatment plan goals. At the next session, the therapist will use Client-centered Therapy and Cognitive Behavioral Therapy to address anxiety and depression.    Behavioral Health Treatment Plan and Discharge Planning: Ely Cantrell is aware of and " agrees to continue to work on their treatment plan. They have identified and are working toward their discharge goals. yes    Depression Follow-up Plan Completed: Not applicable    Visit start and stop times:    12/10/24  Start Time: 1000  Stop Time: 1050  Total Visit Time: 50 minutes

## 2024-12-12 ENCOUNTER — HOSPITAL ENCOUNTER (OUTPATIENT)
Dept: INFUSION CENTER | Facility: CLINIC | Age: 54
Discharge: HOME | End: 2024-12-12
Payer: MEDICARE

## 2024-12-12 VITALS
SYSTOLIC BLOOD PRESSURE: 121 MMHG | RESPIRATION RATE: 16 BRPM | DIASTOLIC BLOOD PRESSURE: 77 MMHG | HEART RATE: 67 BPM | TEMPERATURE: 97.7 F

## 2024-12-12 DIAGNOSIS — D50.8 OTHER IRON DEFICIENCY ANEMIA: Primary | ICD-10-CM

## 2024-12-12 PROCEDURE — 96365 THER/PROPH/DIAG IV INF INIT: CPT

## 2024-12-12 RX ORDER — SODIUM CHLORIDE 9 MG/ML
20 INJECTION, SOLUTION INTRAVENOUS ONCE
Status: CANCELLED | OUTPATIENT
Start: 2024-12-19

## 2024-12-12 RX ORDER — SODIUM CHLORIDE 9 MG/ML
20 INJECTION, SOLUTION INTRAVENOUS ONCE
Status: COMPLETED | OUTPATIENT
Start: 2024-12-12 | End: 2024-12-12

## 2024-12-12 RX ADMIN — SODIUM CHLORIDE 20 ML/HR: 0.9 INJECTION, SOLUTION INTRAVENOUS at 10:54

## 2024-12-12 RX ADMIN — IRON SUCROSE 200 MG: 20 INJECTION, SOLUTION INTRAVENOUS at 10:58

## 2024-12-12 NOTE — PROGRESS NOTES
Patient presents for venofer infusion and is without complaints at this time. Patient tolerated infusion without incident. Declines AVS. Aware of next appointment on 12/19 @ 1030am.

## 2024-12-16 ENCOUNTER — HOSPITAL ENCOUNTER (OUTPATIENT)
Dept: INFUSION CENTER | Facility: HOSPITAL | Age: 54
Discharge: HOME | End: 2024-12-16

## 2024-12-17 ENCOUNTER — HOSPITAL ENCOUNTER (OUTPATIENT)
Dept: INFUSION CENTER | Facility: HOSPITAL | Age: 54
Discharge: HOME | End: 2024-12-17
Payer: MEDICARE

## 2024-12-17 VITALS
HEART RATE: 70 BPM | DIASTOLIC BLOOD PRESSURE: 75 MMHG | SYSTOLIC BLOOD PRESSURE: 124 MMHG | RESPIRATION RATE: 18 BRPM | TEMPERATURE: 97.8 F

## 2024-12-17 DIAGNOSIS — F51.01 PRIMARY INSOMNIA: ICD-10-CM

## 2024-12-17 DIAGNOSIS — D50.8 OTHER IRON DEFICIENCY ANEMIA: Primary | ICD-10-CM

## 2024-12-17 PROCEDURE — 96365 THER/PROPH/DIAG IV INF INIT: CPT

## 2024-12-17 RX ORDER — ESZOPICLONE 3 MG/1
3 TABLET, FILM COATED ORAL
Qty: 30 TABLET | Refills: 0 | Status: SHIPPED | OUTPATIENT
Start: 2024-12-17

## 2024-12-17 RX ORDER — SODIUM CHLORIDE 9 MG/ML
20 INJECTION, SOLUTION INTRAVENOUS ONCE
Status: COMPLETED | OUTPATIENT
Start: 2024-12-17 | End: 2024-12-17

## 2024-12-17 RX ORDER — SODIUM CHLORIDE 9 MG/ML
20 INJECTION, SOLUTION INTRAVENOUS ONCE
OUTPATIENT
Start: 2024-12-26

## 2024-12-17 RX ADMIN — SODIUM CHLORIDE 20 ML/HR: 9 INJECTION, SOLUTION INTRAVENOUS at 11:51

## 2024-12-17 RX ADMIN — IRON SUCROSE 200 MG: 20 INJECTION, SOLUTION INTRAVENOUS at 11:51

## 2024-12-17 NOTE — PROGRESS NOTES
Patient tolerated IV venofer without complications. Next appointment scheduled 12/26 1030am-AL infusion. AVS declined.

## 2024-12-19 ENCOUNTER — HOSPITAL ENCOUNTER (OUTPATIENT)
Dept: INFUSION CENTER | Facility: CLINIC | Age: 54
End: 2024-12-19

## 2024-12-23 ENCOUNTER — HOSPITAL ENCOUNTER (OUTPATIENT)
Dept: RADIOLOGY | Facility: HOSPITAL | Age: 54
Discharge: HOME | End: 2024-12-23
Payer: MEDICARE

## 2024-12-23 ENCOUNTER — OFFICE VISIT (OUTPATIENT)
Dept: INTERNAL MEDICINE CLINIC | Facility: CLINIC | Age: 54
End: 2024-12-23
Payer: MEDICARE

## 2024-12-23 ENCOUNTER — TELEPHONE (OUTPATIENT)
Age: 54
End: 2024-12-23

## 2024-12-23 VITALS
HEART RATE: 63 BPM | DIASTOLIC BLOOD PRESSURE: 80 MMHG | TEMPERATURE: 97.6 F | RESPIRATION RATE: 18 BRPM | OXYGEN SATURATION: 97 % | SYSTOLIC BLOOD PRESSURE: 120 MMHG | WEIGHT: 164 LBS | BODY MASS INDEX: 27.32 KG/M2 | HEIGHT: 65 IN

## 2024-12-23 DIAGNOSIS — R07.81 RIB PAIN ON RIGHT SIDE: ICD-10-CM

## 2024-12-23 DIAGNOSIS — R07.81 RIB PAIN ON RIGHT SIDE: Primary | ICD-10-CM

## 2024-12-23 PROCEDURE — 99213 OFFICE O/P EST LOW 20 MIN: CPT | Performed by: INTERNAL MEDICINE

## 2024-12-23 PROCEDURE — 71111 X-RAY EXAM RIBS/CHEST4/> VWS: CPT

## 2024-12-23 RX ORDER — ESTRADIOL 10 UG/1
INSERT VAGINAL
COMMUNITY

## 2024-12-23 NOTE — TELEPHONE ENCOUNTER
Patient salledwith complaints of right chest/rib pain.  States that she had a rib fracture in that area in September and thought that she had healed from this because she was feeling better.  Last Thursday 12/19, she started to have pain in the area again and it has been worsening.      Scheduled patient for OV today at 3:45 pm with Dr. Altamirano.

## 2024-12-23 NOTE — PROGRESS NOTES
"Name: Ely Cantrell      : 1970      MRN: [id number]  Encounter Provider: Solis Altamirano DO  Encounter Date: 2024   Encounter department: [facility]IC[facility]  :  She has a medical history of protein S deficiency/factor 5 Leiden mutation with chronic DVT on Eliquis, SBO in May of 2021 status post surgical intervention, chronic pain maintained on methadone, status post gastric bypass procedure complicated by iron deficiency anemia, L4/5 compression fractures, central sleep apnea, insomnia    Assessment & Plan  Rib pain on right side  Patient reports onset of right sided chest pain approximately 3 days ago.  Patient did have a ER visit on  -she was sleepwalking and fell forward and hit her chest into a knob.  Chest CT at that time showed acute minimally displaced fracture of the anterior aspect of the right second rib.  She has not had any inciting trauma than then.  Her pain completely resolved but recurred approximately 3 days ago.  Pain is worse with coughing, blowing nose, and when turning to her side.  She has been trying breathing exercises.  Heating pad helps    On exam, she has tenderness to palpation just to the right of the upper sternum in the area of the third and fourth ribs.  No abnormal findings on cardiopulmonary exam    Plan:  -Etiology of symptoms unclear.  Suspect benign musculoskeletal etiology.  Relatively recent rib fracture as above.  Consider costochondritis?  -We will reassess with x-ray of the chest and ribs  -Continue with supportive care otherwise    Orders:  •  XR ribs bilateral 4+ vw w pa chest; Future           History of Present Illness     HPI  Review of Systems    Objective   /80 (BP Location: Left arm, Patient Position: Sitting, Cuff Size: Standard)   Pulse 63   Temp 97.6 °F (36.4 °C) (Tympanic)   Resp 18   Ht 5' 5\" (1.651 m)   Wt 74.4 kg (164 lb)   LMP 2020   SpO2 97%   BMI 27.29 kg/m²      Physical " Exam  Cardiovascular:      Rate and Rhythm: Normal rate and regular rhythm.      Heart sounds: Normal heart sounds. No murmur heard.  Pulmonary:      Effort: Pulmonary effort is normal.      Breath sounds: Normal breath sounds. No wheezing, rhonchi or rales.   Musculoskeletal:         General: Tenderness present.

## 2024-12-24 ENCOUNTER — RESULTS FOLLOW-UP (OUTPATIENT)
Dept: INTERNAL MEDICINE CLINIC | Facility: CLINIC | Age: 54
End: 2024-12-24

## 2024-12-24 ENCOUNTER — TELEPHONE (OUTPATIENT)
Age: 54
End: 2024-12-24

## 2024-12-24 NOTE — TELEPHONE ENCOUNTER
Patient calls to ask about her CXR. Patient is asking about the results. Patient continues to have pain and difficulty with a deep breath. Patient is upset that the results are not ready. Please call the patient at [phone].

## 2024-12-26 ENCOUNTER — HOSPITAL ENCOUNTER (OUTPATIENT)
Dept: INFUSION CENTER | Facility: CLINIC | Age: 54
Discharge: HOME | End: 2024-12-26

## 2025-01-06 DIAGNOSIS — F51.01 PRIMARY INSOMNIA: ICD-10-CM

## 2025-01-06 DIAGNOSIS — M25.512 LEFT SHOULDER PAIN, UNSPECIFIED CHRONICITY: ICD-10-CM

## 2025-01-06 DIAGNOSIS — F41.9 ANXIETY: ICD-10-CM

## 2025-01-06 RX ORDER — METHADONE HYDROCHLORIDE 10 MG/1
30 TABLET ORAL DAILY
Qty: 90 TABLET | Refills: 0 | Status: SHIPPED | OUTPATIENT
Start: 2025-01-06

## 2025-01-06 RX ORDER — ALPRAZOLAM 1 MG/1
1 TABLET ORAL
Qty: 30 TABLET | Refills: 0 | Status: SHIPPED | OUTPATIENT
Start: 2025-01-06

## 2025-01-06 NOTE — TELEPHONE ENCOUNTER
Reason for call:   [x] Refill   [] Prior Auth  [] Other:     Office:   [x] [facility]  Authorized By: Solis Altamirano DO    [] Specialty/Provider -     Medication: ALPRAZolam (XANAX) 1 mg tablet    Dose/Frequency: Take 1 tablet (1 mg total) by mouth daily at bedtime as needed for anxiety    Quantity: 30 tablet    Pharmacy: [id number] [address]     Does the patient have enough for 3 days?   [] Yes   [x] No - Send as HP to POD    Reason for call:   [x] Refill   [] Prior Auth  [] Other:     Office:   [x] PCP/Provider -  INTERNAL MED [facility]  Authorized By: Solis Altamirano DO    [] Specialty/Provider -     Medication: methadone (Dolophine) 10 mg tablet    Dose/Frequency: Take 3 tablets (30 mg total) by mouth daily    Quantity: 90 tablet    Pharmacy: [id number] [address]     Does the patient have enough for 3 days?   [] Yes   [x] No - Send as HP to POD

## 2025-01-15 DIAGNOSIS — F51.01 PRIMARY INSOMNIA: ICD-10-CM

## 2025-01-15 RX ORDER — ESZOPICLONE 3 MG/1
3 TABLET, FILM COATED ORAL
Qty: 30 TABLET | Refills: 0 | Status: SHIPPED | OUTPATIENT
Start: 2025-01-15

## 2025-01-15 NOTE — TELEPHONE ENCOUNTER
Reason for call:   [x] Refill   [] Prior Auth  [] Other:     Office:   [x] PCP/Provider -   [] Specialty/Provider -     Medication: eszopiclone (LUNESTA) 3 MG tablet     Dose/Frequency: Take 1 tablet (3 mg total) by mouth daily at bedtime as needed     Quantity: 30 tablet    Pharmacy: [id number] - [place]      Does the patient have enough for 3 days?   [] Yes   [x] No - Send as HP to POD

## 2025-01-16 ENCOUNTER — HOSPITAL ENCOUNTER (OUTPATIENT)
Dept: INFUSION CENTER | Facility: CLINIC | Age: 55
Discharge: HOME | End: 2025-01-16
Payer: MEDICARE

## 2025-01-16 VITALS
DIASTOLIC BLOOD PRESSURE: 75 MMHG | TEMPERATURE: 96.8 F | HEART RATE: 74 BPM | RESPIRATION RATE: 18 BRPM | SYSTOLIC BLOOD PRESSURE: 125 MMHG

## 2025-01-16 DIAGNOSIS — D50.8 OTHER IRON DEFICIENCY ANEMIA: Primary | ICD-10-CM

## 2025-01-16 PROCEDURE — 96365 THER/PROPH/DIAG IV INF INIT: CPT

## 2025-01-16 RX ORDER — SODIUM CHLORIDE 9 MG/ML
20 INJECTION, SOLUTION INTRAVENOUS ONCE
Status: COMPLETED | OUTPATIENT
Start: 2025-01-16 | End: 2025-01-16

## 2025-01-16 RX ORDER — SODIUM CHLORIDE 9 MG/ML
20 INJECTION, SOLUTION INTRAVENOUS ONCE
OUTPATIENT
Start: 2025-01-23

## 2025-01-16 RX ADMIN — IRON SUCROSE 200 MG: 20 INJECTION, SOLUTION INTRAVENOUS at 11:19

## 2025-01-16 RX ADMIN — SODIUM CHLORIDE 20 ML/HR: 0.9 INJECTION, SOLUTION INTRAVENOUS at 11:10

## 2025-01-27 ENCOUNTER — OFFICE VISIT (OUTPATIENT)
Dept: INTERNAL MEDICINE CLINIC | Facility: CLINIC | Age: 55
End: 2025-01-27
Payer: MEDICARE

## 2025-01-27 VITALS
SYSTOLIC BLOOD PRESSURE: 126 MMHG | HEART RATE: 73 BPM | TEMPERATURE: 98.5 F | DIASTOLIC BLOOD PRESSURE: 69 MMHG | WEIGHT: 165 LBS | BODY MASS INDEX: 27.49 KG/M2 | HEIGHT: 65 IN

## 2025-01-27 DIAGNOSIS — R09.89 CHEST CONGESTION: ICD-10-CM

## 2025-01-27 DIAGNOSIS — J06.9 UPPER RESPIRATORY TRACT INFECTION, UNSPECIFIED TYPE: Primary | ICD-10-CM

## 2025-01-27 DIAGNOSIS — R50.9 FEVER, UNSPECIFIED FEVER CAUSE: ICD-10-CM

## 2025-01-27 DIAGNOSIS — H66.002 NON-RECURRENT ACUTE SUPPURATIVE OTITIS MEDIA OF LEFT EAR WITHOUT SPONTANEOUS RUPTURE OF TYMPANIC MEMBRANE: ICD-10-CM

## 2025-01-27 LAB
SARS-COV-2 AG UPPER RESP QL IA: NEGATIVE
SL AMB POCT RAPID FLU A: NEGATIVE
SL AMB POCT RAPID FLU B: NEGATIVE
VALID CONTROL: NORMAL

## 2025-01-27 PROCEDURE — 87804 INFLUENZA ASSAY W/OPTIC: CPT | Performed by: STUDENT IN AN ORGANIZED HEALTH CARE EDUCATION/TRAINING PROGRAM

## 2025-01-27 PROCEDURE — 99214 OFFICE O/P EST MOD 30 MIN: CPT | Performed by: STUDENT IN AN ORGANIZED HEALTH CARE EDUCATION/TRAINING PROGRAM

## 2025-01-27 PROCEDURE — 87811 SARS-COV-2 COVID19 W/OPTIC: CPT | Performed by: STUDENT IN AN ORGANIZED HEALTH CARE EDUCATION/TRAINING PROGRAM

## 2025-01-27 RX ORDER — DOXYCYCLINE HYCLATE 100 MG
100 TABLET ORAL 2 TIMES DAILY
Qty: 14 TABLET | Refills: 0 | Status: SHIPPED | OUTPATIENT
Start: 2025-01-27 | End: 2025-02-03

## 2025-01-27 NOTE — PROGRESS NOTES
"INTERNAL MEDICINE OFFICE VISIT NOTE  [facility] Internal Medicine [place]    NAME: Ely Cantrell  AGE: 54 y.o. SEX: female    DATE OF ENCOUNTER:       Chief Complaint   Patient presents with    Follow-up     Ear Pain, Chest Congestion          Assessment & Plan  Upper respiratory tract infection, unspecified type  Presents with complaints of upper respiratory symptoms that started approximately 5 days ago.  Reports she has been experiencing general malaise, bilateral ear discomfort left more than right with sensation of fullness.  Intermittent headaches.  Additionally experiencing nasal congestion, cough that is productive.   Last night she experienced a fever of 101 °F that responded well to Tylenol.  She denies any shortness of breath, chest tightness, wheezing, nausea, vomiting, abdominal pain, no urinary symptoms reported.  On exam there is moderate erythema around the perimeter of the tympanic membrane of the left ear.  Lungs are clear to auscultation without wheezing or crackles.  Will proceed with doxycycline x 7 days, history of allergy to amoxicillin.  Advised OTC cold medications and cough medications.  Continue Tylenol ibuprofen as needed  Follow-up as needed         Non-recurrent acute suppurative otitis media of left ear without spontaneous rupture of tympanic membrane    Orders:    doxycycline hyclate (VIBRA-TABS) 100 mg tablet; Take 1 tablet (100 mg total) by mouth 2 (two) times a day for 7 days    Chest congestion    Orders:    POCT Rapid Covid Ag    POCT rapid flu A and B    Fever, unspecified fever cause    Orders:    POCT Rapid Covid Ag    POCT rapid flu A and B      No follow-ups on file.      OBJECTIVE:  Vitals:    01/27/25 1034   BP: 126/69   BP Location: Left arm   Patient Position: Sitting   Cuff Size: Standard   Pulse: 73   Temp: 98.5 °F (36.9 °C)   Weight: 74.8 kg (165 lb)   Height: 5' 5\" (1.651 m)         Physical Exam:   GENERAL: NAD, Normal appearance.    NEUROLOGIC:  " Alert/oriented x3.  HEENT:  NC/AT, no scleral icterus  CARDIAC:  RRR, +S1/S2  PULMONARY:  non-labored breathing        Current Outpatient Medications:     acetaminophen (TYLENOL) 325 mg tablet, Take 2 tablets (650 mg total) by mouth every 6 (six) hours as needed for mild pain, Disp: , Rfl:     ALPRAZolam (XANAX) 1 mg tablet, Take 1 tablet (1 mg total) by mouth daily at bedtime as needed for anxiety, Disp: 30 tablet, Rfl: 0    apixaban (Eliquis) 5 mg, take 1 tablet by mouth twice a day, Disp: 200 tablet, Rfl: 1    doxycycline hyclate (VIBRA-TABS) 100 mg tablet, Take 1 tablet (100 mg total) by mouth 2 (two) times a day for 7 days, Disp: 14 tablet, Rfl: 0    estradiol (VAGIFEM, YUVAFEM) 10 MCG TABS vaginal tablet, insert 1 tablet vaginally daily for 2 weeks then two times a week, Disp: , Rfl:     eszopiclone (LUNESTA) 3 MG tablet, Take 1 tablet (3 mg total) by mouth daily at bedtime as needed for sleep Take immediately before bedtime, Disp: 30 tablet, Rfl: 0    famotidine (PEPCID) 20 mg tablet, Take 1 tablet (20 mg total) by mouth 2 (two) times a day, Disp: 60 tablet, Rfl: 0    ferrous sulfate 324 (65 Fe) mg, Take 1 tablet (324 mg total) by mouth daily before breakfast, Disp: 90 tablet, Rfl: 0    fluticasone (FLONASE) 50 mcg/act nasal spray, 2 sprays into each nostril daily, Disp: 16 g, Rfl: 0    lidocaine (LIDODERM) 5 %, Apply 1 patch topically over 12 hours daily Remove & Discard patch within 12 hours or as directed by MD, Disp: 30 patch, Rfl: 0    methadone (Dolophine) 10 mg tablet, Take 3 tablets (30 mg total) by mouth daily Max Daily Amount: 30 mg, Disp: 90 tablet, Rfl: 0    naloxone (NARCAN) 4 mg/0.1 mL nasal spray, Administer 1 spray into a nostril. If no response after 2-3 minutes, give another dose in the other nostril using a new spray., Disp: 1 each, Rfl: 1    omeprazole (PriLOSEC) 20 mg delayed release capsule, take 1 capsule by mouth once daily, Disp: 90 capsule, Rfl: 1    Pediatric Multiple Vit-C-FA  (FRUITY CHEWABLES MULTIVITAMIN) CHEW, Chew 1 tablet daily , Disp: , Rfl:     senna (SENOKOT) 8.6 MG tablet, Take 1 tablet by mouth as needed  , Disp: , Rfl:     sertraline (ZOLOFT) 100 mg tablet, take 1 tablet by mouth twice a day, Disp: 180 tablet, Rfl: 1    sucralfate (Carafate) 1 g/10 mL suspension, Take 10 mL (1 g total) by mouth 4 (four) times a day as needed (dyspepsia), Disp: 420 mL, Rfl: 2    topiramate (TOPAMAX) 100 mg tablet, take 1 tablet by mouth twice a day, Disp: 180 tablet, Rfl: 1    venlafaxine (EFFEXOR-XR) 75 mg 24 hr capsule, take 1 capsule by mouth once daily, Disp: 90 capsule, Rfl: 1    vitamin B-12 (VITAMIN B-12) 1,000 mcg tablet, Take 1 tablet (1,000 mcg total) by mouth daily, Disp: 90 tablet, Rfl: 0    Vitamin D, Cholecalciferol, 50 MCG (2000 UT) CAPS, Take 2 capsules by mouth daily, Disp: 180 capsule, Rfl: 0    Wound Dressings (Adaptic Non-Adhering Dressing) PADS, Apply 24 each topically in the morning, Disp: 24 each, Rfl: 1    Patient Active Problem List   Diagnosis    Anxiety    Depression    GERD without esophagitis    Primary insomnia    Migraine, unspecified, not intractable, without status migrainosus    Pain syndrome, chronic    Postgastrectomy malabsorption    Vitamin D deficiency    Ventral hernia without obstruction or gangrene    History of bariatric surgery    ASCUS with positive high risk HPV cervical    Symptomatic varicose veins of both lower extremities    Chronic deep vein thrombosis (DVT) of right femoral vein (HCC)    Neutrophilic leukocytosis    Adhesive capsulitis of left shoulder    Factor 5 Leiden mutation, heterozygous (HCC)    Protein S deficiency (HCC)    SBO (small bowel obstruction) (HCC)    Breast mass, right    Closed fracture of left distal radius and ulna    Iron (Fe) deficiency anemia    Breast pain, left    Pain in left elbow    Obstructive sleep apnea (adult) (pediatric)    Central sleep apnea    Rash    Compression fracture of L5 vertebra with routine  healing    Dizziness    History of pulmonary embolism    Blurred vision, left eye             Merritt Albarran MD  [facility] Internal Medicine [place]    * Please Note: This note was completed in part utilizing a dictation software.  Grammatical errors, random word insertions, spelling mistakes, and incomplete sentences may be an occasional consequence of this system secondary to software limitations, ambient noise, and hardware issues.  If you have any questions or concerns about the content, text, or information contained within the body of this dictation, please contact the provider for clarification.**

## 2025-01-28 DIAGNOSIS — I82.409 ACUTE DVT (DEEP VENOUS THROMBOSIS) (HCC): ICD-10-CM

## 2025-01-28 RX ORDER — APIXABAN 5 MG/1
5 TABLET, FILM COATED ORAL 2 TIMES DAILY
Qty: 180 TABLET | Refills: 1 | Status: SHIPPED | OUTPATIENT
Start: 2025-01-28

## 2025-01-29 DIAGNOSIS — F51.01 PRIMARY INSOMNIA: ICD-10-CM

## 2025-01-29 DIAGNOSIS — F41.9 ANXIETY: ICD-10-CM

## 2025-01-29 RX ORDER — ALPRAZOLAM 1 MG/1
1 TABLET ORAL
Qty: 30 TABLET | Refills: 0 | Status: SHIPPED | OUTPATIENT
Start: 2025-01-29

## 2025-01-29 NOTE — TELEPHONE ENCOUNTER
Reason for call:   [x] Refill   [] Prior Auth  [] Other:     Office:   [x] PCP/Provider -   [] Specialty/Provider -     Medication: Alprazolam    Dose/Frequency: 1 mg    Quantity: 30 tablet    Pharmacy: [id number] - [address]      Does the patient have enough for 3 days?   [] Yes   [x] No - Send as HP to POD

## 2025-02-03 ENCOUNTER — TELEPHONE (OUTPATIENT)
Age: 55
End: 2025-02-03

## 2025-02-03 NOTE — TELEPHONE ENCOUNTER
"Received call from patient stating she was last seen in the office 01/27 for bilat  ear infection   Finished 7 day doxy     States now both ears painful and \"clogged\" and pain in throat, sinus headaches     Requesting recommendations/advice.   Please review.   "

## 2025-02-04 ENCOUNTER — TELEMEDICINE (OUTPATIENT)
Dept: INTERNAL MEDICINE CLINIC | Facility: CLINIC | Age: 55
End: 2025-02-04
Payer: MEDICARE

## 2025-02-04 DIAGNOSIS — H69.93 DYSFUNCTION OF BOTH EUSTACHIAN TUBES: ICD-10-CM

## 2025-02-04 DIAGNOSIS — H92.03 EAR DISCOMFORT, BILATERAL: Primary | ICD-10-CM

## 2025-02-04 PROCEDURE — 99213 OFFICE O/P EST LOW 20 MIN: CPT | Performed by: INTERNAL MEDICINE

## 2025-02-04 RX ORDER — PSYLLIUM HUSK 3.4 G/7G
POWDER ORAL
COMMUNITY
Start: 2024-12-30

## 2025-02-04 NOTE — PROGRESS NOTES
Virtual Regular Visit  Name: Ely Cantrell      : 1970      MRN: [id number]  Encounter Provider: Solis Altamirano DO  Encounter Date: 2025   Encounter department: [facility] INTERNAL MEDICINE [place]    Verification of patient location:  Patient is located at Home in the following state in which I hold an active license [place] :  Assessment & Plan  Ear discomfort, bilateral  Previously evaluated in the office by Dr. Albarran on .  Presenting with general malaise, bilateral ear discomfort left greater than right with sensation of fullness.  Intermittent headaches.  Associated nasal congestion, fevers.  Treated with 1 week course of doxycycline for possible otitis media of the left ear    Evaluated today due to ongoing sensation of clogged ears and discomfort.  She was able to complete course of doxycycline.  Unable to assess on exam today due to virtual visit.  Lingering cough which does not seem to be particularly bothersome    Plan:  -I suspect she may have lingering symptoms from URI and probable eustachian tube dysfunction.  She started taking Allegra several days ago and just started taking fluticasone nasal spray  -Recommend she continue with intranasal fluticasone and Allegra  -Will also add on as needed Sudafed  -Advised her to let me know if symptoms do not improve    Orders:  •  pseudoephedrine (SUDAFED) 60 mg tablet; Take 1 tablet (60 mg total) by mouth every 6 (six) hours as needed for congestion for up to 5 days    Dysfunction of both eustachian tubes    Orders:  •  pseudoephedrine (SUDAFED) 60 mg tablet; Take 1 tablet (60 mg total) by mouth every 6 (six) hours as needed for congestion for up to 5 days        Encounter provider Solis Altamirano DO    The patient was identified by name and date of birth. Ely Cantrell was informed that this is a telemedicine visit and that the visit is being conducted through the Epic Embedded platform. She agrees to proceed..  My office door was  closed. No one else was in the room.  She acknowledged consent and understanding of privacy and security of the video platform. The patient has agreed to participate and understands they can discontinue the visit at any time.    Patient is aware this is a billable service.     History of Present Illness     HPI  Review of Systems    Objective   LMP 08/12/2020     Physical Exam    Visit Time  Total Visit Duration:

## 2025-02-05 DIAGNOSIS — M25.512 LEFT SHOULDER PAIN, UNSPECIFIED CHRONICITY: ICD-10-CM

## 2025-02-05 DIAGNOSIS — F32.A DEPRESSION, UNSPECIFIED DEPRESSION TYPE: ICD-10-CM

## 2025-02-05 RX ORDER — SERTRALINE HYDROCHLORIDE 100 MG/1
100 TABLET, FILM COATED ORAL 2 TIMES DAILY
Qty: 180 TABLET | Refills: 1 | Status: SHIPPED | OUTPATIENT
Start: 2025-02-05

## 2025-02-05 RX ORDER — METHADONE HYDROCHLORIDE 10 MG/1
30 TABLET ORAL DAILY
Qty: 90 TABLET | Refills: 0 | Status: SHIPPED | OUTPATIENT
Start: 2025-02-05

## 2025-02-05 NOTE — TELEPHONE ENCOUNTER
Reason for call:   [x] Refill   [] Prior Auth  [] Other:     Office:   [x] PCP/Provider - deana  [] Specialty/Provider -     Medication: methadone (Dolophine) 10 mg tablet    Dose/Frequency: Sig: Take 3 tablets (30 mg total) by mouth daily Max Daily Amount: 30 mg      Quantity: 90    Pharmacy: Pharmacy    [facility] #[id number] [address] [address]  Phone: [phone]  Fax: [phone]      Does the patient have enough for 3 days?   [] Yes   [x] No - Send as HP to POD

## 2025-02-06 DIAGNOSIS — E55.9 VITAMIN D DEFICIENCY: ICD-10-CM

## 2025-02-06 RX ORDER — ACETAMINOPHEN 160 MG
4000 TABLET,DISINTEGRATING ORAL DAILY
Qty: 180 CAPSULE | Refills: 1 | Status: SHIPPED | OUTPATIENT
Start: 2025-02-06

## 2025-02-11 ENCOUNTER — HOSPITAL ENCOUNTER (OUTPATIENT)
Dept: INFUSION CENTER | Facility: CLINIC | Age: 55
Discharge: HOME | End: 2025-02-11
Payer: MEDICARE

## 2025-02-11 VITALS
RESPIRATION RATE: 18 BRPM | SYSTOLIC BLOOD PRESSURE: 117 MMHG | DIASTOLIC BLOOD PRESSURE: 75 MMHG | HEART RATE: 60 BPM | TEMPERATURE: 97.4 F

## 2025-02-11 DIAGNOSIS — D50.8 OTHER IRON DEFICIENCY ANEMIA: Primary | ICD-10-CM

## 2025-02-11 PROCEDURE — 96365 THER/PROPH/DIAG IV INF INIT: CPT

## 2025-02-11 RX ORDER — SODIUM CHLORIDE 9 MG/ML
20 INJECTION, SOLUTION INTRAVENOUS ONCE
OUTPATIENT
Start: 2025-02-26

## 2025-02-11 RX ORDER — SODIUM CHLORIDE 9 MG/ML
20 INJECTION, SOLUTION INTRAVENOUS ONCE
Status: COMPLETED | OUTPATIENT
Start: 2025-02-11 | End: 2025-02-11

## 2025-02-11 RX ADMIN — SODIUM CHLORIDE 20 ML/HR: 0.9 INJECTION, SOLUTION INTRAVENOUS at 11:10

## 2025-02-11 RX ADMIN — IRON SUCROSE 200 MG: 20 INJECTION, SOLUTION INTRAVENOUS at 11:23

## 2025-02-11 NOTE — PROGRESS NOTES
Ely Cantrell  tolerated Venofer without incident.     Ely Cantrell is aware of future appt on 2/26 at 11:30am.     AVS printed and given to Ely Cantrell:  Yes

## 2025-02-12 DIAGNOSIS — F51.01 PRIMARY INSOMNIA: ICD-10-CM

## 2025-02-12 RX ORDER — ESZOPICLONE 3 MG/1
3 TABLET, FILM COATED ORAL
Qty: 30 TABLET | Refills: 0 | Status: SHIPPED | OUTPATIENT
Start: 2025-02-12

## 2025-02-12 NOTE — TELEPHONE ENCOUNTER
Reason for call:   [x] Refill   [] Prior Auth  [] Other:     Office:   [x] PCP/Provider -   [] Specialty/Provider -     Medication: LUNESTA    Dose/Frequency: 3 MG    Quantity: 30    Pharmacy:   [id number] - [place] [address] [address]  Phone: [phone]  Fax: [phone]     Does the patient have enough for 3 days?   [] Yes   [x] No - Send as HP to POD

## 2025-02-17 DIAGNOSIS — H92.03 EAR DISCOMFORT, BILATERAL: ICD-10-CM

## 2025-02-17 DIAGNOSIS — H69.93 DYSFUNCTION OF BOTH EUSTACHIAN TUBES: ICD-10-CM

## 2025-02-19 RX ORDER — PSEUDOEPHEDRINE HYDROCHLORIDE 60 MG/1
TABLET, FILM COATED ORAL
Qty: 20 TABLET | Refills: 0 | OUTPATIENT
Start: 2025-02-19

## 2025-02-21 ENCOUNTER — TELEPHONE (OUTPATIENT)
Dept: INTERNAL MEDICINE CLINIC | Facility: CLINIC | Age: 55
End: 2025-02-21

## 2025-02-21 NOTE — TELEPHONE ENCOUNTER
Patient called the Rxline for the pseudoephedrine, notified patient that it was denied. Patient didn't understand why since it was the only thing that has helped her ears.     Please contact patient at [phone]

## 2025-02-28 DIAGNOSIS — F41.9 ANXIETY: ICD-10-CM

## 2025-02-28 DIAGNOSIS — F51.01 PRIMARY INSOMNIA: ICD-10-CM

## 2025-02-28 RX ORDER — ALPRAZOLAM 1 MG/1
1 TABLET ORAL
Qty: 30 TABLET | Refills: 0 | Status: SHIPPED | OUTPATIENT
Start: 2025-02-28 | End: 2025-03-07 | Stop reason: SDUPTHER

## 2025-02-28 NOTE — TELEPHONE ENCOUNTER
Reason for call:   [x] Refill   [] Prior Auth  [] Other:     Office:   [x] PCP/Provider - [facility] Altamirano DO   [] Specialty/Provider -     Medication:  ALPRAZolam (XANAX) 1 mg tablet    Dose/Frequency: Take 1 tablet (1 mg total) by mouth daily at bedtime as needed for anxiety     Quantity: 30 tablet     Pharmacy: [id number] [address] [phone]    Does the patient have enough for 3 days?   [] Yes   [x] No - Send as HP to POD

## 2025-03-03 NOTE — TELEPHONE ENCOUNTER
Patient called requesting refill for Alprazolam 1 mg. Patient made aware medication was refilled on 2/28/25 for 30 with 0 refills to [facility] pharmacy. Patient instructed to contact the pharmacy to obtain refills of medication. Patient verbalized understanding.

## 2025-03-04 DIAGNOSIS — F51.01 PRIMARY INSOMNIA: ICD-10-CM

## 2025-03-05 ENCOUNTER — TELEPHONE (OUTPATIENT)
Dept: PSYCHIATRY | Facility: CLINIC | Age: 55
End: 2025-03-05

## 2025-03-05 RX ORDER — ESZOPICLONE 3 MG/1
3 TABLET, FILM COATED ORAL
Qty: 30 TABLET | Refills: 0 | Status: SHIPPED | OUTPATIENT
Start: 2025-03-05

## 2025-03-05 NOTE — LETTER
25     Ely Cantrell   : 1970   [address]  [place] [facility] [place] [address]       It is the policy of [facility] to monitor and manage appointments that have been no-showed or cancelled with less than 48-hour notice. This is necessary to ensure that we are able to provide timely access for all patients to our providers. Undue numbers of unutilized appointments delays necessary medical care for all patients.      Dear Ely Cantrell       We are sorry that you missed your appointment with Suzanne Cope MA on 3/4/2025. It has been 2 months or more since your last appointment. Your health and follow-up care are important to us. We want to make you aware that you do not have another appointment with Suzanne Cope MA scheduled.    Please be aware that our office policy states that if you 'no show' two or more Therapy appointments without prior notice of cancellation within in a calendar year, you may be discharged from Therapy treatment.  We want to bring this to your attention now to prevent an interruption of your care.  If you have any questions about this policy, please call us at the number above.     If we do not hear from you within 10 business days to make a follow up appointment, we will assume you are no longer interested in care here.    Thank you in advance for your cooperation and assistance.       Sincerely,      [facility] Support Staff

## 2025-03-05 NOTE — TELEPHONE ENCOUNTER
NO-SHOW LETTER MAILED TO Ely Cantrell.  ADDRESS: [address] [place] [id number]  SENT VIA [employer]

## 2025-03-07 ENCOUNTER — OFFICE VISIT (OUTPATIENT)
Dept: INTERNAL MEDICINE CLINIC | Facility: CLINIC | Age: 55
End: 2025-03-07
Payer: MEDICARE

## 2025-03-07 VITALS
OXYGEN SATURATION: 96 % | SYSTOLIC BLOOD PRESSURE: 140 MMHG | BODY MASS INDEX: 27.49 KG/M2 | HEART RATE: 68 BPM | HEIGHT: 65 IN | WEIGHT: 165 LBS | TEMPERATURE: 97.8 F | DIASTOLIC BLOOD PRESSURE: 80 MMHG | RESPIRATION RATE: 18 BRPM

## 2025-03-07 DIAGNOSIS — S32.050D COMPRESSION FRACTURE OF L5 VERTEBRA WITH ROUTINE HEALING: ICD-10-CM

## 2025-03-07 DIAGNOSIS — S32.040D COMPRESSION FRACTURE OF L4 VERTEBRA WITH ROUTINE HEALING, SUBSEQUENT ENCOUNTER: Primary | ICD-10-CM

## 2025-03-07 DIAGNOSIS — F51.01 PRIMARY INSOMNIA: ICD-10-CM

## 2025-03-07 DIAGNOSIS — G47.31 CENTRAL SLEEP APNEA: ICD-10-CM

## 2025-03-07 DIAGNOSIS — D68.51 FACTOR 5 LEIDEN MUTATION, HETEROZYGOUS (HCC): ICD-10-CM

## 2025-03-07 DIAGNOSIS — F41.9 ANXIETY: ICD-10-CM

## 2025-03-07 PROCEDURE — 99214 OFFICE O/P EST MOD 30 MIN: CPT | Performed by: INTERNAL MEDICINE

## 2025-03-07 RX ORDER — ALPRAZOLAM 1 MG/1
1.5 TABLET ORAL
Start: 2025-03-07
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
## 2025-03-07 NOTE — ASSESSMENT & PLAN NOTE
-Suboptimally controlled at this time.  Has been maintained on eszopiclone 3 mg daily at bedtime and alprazolam 1 mg daily at bedtime.  She has tried a number of prior medications as below.  Previously following with sleep medicine, she did have sleep study suggestive of mild sleep apnea, majority of events recorded or central apneas.  Could not tolerate ASV treatment  -After discussion, we will carefully increase alprazolam from 1 mg to 1.5 mg daily at bedtime.  I would be hesitant to further escalate dose of this in the future  -Continue eszopiclone 3 mg daily at bedtime    Previous medications: Ambien, trazodone, Lunesta, nortriptyline, temazepam, Seroquel, Belsomra was not covered by insurance        Orders:  •  ALPRAZolam (XANAX) 1 mg tablet; Take 1.5 tablets (1.5 mg total) by mouth daily at bedtime as needed for anxiety

## 2025-03-07 NOTE — ASSESSMENT & PLAN NOTE
Stable  Continue sertraline, Effexor  Continue alprazolam at bedtime for anxiety and insomnia  Orders:  •  ALPRAZolam (XANAX) 1 mg tablet; Take 1.5 tablets (1.5 mg total) by mouth daily at bedtime as needed for anxiety

## 2025-03-07 NOTE — ASSESSMENT & PLAN NOTE
Underwent sleep study in May 2023 with finding of mild sleep apnea, majority of respiratory events were central apneas.  Likely related to use of methadone and possibly also alprazolam.  She was unfortunately unable to tolerate ASV treatment
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
## 2025-03-13 DIAGNOSIS — M25.512 LEFT SHOULDER PAIN, UNSPECIFIED CHRONICITY: ICD-10-CM

## 2025-03-13 RX ORDER — METHADONE HYDROCHLORIDE 10 MG/1
30 TABLET ORAL DAILY
Qty: 90 TABLET | Refills: 0 | Status: SHIPPED | OUTPATIENT
Start: 2025-03-13

## 2025-03-13 NOTE — TELEPHONE ENCOUNTER
Reason for call:   [x] Refill   [] Prior Auth  [] Other:     Office:   [x] PCP/Provider - Solis Altamirano, DO   [] Specialty/Provider -     Medication:     methadone (Dolophine) 10 mg tablet       Dose/Frequency:     30 mg, Oral, Daily       Quantity: 90    Ph[facility]: [id number] - [address] [facility]   Does the patient have enough for 3 days?   [] Yes   [x] No - Send as HP to POD    Mail Away Pharmacy   Does the patient have enough for 10 days?   [] Yes   [] No - Send as HP to POD

## 2025-03-20 DIAGNOSIS — F41.9 ANXIETY: ICD-10-CM

## 2025-03-20 DIAGNOSIS — F51.01 PRIMARY INSOMNIA: ICD-10-CM

## 2025-03-20 NOTE — TELEPHONE ENCOUNTER
Reason for call:   [x] Refill   [] Prior Auth  [x] Other: not a duplicate, change in dose and pt used up last refill sooner due to dose increase    Office:   [x] PCP/Provider - Solis Altamirano, DO  [] Specialty/Provider -     Medication: ALPRAZolam (XANAX) 1 mg tablet     Dose/Frequency:     Take 1.5 tablets (1.5 mg total) by mouth daily at bedtime as needed for anxiety       Quantity:     Pharmacy: [id number] [phone]     Local Pharmacy   Does the patient have enough for 3 days?   [] Yes   [x] No - Send as HP to POD    Mail Away Pharmacy   Does the patient have enough for 10 days?   [] Yes   [] No - Send as HP to POD

## 2025-03-21 RX ORDER — ALPRAZOLAM 1 MG/1
1.5 TABLET ORAL
Qty: 45 TABLET | Refills: 0 | Status: SHIPPED | OUTPATIENT
Start: 2025-03-21

## 2025-03-21 NOTE — TELEPHONE ENCOUNTER
Patient called the office requesting for the status on her medication refill. Pt was made aware that the provider hasn't review the message as of yet. Pt was also made aware that she will be contacted once the refill is sent in. No further questions.

## 2025-03-21 NOTE — TELEPHONE ENCOUNTER
Received warm xfer   Spoke with patient   C.o issues with Xanax   Last OV 03/07   Xanax was increased from 1mg to 1.5mg   States pharmacy will not fill medication     Called [facility]   Spoke with pharmacist   I reviewed when medication dose was changed 03/07 provided documented change. See 03/07 OV encounter   Pharmacists stated she will refill medication   Advised Patient the refill will be ready for  shortly

## 2025-03-28 ENCOUNTER — TELEPHONE (OUTPATIENT)
Age: 55
End: 2025-03-28

## 2025-03-28 NOTE — TELEPHONE ENCOUNTER
Called spoke with patient advised per Dr Albarran she is to been seen either in office or virtual visit and if she is not able to do either she is to then go to Urgent Care to be evaluated. 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 She asked if she can use OTC eye drops and Dr Albarran stated she can use Naphcon-A eye drop but if she is not better she is to again go to  to be seen over the weekend. Patient understood

## 2025-03-28 NOTE — TELEPHONE ENCOUNTER
Patient contacted the office this morning asking if pcp would be willing to prescribe an eye ointment. She has had left liquid discharge from left eye, redness. Film over eye each morning. Had pink eye in the past. She is a  and can't get into the office today to be seen. Would like medication be sent to [facility] in [place].

## 2025-04-07 DIAGNOSIS — E53.8 B12 DEFICIENCY: ICD-10-CM

## 2025-04-07 DIAGNOSIS — F51.01 PRIMARY INSOMNIA: ICD-10-CM

## 2025-04-07 DIAGNOSIS — F32.A DEPRESSION, UNSPECIFIED DEPRESSION TYPE: ICD-10-CM

## 2025-04-07 NOTE — TELEPHONE ENCOUNTER
Reason for call:   [x] Refill   [] Prior Auth  [] Other:     Office:   [x] PCP/Provider - Dr Altamirano   [] Specialty/Provider -     Medication: eszopiclone 3 mg take daily at bedtime as needed #30    Vitamin B-12 1000 daily #90      Pharmacy: [facility] [place]     Local Pharmacy   Does the patient have enough for 3 days?   [x] Yes   [] No - Send as HP to POD    Mail Away Pharmacy   Does the patient have enough for 10 days?   [] Yes   [] No - Send as HP to POD

## 2025-04-08 RX ORDER — ESZOPICLONE 3 MG/1
3 TABLET, FILM COATED ORAL
Qty: 30 TABLET | Refills: 0 | Status: SHIPPED | OUTPATIENT
Start: 2025-04-08

## 2025-04-08 RX ORDER — VENLAFAXINE HYDROCHLORIDE 75 MG/1
75 CAPSULE, EXTENDED RELEASE ORAL DAILY
Qty: 90 CAPSULE | Refills: 1 | Status: SHIPPED | OUTPATIENT
Start: 2025-04-08

## 2025-04-08 RX ORDER — LANOLIN ALCOHOL/MO/W.PET/CERES
1000 CREAM (GRAM) TOPICAL DAILY
Qty: 90 TABLET | Refills: 1 | Status: SHIPPED | OUTPATIENT
Start: 2025-04-08

## 2025-04-10 DIAGNOSIS — M25.512 LEFT SHOULDER PAIN, UNSPECIFIED CHRONICITY: ICD-10-CM

## 2025-04-10 RX ORDER — METHADONE HYDROCHLORIDE 10 MG/1
30 TABLET ORAL DAILY
Qty: 90 TABLET | Refills: 0 | Status: SHIPPED | OUTPATIENT
Start: 2025-04-10

## 2025-04-10 NOTE — TELEPHONE ENCOUNTER
Reason for call:   [x] Refill   [] Prior Auth  [] Other:     Office:   [x] [facility]  Authorized By: Solis Altamirano DO    [] Specialty/Provider -     Medication: methadone (Dolophine) 10 mg tablet    Dose/Frequency:  Take 3 tablets (30 mg total) by mouth daily     Quantity: 90 tablet    Pharmacy: [id number] [address] Pharmacy   Does the patient have enough for 3 days?   [] Yes   [x] No - Send as HP to POD    Mail Away Pharmacy   Does the patient have enough for 10 days?   [] Yes   [] No - Send as HP to POD

## 2025-04-17 DIAGNOSIS — F41.9 ANXIETY: ICD-10-CM

## 2025-04-17 DIAGNOSIS — F51.01 PRIMARY INSOMNIA: ICD-10-CM

## 2025-04-17 NOTE — TELEPHONE ENCOUNTER
Reason for call:   [x] Refill   [] Prior Auth  [] Other:     Office:   [x] PCP/Provider -   [] Specialty/Provider -     Medication: Alprazolam     Dose/Frequency: 1 mg tablet. Take 1.5 mg by mouth total daily at bedtime PRN for anxiety     Quantity: 45 tabs    Pharmacy: [facility] #[id number] [address] [phone]     Local Pharmacy   Does the patient have enough for 3 days?   [] Yes   [x] No - Send as HP to POD    Mail Away Pharmacy   Does the patient have enough for 10 days?   [] Yes   [] No - Send as HP to POD

## 2025-04-19 RX ORDER — ALPRAZOLAM 1 MG/1
1.5 TABLET ORAL
Qty: 45 TABLET | Refills: 0 | Status: SHIPPED | OUTPATIENT
Start: 2025-04-19

## 2025-04-22 ENCOUNTER — TELEPHONE (OUTPATIENT)
Dept: DENTISTRY | Facility: CLINIC | Age: 55
End: 2025-04-22

## 2025-04-22 DIAGNOSIS — G43.909 MIGRAINE WITHOUT STATUS MIGRAINOSUS, NOT INTRACTABLE, UNSPECIFIED MIGRAINE TYPE: ICD-10-CM

## 2025-04-22 RX ORDER — TOPIRAMATE 100 MG/1
100 TABLET, FILM COATED ORAL 2 TIMES DAILY
Qty: 180 TABLET | Refills: 1 | Status: SHIPPED | OUTPATIENT
Start: 2025-04-22

## 2025-04-29 ENCOUNTER — APPOINTMENT (OUTPATIENT)
Dept: URGENT CARE | Age: 55
End: 2025-04-29

## 2025-04-30 DIAGNOSIS — F51.01 PRIMARY INSOMNIA: ICD-10-CM

## 2025-04-30 NOTE — TELEPHONE ENCOUNTER
Reason for call:   [x] Refill   [] Prior Auth  [] Other:     Office:    [facility]  Authorized By: Solis Altamirano DO PCP/Provider -   [] Specialty/Provider -     Medication: eszopiclone (LUNESTA) 3 MG tablet     Dose/Frequency: Take 1 tablet (3 mg total) by mouth daily at bedtime as needed for sleep Take immediately before bedtime     Quantity: 30    Pharmacy: [id number] [address] Pharmacy   Does the patient have enough for 3 days?   [x] Yes   [] No - Send as HP to POD    Mail Away Pharmacy   Does the patient have enough for 10 days?   [] Yes   [] No - Send as HP to POD

## 2025-05-01 ENCOUNTER — DOCUMENTATION (OUTPATIENT)
Dept: ADMINISTRATIVE | Facility: OTHER | Age: 55
End: 2025-05-01

## 2025-05-01 DIAGNOSIS — F51.01 PRIMARY INSOMNIA: ICD-10-CM

## 2025-05-01 RX ORDER — ESZOPICLONE 3 MG/1
3 TABLET, FILM COATED ORAL
Qty: 30 TABLET | Refills: 0 | Status: SHIPPED | OUTPATIENT
Start: 2025-05-01 | End: 2025-05-01 | Stop reason: SDUPTHER

## 2025-05-01 RX ORDER — ESZOPICLONE 3 MG/1
3 TABLET, FILM COATED ORAL
Qty: 30 TABLET | Refills: 0 | Status: SHIPPED | OUTPATIENT
Start: 2025-05-01

## 2025-05-01 NOTE — PROGRESS NOTES
05/01/25 10:51 AM    Osteoporosis Management Post Fracture outreach is not required; the patient has already been scheduled for a DEXA scan .    Thank you.  Isis Bean MA  [facility]

## 2025-05-03 DIAGNOSIS — H69.93 DYSFUNCTION OF BOTH EUSTACHIAN TUBES: ICD-10-CM

## 2025-05-03 DIAGNOSIS — H92.03 EAR DISCOMFORT, BILATERAL: ICD-10-CM

## 2025-05-04 RX ORDER — PSEUDOEPHEDRINE HYDROCHLORIDE 60 MG/1
60 TABLET, FILM COATED ORAL EVERY 6 HOURS PRN
Qty: 20 TABLET | Refills: 0 | Status: SHIPPED | OUTPATIENT
Start: 2025-05-04 | End: 2025-05-05 | Stop reason: SDUPTHER

## 2025-05-05 DIAGNOSIS — H69.93 DYSFUNCTION OF BOTH EUSTACHIAN TUBES: ICD-10-CM

## 2025-05-05 DIAGNOSIS — H92.03 EAR DISCOMFORT, BILATERAL: ICD-10-CM

## 2025-05-06 ENCOUNTER — TELEPHONE (OUTPATIENT)
Age: 55
End: 2025-05-06

## 2025-05-06 RX ORDER — PSEUDOEPHEDRINE HYDROCHLORIDE 60 MG/1
60 TABLET, FILM COATED ORAL EVERY 6 HOURS PRN
Qty: 20 TABLET | Refills: 0 | Status: SHIPPED | OUTPATIENT
Start: 2025-05-06 | End: 2025-05-11

## 2025-05-06 NOTE — TELEPHONE ENCOUNTER
NOT A DUPLICATE. [facility] DOES NOT HAVE A SCRIPT    Reason for call:   [x] Refill   [] Prior Auth  [] Other:     Office:   [x] PCP/Provider -   [] Specialty/Provider -     Medication: eszopiclone (LUNESTA) 3 MG tablet     Dose/Frequency: Take 1 tablet (3 mg total) by mouth daily at bedtime as needed     Quantity: 30 TABLET    Pharmacy: [id number] [address] Pharm[facility]   Does the patient have enough for 3 days?   [] Yes   [x] No - Send as HP to POD    Mail Away Pharmacy   Does the patient have enough for 10 days?   [] Yes   [] No - Send as HP to POD
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
## 2025-05-07 ENCOUNTER — NURSE TRIAGE (OUTPATIENT)
Age: 55
End: 2025-05-07

## 2025-05-07 DIAGNOSIS — F51.01 PRIMARY INSOMNIA: ICD-10-CM

## 2025-05-07 RX ORDER — ESZOPICLONE 3 MG/1
3 TABLET, FILM COATED ORAL
Qty: 30 TABLET | Refills: 0 | Status: SHIPPED | OUTPATIENT
Start: 2025-05-07

## 2025-05-07 NOTE — TELEPHONE ENCOUNTER
"Reason for Disposition   Caller has NON-URGENT medicine question about med that PCP or specialist prescribed and triager unable to answer question    Answer Assessment - Initial Assessment Questions  1. NAME of MEDICINE: \"What medicine(s) are you calling about?\"      Lunesta  2. QUESTION: \"What is your question?\" (e.g., double dose of medicine, side effect)      Went to the pharmacy on 5/1/25  3. PRESCRIBER: \"Who prescribed the medicine?\" Reason: if prescribed by specialist, call should be referred to that group.      PCP  4. SYMPTOMS: \"Do you have any symptoms?\" If Yes, ask: \"What symptoms are you having?\"  \"How bad are the symptoms (e.g., mild, moderate, severe)      Unable to sleep  5. PREGNANCY:  \"Is there any chance that you are pregnant?\" \"When was your last menstrual period?\"      N/A    Protocols used: Medication Question Call-Adult-[place]  FOLLOW UP: Medication was sent to [facility] on 5/1/25 but the "[employer] was UNABLE to accept any electronic medications due to a system failure. Patient asked again on 5/3/25 and the prescription was refused stating it was a duplicate    REASON FOR CONVERSATION: Medication Problem    SYMPTOMS: N/A    OTHER: Please resend this medication. This is NOT a duplicate. RN cannot send as this is controlled substance    DISPOSITION: Callback by PCP Today    "

## 2025-05-08 ENCOUNTER — TELEPHONE (OUTPATIENT)
Dept: PSYCHIATRY | Facility: CLINIC | Age: 55
End: 2025-05-08

## 2025-05-08 NOTE — TELEPHONE ENCOUNTER
Provider contacted patient to determine interest in services and scheduled next apt for 6/11 at 8am and put on cancellation list.

## 2025-05-09 DIAGNOSIS — M25.512 LEFT SHOULDER PAIN, UNSPECIFIED CHRONICITY: ICD-10-CM

## 2025-05-09 NOTE — TELEPHONE ENCOUNTER
Reason for call:   [x] Refill   [] Prior Auth  [] Other:     Office:   [x] PCP/Provider - INTERNAL [facility] [place]   [] Specialty/Provider -     Medication: methadone (Dolophine) 10 mg tablet     Dose/Frequency: 30 mg daily    Quantity: 90    Pharmacy: [id number]    Local Pharmacy   Does the patient have enough for 3 days?   [] Yes   [x] No - Send as HP to POD    Mail Away Pharmacy   Does the patient have enough for 10 days?   [] Yes   [] No - Send as HP to POD

## 2025-05-10 RX ORDER — METHADONE HYDROCHLORIDE 10 MG/1
30 TABLET ORAL DAILY
Qty: 90 TABLET | Refills: 0 | Status: SHIPPED | OUTPATIENT
Start: 2025-05-10

## 2025-05-27 ENCOUNTER — TRANSITIONAL CARE MANAGEMENT (OUTPATIENT)
Dept: INTERNAL MEDICINE CLINIC | Facility: CLINIC | Age: 55
End: 2025-05-27

## 2025-05-27 ENCOUNTER — TELEPHONE (OUTPATIENT)
Dept: INTERNAL MEDICINE CLINIC | Facility: CLINIC | Age: 55
End: 2025-05-27

## 2025-05-27 NOTE — TELEPHONE ENCOUNTER
Pt had called to schedule a TCM visit which was made for 5/30/2025 at 9:45 AM. She has an appointment scheduled in July for an opoid test which she had hoped could be addressed on Friday. After discussing with Dr. Altamirano, he wants to focus 5/30 visit on just the TCM

## 2025-05-30 ENCOUNTER — TELEPHONE (OUTPATIENT)
Age: 55
End: 2025-05-30

## 2025-05-30 ENCOUNTER — NURSE TRIAGE (OUTPATIENT)
Age: 55
End: 2025-05-30

## 2025-05-30 ENCOUNTER — OFFICE VISIT (OUTPATIENT)
Dept: INTERNAL MEDICINE CLINIC | Facility: CLINIC | Age: 55
End: 2025-05-30
Payer: MEDICARE

## 2025-05-30 VITALS
DIASTOLIC BLOOD PRESSURE: 72 MMHG | HEIGHT: 65 IN | WEIGHT: 158 LBS | OXYGEN SATURATION: 79 % | BODY MASS INDEX: 26.33 KG/M2 | HEART RATE: 106 BPM | RESPIRATION RATE: 18 BRPM | SYSTOLIC BLOOD PRESSURE: 140 MMHG | TEMPERATURE: 97.7 F

## 2025-05-30 DIAGNOSIS — F51.01 PRIMARY INSOMNIA: ICD-10-CM

## 2025-05-30 DIAGNOSIS — K21.9 GERD WITHOUT ESOPHAGITIS: ICD-10-CM

## 2025-05-30 DIAGNOSIS — F32.A DEPRESSION, UNSPECIFIED DEPRESSION TYPE: ICD-10-CM

## 2025-05-30 DIAGNOSIS — T50.902D INTENTIONAL OVERDOSE, SUBSEQUENT ENCOUNTER: Primary | ICD-10-CM

## 2025-05-30 DIAGNOSIS — R79.89 ELEVATED LFTS: ICD-10-CM

## 2025-05-30 DIAGNOSIS — E53.8 B12 DEFICIENCY: ICD-10-CM

## 2025-05-30 DIAGNOSIS — M25.512 LEFT SHOULDER PAIN, UNSPECIFIED CHRONICITY: ICD-10-CM

## 2025-05-30 DIAGNOSIS — G43.909 MIGRAINE WITHOUT STATUS MIGRAINOSUS, NOT INTRACTABLE, UNSPECIFIED MIGRAINE TYPE: ICD-10-CM

## 2025-05-30 DIAGNOSIS — G25.81 RLS (RESTLESS LEGS SYNDROME): ICD-10-CM

## 2025-05-30 DIAGNOSIS — E55.9 VITAMIN D DEFICIENCY: ICD-10-CM

## 2025-05-30 DIAGNOSIS — I82.409 ACUTE DVT (DEEP VENOUS THROMBOSIS) (HCC): ICD-10-CM

## 2025-05-30 PROCEDURE — 99495 TRANSJ CARE MGMT MOD F2F 14D: CPT | Performed by: INTERNAL MEDICINE

## 2025-05-30 RX ORDER — TOPIRAMATE 100 MG/1
100 TABLET, FILM COATED ORAL 2 TIMES DAILY
Qty: 180 TABLET | Refills: 1 | Status: SHIPPED | OUTPATIENT
Start: 2025-05-30

## 2025-05-30 RX ORDER — TRAZODONE HYDROCHLORIDE 100 MG/1
100 TABLET ORAL
Qty: 90 TABLET | Refills: 0 | Status: SHIPPED | OUTPATIENT
Start: 2025-05-30

## 2025-05-30 RX ORDER — SERTRALINE HYDROCHLORIDE 100 MG/1
100 TABLET, FILM COATED ORAL 2 TIMES DAILY
Qty: 180 TABLET | Refills: 1 | Status: SHIPPED | OUTPATIENT
Start: 2025-05-30

## 2025-05-30 RX ORDER — METHADONE HYDROCHLORIDE 10 MG/1
30 TABLET ORAL DAILY
Qty: 90 TABLET | Refills: 0 | Status: SHIPPED | OUTPATIENT
Start: 2025-05-30 | End: 2025-05-30 | Stop reason: SDUPTHER

## 2025-05-30 RX ORDER — QUETIAPINE FUMARATE 50 MG/1
TABLET, FILM COATED ORAL
COMMUNITY
Start: 2025-05-28 | End: 2025-05-30 | Stop reason: SDUPTHER

## 2025-05-30 RX ORDER — LANOLIN ALCOHOL/MO/W.PET/CERES
1000 CREAM (GRAM) TOPICAL DAILY
Qty: 90 TABLET | Refills: 1 | Status: SHIPPED | OUTPATIENT
Start: 2025-05-30

## 2025-05-30 RX ORDER — OMEPRAZOLE 20 MG/1
20 CAPSULE, DELAYED RELEASE ORAL DAILY
Qty: 90 CAPSULE | Refills: 1 | Status: SHIPPED | OUTPATIENT
Start: 2025-05-30

## 2025-05-30 RX ORDER — QUETIAPINE FUMARATE 50 MG/1
50 TABLET, FILM COATED ORAL
Qty: 90 TABLET | Refills: 0 | Status: SHIPPED | OUTPATIENT
Start: 2025-05-30 | End: 2025-05-30

## 2025-05-30 RX ORDER — ROPINIROLE 1 MG/1
1 TABLET, FILM COATED ORAL DAILY
Qty: 90 TABLET | Refills: 0 | Status: SHIPPED | OUTPATIENT
Start: 2025-05-30

## 2025-05-30 RX ORDER — METHADONE HYDROCHLORIDE 10 MG/1
30 TABLET ORAL DAILY
Qty: 90 TABLET | Refills: 0 | Status: CANCELLED | OUTPATIENT
Start: 2025-05-30

## 2025-05-30 RX ORDER — VENLAFAXINE HYDROCHLORIDE 150 MG/1
CAPSULE, EXTENDED RELEASE ORAL
COMMUNITY
Start: 2025-05-28 | End: 2025-05-30

## 2025-05-30 RX ORDER — VENLAFAXINE HYDROCHLORIDE 37.5 MG/1
75 CAPSULE, EXTENDED RELEASE ORAL
Qty: 90 CAPSULE | Refills: 0 | Status: SHIPPED | OUTPATIENT
Start: 2025-05-30

## 2025-05-30 RX ORDER — METHADONE HYDROCHLORIDE 10 MG/1
30 TABLET ORAL DAILY
Qty: 90 TABLET | Refills: 0 | Status: SHIPPED | OUTPATIENT
Start: 2025-05-30

## 2025-05-30 RX ORDER — ACETAMINOPHEN 160 MG
4000 TABLET,DISINTEGRATING ORAL DAILY
Qty: 180 CAPSULE | Refills: 1 | Status: SHIPPED | OUTPATIENT
Start: 2025-05-30

## 2025-05-30 RX ORDER — ROPINIROLE 1 MG/1
1 TABLET, FILM COATED ORAL DAILY
COMMUNITY
End: 2025-05-30 | Stop reason: SDUPTHER

## 2025-05-30 NOTE — PROGRESS NOTES
Transition of Care Visit:  Name: Ely Rivera      : 1970      MRN: [id number]  Encounter Provider: Solis Altamirano DO  Encounter Date: 2025   Encounter department: [facility] INTERNAL MEDICINE [place]    Medical history of protein S deficiency/factor 5 Leiden mutation with chronic DVT on Eliquis, SBO in May of 2021 status post surgical intervention, chronic pain maintained on methadone, status post gastric bypass procedure complicated by iron deficiency anemia, L4/5 compression fractures, central sleep apnea, insomnia    Assessment & Plan  Intentional overdose, subsequent encounter  Here today for TCM appointment.  Admitted at [facility] from  to .  Presented after suicide attempt at home.  Patient took multiple pills of methadone, Lunesta, Xanax, rubbed THC lotion over her body.  Admitted to the ICU and started on naloxone drip.  Initially noted to have long QT on EKG which resolved.  Improved clinically and was deemed stable for discharge.  She did spend 8 days at [facility].  Overall has been doing better since being home. Does not report any SI today. Medications are being closely monitored in conjunction with family members.  She has completely stopped alprazolam and Lunesta.  Requip was recently started to address RLS like symptoms.  Trazodone and more recently Seroquel was added to her regimen.  She will be following at [facility] for counseling.    Plan:  -Brief counseling provided today.  She will continue with sertraline.  Provided her with instructions to wean off of venlafaxine to reduce her pill burden and further reduce risk of polypharmacy  - Also recommend discontinuation of Seroquel at this time and will continue with trazodone  -We will cautiously continue with methadone-30 mg daily which she has been taking chronically for some time.  Her dose has been stable.  PDMP was reviewed  - She should continue to follow closely with [facility] for counseling/talk  therapy         Left shoulder pain, unspecified chronicity         Depression, unspecified depression type      Orders:  •  venlafaxine (EFFEXOR-XR) 37.5 mg 24 hr capsule; Take 2 capsules (75 mg total) by mouth daily with breakfast  •  sertraline (ZOLOFT) 100 mg tablet; Take 1 tablet (100 mg total) by mouth 2 (two) times a day    RLS (restless legs syndrome)  Continue Requip  Should check iron studies with next set of labs  Orders:  •  rOPINIRole (REQUIP) 1 mg tablet; Take 1 tablet (1 mg total) by mouth in the morning    Elevated LFTs  LFTs downtrending on discharge, AST of 42, ALT of 82 prior to discharge  Recommend rechecking CMP at this time  Orders:  •  Comprehensive metabolic panel; Future    Acute DVT (deep venous thrombosis) (HCC)    Orders:  •  apixaban (Eliquis) 5 mg; Take 1 tablet (5 mg total) by mouth 2 (two) times a day    GERD without esophagitis    Orders:  •  omeprazole (PriLOSEC) 20 mg delayed release capsule; Take 1 capsule (20 mg total) by mouth daily    Migraine without status migrainosus, not intractable, unspecified migraine type    Orders:  •  topiramate (TOPAMAX) 100 mg tablet; Take 1 tablet (100 mg total) by mouth 2 (two) times a day    Vitamin D deficiency    Orders:  •  Cholecalciferol (Vitamin D3) 50 MCG (2000 UT) capsule; Take 2 capsules (4,000 Units total) by mouth daily    B12 deficiency    Orders:  •  vitamin B-12 (VITAMIN B-12) 1,000 mcg tablet; Take 1 tablet (1,000 mcg total) by mouth daily    Primary insomnia    Orders:  •  traZODone (DESYREL) 100 mg tablet; Take 1 tablet (100 mg total) by mouth daily at bedtime         History of Present Illness     Transitional Care Management Review:   Ely Rivera is a 55 y.o. female here for TCM follow up.     During the TCM phone call patient stated:  TCM Call (since 5/21/2025)     Date and time call was made  5/27/2025 11:23 AM    Patient was hospitialized at  [facility]    Date of Admission  05/13/25    Date of discharge   "05/20/25    Diagnosis  Drug overdose      TCM Call (since 5/21/2025)     Scheduled for follow up?  Yes        HPI  Review of Systems  Objective   /72 (BP Location: Left arm, Patient Position: Sitting, Cuff Size: Standard)   Pulse (!) 106   Temp 97.7 °F (36.5 °C) (Temporal)   Resp 18   Ht 5' 5\" (1.651 m)   Wt 71.7 kg (158 lb)   LMP 08/12/2020   SpO2 (!) 79%   BMI 26.29 kg/m²     Physical Exam  Medications have been reviewed by provider in current encounter      "

## 2025-05-30 NOTE — TELEPHONE ENCOUNTER
REASON FOR CONVERSATION: Medication Problem    SYMPTOMS: N/A    OTHER HEALTH INFORMATION: Prescription sent to [facility], not in stock until Monday and ok to fill early from pcp, asking to please be resent to [facility] in [place]    PROTOCOL DISPOSITION: Home Care    CARE ADVICE PROVIDED: Pending for pcp to fill at alternate pharmacy per patient request.    PRACTICE FOLLOW-UP: Unable to fill, forwarded to office clinical to please have pcp fill as soon as possible as patient has none at this time. Please contact patient back once medication has been sent to [facility].      Reason for Disposition   Prescription prescribed recently is not at pharmacy and triager has access to patient's EMR and prescription is recorded in the EMR    Additional Information   Negative: Intentional drug overdose and suicidal thoughts or ideas   Negative: Drug overdose and triager unable to answer question   Negative: Caller requesting a renewal or refill of a medicine patient is currently taking   Negative: Caller requesting information unrelated to medicine   Negative: Caller requesting information about COVID-19 Vaccine   Negative: Caller requesting information about Emergency Contraception   Negative: Caller requesting information about Combined Birth Control Pills   Negative: Caller requesting information about Progestin Birth Control Pills   Negative: Caller requesting information about post-op pain or medicines   Negative: Caller requesting a prescription antibiotic (such as penicillin) for Strep throat and has a positive culture result   Negative: Caller requesting a prescription anti-viral med (such as Tamiflu) and has influenza (flu) symptoms   Negative: Immunization reaction suspected   Negative: Rash while taking a medicine or within 3 days of stopping it   Negative: Asthma and having symptoms of asthma (cough, wheezing, etc.)   Negative: Symptom of illness (e.g., headache, abdominal pain, earache, vomiting) that  are more than mild   Negative: Breastfeeding questions about mother's medicines and diet   Negative: MORE THAN A DOUBLE DOSE of a prescription or over-the-counter (OTC) drug   Negative: DOUBLE DOSE (an extra dose or lesser amount) of prescription drug and any symptoms (e.g., dizziness, nausea, pain, sleepiness)   Negative: DOUBLE DOSE (an extra dose or lesser amount) of over-the-counter (OTC) drug and any symptoms (e.g., dizziness, nausea, pain, sleepiness)   Negative: Took another person's prescription drug   Negative: DOUBLE DOSE (an extra dose or lesser amount) of prescription drug and NO symptoms  (Exception: A double dose of antibiotics.)   Negative: Diabetes drug error or overdose (e.g., took wrong type of insulin or took extra dose)   Negative: Caller has medication question about med NOT prescribed by PCP and triager unable to answer question (e.g., compatibility with other med, storage)   Negative: Prescription not at pharmacy and was prescribed by PCP recently  (Exception: triager has access to EMR and prescription is recorded there. Go to Home Care and confirm for pharmacy.)   Negative: Pharmacy calling with prescription question and triager unable to answer question   Negative: Caller has URGENT medicine question about med that PCP or specialist prescribed and triager unable to answer question   Negative: Caller has NON-URGENT medicine question about med that PCP or specialist prescribed and triager unable to answer question   Negative: Caller wants to use a complementary or alternative medicine   Negative: Medicine patch causing local rash or itching   Negative: Prescription request for new medicine (not a refill)   Negative: DOUBLE DOSE (an extra dose or lesser amount) of over-the-counter (OTC) drug and NO symptoms   Negative: DOUBLE DOSE (an extra dose or lesser amount) of antibiotic drug and NO symptoms   Negative: Caller has medicine question only, adult not sick, and triager answers question    "Negative: Caller has medicine question, adult has minor symptoms, caller declines triage, and triager answers question   Negative: Caller requesting information about medicine during pregnancy; adult is not ill AND triager answers question   Negative: Caller requesting information about medicine use with breastfeeding; neither adult nor infant is ill, triager answers question   Negative: Pharmacy calling with prescription question and triager answers question    Answer Assessment - Initial Assessment Questions  1. NAME of MEDICINE: \"What medicine(s) are you calling about?\"      methadone (Dolophine) 10 mg tablet   2. QUESTION: \"What is your question?\" (e.g., double dose of medicine, side effect)      Not in stock at [facility], asking to be sent to [facility] in [place]  3. PRESCRIBER: \"Who prescribed the medicine?\" Reason: if prescribed by specialist, call should be referred to that group.      PCP - Dr. Altamirano    Protocols used: Medication Question Call-Adult-[place]    "

## 2025-05-30 NOTE — PATIENT INSTRUCTIONS
Please wean venlafaxine as follows:    Please resume venlafaxine 75 mg total (2 capsules) daily for 1 week  Then, reduce to 37.5 mg total (1 capsule) daily for 1 week  Then, take 1 capsule every other day for 1 week  Then, discontinue venlafaxine

## 2025-05-30 NOTE — ASSESSMENT & PLAN NOTE
Orders:  •  venlafaxine (EFFEXOR-XR) 37.5 mg 24 hr capsule; Take 2 capsules (75 mg total) by mouth daily with breakfast  •  sertraline (ZOLOFT) 100 mg tablet; Take 1 tablet (100 mg total) by mouth 2 (two) times a day

## 2025-05-30 NOTE — ASSESSMENT & PLAN NOTE
Orders:  •  Cholecalciferol (Vitamin D3) 50 MCG (2000 UT) capsule; Take 2 capsules (4,000 Units total) by mouth daily

## 2025-05-30 NOTE — TELEPHONE ENCOUNTER
Patient called in regarding the Methadone script. She stated that [facility] wouldn't fill it. The pharmacy called over and was waiting for a reason why the Methadone  needs early - Tried calling over to the office and didn't get anyone to  - Patient stated that we are calling her right now.

## 2025-05-30 NOTE — ASSESSMENT & PLAN NOTE
Orders:  •  traZODone (DESYREL) 100 mg tablet; Take 1 tablet (100 mg total) by mouth daily at bedtime

## 2025-06-10 ENCOUNTER — TELEPHONE (OUTPATIENT)
Age: 55
End: 2025-06-10

## 2025-06-10 NOTE — TELEPHONE ENCOUNTER
Patient would like a recommendation to an Ortho for Fractures(not recent) in her back. Please call.

## 2025-06-16 ENCOUNTER — DOCUMENTATION (OUTPATIENT)
Dept: PSYCHIATRY | Facility: CLINIC | Age: 55
End: 2025-06-16

## 2025-06-16 ENCOUNTER — TELEPHONE (OUTPATIENT)
Dept: PSYCHIATRY | Facility: CLINIC | Age: 55
End: 2025-06-16

## 2025-06-16 DIAGNOSIS — F32.A DEPRESSION, UNSPECIFIED DEPRESSION TYPE: Primary | ICD-10-CM

## 2025-06-16 DIAGNOSIS — F41.9 ANXIETY: ICD-10-CM

## 2025-06-16 NOTE — LETTER
06/16/25       Ely Rivrea   [address]  [place] [address]       Dear Ely Rivera:    It has been our pleasure to serve your needs. Since you are no longer able to continue your treatment with Suzanne Cope MA at [facility], your chart is being closed at this time.    If you wish to return to [facility], you will need to have another initial assessment and intake appointment. Please call [phone] to schedule a new psychiatric intake if you are interested in doing so.      Please follow-up with your primary care provider for continual care.  When you have scheduled an appointment with another agency, please feel free to complete a release of information so that your records can be transferred to your new provider prior to your first appointment.  This will aid with the continuity of your care.      Sincerely,           Suzanne Cope MA     [facility]        We will continue to provide psychotropic medications and/or emergency counseling as deemed appropriate by clinical staff for 45 days from receipt of this letter.                For a referral to another agency, we would suggest that you contact your primary health care provider or insurance company.   Please see Provider's List of agencies below:    Outpatient Mental Health Adult  [facility].  [address]  Neto ORDAZ.  [phone]   Everton Meier MD to [address] [facility].  Kyle ORDAZ. [phone]   [facility] [address] [place] [address]. Michael Wright. [phone]   Charlotte Cuevas MD [address] [place] [address]. [phone]   Ruddy Urias MD, [address] , Michael Wright. [phone]   Outpatient Mental Health Children, Adolescents and Family  [facility] IU [address]21: [address] MICHAEL Altamirano [address] [phone]  [facility] [address]  MICHAEL Bazan [address]  and MICHAEL Wright [id number], [address]   [phone]  [facility] (14 and over)  [address] ORLIN Duque  [phone]  Outpatient Mental Health [place]  [facility] [address] [place] [address] [phone]  [facility]:   [place] [address] Phone: [phone]  [facility] Outpatient:Winona Loraine, [address] [place] [address] New Admissions [phone] Local [phone]  [facility]:   [facility] :[address] [place], [place] [address] Phone: [phone]  [facility] Outpatient: [address] [place] [address] Phone: New Admissions [phone] / Local Office [phone]  Drug & Alcohol Services:  [facility]:   [phone] or [phone]  [facility]:  [phone] or [phone]  [facility]:  [phone]  [facility]:  [phone]  [facility]: [phone]    [facility]:   [facility]:  [address] [address]  Phone: [phone]  [facility] CRISIS NUMBERS:    [place]:  [phone]    [place]: [phone] or [phone]    [place] / [place]: [phone]    [place]: [phone]    [place]: [phone]    [place]: [phone] ([phone])    [place]: [phone]    National Suicide Prevention Hotline:  1-619.101.3260 (TALK)

## 2025-06-16 NOTE — PROGRESS NOTES
"Psychiatric Discharge Summary   Discharge Summary: Admission Date 10/3/2024 and Discharge Date 6/16/2025   Discharge Diagnosis:  1. Depression, unspecified depression type        2. Anxiety          Treating Physician: n/a, Treatment Complications: n/a, Admit with discharge: n/a  Prognosis at time of discharge: Poor  Presenting Problems/Pertinent Findings: \" My biggest thing is my chronic pain now for 12 years which is overwhelming I try not to let it affect my life or talk to people about it. On methadone for pain for the nerve damage. Only time I don't have it is when I am asleep or drunk that is my biggest complaint and what I am sad about\"      Therapist: Suzanne BISHOP  Course of Treatment: Individual [facility]  Summary of Treatment Progress: Worked to utilize coping skills to manage negative moods and minimize use of negative coping skills such as drinking. Worked to discuss current triggers to depressed and anxious mood.    To what extent did the consumer achieve their goals? Some  Criteria for Discharge: Need to be transfered to another service/level of care within the system  Aftercare Recommendations: Starting with [facility] for services due to recent suicide attempt.    Discharge Medications: Current Medications[1]     Describe consumers ability and willingness to work and solve her mental problem.   Consistency with services were inconsistent but when present willing to be open and honest and work to utilize skills and techniques.    Substance Abuse History:  Social History     Substance and Sexual Activity   Drug Use Never    Comment: usues methadone for pain relief       Family Psychiatric History: Family History[2]        Social History     Socioeconomic History    Marital status:      Spouse name: Not on file    Number of children: Not on file    Years of education: Not on file    Highest education level: Not on file   Occupational History    Not on file   Tobacco Use    Smoking status: " Never     Passive exposure: Never    Smokeless tobacco: Never   Vaping Use    Vaping status: Never Used   Substance and Sexual Activity    Alcohol use: Not Currently    Drug use: Never     Comment: usues methadone for pain relief    Sexual activity: Not Currently     Partners: Male   Other Topics Concern    Not on file   Social History Narrative    Not on file     Social Drivers of Health     Financial Resource Strain: High Risk (12/26/2024)    Received from [facility]    Overall Financial Resource Strain (CARDIA)     Difficulty of Paying Living Expenses: Very hard   Food Insecurity: No Food Insecurity (12/26/2024)    Received from [facility]    Hunger Vital Sign     Within the past 12 months, you worried that your food would run out before you got the money to buy more.: Never true     Within the past 12 months, the food you bought just didn't last and you didn't have money to get more.: Never true   Transportation Needs: Unmet Transportation Needs (12/26/2024)    Received from [facility]    PRAPARE - Transportation     Lack of Transportation (Medical): Yes     Lack of Transportation (Non-Medical): Yes   Physical Activity: Not on file   Stress: Not on file   Social Connections: Not on file   Intimate Partner Violence: Not At Risk (12/26/2024)    Received from [facility]    Humiliation, Afraid, Rape, and Kick questionnaire     Within the last year, have you been afraid of your partner or ex-partner?: No     Within the last year, have you been humiliated or emotionally abused in other ways by your partner or ex-partner?: No     Within the last year, have you been kicked, hit, slapped, or otherwise physically hurt by your partner or ex-partner?: No     Within the last year, have you been raped or forced to have any kind of sexual activity by your partner or ex-partner?: No   Housing Stability: High Risk (12/26/2024)    Received from [facility]    Housing Stability Vital Sign     In the last 12 months, was there a time when you were not able to pay the mortgage or rent on time?: Yes     In the past 12 months, how many times have you moved where you were living?: 1     At any time in the past 12 months, were you homeless or living in a shelter (including now)?: No     Social History     Social History Narrative    Not on file       Mental Status at Time of most recent visit on .     Mental status:  Appearance   Mood   Affect   Speech   Thought Processes   Hallucinations   Thought Content   Abnormal Thoughts   Orientation    Remote Memory   Attention Span   Intellect Appears to be of Average Intelligence  Fund of Knowledge   Insight   Judgement   Muscle Strength   Language   Fund of Knowledge   Pain   Pain Scale 0    Visit start and stop times:    Start Time: 11:46 AM    Stop Time: 11:46 AM         [1]   Current Outpatient Medications:     acetaminophen (TYLENOL) 325 mg tablet, Take 2 tablets (650 mg total) by mouth every 6 (six) hours as needed for mild pain, Disp: , Rfl:     apixaban (Eliquis) 5 mg, Take 1 tablet (5 mg total) by mouth 2 (two) times a day, Disp: 180 tablet, Rfl: 1    Cholecalciferol (Vitamin D3) 50 MCG (2000 UT) capsule, Take 2 capsules (4,000 Units total) by mouth daily, Disp: 180 capsule, Rfl: 1    estradiol (VAGIFEM, YUVAFEM) 10 MCG TABS vaginal tablet, , Disp: , Rfl:     famotidine (PEPCID) 20 mg tablet, Take 1 tablet (20 mg total) by mouth 2 (two) times a day, Disp: 60 tablet, Rfl: 0    ferrous sulfate 324 (65 Fe) mg, Take 1 tablet (324 mg total) by mouth daily before breakfast, Disp: 90 tablet, Rfl: 0    fluticasone (FLONASE) 50 mcg/act nasal spray, 2 sprays into each nostril daily, Disp: 16 g, Rfl: 0    lidocaine (LIDODERM) 5 %, Apply 1 patch topically over 12 hours daily Remove & Discard patch within 12 hours or as directed by MD, Disp: 30 patch, Rfl: 0    methadone (Dolophine) 10 mg tablet, Take 3 tablets (30 mg  total) by mouth daily Max Daily Amount: 30 mg, Disp: 90 tablet, Rfl: 0    naloxone (NARCAN) 4 mg/0.1 mL nasal spray, Administer 1 spray into a nostril. If no response after 2-3 minutes, give another dose in the other nostril using a new spray., Disp: 1 each, Rfl: 1    omeprazole (PriLOSEC) 20 mg delayed release capsule, Take 1 capsule (20 mg total) by mouth daily, Disp: 90 capsule, Rfl: 1    Pediatric Multiple Vit-C-FA (FRUITY CHEWABLES MULTIVITAMIN) CHEW, Chew 1 tablet in the morning., Disp: , Rfl:     pseudoephedrine (SUDAFED) 60 mg tablet, Take 1 tablet (60 mg total) by mouth every 6 (six) hours as needed for congestion for up to 5 days, Disp: 20 tablet, Rfl: 0    rOPINIRole (REQUIP) 1 mg tablet, Take 1 tablet (1 mg total) by mouth in the morning, Disp: 90 tablet, Rfl: 0    senna (SENOKOT) 8.6 MG tablet, Take 1 tablet by mouth as needed, Disp: , Rfl:     sertraline (ZOLOFT) 100 mg tablet, Take 1 tablet (100 mg total) by mouth 2 (two) times a day, Disp: 180 tablet, Rfl: 1    sucralfate (Carafate) 1 g/10 mL suspension, Take 10 mL (1 g total) by mouth 4 (four) times a day as needed (dyspepsia), Disp: 420 mL, Rfl: 2    topiramate (TOPAMAX) 100 mg tablet, Take 1 tablet (100 mg total) by mouth 2 (two) times a day, Disp: 180 tablet, Rfl: 1    traZODone (DESYREL) 100 mg tablet, Take 1 tablet (100 mg total) by mouth daily at bedtime, Disp: 90 tablet, Rfl: 0    venlafaxine (EFFEXOR-XR) 37.5 mg 24 hr capsule, Take 2 capsules (75 mg total) by mouth daily with breakfast, Disp: 90 capsule, Rfl: 0    vitamin B-12 (VITAMIN B-12) 1,000 mcg tablet, Take 1 tablet (1,000 mcg total) by mouth daily, Disp: 90 tablet, Rfl: 1  [2]   Family History  Problem Relation Name Age of Onset    Diabetes Family          MELLITUS    Cancer Family      Hypertension Family      Osteoporosis Family      Asthma Family      Stroke Family          SYNDROME    Breast cancer Mother My mom     Depression Mother My mom     COPD Father      Asthma Child

## 2025-06-16 NOTE — PROGRESS NOTES
"Provider received contact from patient when canceling apt noting \"I am going to a new doctor for therapy. I was just hospitalized for depression and am in need of an outpatient program, which I will be going to called [facility].\" On 6/11/25 provider delayed return contact due to her being hospitalized. Clinician returned contact today and LVM to check in and proceed with discharge.  "

## 2025-06-20 NOTE — TELEPHONE ENCOUNTER
DISCHARGE LETTER for Suzanne Cope MA (certified and regular) placed in outgoing mail on 06/20/25.    Article #:  [id number]    Address:  [address] [place] [id number]

## 2025-06-25 PROBLEM — M47.816 SPONDYLOSIS OF LUMBAR SPINE: Status: ACTIVE | Noted: 2025-06-25

## 2025-06-26 ENCOUNTER — CONSULT (OUTPATIENT)
Dept: NEUROSURGERY | Facility: CLINIC | Age: 55
End: 2025-06-26
Payer: MEDICARE

## 2025-06-26 VITALS
TEMPERATURE: 98.1 F | BODY MASS INDEX: 28.31 KG/M2 | WEIGHT: 159.8 LBS | RESPIRATION RATE: 20 BRPM | HEART RATE: 67 BPM | HEIGHT: 63 IN | SYSTOLIC BLOOD PRESSURE: 128 MMHG | OXYGEN SATURATION: 98 % | DIASTOLIC BLOOD PRESSURE: 78 MMHG

## 2025-06-26 DIAGNOSIS — M47.816 SPONDYLOSIS OF LUMBAR SPINE: Primary | ICD-10-CM

## 2025-06-26 DIAGNOSIS — M85.80 LOW BONE MASS: ICD-10-CM

## 2025-06-26 DIAGNOSIS — M25.512 LEFT SHOULDER PAIN, UNSPECIFIED CHRONICITY: ICD-10-CM

## 2025-06-26 PROCEDURE — 99245 OFF/OP CONSLTJ NEW/EST HI 55: CPT | Performed by: NURSE PRACTITIONER

## 2025-06-26 NOTE — TELEPHONE ENCOUNTER
Reason for call:   [x] Refill   [] Prior Auth  [] Other:     Office:   [x] PCP/Provider - Solis Altamirano,    [] Specialty/Provider -     Medication: methadone (Dolophine) 10 mg tablet     Dose/Frequency: Take 3 tablets (30 mg total) by mouth daily     Quantity: 90    Pharmacy: [facility]   Does the patient have enough for 3 days?   [] Yes   [x] No - Send as HP to POD

## 2025-06-26 NOTE — ASSESSMENT & PLAN NOTE
Symptoms as addressed in HPI  She reports urinary incontinence in the morning when she gets out of bed, does not occur daily.  She denies dysfunction with bowel, saddle anesthesia or motor deficits.  Nonfocal neurologic examination    Imaging  5/31/2024 MRI Lumbar ---Transitional anatomy. Please correlate with imaging prior to any treatment.   Chronic compression deformity of L5 with superimposed Schmorl's node indenting the superior endplate, stable in height compared to the seventh 2023 exam. No new compression deformity seen.   Mild lumbar spondylosis. No high-grade spinal canal or neural foraminal stenosis. L5 nerve root impingement not excluded.     Plan  Ordered MRI of the lumbar spine without contrast--- given she has worsening acute onset of pain pain in the low back after moving and lifting heavy objects in the setting og HO osteopenia there is concern for compression fracture.  She has a prior history of an L5 compression fracture and is not optimized on medications.   Explained we will hold on ordering physical therapy for pain management until MRI is reviewed and verify if she has a compression fracture.  If no compression fracture we will then order physical therapy and pain management referral.  Request checkout schedule a follow-up appointment within 3 days to 1 week of MRI completion.  Request checkout assist patient to schedule the DEXA scan ordered on 3/7/2025  Reviewed red flag signs of nerve root compression advised if this develops she should report to the closest emergency room for assessment  Advised if she has additional questions or concerns she should contact the neurosurgery office.    Orders:    MRI lumbar spine without contrast; Future
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
## 2025-06-27 ENCOUNTER — APPOINTMENT (OUTPATIENT)
Dept: URGENT CARE | Facility: CLINIC | Age: 55
End: 2025-06-27

## 2025-06-27 ENCOUNTER — OCCMED (OUTPATIENT)
Dept: URGENT CARE | Facility: CLINIC | Age: 55
End: 2025-06-27

## 2025-06-27 DIAGNOSIS — N95.8 GENITOURINARY SYNDROME OF MENOPAUSE: Primary | ICD-10-CM

## 2025-06-27 DIAGNOSIS — S22.31XA CLOSED FRACTURE OF ONE RIB OF RIGHT SIDE, INITIAL ENCOUNTER: ICD-10-CM

## 2025-06-27 DIAGNOSIS — Z02.1 PRE-EMPLOYMENT EXAMINATION: Primary | ICD-10-CM

## 2025-06-27 RX ORDER — METHADONE HYDROCHLORIDE 10 MG/1
30 TABLET ORAL DAILY
Qty: 90 TABLET | Refills: 0 | Status: SHIPPED | OUTPATIENT
Start: 2025-06-27 | End: 2025-06-28 | Stop reason: SDUPTHER

## 2025-06-27 NOTE — PROGRESS NOTES
This encounter was created for [facility] orders only . -After signed consent, chart reviewed medication and allergy list for physical.

## 2025-06-27 NOTE — TELEPHONE ENCOUNTER
Patient called to request a refill for their methadone advised a refill was requested on 6-26-25 and is pending approval. Patient verbalized understanding and is in agreement.     Does the patient have enough for 3 days?   [] Yes   [x] No - Send as HP to POD

## 2025-06-28 ENCOUNTER — NURSE TRIAGE (OUTPATIENT)
Dept: OTHER | Facility: OTHER | Age: 55
End: 2025-06-28

## 2025-06-28 ENCOUNTER — DOCUMENTATION (OUTPATIENT)
Dept: INTERNAL MEDICINE CLINIC | Facility: CLINIC | Age: 55
End: 2025-06-28

## 2025-06-28 DIAGNOSIS — M25.512 LEFT SHOULDER PAIN, UNSPECIFIED CHRONICITY: ICD-10-CM

## 2025-06-28 DIAGNOSIS — M25.512 LEFT SHOULDER PAIN, UNSPECIFIED CHRONICITY: Primary | ICD-10-CM

## 2025-06-28 RX ORDER — METHADONE HYDROCHLORIDE 10 MG/1
30 TABLET ORAL DAILY
Qty: 90 TABLET | Refills: 0 | Status: SHIPPED | OUTPATIENT
Start: 2025-06-28 | End: 2025-06-28 | Stop reason: SDUPTHER

## 2025-06-28 RX ORDER — METHADONE HYDROCHLORIDE 10 MG/1
30 TABLET ORAL DAILY
Qty: 90 TABLET | Refills: 0 | Status: SHIPPED | OUTPATIENT
Start: 2025-06-28

## 2025-06-28 NOTE — TELEPHONE ENCOUNTER
"Regarding: Send prescription to alt. pharmacy/ pharmacy closes at 5 pm  ----- Message from Karina NEAL sent at 6/28/2025  2:20 PM EDT -----  Patient stated, \"I called 4 hours ago and still have not heard back from a nurse. I'm worried that if I don't hear back from a nurse, I will go through withdrawal.\"    ----- Message from Elida MITCHELL sent at 6/28/2025  1:20 PM EDT -----  Patient stated, \" I called two hours ago for my medication refill and I still have not received a call back.\"    # Patient was made aware that a message was sent to the nurses previously.    ----- Message from Dulce MITCHELL sent at 6/28/2025 11:42 AM EDT -----  Patient stated, \"I need my prescription for my methadone (Dolophine) 10 mg tablet----sent to another pharmacy because the initial does not have it in stock. Please send it to [id number] - [place] [address] [facility], [address] [place] [id number], Phone: [phone]  Fax: [phone]. Thank you.\"    "

## 2025-06-28 NOTE — TELEPHONE ENCOUNTER
"On call provider made aware and will be sending rx to [facility] in [place]. Patient made aware and expressed understanding.       Reason for Disposition   [1] Caller has URGENT medicine question about med that PCP or specialist prescribed AND [2] triager unable to answer question    Answer Assessment - Initial Assessment Questions  1. NAME of MEDICINE: \"What medicine(s) are you calling about?\"        Methadone     2. QUESTION: \"What is your question?\" (e.g., double dose of medicine, side effect)        [facility] is out of stock of methadone, patient requesting     3. PRESCRIBER: \"Who prescribed the medicine?\" Reason: if prescribed by specialist, call should be referred to that group.        Dr. Merritt Albarran    4. SYMPTOMS: \"Do you have any symptoms?\" If Yes, ask: \"What symptoms are you having?\"  \"How bad are the symptoms (e.g., mild, moderate, severe)        Patient afraid of withdrawal symptoms    Protocols used: Medication Question Call-Adult-    "

## 2025-06-28 NOTE — TELEPHONE ENCOUNTER
Patient called back to report medication was not sent to [facility] and pharmacy is now closed. On call provider made aware and sent rx to [facility]. Patient made aware.

## 2025-06-30 RX ORDER — LIDOCAINE 50 MG/G
1 PATCH TOPICAL DAILY
Qty: 30 PATCH | Refills: 0 | Status: SHIPPED | OUTPATIENT
Start: 2025-06-30

## 2025-06-30 RX ORDER — ESTRADIOL 10 UG/1
1 TABLET, FILM COATED VAGINAL 2 TIMES WEEKLY
Qty: 24 TABLET | Refills: 0 | Status: SHIPPED | OUTPATIENT
Start: 2025-06-30

## 2025-07-03 ENCOUNTER — HOSPITAL ENCOUNTER (OUTPATIENT)
Dept: MRI IMAGING | Facility: HOSPITAL | Age: 55
End: 2025-07-03
Attending: NURSE PRACTITIONER
Payer: MEDICARE

## 2025-07-03 DIAGNOSIS — M85.80 LOW BONE MASS: ICD-10-CM

## 2025-07-03 DIAGNOSIS — M47.816 SPONDYLOSIS OF LUMBAR SPINE: ICD-10-CM

## 2025-07-03 PROCEDURE — 72148 MRI LUMBAR SPINE W/O DYE: CPT

## 2025-07-05 ENCOUNTER — APPOINTMENT (OUTPATIENT)
Dept: LAB | Facility: HOSPITAL | Age: 55
End: 2025-07-05
Payer: MEDICARE

## 2025-07-05 LAB
ALBUMIN SERPL BCG-MCNC: 4 G/DL (ref 3.5–5)
ALP SERPL-CCNC: 97 U/L (ref 34–104)
ALT SERPL W P-5'-P-CCNC: 16 U/L (ref 7–52)
ANION GAP SERPL CALCULATED.3IONS-SCNC: 9 MMOL/L (ref 4–13)
AST SERPL W P-5'-P-CCNC: 26 U/L (ref 13–39)
BILIRUB SERPL-MCNC: 0.46 MG/DL (ref 0.2–1)
BUN SERPL-MCNC: 15 MG/DL (ref 5–25)
CALCIUM SERPL-MCNC: 8.4 MG/DL (ref 8.4–10.2)
CHLORIDE SERPL-SCNC: 108 MMOL/L (ref 96–108)
CO2 SERPL-SCNC: 23 MMOL/L (ref 21–32)
CREAT SERPL-MCNC: 0.68 MG/DL (ref 0.6–1.3)
GFR SERPL CREATININE-BSD FRML MDRD: 97 ML/MIN/1.73SQ M
GLUCOSE SERPL-MCNC: 90 MG/DL (ref 65–140)
POTASSIUM SERPL-SCNC: 4 MMOL/L (ref 3.5–5.3)
PROT SERPL-MCNC: 6.9 G/DL (ref 6.4–8.4)
SODIUM SERPL-SCNC: 140 MMOL/L (ref 135–147)

## 2025-07-05 PROCEDURE — 80053 COMPREHEN METABOLIC PANEL: CPT

## 2025-07-05 PROCEDURE — 36415 COLL VENOUS BLD VENIPUNCTURE: CPT

## 2025-07-06 ENCOUNTER — APPOINTMENT (OUTPATIENT)
Dept: LAB | Facility: CLINIC | Age: 55
End: 2025-07-06
Attending: INTERNAL MEDICINE
Payer: MEDICARE

## 2025-07-06 DIAGNOSIS — R79.89 ELEVATED LFTS: ICD-10-CM

## 2025-07-07 ENCOUNTER — OFFICE VISIT (OUTPATIENT)
Dept: INTERNAL MEDICINE CLINIC | Facility: CLINIC | Age: 55
End: 2025-07-07
Payer: MEDICARE

## 2025-07-07 ENCOUNTER — TELEPHONE (OUTPATIENT)
Age: 55
End: 2025-07-07

## 2025-07-07 VITALS
WEIGHT: 155 LBS | RESPIRATION RATE: 18 BRPM | HEIGHT: 63 IN | BODY MASS INDEX: 27.46 KG/M2 | HEART RATE: 67 BPM | TEMPERATURE: 97.6 F | DIASTOLIC BLOOD PRESSURE: 70 MMHG | SYSTOLIC BLOOD PRESSURE: 124 MMHG | OXYGEN SATURATION: 97 %

## 2025-07-07 DIAGNOSIS — Z12.31 ENCOUNTER FOR SCREENING MAMMOGRAM FOR BREAST CANCER: ICD-10-CM

## 2025-07-07 DIAGNOSIS — N93.0 PCB (POST COITAL BLEEDING): ICD-10-CM

## 2025-07-07 DIAGNOSIS — K91.2 POSTSURGICAL MALABSORPTION: ICD-10-CM

## 2025-07-07 DIAGNOSIS — Z00.00 ANNUAL PHYSICAL EXAM: ICD-10-CM

## 2025-07-07 DIAGNOSIS — G89.4 PAIN SYNDROME, CHRONIC: ICD-10-CM

## 2025-07-07 DIAGNOSIS — K59.00 CONSTIPATION, UNSPECIFIED CONSTIPATION TYPE: ICD-10-CM

## 2025-07-07 DIAGNOSIS — I82.511 CHRONIC DEEP VEIN THROMBOSIS (DVT) OF RIGHT FEMORAL VEIN (HCC): Chronic | ICD-10-CM

## 2025-07-07 DIAGNOSIS — N39.41 URGE INCONTINENCE: Primary | ICD-10-CM

## 2025-07-07 DIAGNOSIS — F51.01 PRIMARY INSOMNIA: ICD-10-CM

## 2025-07-07 PROCEDURE — 99396 PREV VISIT EST AGE 40-64: CPT | Performed by: INTERNAL MEDICINE

## 2025-07-07 PROCEDURE — 99214 OFFICE O/P EST MOD 30 MIN: CPT | Performed by: INTERNAL MEDICINE

## 2025-07-07 NOTE — TELEPHONE ENCOUNTER
Patient called and stated Medical forms were to be faxed to new job and last page that included list of medications were not received. Please refax and include list of meds.     [employer] [phone]    Make sure patients name is on fax

## 2025-07-07 NOTE — ASSESSMENT & PLAN NOTE
-Chronic nerve pain issues related to prior abdominoplasty complicated by MRSA infection of the right hip. Also with history of L5 burst fracture, L4 compression deformities, chronic back pain  -Continue methadone 30 mg daily.  Continues to tolerate without issue    Will obtain updated urine drug screen  -Patient has severe pain caused by a medical condition  -There are no current concerns regarding opioid misuse or use disorder    -Continue to monitor for concomitant drug-drug interactions and potential for adverse events  -Counseled on potential adverse effects of opioid analgesics, including risk of misuse, abuse, addiction  -Based on patient's clinical circumstances, amount of opioid prescribed is warranted in order to adequately manage the patient's pain  -Patient has previously tried and had inadequate response to fentanyl patches and tramadol    Orders:  •  Urine Drug Screen  •  Methylphenidate  •  Fentanyl with Confirmation  •  Buprenorphine w/ Naloxone  •  Naltrexone  •  Gabapentin  •  Pregabalin  •  Synthetic Stimulants  •  [facility] Prescribed Medications

## 2025-07-07 NOTE — ASSESSMENT & PLAN NOTE
Symptoms appear to be stable overall at this time.  Continue with combination of trazodone and Requip at bedtime

## 2025-07-07 NOTE — PROGRESS NOTES
Adult Annual Physical  Name: Ely Rivera      : 1970      MRN: [id number]  Encounter Provider: Solis Altamirano DO  Encounter Date: 2025   Encounter department: [facility] [place]    Medical history of protein S deficiency/factor 5 Leiden mutation with chronic DVT on Eliquis, SBO in May of 2021 status post surgical intervention, chronic pain maintained on methadone, status post gastric bypass procedure complicated by iron deficiency anemia, L4/5 compression fractures, central sleep apnea, insomnia    She has been doing well at home.  Has a new partner who she is very pleased with.  Also starting new job as a  which she is happy with    Assessment & Plan  Urge incontinence  Intermittent urge incontinence.  She will wake up in the morning and have difficulty making it to the toilet in time.  States she did have incontinence issues years ago and appears she may have been referred for PFMT.  She does not report loss of urine with laughing, coughing, sneezing.  She had not been sexually active for some time and did notice postcoital bleeding with her partner  -For now, I recommend evaluation with gynecology to discuss both her incontinence issues and postcoital bleeding       Encounter for screening mammogram for breast cancer    Orders:  •  Mammo screening bilateral w 3d and cad; Future    Pain syndrome, chronic  -Chronic nerve pain issues related to prior abdominoplasty complicated by MRSA infection of the right hip. Also with history of L5 burst fracture, L4 compression deformities, chronic back pain  -Continue methadone 30 mg daily.  Continues to tolerate without issue    Will obtain updated urine drug screen  -Patient has severe pain caused by a medical condition  -There are no current concerns regarding opioid misuse or use disorder    -Continue to monitor for concomitant drug-drug interactions and potential for adverse events  -Counseled on potential adverse  effects of opioid analgesics, including risk of misuse, abuse, addiction  -Based on patient's clinical circumstances, amount of opioid prescribed is warranted in order to adequately manage the patient's pain  -Patient has previously tried and had inadequate response to fentanyl patches and tramadol    Orders:  •  Urine Drug Screen  •  Methylphenidate  •  Fentanyl with Confirmation  •  Buprenorphine w/ Naloxone  •  Naltrexone  •  Gabapentin  •  Pregabalin  •  Synthetic Stimulants  •  [facility] Prescribed Medications     PCB (post coital bleeding)  See above plan for urge incontinence       Postsurgical malabsorption  Continue to monitor for nutritional deficiency  Orders:  •  CBC and differential; Future  •  Comprehensive metabolic panel; Future  •  Vitamin B1, whole blood; Future  •  Folate; Future  •  Vitamin B12; Future  •  Vitamin D 25 hydroxy; Future  •  Iron Panel (Includes Ferritin, Iron Sat%, Iron, and TIBC); Future  •  Lipid Panel with Direct LDL reflex; Future    Constipation, unspecified constipation type  Bothersome constipation.  Has tried Dulcolax without much improvement.  Recommend trial of Metamucil as well as as needed MiraLAX up to 2 times daily       Chronic deep vein thrombosis (DVT) of right femoral vein (HCC)  -history of factor 5 Leiden mutation, protein S deficiency, recurrent DVT and PE  -remains on indefinite anticoagulation with Eliquis  Primary insomnia  Symptoms appear to be stable overall at this time.  Continue with combination of trazodone and Requip at bedtime            Annual physical exam             Preventive Screenings:  - Diabetes Screening: screening up-to-date  - Hepatitis C screening: screening up-to-date   - HIV screening: screening up-to-date   - Breast cancer screening: screening up-to-date   - Colon cancer screening: screening up-to-date   - Lung cancer screening: screening not indicated     Immunizations:  - Immunizations due: Prevnar 20 and Zoster (Shingrix)      "    History of Present Illness     Adult Annual Physical:  Patient presents for annual physical.     Diet and Physical Activity:  - Diet/Nutrition: portion control.  - Exercise: moderate cardiovascular exercise.    Depression Screening:    - PHQ-9 Score: 0    General Health:  - Sleep: 7-8 hours of sleep on average.  - Hearing: normal hearing bilateral ears.  - Vision: wears glasses and most recent eye exam > 1 year ago.  - Dental: regular dental visits and brushes teeth twice daily.    /GYN Health:  - Follows with GYN: yes.   - History of STDs: no    Advanced Care Planning:  - Has an advanced directive?: no    - Has a durable medical POA?: no      Review of Systems      Objective   /70 (BP Location: Right arm, Patient Position: Sitting, Cuff Size: Standard)   Pulse 67   Temp 97.6 °F (36.4 °C) (Temporal)   Resp 18   Ht 5' 3\" (1.6 m)   Wt 70.3 kg (155 lb)   LMP 08/12/2020   SpO2 97%   BMI 27.46 kg/m²     Physical Exam  Constitutional:       Appearance: Normal appearance. She is not ill-appearing.   HENT:      Head: Normocephalic and atraumatic.     Eyes:      General: No scleral icterus.        Right eye: No discharge.         Left eye: No discharge.       Cardiovascular:      Rate and Rhythm: Normal rate and regular rhythm.      Heart sounds: No murmur heard.     No friction rub.   Pulmonary:      Effort: Pulmonary effort is normal.      Breath sounds: Normal breath sounds. No wheezing or rales.     Musculoskeletal:         General: No swelling, tenderness or deformity.     Skin:     General: Skin is warm and dry.      Findings: No erythema.     Neurological:      Mental Status: She is alert and oriented to person, place, and time. Mental status is at baseline.      Motor: No weakness.     Psychiatric:         Mood and Affect: Mood normal.         Behavior: Behavior normal.         "

## 2025-07-09 ENCOUNTER — HOSPITAL ENCOUNTER (OUTPATIENT)
Dept: RADIOLOGY | Age: 55
Discharge: HOME | End: 2025-07-09
Attending: INTERNAL MEDICINE
Payer: MEDICARE

## 2025-07-09 VITALS — HEIGHT: 63 IN | BODY MASS INDEX: 27.46 KG/M2 | WEIGHT: 155 LBS

## 2025-07-09 DIAGNOSIS — Z12.31 ENCOUNTER FOR SCREENING MAMMOGRAM FOR BREAST CANCER: ICD-10-CM

## 2025-07-09 PROCEDURE — 77063 BREAST TOMOSYNTHESIS BI: CPT

## 2025-07-09 PROCEDURE — 77067 SCR MAMMO BI INCL CAD: CPT

## 2025-07-10 ENCOUNTER — OFFICE VISIT (OUTPATIENT)
Dept: NEUROSURGERY | Facility: CLINIC | Age: 55
End: 2025-07-10
Payer: MEDICARE

## 2025-07-10 ENCOUNTER — RESULTS FOLLOW-UP (OUTPATIENT)
Dept: INTERNAL MEDICINE CLINIC | Facility: CLINIC | Age: 55
End: 2025-07-10

## 2025-07-10 VITALS
DIASTOLIC BLOOD PRESSURE: 62 MMHG | HEIGHT: 63 IN | HEART RATE: 71 BPM | BODY MASS INDEX: 27.46 KG/M2 | WEIGHT: 155 LBS | OXYGEN SATURATION: 97 % | RESPIRATION RATE: 17 BRPM | TEMPERATURE: 98 F | SYSTOLIC BLOOD PRESSURE: 124 MMHG

## 2025-07-10 DIAGNOSIS — M54.6 THORACOLUMBAR BACK PAIN: Primary | ICD-10-CM

## 2025-07-10 DIAGNOSIS — M79.18 MYOFASCIAL MUSCLE PAIN: ICD-10-CM

## 2025-07-10 DIAGNOSIS — M54.50 THORACOLUMBAR BACK PAIN: Primary | ICD-10-CM

## 2025-07-10 PROCEDURE — 99215 OFFICE O/P EST HI 40 MIN: CPT | Performed by: NURSE PRACTITIONER

## 2025-07-10 NOTE — ASSESSMENT & PLAN NOTE
She reports TTP with deep palpation of the thoracolumbar area.  Orders:    Ambulatory referral to Spine & Pain Management; Future    Ambulatory Referral to Physical Therapy; Future

## 2025-07-10 NOTE — ASSESSMENT & PLAN NOTE
Symptoms as addressed in HPI  She reports treating with an antispasmodic in the past that helped spasms she experienced after gastric bypass surgery and bowel obstructions is currently not treating with an antispasmodic.    Imaging  7/3/2025 MRI of the lumbar spine without-No acute abnormality of lumbar spine.Unchanged chronic L5 superior endplate compression fracture with mild height loss and small intravertebral herniation.Similar multilevel degenerative changes of lumbar spine, as detailed above. No significant canal stenosis or foraminal narrowing.      Plan  Reviewed MRI of the lumbar spine in great detail.  Explained no indication for neurosurgical intervention at this time.  Symptoms as documented in HPI  She has not received multimodal treatment PT never  or pain management in greater than 6 months ago.  Ordered referral to spine and pain for myofascial thoracolumbar pain  Ordered physical therapy for muscle strengthening as MRI of the lumbar spine demonstrates loss of muscle mass in thoracic or lumbar area.,  Heat and massage, and TENS unit as appropriate.  Patient reports use of a TENS unit years ago and can no longer locate the device.  Advised to return to office as needed if symptoms fail to improve after 6 weeks of multimodal treatment.  Advised if she has additional questions or concerns to contact surgery office.  Orders:    Ambulatory referral to Spine & Pain Management; Future    Ambulatory Referral to Physical Therapy; Future
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
## 2025-07-11 LAB
1OH-MIDAZOLAM UR-MCNC: NEGATIVE NG/ML
6-BETA NALTREXOL UR CFM-MCNC: NEGATIVE NG/ML
6MAM UR QL: NEGATIVE NG/ML
A-OH ALPRAZ UR-MCNC: NEGATIVE NG/ML
A-OH-TRIAZOLAM UR-MCNC: NEGATIVE NG/ML
AMPHETAMINES UR QL: NEGATIVE NG/ML
BARBITURATES UR QL: NEGATIVE NG/ML
BENZODIAZ UR QL: ABNORMAL NG/ML
BUPRENORPHINE UR QL: NEGATIVE NG/ML
BZE UR QL: NEGATIVE NG/ML
COMMENT: NORMAL
CREAT UR-MCNC: 103.6 MG/DL
EDDP UR-MCNC: 9198 NG/ML
ETHANOL UR QL: POSITIVE NG/ML
ETHYL GLUCURONIDE UR-MCNC: 7050 NG/ML
ETHYL SULFATE UR-MCNC: 3998 NG/ML
FENTANYL: NEGATIVE NG/ML
GABAPENTIN UR-MCNC: NEGATIVE NG/ML
LORAZEPAM UR-MCNC: NEGATIVE NG/ML
Lab: NEGATIVE NG/ML
METHADONE UR QL: POSITIVE NG/ML
METHADONE UR-MCNC: 6920 NG/ML
NALTREXONE UR-MCNC: NEGATIVE NG/ML
NORDIAZEPAM UR-MCNC: NEGATIVE NG/ML
OPIATES UR QL: NEGATIVE NG/ML
OXAZEPAM UR-MCNC: NEGATIVE NG/ML
OXIDANTS UR QL: NEGATIVE MCG/ML
OXYCODONE UR QL: NEGATIVE NG/ML
PCP UR QL: NEGATIVE NG/ML
PH UR: 6.1 [PH] (ref 4.5–9)
SL AMB AMINOCLONAZEPAM: NEGATIVE NG/ML
SL AMB HYDROXYETHYLFLURAZEPAM: NEGATIVE NG/ML
SL AMB MEDMATCH EDDP: ABNORMAL
SL AMB MEDMATCH ETG: ABNORMAL
SL AMB MEDMATCH ETS: ABNORMAL
SL AMB MEDMATCH METHADONE: ABNORMAL
SL AMB PREGABALIN: NEGATIVE NG/ML
SL AMB RITALINIC ACID: NEGATIVE NG/ML
TEMAZEPAM UR-MCNC: NEGATIVE NG/ML
THC UR QL: NEGATIVE NG/ML

## 2025-07-16 ENCOUNTER — EVALUATION (OUTPATIENT)
Dept: PHYSICAL THERAPY | Facility: REHABILITATION | Age: 55
End: 2025-07-16
Attending: NURSE PRACTITIONER
Payer: MEDICARE

## 2025-07-16 DIAGNOSIS — M54.50 THORACOLUMBAR BACK PAIN: Primary | ICD-10-CM

## 2025-07-16 DIAGNOSIS — M79.18 MYOFASCIAL MUSCLE PAIN: ICD-10-CM

## 2025-07-16 DIAGNOSIS — M54.6 THORACOLUMBAR BACK PAIN: Primary | ICD-10-CM

## 2025-07-16 PROCEDURE — 97112 NEUROMUSCULAR REEDUCATION: CPT

## 2025-07-16 PROCEDURE — 97162 PT EVAL MOD COMPLEX 30 MIN: CPT
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
## 2025-07-23 ENCOUNTER — OFFICE VISIT (OUTPATIENT)
Dept: PHYSICAL THERAPY | Facility: REHABILITATION | Age: 55
End: 2025-07-23
Attending: NURSE PRACTITIONER
Payer: MEDICARE

## 2025-07-23 DIAGNOSIS — E53.8 B12 DEFICIENCY: ICD-10-CM

## 2025-07-23 DIAGNOSIS — M79.18 MYOFASCIAL MUSCLE PAIN: Primary | ICD-10-CM

## 2025-07-23 DIAGNOSIS — M25.512 LEFT SHOULDER PAIN, UNSPECIFIED CHRONICITY: ICD-10-CM

## 2025-07-23 DIAGNOSIS — M54.50 THORACOLUMBAR BACK PAIN: ICD-10-CM

## 2025-07-23 DIAGNOSIS — M54.6 THORACOLUMBAR BACK PAIN: ICD-10-CM

## 2025-07-23 PROCEDURE — 97112 NEUROMUSCULAR REEDUCATION: CPT

## 2025-07-23 PROCEDURE — 97110 THERAPEUTIC EXERCISES: CPT

## 2025-07-23 NOTE — PROGRESS NOTES
Daily Note     Today's date: 2025  Patient name: Ely Rivera  : 1970  MRN: [id number]  Referring provider: Allie Stark CRNP  Dx:   Encounter Diagnosis     ICD-10-CM    1. Myofascial muscle pain  M79.18       2. Thoracolumbar back pain  M54.50     M54.6           Start Time: 1420  Stop Time: 1505  Total time in clinic (min): 45 minutes    Subjective: Pt reports no significant change in status following last session.      Objective: See treatment diary below      Supine to Sit: R was slightly longer than the left.     Special Tests:  Lumbar Specific and Neural Tension                                                                            Test / Measure  2025   Straight Leg raise N Bilaterally   Crossed straight leg raise N Bilaterally   Slump test N Bilaterally   Prone instability test N Bilaterally   Sural n (invert), tibial (amy) N Bilaterally         Hip Special Testing:  Test / Measure  2025   RACHELE N Bilaterally   FADIR N Bilaterally   Hip Scour N Bilaterally         Assessment: Pt tolerated today's session well with an occasional increase in pain noted during or after completion of exercises. Pt required VC/TC for proper form during performance of posterior pelvic tilts during today's session, and was able to adjust accordingly. Pt was most challenged during today's session with the performance of glute bridges  HEP was updated and reviewed. Will monitor status NV and progress as appropriate. Pt will benefit from continued skilled PT services to address remaining impairments and improve QOL. Pt was 1:1 with Chidi Raphael PT, DPT from 220-245, IEP for remainder.        Plan: Continue per plan of care.  Progress treatment as tolerated.       Precautions: Chronic DVT of R femoral vein, GERD, Factor 5 Leiden Mutation, Anxiety, Depression, Chronic Pain Syndrome, Hx of PE, Dizziness, and chronic L5 superior endplate compression fracture. Hx of failed Suicide Attempt in May  "2025.    POC expires Unit limit Auth Expiration date PT/OT + Visit Limit?   9/10/2025  BOMN  BOMN         Visit/Unit Tracking  AUTH Status:  Date 7/16/2025 7/23        [employer] Used 1 2         Remaining              Pertinent Findings:      Test / Measure  7/16/2025   FOTO (Predicted 51) 43   Postural deficits    Dec gross b/l Hip strength Avg of 4-/5       Access Code: [email or web address]  URL: [email or web address]  Date: 07/23/2025  Prepared by: Chidi Raphael    Exercises  - Supine Transversus Abdominis Bracing - Hands on Stomach  - 1 x daily - 7 x weekly - 2 sets - 10 reps - 3 hold  - Supine Posterior Pelvic Tilt  - 1 x daily - 7 x weekly - 2 sets - 8 reps  - Sidelying Open Book Thoracic Rotation with Knee on Foam Roll  - 1 x daily - 7 x weekly - 3 sets - 10 reps  - Supine Bridge  - 1 x daily - 5 x weekly - 2 sets - 10 reps    Manuals 7/16/2025 7/23           LE stretching                                                    Neuro Re-Ed             Patient Education 10' 10'           Open Books  10x B           Pelvic Tilts  2x8           Cat/Camel             Thread the Needles             Foam Roller Protocol             Seated T/s Extension             TrA contractions  10x3\"           LTR  10x3\"                         Ther Ex             NuStep  L4 x 8'           Naresh Rows/Ext             Face pulls             Clamshells             No monies             Bridges  2x10                                     Ther Activity                          Objective Testing  5'           Gait Training                                       Modalities                                                "

## 2025-07-23 NOTE — TELEPHONE ENCOUNTER
Reason for call:   [x] Refill   [] Prior Auth  [] Other:     Office:   [x] PCP/Provider -   [] Specialty/Provider -     Medication: vitamin B-12 (VITAMIN B-12) 1,000 mcg tablet     Dose/Frequency: Take 1 tablet (1,000 mcg total) by mouth daily     Quantity: 90    Pharmacy: [facility]   Does the patient have enough for 3 days?   [x] Yes   [] No - Send as HP to POD    Mail Away Pharmacy   Does the patient have enough for 10 days?   [] Yes   [] No - Send as HP to POD

## 2025-07-23 NOTE — TELEPHONE ENCOUNTER
Reason for call:   [x] Refill   [] Prior Auth  [] Other:     Office:   [x] PCP/Provider -   [] Specialty/Provider -     Medication: methadone (Dolophine) 10 mg tablet     Dose/Frequency:      Take 3 tablets (30 mg total) by mouth daily Max Daily Amount: 30 mg                      Quantity: 90    Pharmacy: [facility]   Does the patient have enough for 3 days?   [x] Yes   [] No - Send as HP to POD    Mail Away Pharmacy   Does the patient have enough for 10 days?   [] Yes   [] No - Send as HP to POD

## 2025-07-24 ENCOUNTER — OFFICE VISIT (OUTPATIENT)
Dept: PHYSICAL THERAPY | Facility: REHABILITATION | Age: 55
End: 2025-07-24
Attending: NURSE PRACTITIONER
Payer: MEDICARE

## 2025-07-24 DIAGNOSIS — M54.6 THORACOLUMBAR BACK PAIN: ICD-10-CM

## 2025-07-24 DIAGNOSIS — M79.18 MYOFASCIAL MUSCLE PAIN: Primary | ICD-10-CM

## 2025-07-24 DIAGNOSIS — M54.50 THORACOLUMBAR BACK PAIN: ICD-10-CM

## 2025-07-24 PROCEDURE — 97112 NEUROMUSCULAR REEDUCATION: CPT

## 2025-07-24 PROCEDURE — 97110 THERAPEUTIC EXERCISES: CPT

## 2025-07-24 RX ORDER — LANOLIN ALCOHOL/MO/W.PET/CERES
1000 CREAM (GRAM) TOPICAL DAILY
Qty: 90 TABLET | Refills: 1 | Status: SHIPPED | OUTPATIENT
Start: 2025-07-24

## 2025-07-24 NOTE — PROGRESS NOTES
Daily Note     Today's date: 2025  Patient name: Ely Rivera  : 1970  MRN: [id number]  Referring provider: Allie Stark CRNP  Dx:   Encounter Diagnosis     ICD-10-CM    1. Myofascial muscle pain  M79.18       2. Thoracolumbar back pain  M54.50     M54.6           Start Time: 1432  Stop Time: 1515  Total time in clinic (min): 43 minutes    Subjective: Pt reports feeling good at the start of today's session and denies any increase in pain/soreness following yesterday's session.      Objective: See treatment diary below      Assessment: Pt tolerated today's session well with no increase in pain noted during or after completion of exercises. Pt responded favorably to the implementation of supine clamshells during today's session. Will monitor status NV and progress as appropriate. Pt will benefit from continued skilled PT services to address remaining impairments and improve QOL. Pt was 1:1 with Chidi Raphael PT, DPT from 232-255, IEP for remainder.          Plan: Continue per plan of care.  Progress treatment as tolerated.       Precautions: Chronic DVT of R femoral vein, GERD, Factor 5 Leiden Mutation, Anxiety, Depression, Chronic Pain Syndrome, Hx of PE, Dizziness, and chronic L5 superior endplate compression fracture. Hx of failed Suicide Attempt in May 2025.    POC expires Unit limit Auth Expiration date PT/OT + Visit Limit?   9/10/2025  BOMN  BOMN         Visit/Unit Tracking  AUTH Status:  Date 2025       [employer] Used 1 2 3        Remaining              Pertinent Findings:      Test / Measure  2025   FOTO (Predicted 51) 43   Postural deficits    Dec gross b/l Hip strength Avg of 4-/5       Access Code: [email or web address]  URL: [email or web address]  Date: 2025  Prepared by: Chidi Raphael    Exercises  - Supine Transversus Abdominis Bracing - Hands on Stomach  - 1 x daily - 7 x weekly - 2 sets - 10 reps - 3 hold  - Supine Posterior Pelvic Tilt  - 1 x daily - 7  "x weekly - 2 sets - 8 reps  - Sidelying Open Book Thoracic Rotation with Knee on Foam Roll  - 1 x daily - 7 x weekly - 3 sets - 10 reps  - Supine Bridge  - 1 x daily - 5 x weekly - 2 sets - 10 reps    Manuals 7/16/2025 7/23 7/24          LE stretching                                                    Neuro Re-Ed             Patient Education 10' 10'           Open Books  10x B 10x B          Pelvic Tilts  2x8 2x10          Cat/Camel             Thread the Needles             Foam Roller Protocol             Seated T/s Extension             TrA contractions  10x3\"           LTR  10x3\"  10x3\"                       Ther Ex             NuStep  L4 x 8' L5 x 8'  P*          Naresh Rows/Ext   2x10 8.5# rows    5# ext          Face pulls             Clamshells   2x12 Otb in supine          No monies             Bridges  2x10 2x10                                    Ther Activity                          Objective Testing  5'           Gait Training                                       Modalities                                                "

## 2025-07-25 RX ORDER — METHADONE HYDROCHLORIDE 10 MG/1
30 TABLET ORAL DAILY
Qty: 90 TABLET | Refills: 0 | Status: SHIPPED | OUTPATIENT
Start: 2025-07-25

## 2025-07-28 ENCOUNTER — OFFICE VISIT (OUTPATIENT)
Dept: PHYSICAL THERAPY | Facility: REHABILITATION | Age: 55
End: 2025-07-28
Attending: NURSE PRACTITIONER
Payer: MEDICARE

## 2025-07-28 DIAGNOSIS — M25.512 LEFT SHOULDER PAIN, UNSPECIFIED CHRONICITY: ICD-10-CM

## 2025-07-28 DIAGNOSIS — M54.6 THORACOLUMBAR BACK PAIN: ICD-10-CM

## 2025-07-28 DIAGNOSIS — M54.50 THORACOLUMBAR BACK PAIN: ICD-10-CM

## 2025-07-28 DIAGNOSIS — M79.18 MYOFASCIAL MUSCLE PAIN: Primary | ICD-10-CM

## 2025-07-28 PROCEDURE — 97112 NEUROMUSCULAR REEDUCATION: CPT

## 2025-07-28 PROCEDURE — 97110 THERAPEUTIC EXERCISES: CPT

## 2025-07-28 RX ORDER — METHADONE HYDROCHLORIDE 10 MG/1
30 TABLET ORAL DAILY
Qty: 90 TABLET | Refills: 0 | Status: CANCELLED | OUTPATIENT
Start: 2025-07-28

## 2025-07-29 ENCOUNTER — TELEPHONE (OUTPATIENT)
Age: 55
End: 2025-07-29

## 2025-07-29 DIAGNOSIS — G89.4 CHRONIC PAIN SYNDROME: ICD-10-CM

## 2025-07-29 DIAGNOSIS — M54.6 THORACOLUMBAR BACK PAIN: ICD-10-CM

## 2025-07-29 DIAGNOSIS — M54.50 THORACOLUMBAR BACK PAIN: ICD-10-CM

## 2025-07-29 DIAGNOSIS — M25.512 LEFT SHOULDER PAIN, UNSPECIFIED CHRONICITY: ICD-10-CM

## 2025-07-29 RX ORDER — METHADONE HYDROCHLORIDE 10 MG/1
30 TABLET ORAL DAILY
Qty: 90 TABLET | Refills: 0 | Status: SHIPPED | OUTPATIENT
Start: 2025-07-29

## 2025-07-29 RX ORDER — METHADONE HYDROCHLORIDE 10 MG/1
30 TABLET ORAL DAILY
Qty: 90 TABLET | Refills: 0 | Status: SHIPPED | OUTPATIENT
Start: 2025-07-29 | End: 2025-07-29 | Stop reason: SDUPTHER

## 2025-07-29 RX ORDER — METHADONE HYDROCHLORIDE 10 MG/1
30 TABLET ORAL DAILY
Qty: 90 TABLET | Refills: 0 | Status: CANCELLED | OUTPATIENT
Start: 2025-07-29

## 2025-07-30 ENCOUNTER — OFFICE VISIT (OUTPATIENT)
Dept: PHYSICAL THERAPY | Facility: REHABILITATION | Age: 55
End: 2025-07-30
Attending: NURSE PRACTITIONER
Payer: MEDICARE

## 2025-07-30 ENCOUNTER — TELEPHONE (OUTPATIENT)
Dept: INTERNAL MEDICINE CLINIC | Facility: CLINIC | Age: 55
End: 2025-07-30

## 2025-07-30 DIAGNOSIS — M54.6 THORACOLUMBAR BACK PAIN: ICD-10-CM

## 2025-07-30 DIAGNOSIS — M79.18 MYOFASCIAL MUSCLE PAIN: Primary | ICD-10-CM

## 2025-07-30 DIAGNOSIS — S22.31XA CLOSED FRACTURE OF ONE RIB OF RIGHT SIDE, INITIAL ENCOUNTER: ICD-10-CM

## 2025-07-30 DIAGNOSIS — I82.409 ACUTE DVT (DEEP VENOUS THROMBOSIS) (HCC): ICD-10-CM

## 2025-07-30 DIAGNOSIS — M54.50 THORACOLUMBAR BACK PAIN: ICD-10-CM

## 2025-07-30 DIAGNOSIS — F51.01 PRIMARY INSOMNIA: ICD-10-CM

## 2025-07-30 PROCEDURE — 97110 THERAPEUTIC EXERCISES: CPT

## 2025-07-30 PROCEDURE — 97140 MANUAL THERAPY 1/> REGIONS: CPT

## 2025-07-30 RX ORDER — TRAZODONE HYDROCHLORIDE 100 MG/1
100 TABLET ORAL
Qty: 90 TABLET | Refills: 0 | Status: SHIPPED | OUTPATIENT
Start: 2025-07-30

## 2025-07-31 DIAGNOSIS — K21.9 GERD WITHOUT ESOPHAGITIS: ICD-10-CM

## 2025-07-31 RX ORDER — SUCRALFATE ORAL 1 G/10ML
1 SUSPENSION ORAL 4 TIMES DAILY PRN
Qty: 420 ML | Refills: 2 | Status: SHIPPED | OUTPATIENT
Start: 2025-07-31

## 2025-07-31 RX ORDER — LIDOCAINE 50 MG/G
1 PATCH TOPICAL DAILY
Qty: 30 PATCH | Refills: 0 | Status: SHIPPED | OUTPATIENT
Start: 2025-07-31

## 2025-08-01 DIAGNOSIS — Z00.6 ENCOUNTER FOR EXAMINATION FOR NORMAL COMPARISON OR CONTROL IN CLINICAL RESEARCH PROGRAM: ICD-10-CM

## 2025-08-04 ENCOUNTER — TELEPHONE (OUTPATIENT)
Dept: UROLOGY | Facility: AMBULATORY SURGERY CENTER | Age: 55
End: 2025-08-04

## 2025-08-04 ENCOUNTER — NURSE TRIAGE (OUTPATIENT)
Age: 55
End: 2025-08-04

## 2025-08-07 ENCOUNTER — OFFICE VISIT (OUTPATIENT)
Dept: INTERNAL MEDICINE CLINIC | Facility: CLINIC | Age: 55
End: 2025-08-07
Payer: MEDICARE

## 2025-08-07 ENCOUNTER — HOSPITAL ENCOUNTER (OUTPATIENT)
Dept: RADIOLOGY | Facility: HOSPITAL | Age: 55
Discharge: HOME | End: 2025-08-07
Payer: MEDICARE

## 2025-08-07 ENCOUNTER — APPOINTMENT (OUTPATIENT)
Dept: LAB | Facility: HOSPITAL | Age: 55
End: 2025-08-07
Payer: MEDICARE

## 2025-08-07 VITALS
HEIGHT: 63 IN | BODY MASS INDEX: 26.58 KG/M2 | SYSTOLIC BLOOD PRESSURE: 120 MMHG | RESPIRATION RATE: 18 BRPM | TEMPERATURE: 98.3 F | WEIGHT: 150 LBS | HEART RATE: 99 BPM | OXYGEN SATURATION: 98 % | DIASTOLIC BLOOD PRESSURE: 68 MMHG

## 2025-08-07 DIAGNOSIS — R07.89 CHEST WALL PAIN: Primary | ICD-10-CM

## 2025-08-07 DIAGNOSIS — R07.89 CHEST WALL PAIN: ICD-10-CM

## 2025-08-07 DIAGNOSIS — K43.9 VENTRAL HERNIA WITHOUT OBSTRUCTION OR GANGRENE: ICD-10-CM

## 2025-08-07 PROCEDURE — 99214 OFFICE O/P EST MOD 30 MIN: CPT | Performed by: INTERNAL MEDICINE

## 2025-08-07 PROCEDURE — 71110 X-RAY EXAM RIBS BIL 3 VIEWS: CPT

## 2025-08-08 ENCOUNTER — OFFICE VISIT (OUTPATIENT)
Dept: PHYSICAL THERAPY | Facility: REHABILITATION | Age: 55
End: 2025-08-08
Attending: NURSE PRACTITIONER
Payer: MEDICARE

## 2025-08-08 DIAGNOSIS — M79.18 MYOFASCIAL MUSCLE PAIN: Primary | ICD-10-CM

## 2025-08-08 DIAGNOSIS — M54.6 THORACOLUMBAR BACK PAIN: ICD-10-CM

## 2025-08-08 DIAGNOSIS — M54.50 THORACOLUMBAR BACK PAIN: ICD-10-CM

## 2025-08-08 PROCEDURE — 97112 NEUROMUSCULAR REEDUCATION: CPT

## 2025-08-08 PROCEDURE — 97110 THERAPEUTIC EXERCISES: CPT

## 2025-08-08 PROCEDURE — 97140 MANUAL THERAPY 1/> REGIONS: CPT

## 2025-08-11 ENCOUNTER — OFFICE VISIT (OUTPATIENT)
Dept: PHYSICAL THERAPY | Facility: REHABILITATION | Age: 55
End: 2025-08-11
Attending: NURSE PRACTITIONER
Payer: MEDICARE

## 2025-08-13 ENCOUNTER — EVALUATION (OUTPATIENT)
Dept: PHYSICAL THERAPY | Facility: REHABILITATION | Age: 55
End: 2025-08-13
Attending: NURSE PRACTITIONER
Payer: MEDICARE

## 2025-08-14 ENCOUNTER — OFFICE VISIT (OUTPATIENT)
Dept: UROLOGY | Facility: AMBULATORY SURGERY CENTER | Age: 55
End: 2025-08-14
Payer: MEDICARE

## 2025-08-15 ENCOUNTER — TELEPHONE (OUTPATIENT)
Age: 55
End: 2025-08-15

## 2025-08-18 ENCOUNTER — HOSPITAL ENCOUNTER (OUTPATIENT)
Dept: BONE DENSITY | Facility: MEDICAL CENTER | Age: 55
Discharge: HOME | End: 2025-08-18
Attending: INTERNAL MEDICINE
Payer: MEDICARE

## 2025-08-18 VITALS — WEIGHT: 148 LBS | HEIGHT: 64 IN | BODY MASS INDEX: 25.27 KG/M2

## 2025-08-18 DIAGNOSIS — S32.040D COMPRESSION FRACTURE OF L4 VERTEBRA WITH ROUTINE HEALING, SUBSEQUENT ENCOUNTER: ICD-10-CM

## 2025-08-18 PROCEDURE — 77080 DXA BONE DENSITY AXIAL: CPT

## 2025-08-19 ENCOUNTER — OFFICE VISIT (OUTPATIENT)
Dept: PHYSICAL THERAPY | Facility: REHABILITATION | Age: 55
End: 2025-08-19
Attending: NURSE PRACTITIONER
Payer: MEDICARE

## 2025-08-19 DIAGNOSIS — M79.18 MYOFASCIAL MUSCLE PAIN: Primary | ICD-10-CM

## 2025-08-19 DIAGNOSIS — M54.50 THORACOLUMBAR BACK PAIN: ICD-10-CM

## 2025-08-19 DIAGNOSIS — M54.6 THORACOLUMBAR BACK PAIN: ICD-10-CM

## 2025-08-19 PROCEDURE — 97110 THERAPEUTIC EXERCISES: CPT

## 2025-08-19 PROCEDURE — 97112 NEUROMUSCULAR REEDUCATION: CPT

## (undated) DEVICE — 1.25MM KIRSCHNER WIRE W/TROCAR POINT 150MM
Type: IMPLANTABLE DEVICE | Site: WRIST | Status: NON-FUNCTIONAL
Removed: 2022-01-06

## (undated) DEVICE — SUT ETHILON 3-0 FS-1 18 IN 663G

## (undated) DEVICE — 1.6MM KIRSCHNER WIRE W/TROCAR POINT 150MM
Type: IMPLANTABLE DEVICE | Site: WRIST | Status: NON-FUNCTIONAL
Removed: 2022-01-06

## (undated) DEVICE — GAUZE SPONGES,16 PLY: Brand: CURITY

## (undated) DEVICE — CUFF TOURNIQUET 18 X 4 IN QUICK CONNECT DISP 1 BLADDER

## (undated) DEVICE — 3M™ IOBAN™ 2 ANTIMICROBIAL INCISE DRAPE 6640EZ: Brand: IOBAN™ 2

## (undated) DEVICE — IMPERVIOUS STOCKINETTE: Brand: DEROYAL

## (undated) DEVICE — 1.8MM DRILL BIT WITH DEPTH MARK/QC/110MM

## (undated) DEVICE — NEEDLE 25G X 1 1/2

## (undated) DEVICE — VIOLET BRAIDED (POLYGLACTIN 910), SYNTHETIC ABSORBABLE SUTURE: Brand: COATED VICRYL

## (undated) DEVICE — TUBING SUCTION 5MM X 12 FT

## (undated) DEVICE — ACE WRAP 4 IN STERILE

## (undated) DEVICE — ABDOMINAL PAD: Brand: DERMACEA

## (undated) DEVICE — GLOVE INDICATOR PI UNDERGLOVE SZ 8 BLUE

## (undated) DEVICE — INTENDED FOR TISSUE SEPARATION, AND OTHER PROCEDURES THAT REQUIRE A SHARP SURGICAL BLADE TO PUNCTURE OR CUT.: Brand: BARD-PARKER SAFETY BLADES SIZE 15, STERILE

## (undated) DEVICE — PENCIL ELECTROSURG E-Z CLEAN -0035H

## (undated) DEVICE — GLOVE SRG BIOGEL ORTHOPEDIC 8

## (undated) DEVICE — STERILE BETHLEHEM PLASTIC HAND: Brand: CARDINAL HEALTH

## (undated) DEVICE — INTENDED FOR TISSUE SEPARATION, AND OTHER PROCEDURES THAT REQUIRE A SHARP SURGICAL BLADE TO PUNCTURE OR CUT.: Brand: BARD-PARKER SAFETY BLADES SIZE 10, STERILE

## (undated) DEVICE — 2.0MM DRILL BIT/QC/100MM

## (undated) DEVICE — ACE WRAP 4 IN UNSTERILE

## (undated) DEVICE — CAST PLASTER 3 IN ROLL

## (undated) DEVICE — 2.7MM CORTEX SCREW SELF-TAPPING 18MM: Type: IMPLANTABLE DEVICE | Site: WRIST | Status: NON-FUNCTIONAL

## (undated) DEVICE — CHLORAPREP HI-LITE 26ML ORANGE

## (undated) DEVICE — PADDING CAST 4 IN  COTTON STRL

## (undated) DEVICE — ELECTRODE BLADE MOD E-Z CLEAN 2.5IN 6.4CM -0012M

## (undated) DEVICE — OCCLUSIVE GAUZE STRIP,3% BISMUTH TRIBROMOPHENATE IN PETROLATUM BLEND: Brand: XEROFORM

## (undated) DEVICE — DRAPE C-ARM X-RAY